# Patient Record
Sex: FEMALE | Race: BLACK OR AFRICAN AMERICAN | Employment: UNEMPLOYED | URBAN - NONMETROPOLITAN AREA
[De-identification: names, ages, dates, MRNs, and addresses within clinical notes are randomized per-mention and may not be internally consistent; named-entity substitution may affect disease eponyms.]

---

## 2018-12-11 ENCOUNTER — APPOINTMENT (OUTPATIENT)
Dept: CT IMAGING | Age: 48
End: 2018-12-11
Payer: MEDICARE

## 2018-12-11 ENCOUNTER — HOSPITAL ENCOUNTER (EMERGENCY)
Age: 48
Discharge: HOME | End: 2018-12-11
Attending: EMERGENCY MEDICINE
Payer: MEDICARE

## 2018-12-11 VITALS
OXYGEN SATURATION: 97 % | TEMPERATURE: 96.7 F | RESPIRATION RATE: 16 BRPM | DIASTOLIC BLOOD PRESSURE: 118 MMHG | HEART RATE: 92 BPM | SYSTOLIC BLOOD PRESSURE: 189 MMHG

## 2018-12-11 DIAGNOSIS — M54.31 SCIATICA OF RIGHT SIDE: Primary | ICD-10-CM

## 2018-12-11 DIAGNOSIS — I10 HYPERTENSION, UNSPECIFIED TYPE: ICD-10-CM

## 2018-12-11 LAB
-: ABNORMAL
AMORPHOUS: ABNORMAL
BACTERIA: ABNORMAL
BILIRUBIN URINE: NEGATIVE
CASTS UA: ABNORMAL /LPF
COLOR: YELLOW
COMMENT UA: ABNORMAL
CRYSTALS, UA: ABNORMAL /HPF
EPITHELIAL CELLS UA: ABNORMAL /HPF (ref 0–25)
GLUCOSE URINE: ABNORMAL
HCG(URINE) PREGNANCY TEST: NEGATIVE
KETONES, URINE: NEGATIVE
LEUKOCYTE ESTERASE, URINE: NEGATIVE
MUCUS: ABNORMAL
NITRITE, URINE: POSITIVE
OTHER OBSERVATIONS UA: ABNORMAL
PH UA: 6 (ref 5–9)
PROTEIN UA: ABNORMAL
RBC UA: ABNORMAL /HPF (ref 0–2)
RENAL EPITHELIAL, UA: ABNORMAL /HPF
SPECIFIC GRAVITY UA: 1.02 (ref 1.01–1.02)
TRICHOMONAS: ABNORMAL
TURBIDITY: ABNORMAL
URINE HGB: ABNORMAL
UROBILINOGEN, URINE: NORMAL
WBC UA: ABNORMAL /HPF (ref 0–5)
YEAST: ABNORMAL

## 2018-12-11 PROCEDURE — 81001 URINALYSIS AUTO W/SCOPE: CPT

## 2018-12-11 PROCEDURE — 84703 CHORIONIC GONADOTROPIN ASSAY: CPT

## 2018-12-11 PROCEDURE — 6360000002 HC RX W HCPCS: Performed by: PHYSICIAN ASSISTANT

## 2018-12-11 PROCEDURE — 99284 EMERGENCY DEPT VISIT MOD MDM: CPT

## 2018-12-11 PROCEDURE — 96374 THER/PROPH/DIAG INJ IV PUSH: CPT

## 2018-12-11 PROCEDURE — 87186 SC STD MICRODIL/AGAR DIL: CPT

## 2018-12-11 PROCEDURE — 87086 URINE CULTURE/COLONY COUNT: CPT

## 2018-12-11 PROCEDURE — 87077 CULTURE AEROBIC IDENTIFY: CPT

## 2018-12-11 PROCEDURE — 72131 CT LUMBAR SPINE W/O DYE: CPT

## 2018-12-11 RX ORDER — ORPHENADRINE CITRATE 100 MG/1
100 TABLET, EXTENDED RELEASE ORAL 2 TIMES DAILY
Qty: 20 TABLET | Refills: 0 | Status: SHIPPED | OUTPATIENT
Start: 2018-12-11 | End: 2018-12-19 | Stop reason: ALTCHOICE

## 2018-12-11 RX ORDER — LISINOPRIL 40 MG/1
40 TABLET ORAL DAILY
COMMUNITY
End: 2018-12-19 | Stop reason: ALTCHOICE

## 2018-12-11 RX ORDER — CIPROFLOXACIN 500 MG/1
500 TABLET, FILM COATED ORAL 2 TIMES DAILY
Qty: 14 TABLET | Refills: 0 | Status: SHIPPED | OUTPATIENT
Start: 2018-12-11 | End: 2018-12-18

## 2018-12-11 RX ORDER — ONDANSETRON 4 MG/1
4 TABLET, ORALLY DISINTEGRATING ORAL ONCE
Status: COMPLETED | OUTPATIENT
Start: 2018-12-11 | End: 2018-12-11

## 2018-12-11 RX ORDER — MORPHINE SULFATE 4 MG/ML
4 INJECTION, SOLUTION INTRAMUSCULAR; INTRAVENOUS ONCE
Status: COMPLETED | OUTPATIENT
Start: 2018-12-11 | End: 2018-12-11

## 2018-12-11 RX ADMIN — MORPHINE SULFATE 4 MG: 4 INJECTION, SOLUTION INTRAMUSCULAR; INTRAVENOUS at 12:13

## 2018-12-11 RX ADMIN — ONDANSETRON 4 MG: 4 TABLET, ORALLY DISINTEGRATING ORAL at 12:13

## 2018-12-11 ASSESSMENT — ENCOUNTER SYMPTOMS
COUGH: 0
BACK PAIN: 1
ABDOMINAL PAIN: 0
BLOOD IN STOOL: 0
VOMITING: 0
DIARRHEA: 0
CONSTIPATION: 0
SORE THROAT: 0
NAUSEA: 0
EYE DISCHARGE: 0
RHINORRHEA: 0
WHEEZING: 0
SHORTNESS OF BREATH: 0
EYE REDNESS: 0
CHEST TIGHTNESS: 0

## 2018-12-11 ASSESSMENT — PAIN DESCRIPTION - PAIN TYPE
TYPE: ACUTE PAIN
TYPE: ACUTE PAIN

## 2018-12-11 ASSESSMENT — PAIN DESCRIPTION - ORIENTATION: ORIENTATION: RIGHT

## 2018-12-11 ASSESSMENT — PAIN SCALES - GENERAL
PAINLEVEL_OUTOF10: 8
PAINLEVEL_OUTOF10: 8
PAINLEVEL_OUTOF10: 2

## 2018-12-11 ASSESSMENT — PAIN DESCRIPTION - LOCATION: LOCATION: LEG

## 2018-12-11 NOTE — ED PROVIDER NOTES
[facility] ED  eMERGENCY dEPARTMENT eNCOUnter      Pt Name: Anne Driscoll  MRN: [id number]  [place] 1970  Date of evaluation: 12/11/2018  Provider: Kitty Lemos Dr     Chief Complaint   Patient presents with    Leg Pain     states was seen at [facility] last month and diagnosed with sciatica a month ago, states still painful. right leg and hip         HISTORY OF PRESENT ILLNESS   (Location/Symptom, Timing/Onset, Context/Setting,Quality, Duration, Modifying Factors, Severity)  Note limiting factors. Anne Driscoll is a49 y.o. female who presents to the emergency department with complaints of right lower back discomfort which goes into her right lower leg which is been ongoing for the past  2 months but worsening over the past few days. Patient states she was seen at a different local ER and told she likely had sciatica and was given pain medication and sent home. She states she has not had any imaging on her back. Reports she works as a . And states that her pain has worsened. She denies any numbness or tingling. She denies any bowel or bladder incontinence. She denies fever or chills. Reports that she had been off her antihypertensive medication but did start taking her lisinopril again. She denies any headache or dizziness. She denies any unilateral weakness. She denies any abdominal pain or urinary retention. No other complaints at this time. HPI    Nursing Notes werereviewed. REVIEW OF SYSTEMS    (2-9 systems for level 4, 10 or more for level 5)     Review of Systems   Constitutional: Negative for chills, diaphoresis and fever. HENT: Negative for congestion, ear pain, rhinorrhea and sore throat. Eyes: Negative for discharge, redness and visual disturbance. Respiratory: Negative for cough, chest tightness, shortness of breath and wheezing. Cardiovascular: Negative for chest pain and palpitations.    Gastrointestinal: Negative for abdominal pain, blood in stool, constipation, diarrhea, nausea and vomiting. Endocrine: Negative for polydipsia, polyphagia and polyuria. Genitourinary: Negative for decreased urine volume, difficulty urinating, dysuria, frequency and hematuria. Musculoskeletal: Positive for back pain. Negative for arthralgias and myalgias. Skin: Negative for pallor and rash. Allergic/Immunologic: Negative for food allergies and immunocompromised state. Neurological: Negative for dizziness, syncope, weakness and light-headedness. Hematological: Negative for adenopathy. Does not bruise/bleed easily. Psychiatric/Behavioral: Negative for behavioral problems and suicidal ideas. The patient is not nervous/anxious. Except as noted above the remainder of the review of systems was reviewed and negative. PAST MEDICAL HISTORY     Past Medical History:   Diagnosis Date    Diabetes mellitus ([facility] [address]     Hypertension          SURGICALHISTORY     History reviewed. No pertinent surgical history. CURRENT MEDICATIONS       Discharge Medication List as of 12/11/2018  1:26 PM      CONTINUE these medications which have NOT CHANGED    Details   metFORMIN (GLUCOPHAGE) 1000 MG tablet Take 1,000 mg by mouth 2 times daily (with meals)Historical Med      lisinopril (PRINIVIL;ZESTRIL) 40 MG tablet Take 40 mg by mouth dailyHistorical Med             ALLERGIES     Patient has no known allergies. FAMILY HISTORY     History reviewed. No pertinent family history.        SOCIAL HISTORY       Social History     Social History    Marital status: Single     Spouse name: N/A    Number of children: N/A    Years of education: N/A     Social History Main Topics    Smoking status: Never Smoker    Smokeless tobacco: None    Alcohol use None    Drug use: Unknown    Sexual activity: Not Asked     Other Topics Concern    None     Social History Narrative    None       SCREENINGS    Doris Coma Scale  Eye Opening: Spontaneous  Best

## 2018-12-12 LAB
CULTURE: ABNORMAL
Lab: ABNORMAL
ORGANISM: ABNORMAL
SPECIMEN DESCRIPTION: ABNORMAL
STATUS: ABNORMAL

## 2018-12-18 RX ORDER — GLIPIZIDE 5 MG/1
5 TABLET ORAL
COMMUNITY
End: 2018-12-19 | Stop reason: ALTCHOICE

## 2018-12-19 ENCOUNTER — OFFICE VISIT (OUTPATIENT)
Dept: PRIMARY CARE CLINIC | Age: 48
End: 2018-12-19
Payer: MEDICARE

## 2018-12-19 VITALS
SYSTOLIC BLOOD PRESSURE: 180 MMHG | BODY MASS INDEX: 31.79 KG/M2 | HEART RATE: 98 BPM | RESPIRATION RATE: 16 BRPM | WEIGHT: 186.2 LBS | DIASTOLIC BLOOD PRESSURE: 100 MMHG | HEIGHT: 64 IN | TEMPERATURE: 97.2 F

## 2018-12-19 DIAGNOSIS — I10 UNCONTROLLED HYPERTENSION: ICD-10-CM

## 2018-12-19 DIAGNOSIS — M54.16 LUMBAR BACK PAIN WITH RADICULOPATHY AFFECTING RIGHT LOWER EXTREMITY: ICD-10-CM

## 2018-12-19 DIAGNOSIS — E11.65 UNCONTROLLED TYPE 2 DIABETES MELLITUS WITH HYPERGLYCEMIA (HCC): Primary | ICD-10-CM

## 2018-12-19 DIAGNOSIS — Z23 NEED FOR INFLUENZA VACCINATION: ICD-10-CM

## 2018-12-19 LAB — HBA1C MFR BLD: 10.4 %

## 2018-12-19 PROCEDURE — G8482 FLU IMMUNIZE ORDER/ADMIN: HCPCS | Performed by: NURSE PRACTITIONER

## 2018-12-19 PROCEDURE — 83036 HEMOGLOBIN GLYCOSYLATED A1C: CPT | Performed by: NURSE PRACTITIONER

## 2018-12-19 PROCEDURE — 1036F TOBACCO NON-USER: CPT | Performed by: NURSE PRACTITIONER

## 2018-12-19 PROCEDURE — 3046F HEMOGLOBIN A1C LEVEL >9.0%: CPT | Performed by: NURSE PRACTITIONER

## 2018-12-19 PROCEDURE — G8417 CALC BMI ABV UP PARAM F/U: HCPCS | Performed by: NURSE PRACTITIONER

## 2018-12-19 PROCEDURE — 90686 IIV4 VACC NO PRSV 0.5 ML IM: CPT | Performed by: NURSE PRACTITIONER

## 2018-12-19 PROCEDURE — 90471 IMMUNIZATION ADMIN: CPT | Performed by: NURSE PRACTITIONER

## 2018-12-19 PROCEDURE — 99203 OFFICE O/P NEW LOW 30 MIN: CPT | Performed by: NURSE PRACTITIONER

## 2018-12-19 PROCEDURE — 2022F DILAT RTA XM EVC RTNOPTHY: CPT | Performed by: NURSE PRACTITIONER

## 2018-12-19 PROCEDURE — G8427 DOCREV CUR MEDS BY ELIG CLIN: HCPCS | Performed by: NURSE PRACTITIONER

## 2018-12-19 RX ORDER — LOSARTAN POTASSIUM AND HYDROCHLOROTHIAZIDE 25; 100 MG/1; MG/1
1 TABLET ORAL DAILY
Qty: 90 TABLET | Refills: 1 | Status: SHIPPED | OUTPATIENT
Start: 2018-12-19 | End: 2019-07-23 | Stop reason: SDUPTHER

## 2018-12-19 RX ORDER — GABAPENTIN 300 MG/1
300 CAPSULE ORAL 3 TIMES DAILY
Qty: 270 CAPSULE | Refills: 0 | Status: SHIPPED | OUTPATIENT
Start: 2018-12-19 | End: 2019-02-04 | Stop reason: SDUPTHER

## 2018-12-19 ASSESSMENT — ENCOUNTER SYMPTOMS
BOWEL INCONTINENCE: 0
SHORTNESS OF BREATH: 0
ABDOMINAL PAIN: 0
SORE THROAT: 0
CONSTIPATION: 0
NAUSEA: 0
WHEEZING: 0
DIARRHEA: 0
BACK PAIN: 1
COUGH: 0
VOMITING: 0
RHINORRHEA: 0

## 2018-12-19 ASSESSMENT — PATIENT HEALTH QUESTIONNAIRE - PHQ9
2. FEELING DOWN, DEPRESSED OR HOPELESS: 0
SUM OF ALL RESPONSES TO PHQ9 QUESTIONS 1 & 2: 0
SUM OF ALL RESPONSES TO PHQ QUESTIONS 1-9: 0
SUM OF ALL RESPONSES TO PHQ QUESTIONS 1-9: 0
1. LITTLE INTEREST OR PLEASURE IN DOING THINGS: 0

## 2018-12-20 ENCOUNTER — HOSPITAL ENCOUNTER (OUTPATIENT)
Age: 48
Discharge: HOME | End: 2018-12-20
Payer: MEDICARE

## 2018-12-20 ENCOUNTER — TELEPHONE (OUTPATIENT)
Dept: PRIMARY CARE CLINIC | Age: 48
End: 2018-12-20

## 2018-12-20 DIAGNOSIS — E11.65 UNCONTROLLED TYPE 2 DIABETES MELLITUS WITH HYPERGLYCEMIA (HCC): ICD-10-CM

## 2018-12-20 DIAGNOSIS — E78.5 DYSLIPIDEMIA: Primary | ICD-10-CM

## 2018-12-20 DIAGNOSIS — D50.9 MICROCYTIC ANEMIA: Primary | ICD-10-CM

## 2018-12-20 LAB
ABSOLUTE EOS #: 0.22 K/UL (ref 0–0.44)
ABSOLUTE IMMATURE GRANULOCYTE: <0.03 K/UL (ref 0–0.3)
ABSOLUTE LYMPH #: 2.11 K/UL (ref 1.1–3.7)
ABSOLUTE MONO #: 0.74 K/UL (ref 0.1–1.2)
ALBUMIN SERPL-MCNC: 4 G/DL (ref 3.5–5.2)
ALBUMIN/GLOBULIN RATIO: 1.2 (ref 1–2.5)
ALP BLD-CCNC: 53 U/L (ref 35–104)
ALT SERPL-CCNC: 14 U/L (ref 5–33)
ANION GAP SERPL CALCULATED.3IONS-SCNC: 12 MMOL/L (ref 9–17)
AST SERPL-CCNC: 14 U/L
BASOPHILS # BLD: 1 % (ref 0–2)
BASOPHILS ABSOLUTE: 0.07 K/UL (ref 0–0.2)
BILIRUB SERPL-MCNC: 0.3 MG/DL (ref 0.3–1.2)
BUN BLDV-MCNC: 8 MG/DL (ref 6–20)
BUN/CREAT BLD: 15 (ref 9–20)
CALCIUM SERPL-MCNC: 10.2 MG/DL (ref 8.6–10.4)
CHLORIDE BLD-SCNC: 95 MMOL/L (ref 98–107)
CHOLESTEROL/HDL RATIO: 3.9
CHOLESTEROL: 212 MG/DL
CO2: 26 MMOL/L (ref 20–31)
CREAT SERPL-MCNC: 0.55 MG/DL (ref 0.5–0.9)
CREATININE URINE: 44.7 MG/DL (ref 28–217)
DIFFERENTIAL TYPE: ABNORMAL
EOSINOPHILS RELATIVE PERCENT: 3 % (ref 1–4)
GFR AFRICAN AMERICAN: >60 ML/MIN
GFR NON-AFRICAN AMERICAN: >60 ML/MIN
GFR SERPL CREATININE-BSD FRML MDRD: ABNORMAL ML/MIN/{1.73_M2}
GFR SERPL CREATININE-BSD FRML MDRD: ABNORMAL ML/MIN/{1.73_M2}
GLUCOSE BLD-MCNC: 228 MG/DL (ref 70–99)
HCT VFR BLD CALC: 32.3 % (ref 36.3–47.1)
HDLC SERPL-MCNC: 55 MG/DL
HEMOGLOBIN: 9.7 G/DL (ref 11.9–15.1)
IMMATURE GRANULOCYTES: 0 %
LDL CHOLESTEROL: 117 MG/DL (ref 0–130)
LYMPHOCYTES # BLD: 27 % (ref 24–43)
MCH RBC QN AUTO: 21.4 PG (ref 25.2–33.5)
MCHC RBC AUTO-ENTMCNC: 30 G/DL (ref 28.4–34.8)
MCV RBC AUTO: 71.3 FL (ref 82.6–102.9)
MICROALBUMIN/CREAT 24H UR: 180 MG/L
MICROALBUMIN/CREAT UR-RTO: 403 MCG/MG CREAT
MONOCYTES # BLD: 9 % (ref 3–12)
NRBC AUTOMATED: 0 PER 100 WBC
PDW BLD-RTO: 17.2 % (ref 11.8–14.4)
PLATELET # BLD: 447 K/UL (ref 138–453)
PLATELET ESTIMATE: ABNORMAL
PMV BLD AUTO: 9.1 FL (ref 8.1–13.5)
POTASSIUM SERPL-SCNC: 4.1 MMOL/L (ref 3.7–5.3)
RBC # BLD: 4.53 M/UL (ref 3.95–5.11)
RBC # BLD: ABNORMAL 10*6/UL
SEG NEUTROPHILS: 60 % (ref 36–65)
SEGMENTED NEUTROPHILS ABSOLUTE COUNT: 4.77 K/UL (ref 1.5–8.1)
SODIUM BLD-SCNC: 133 MMOL/L (ref 135–144)
TOTAL PROTEIN: 7.3 G/DL (ref 6.4–8.3)
TRIGL SERPL-MCNC: 202 MG/DL
VLDLC SERPL CALC-MCNC: ABNORMAL MG/DL (ref 1–30)
WBC # BLD: 7.9 K/UL (ref 3.5–11.3)
WBC # BLD: ABNORMAL 10*3/UL

## 2018-12-20 PROCEDURE — 85025 COMPLETE CBC W/AUTO DIFF WBC: CPT

## 2018-12-20 PROCEDURE — 82043 UR ALBUMIN QUANTITATIVE: CPT

## 2018-12-20 PROCEDURE — 80061 LIPID PANEL: CPT

## 2018-12-20 PROCEDURE — 82570 ASSAY OF URINE CREATININE: CPT

## 2018-12-20 PROCEDURE — 36415 COLL VENOUS BLD VENIPUNCTURE: CPT

## 2018-12-20 PROCEDURE — 80053 COMPREHEN METABOLIC PANEL: CPT

## 2018-12-20 RX ORDER — ATORVASTATIN CALCIUM 40 MG/1
40 TABLET, FILM COATED ORAL DAILY
Qty: 90 TABLET | Refills: 1 | Status: SHIPPED | OUTPATIENT
Start: 2018-12-20 | End: 2019-10-04 | Stop reason: SDUPTHER

## 2018-12-20 NOTE — TELEPHONE ENCOUNTER
----- Message from [address] [place] [address], APRN - CNP sent at 12/20/2018  4:46 PM EST -----  Labs stable, lipids elevated have patient start atorvastatin 40 mg daily. Rx sent. Thank you.

## 2018-12-21 ENCOUNTER — HOSPITAL ENCOUNTER (OUTPATIENT)
Age: 48
Discharge: HOME | End: 2018-12-21
Payer: MEDICARE

## 2018-12-21 DIAGNOSIS — D50.9 MICROCYTIC ANEMIA: ICD-10-CM

## 2018-12-21 DIAGNOSIS — D50.9 IRON DEFICIENCY ANEMIA, UNSPECIFIED IRON DEFICIENCY ANEMIA TYPE: Primary | ICD-10-CM

## 2018-12-21 LAB
FERRITIN: 16 UG/L (ref 13–150)
FOLATE: >20 NG/ML
IRON SATURATION: 7 % (ref 20–55)
IRON: 28 UG/DL (ref 37–145)
TOTAL IRON BINDING CAPACITY: 424 UG/DL (ref 250–450)
UNSATURATED IRON BINDING CAPACITY: 396.2 UG/DL (ref 112–347)
VITAMIN B-12: 655 PG/ML (ref 232–1245)

## 2018-12-21 PROCEDURE — 83540 ASSAY OF IRON: CPT

## 2018-12-21 PROCEDURE — 82728 ASSAY OF FERRITIN: CPT

## 2018-12-21 PROCEDURE — 82746 ASSAY OF FOLIC ACID SERUM: CPT

## 2018-12-21 PROCEDURE — 83550 IRON BINDING TEST: CPT

## 2018-12-21 PROCEDURE — 36415 COLL VENOUS BLD VENIPUNCTURE: CPT

## 2018-12-21 PROCEDURE — 82607 VITAMIN B-12: CPT

## 2018-12-24 ENCOUNTER — TELEPHONE (OUTPATIENT)
Dept: PRIMARY CARE CLINIC | Age: 48
End: 2018-12-24

## 2019-01-02 ENCOUNTER — TELEPHONE (OUTPATIENT)
Dept: PRIMARY CARE CLINIC | Age: 49
End: 2019-01-02

## 2019-01-02 DIAGNOSIS — M54.16 LUMBAR RADICULAR PAIN: Primary | ICD-10-CM

## 2019-01-10 ENCOUNTER — OFFICE VISIT (OUTPATIENT)
Dept: ONCOLOGY | Age: 49
End: 2019-01-10
Payer: MEDICARE

## 2019-01-10 ENCOUNTER — TELEPHONE (OUTPATIENT)
Dept: PRIMARY CARE CLINIC | Age: 49
End: 2019-01-10

## 2019-01-10 VITALS
HEIGHT: 64 IN | SYSTOLIC BLOOD PRESSURE: 183 MMHG | RESPIRATION RATE: 18 BRPM | BODY MASS INDEX: 31.92 KG/M2 | TEMPERATURE: 97.5 F | WEIGHT: 187 LBS | DIASTOLIC BLOOD PRESSURE: 114 MMHG | HEART RATE: 111 BPM

## 2019-01-10 DIAGNOSIS — D50.9 MICROCYTIC ANEMIA: Primary | ICD-10-CM

## 2019-01-10 DIAGNOSIS — K90.49 MALABSORPTION DUE TO INTOLERANCE, NOT ELSEWHERE CLASSIFIED: ICD-10-CM

## 2019-01-10 DIAGNOSIS — D50.9 IRON DEFICIENCY ANEMIA, UNSPECIFIED IRON DEFICIENCY ANEMIA TYPE: ICD-10-CM

## 2019-01-10 PROCEDURE — 1036F TOBACCO NON-USER: CPT | Performed by: INTERNAL MEDICINE

## 2019-01-10 PROCEDURE — 99204 OFFICE O/P NEW MOD 45 MIN: CPT | Performed by: INTERNAL MEDICINE

## 2019-01-10 PROCEDURE — G8417 CALC BMI ABV UP PARAM F/U: HCPCS | Performed by: INTERNAL MEDICINE

## 2019-01-10 PROCEDURE — G8482 FLU IMMUNIZE ORDER/ADMIN: HCPCS | Performed by: INTERNAL MEDICINE

## 2019-01-10 PROCEDURE — 3017F COLORECTAL CA SCREEN DOC REV: CPT | Performed by: INTERNAL MEDICINE

## 2019-01-10 PROCEDURE — G8427 DOCREV CUR MEDS BY ELIG CLIN: HCPCS | Performed by: INTERNAL MEDICINE

## 2019-01-10 RX ORDER — SODIUM CHLORIDE 9 MG/ML
INJECTION, SOLUTION INTRAVENOUS ONCE
Status: CANCELLED | OUTPATIENT
Start: 2019-01-14 | End: 2019-01-14

## 2019-01-10 RX ORDER — SODIUM CHLORIDE 9 MG/ML
INJECTION, SOLUTION INTRAVENOUS CONTINUOUS
Status: CANCELLED | OUTPATIENT
Start: 2019-01-14

## 2019-01-10 RX ORDER — 0.9 % SODIUM CHLORIDE 0.9 %
10 VIAL (ML) INJECTION ONCE
Status: CANCELLED | OUTPATIENT
Start: 2019-01-14 | End: 2019-01-14

## 2019-01-10 RX ORDER — DIPHENHYDRAMINE HYDROCHLORIDE 50 MG/ML
50 INJECTION INTRAMUSCULAR; INTRAVENOUS ONCE
Status: CANCELLED | OUTPATIENT
Start: 2019-01-14 | End: 2019-01-14

## 2019-01-10 RX ORDER — SODIUM CHLORIDE 0.9 % (FLUSH) 0.9 %
5 SYRINGE (ML) INJECTION PRN
Status: CANCELLED | OUTPATIENT
Start: 2019-01-14

## 2019-01-10 RX ORDER — HEPARIN SODIUM (PORCINE) LOCK FLUSH IV SOLN 100 UNIT/ML 100 UNIT/ML
500 SOLUTION INTRAVENOUS PRN
Status: CANCELLED | OUTPATIENT
Start: 2019-01-14

## 2019-01-10 RX ORDER — METHYLPREDNISOLONE SODIUM SUCCINATE 125 MG/2ML
125 INJECTION, POWDER, LYOPHILIZED, FOR SOLUTION INTRAMUSCULAR; INTRAVENOUS ONCE
Status: CANCELLED | OUTPATIENT
Start: 2019-01-14 | End: 2019-01-14

## 2019-01-10 RX ORDER — SODIUM CHLORIDE 0.9 % (FLUSH) 0.9 %
10 SYRINGE (ML) INJECTION PRN
Status: CANCELLED | OUTPATIENT
Start: 2019-01-14

## 2019-01-10 ASSESSMENT — ENCOUNTER SYMPTOMS
BLOOD IN STOOL: 0
DIARRHEA: 0
CHEST TIGHTNESS: 0
CONSTIPATION: 0
NAUSEA: 0
BACK PAIN: 0
WHEEZING: 0
ABDOMINAL PAIN: 0
EYE ITCHING: 0
COUGH: 0
VOMITING: 0
EYE REDNESS: 0
SHORTNESS OF BREATH: 0
COLOR CHANGE: 0

## 2019-01-11 ENCOUNTER — TELEPHONE (OUTPATIENT)
Dept: GASTROENTEROLOGY | Age: 49
End: 2019-01-11

## 2019-01-11 ENCOUNTER — TELEPHONE (OUTPATIENT)
Dept: ONCOLOGY | Age: 49
End: 2019-01-11

## 2019-01-11 DIAGNOSIS — D50.9 IRON DEFICIENCY ANEMIA, UNSPECIFIED IRON DEFICIENCY ANEMIA TYPE: Primary | ICD-10-CM

## 2019-01-11 RX ORDER — SODIUM, POTASSIUM,MAG SULFATES 17.5-3.13G
SOLUTION, RECONSTITUTED, ORAL ORAL
Qty: 2 BOTTLE | Refills: 0 | Status: SHIPPED | OUTPATIENT
Start: 2019-01-11 | End: 2019-02-20 | Stop reason: ALTCHOICE

## 2019-01-14 ENCOUNTER — NURSE ONLY (OUTPATIENT)
Dept: PRIMARY CARE CLINIC | Age: 49
End: 2019-01-14

## 2019-01-14 VITALS
HEART RATE: 102 BPM | HEIGHT: 64 IN | BODY MASS INDEX: 33.68 KG/M2 | RESPIRATION RATE: 16 BRPM | SYSTOLIC BLOOD PRESSURE: 162 MMHG | WEIGHT: 197.3 LBS | DIASTOLIC BLOOD PRESSURE: 97 MMHG | TEMPERATURE: 97.4 F

## 2019-01-14 DIAGNOSIS — I10 UNCONTROLLED HYPERTENSION: Primary | ICD-10-CM

## 2019-01-14 RX ORDER — NIFEDIPINE 30 MG/1
30 TABLET, EXTENDED RELEASE ORAL DAILY
Qty: 90 TABLET | Refills: 1 | Status: SHIPPED | OUTPATIENT
Start: 2019-01-14 | End: 2019-02-04 | Stop reason: DRUGHIGH

## 2019-01-15 ENCOUNTER — HOSPITAL ENCOUNTER (OUTPATIENT)
Dept: INFUSION THERAPY | Age: 49
Discharge: HOME | End: 2019-01-15
Payer: MEDICARE

## 2019-01-15 VITALS
DIASTOLIC BLOOD PRESSURE: 87 MMHG | RESPIRATION RATE: 18 BRPM | HEART RATE: 101 BPM | SYSTOLIC BLOOD PRESSURE: 163 MMHG | TEMPERATURE: 97.2 F

## 2019-01-15 DIAGNOSIS — K90.49 MALABSORPTION DUE TO INTOLERANCE, NOT ELSEWHERE CLASSIFIED: ICD-10-CM

## 2019-01-15 DIAGNOSIS — D50.9 IRON DEFICIENCY ANEMIA, UNSPECIFIED IRON DEFICIENCY ANEMIA TYPE: ICD-10-CM

## 2019-01-15 PROCEDURE — 2580000003 HC RX 258: Performed by: INTERNAL MEDICINE

## 2019-01-15 PROCEDURE — 6360000002 HC RX W HCPCS: Performed by: INTERNAL MEDICINE

## 2019-01-15 PROCEDURE — 96365 THER/PROPH/DIAG IV INF INIT: CPT

## 2019-01-15 RX ORDER — HEPARIN SODIUM (PORCINE) LOCK FLUSH IV SOLN 100 UNIT/ML 100 UNIT/ML
500 SOLUTION INTRAVENOUS PRN
Status: CANCELLED | OUTPATIENT
Start: 2019-01-15

## 2019-01-15 RX ORDER — SODIUM CHLORIDE 0.9 % (FLUSH) 0.9 %
10 SYRINGE (ML) INJECTION PRN
Status: DISCONTINUED | OUTPATIENT
Start: 2019-01-15 | End: 2019-01-16 | Stop reason: HOSPADM

## 2019-01-15 RX ORDER — METHYLPREDNISOLONE SODIUM SUCCINATE 125 MG/2ML
125 INJECTION, POWDER, LYOPHILIZED, FOR SOLUTION INTRAMUSCULAR; INTRAVENOUS ONCE
Status: CANCELLED | OUTPATIENT
Start: 2019-01-15 | End: 2019-01-15

## 2019-01-15 RX ORDER — SODIUM CHLORIDE 9 MG/ML
INJECTION, SOLUTION INTRAVENOUS ONCE
Status: COMPLETED | OUTPATIENT
Start: 2019-01-15 | End: 2019-01-15

## 2019-01-15 RX ORDER — SODIUM CHLORIDE 9 MG/ML
INJECTION, SOLUTION INTRAVENOUS ONCE
Status: CANCELLED | OUTPATIENT
Start: 2019-01-15 | End: 2019-01-15

## 2019-01-15 RX ORDER — SODIUM CHLORIDE 0.9 % (FLUSH) 0.9 %
5 SYRINGE (ML) INJECTION PRN
Status: CANCELLED | OUTPATIENT
Start: 2019-01-15

## 2019-01-15 RX ORDER — SODIUM CHLORIDE 9 MG/ML
INJECTION, SOLUTION INTRAVENOUS CONTINUOUS
Status: CANCELLED | OUTPATIENT
Start: 2019-01-15

## 2019-01-15 RX ORDER — DIPHENHYDRAMINE HYDROCHLORIDE 50 MG/ML
50 INJECTION INTRAMUSCULAR; INTRAVENOUS ONCE
Status: CANCELLED | OUTPATIENT
Start: 2019-01-15 | End: 2019-01-15

## 2019-01-15 RX ORDER — 0.9 % SODIUM CHLORIDE 0.9 %
10 VIAL (ML) INJECTION ONCE
Status: CANCELLED | OUTPATIENT
Start: 2019-01-15 | End: 2019-01-15

## 2019-01-15 RX ORDER — EPINEPHRINE 1 MG/ML
0.3 INJECTION, SOLUTION, CONCENTRATE INTRAVENOUS PRN
Status: CANCELLED | OUTPATIENT
Start: 2019-01-15

## 2019-01-15 RX ORDER — SODIUM CHLORIDE 0.9 % (FLUSH) 0.9 %
10 SYRINGE (ML) INJECTION PRN
Status: CANCELLED | OUTPATIENT
Start: 2019-01-15

## 2019-01-15 RX ADMIN — SODIUM CHLORIDE: 9 INJECTION, SOLUTION INTRAVENOUS at 13:07

## 2019-01-15 RX ADMIN — Medication 10 ML: at 13:00

## 2019-01-15 RX ADMIN — FERRIC CARBOXYMALTOSE INJECTION 750 MG: 50 INJECTION, SOLUTION INTRAVENOUS at 13:07

## 2019-01-25 ENCOUNTER — HOSPITAL ENCOUNTER (OUTPATIENT)
Dept: INFUSION THERAPY | Age: 49
Discharge: HOME | End: 2019-01-25
Payer: MEDICARE

## 2019-01-25 VITALS
HEART RATE: 96 BPM | TEMPERATURE: 98 F | RESPIRATION RATE: 18 BRPM | DIASTOLIC BLOOD PRESSURE: 98 MMHG | SYSTOLIC BLOOD PRESSURE: 176 MMHG

## 2019-01-25 DIAGNOSIS — K90.49 MALABSORPTION DUE TO INTOLERANCE, NOT ELSEWHERE CLASSIFIED: ICD-10-CM

## 2019-01-25 DIAGNOSIS — D50.9 IRON DEFICIENCY ANEMIA, UNSPECIFIED IRON DEFICIENCY ANEMIA TYPE: ICD-10-CM

## 2019-01-25 PROCEDURE — 96365 THER/PROPH/DIAG IV INF INIT: CPT

## 2019-01-25 PROCEDURE — 6360000002 HC RX W HCPCS: Performed by: INTERNAL MEDICINE

## 2019-01-25 PROCEDURE — 2580000003 HC RX 258: Performed by: INTERNAL MEDICINE

## 2019-01-25 RX ORDER — HEPARIN SODIUM (PORCINE) LOCK FLUSH IV SOLN 100 UNIT/ML 100 UNIT/ML
500 SOLUTION INTRAVENOUS PRN
Status: CANCELLED | OUTPATIENT
Start: 2019-01-25

## 2019-01-25 RX ORDER — SODIUM CHLORIDE 0.9 % (FLUSH) 0.9 %
5 SYRINGE (ML) INJECTION PRN
Status: CANCELLED | OUTPATIENT
Start: 2019-01-25

## 2019-01-25 RX ORDER — SODIUM CHLORIDE 9 MG/ML
INJECTION, SOLUTION INTRAVENOUS ONCE
Status: CANCELLED | OUTPATIENT
Start: 2019-01-25 | End: 2019-01-25

## 2019-01-25 RX ORDER — SODIUM CHLORIDE 0.9 % (FLUSH) 0.9 %
10 SYRINGE (ML) INJECTION PRN
Status: DISCONTINUED | OUTPATIENT
Start: 2019-01-25 | End: 2019-01-26 | Stop reason: HOSPADM

## 2019-01-25 RX ORDER — DIPHENHYDRAMINE HYDROCHLORIDE 50 MG/ML
50 INJECTION INTRAMUSCULAR; INTRAVENOUS ONCE
Status: CANCELLED | OUTPATIENT
Start: 2019-01-25 | End: 2019-01-25

## 2019-01-25 RX ORDER — SODIUM CHLORIDE 9 MG/ML
INJECTION, SOLUTION INTRAVENOUS ONCE
Status: COMPLETED | OUTPATIENT
Start: 2019-01-25 | End: 2019-01-25

## 2019-01-25 RX ORDER — SODIUM CHLORIDE 0.9 % (FLUSH) 0.9 %
10 SYRINGE (ML) INJECTION PRN
Status: CANCELLED | OUTPATIENT
Start: 2019-01-25

## 2019-01-25 RX ORDER — METHYLPREDNISOLONE SODIUM SUCCINATE 125 MG/2ML
125 INJECTION, POWDER, LYOPHILIZED, FOR SOLUTION INTRAMUSCULAR; INTRAVENOUS ONCE
Status: CANCELLED | OUTPATIENT
Start: 2019-01-25 | End: 2019-01-25

## 2019-01-25 RX ORDER — EPINEPHRINE 1 MG/ML
0.3 INJECTION, SOLUTION, CONCENTRATE INTRAVENOUS PRN
Status: CANCELLED | OUTPATIENT
Start: 2019-01-25

## 2019-01-25 RX ORDER — SODIUM CHLORIDE 9 MG/ML
INJECTION, SOLUTION INTRAVENOUS CONTINUOUS
Status: CANCELLED | OUTPATIENT
Start: 2019-01-25

## 2019-01-25 RX ORDER — 0.9 % SODIUM CHLORIDE 0.9 %
10 VIAL (ML) INJECTION ONCE
Status: CANCELLED | OUTPATIENT
Start: 2019-01-25 | End: 2019-01-25

## 2019-01-25 RX ADMIN — FERRIC CARBOXYMALTOSE INJECTION 750 MG: 50 INJECTION, SOLUTION INTRAVENOUS at 09:20

## 2019-01-25 RX ADMIN — SODIUM CHLORIDE: 9 INJECTION, SOLUTION INTRAVENOUS at 09:12

## 2019-01-25 RX ADMIN — Medication 10 ML: at 09:11

## 2019-01-25 NOTE — PROGRESS NOTES
Pt here for iron infusion and BP noted to still be elevated. Pt states she saw Leila Butcher after her last infusion where her BP's were also elevated and he added an additional medication. Advised pt to call office of Leila Butcher today and inform them that her BP remains elevated even with the addition of the medication last week. Pt states she took all her medication this am at 0800. Pt denies any headache or problems at this time. Pt states she is scheduled to see Leila Butcher next Wednesday.

## 2019-02-04 ENCOUNTER — OFFICE VISIT (OUTPATIENT)
Dept: PRIMARY CARE CLINIC | Age: 49
End: 2019-02-04
Payer: MEDICARE

## 2019-02-04 VITALS
HEART RATE: 116 BPM | WEIGHT: 190.2 LBS | DIASTOLIC BLOOD PRESSURE: 74 MMHG | BODY MASS INDEX: 32.65 KG/M2 | SYSTOLIC BLOOD PRESSURE: 152 MMHG | TEMPERATURE: 98.1 F | RESPIRATION RATE: 14 BRPM

## 2019-02-04 DIAGNOSIS — E11.65 UNCONTROLLED TYPE 2 DIABETES MELLITUS WITH HYPERGLYCEMIA (HCC): Primary | ICD-10-CM

## 2019-02-04 DIAGNOSIS — Z12.31 ENCOUNTER FOR SCREENING MAMMOGRAM FOR BREAST CANCER: ICD-10-CM

## 2019-02-04 DIAGNOSIS — I10 UNCONTROLLED HYPERTENSION: ICD-10-CM

## 2019-02-04 DIAGNOSIS — M54.16 LUMBAR BACK PAIN WITH RADICULOPATHY AFFECTING RIGHT LOWER EXTREMITY: ICD-10-CM

## 2019-02-04 LAB — HBA1C MFR BLD: 10.5 %

## 2019-02-04 PROCEDURE — 3046F HEMOGLOBIN A1C LEVEL >9.0%: CPT | Performed by: NURSE PRACTITIONER

## 2019-02-04 PROCEDURE — G8417 CALC BMI ABV UP PARAM F/U: HCPCS | Performed by: NURSE PRACTITIONER

## 2019-02-04 PROCEDURE — 1036F TOBACCO NON-USER: CPT | Performed by: NURSE PRACTITIONER

## 2019-02-04 PROCEDURE — 3017F COLORECTAL CA SCREEN DOC REV: CPT | Performed by: NURSE PRACTITIONER

## 2019-02-04 PROCEDURE — G8427 DOCREV CUR MEDS BY ELIG CLIN: HCPCS | Performed by: NURSE PRACTITIONER

## 2019-02-04 PROCEDURE — 99214 OFFICE O/P EST MOD 30 MIN: CPT | Performed by: NURSE PRACTITIONER

## 2019-02-04 PROCEDURE — G8482 FLU IMMUNIZE ORDER/ADMIN: HCPCS | Performed by: NURSE PRACTITIONER

## 2019-02-04 PROCEDURE — 83036 HEMOGLOBIN GLYCOSYLATED A1C: CPT | Performed by: NURSE PRACTITIONER

## 2019-02-04 PROCEDURE — 2022F DILAT RTA XM EVC RTNOPTHY: CPT | Performed by: NURSE PRACTITIONER

## 2019-02-04 RX ORDER — NIFEDIPINE 30 MG/1
60 TABLET, EXTENDED RELEASE ORAL DAILY
Qty: 90 TABLET | Refills: 1 | Status: SHIPPED | OUTPATIENT
Start: 2019-02-04 | End: 2019-10-04 | Stop reason: SDUPTHER

## 2019-02-04 RX ORDER — GABAPENTIN 600 MG/1
600 TABLET ORAL 3 TIMES DAILY
Qty: 270 TABLET | Refills: 0 | Status: SHIPPED | OUTPATIENT
Start: 2019-02-04 | End: 2019-10-04

## 2019-02-04 ASSESSMENT — ENCOUNTER SYMPTOMS
ABDOMINAL PAIN: 0
RHINORRHEA: 0
SORE THROAT: 0
DIARRHEA: 0
BLURRED VISION: 0
CONSTIPATION: 0
WHEEZING: 0
BOWEL INCONTINENCE: 0
NAUSEA: 0
VISUAL CHANGE: 0
SHORTNESS OF BREATH: 0
BACK PAIN: 1
COUGH: 0
VOMITING: 0
ORTHOPNEA: 0

## 2019-02-06 ENCOUNTER — TELEPHONE (OUTPATIENT)
Dept: PRIMARY CARE CLINIC | Age: 49
End: 2019-02-06

## 2019-02-13 RX ORDER — SODIUM, POTASSIUM,MAG SULFATES 17.5-3.13G
SOLUTION, RECONSTITUTED, ORAL ORAL
Qty: 2 BOTTLE | Refills: 0 | Status: SHIPPED | OUTPATIENT
Start: 2019-02-13 | End: 2019-02-20 | Stop reason: ALTCHOICE

## 2019-02-19 ENCOUNTER — NURSE ONLY (OUTPATIENT)
Dept: PRIMARY CARE CLINIC | Age: 49
End: 2019-02-19

## 2019-02-19 VITALS
WEIGHT: 186.2 LBS | TEMPERATURE: 97.4 F | BODY MASS INDEX: 31.79 KG/M2 | RESPIRATION RATE: 14 BRPM | SYSTOLIC BLOOD PRESSURE: 142 MMHG | HEART RATE: 90 BPM | HEIGHT: 64 IN | DIASTOLIC BLOOD PRESSURE: 88 MMHG

## 2019-02-19 DIAGNOSIS — I10 ESSENTIAL HYPERTENSION: Primary | ICD-10-CM

## 2019-02-20 ENCOUNTER — ANESTHESIA EVENT (OUTPATIENT)
Dept: OPERATING ROOM | Age: 49
End: 2019-02-20
Payer: MEDICARE

## 2019-02-20 ENCOUNTER — TELEPHONE (OUTPATIENT)
Dept: GASTROENTEROLOGY | Age: 49
End: 2019-02-20

## 2019-02-20 ENCOUNTER — ANESTHESIA (OUTPATIENT)
Dept: OPERATING ROOM | Age: 49
End: 2019-02-20
Payer: MEDICARE

## 2019-02-20 ENCOUNTER — HOSPITAL ENCOUNTER (OUTPATIENT)
Age: 49
Setting detail: OUTPATIENT SURGERY
Discharge: HOME | End: 2019-02-20
Attending: INTERNAL MEDICINE | Admitting: INTERNAL MEDICINE
Payer: MEDICARE

## 2019-02-20 ENCOUNTER — ANESTHESIA (OUTPATIENT)
Dept: OPERATING ROOM | Age: 49
End: 2019-02-20

## 2019-02-20 VITALS
DIASTOLIC BLOOD PRESSURE: 67 MMHG | RESPIRATION RATE: 12 BRPM | OXYGEN SATURATION: 99 % | SYSTOLIC BLOOD PRESSURE: 99 MMHG

## 2019-02-20 VITALS
TEMPERATURE: 98.1 F | WEIGHT: 186 LBS | DIASTOLIC BLOOD PRESSURE: 96 MMHG | RESPIRATION RATE: 18 BRPM | HEIGHT: 64 IN | SYSTOLIC BLOOD PRESSURE: 179 MMHG | HEART RATE: 94 BPM | OXYGEN SATURATION: 100 % | BODY MASS INDEX: 31.76 KG/M2

## 2019-02-20 PROCEDURE — 6360000002 HC RX W HCPCS: Performed by: NURSE ANESTHETIST, CERTIFIED REGISTERED

## 2019-02-20 PROCEDURE — 2500000003 HC RX 250 WO HCPCS: Performed by: NURSE ANESTHETIST, CERTIFIED REGISTERED

## 2019-02-20 PROCEDURE — 2580000003 HC RX 258: Performed by: INTERNAL MEDICINE

## 2019-02-20 RX ORDER — SODIUM, POTASSIUM,MAG SULFATES 17.5-3.13G
SOLUTION, RECONSTITUTED, ORAL ORAL
Status: ON HOLD | COMMUNITY
End: 2019-03-06 | Stop reason: HOSPADM

## 2019-02-20 RX ORDER — SODIUM CHLORIDE, SODIUM LACTATE, POTASSIUM CHLORIDE, CALCIUM CHLORIDE 600; 310; 30; 20 MG/100ML; MG/100ML; MG/100ML; MG/100ML
INJECTION, SOLUTION INTRAVENOUS CONTINUOUS
Status: DISCONTINUED | OUTPATIENT
Start: 2019-02-20 | End: 2019-02-20 | Stop reason: HOSPADM

## 2019-02-20 RX ORDER — LIDOCAINE HYDROCHLORIDE 20 MG/ML
INJECTION, SOLUTION INFILTRATION; PERINEURAL PRN
Status: DISCONTINUED | OUTPATIENT
Start: 2019-02-20 | End: 2019-02-20 | Stop reason: SDUPTHER

## 2019-02-20 RX ORDER — PROPOFOL 10 MG/ML
INJECTION, EMULSION INTRAVENOUS CONTINUOUS PRN
Status: DISCONTINUED | OUTPATIENT
Start: 2019-02-20 | End: 2019-02-20 | Stop reason: SDUPTHER

## 2019-02-20 RX ADMIN — SODIUM CHLORIDE, POTASSIUM CHLORIDE, SODIUM LACTATE AND CALCIUM CHLORIDE: 600; 310; 30; 20 INJECTION, SOLUTION INTRAVENOUS at 12:48

## 2019-02-20 RX ADMIN — LIDOCAINE HYDROCHLORIDE 100 MG: 20 INJECTION, SOLUTION INFILTRATION; PERINEURAL at 14:40

## 2019-02-20 RX ADMIN — PROPOFOL 140 MCG/KG/MIN: 10 INJECTION, EMULSION INTRAVENOUS at 14:40

## 2019-02-20 ASSESSMENT — PAIN - FUNCTIONAL ASSESSMENT: PAIN_FUNCTIONAL_ASSESSMENT: 0-10

## 2019-02-21 ENCOUNTER — TELEPHONE (OUTPATIENT)
Dept: PRIMARY CARE CLINIC | Age: 49
End: 2019-02-21

## 2019-03-06 ENCOUNTER — ANESTHESIA EVENT (OUTPATIENT)
Dept: OPERATING ROOM | Age: 49
End: 2019-03-06
Payer: MEDICARE

## 2019-03-06 ENCOUNTER — HOSPITAL ENCOUNTER (OUTPATIENT)
Age: 49
Setting detail: OUTPATIENT SURGERY
Discharge: HOME | End: 2019-03-06
Attending: INTERNAL MEDICINE | Admitting: INTERNAL MEDICINE
Payer: MEDICARE

## 2019-03-06 ENCOUNTER — ANESTHESIA (OUTPATIENT)
Dept: OPERATING ROOM | Age: 49
End: 2019-03-06
Payer: MEDICARE

## 2019-03-06 VITALS
RESPIRATION RATE: 18 BRPM | DIASTOLIC BLOOD PRESSURE: 90 MMHG | HEART RATE: 94 BPM | TEMPERATURE: 98.4 F | WEIGHT: 186 LBS | OXYGEN SATURATION: 100 % | SYSTOLIC BLOOD PRESSURE: 144 MMHG | BODY MASS INDEX: 31.76 KG/M2 | HEIGHT: 64 IN

## 2019-03-06 VITALS
SYSTOLIC BLOOD PRESSURE: 138 MMHG | DIASTOLIC BLOOD PRESSURE: 82 MMHG | RESPIRATION RATE: 17 BRPM | OXYGEN SATURATION: 99 %

## 2019-03-06 LAB
DIRECT EXAM: POSITIVE
Lab: ABNORMAL
SPECIMEN DESCRIPTION: ABNORMAL

## 2019-03-06 PROCEDURE — 2580000003 HC RX 258: Performed by: INTERNAL MEDICINE

## 2019-03-06 PROCEDURE — 43239 EGD BIOPSY SINGLE/MULTIPLE: CPT | Performed by: INTERNAL MEDICINE

## 2019-03-06 PROCEDURE — 45378 DIAGNOSTIC COLONOSCOPY: CPT | Performed by: INTERNAL MEDICINE

## 2019-03-06 PROCEDURE — 88305 TISSUE EXAM BY PATHOLOGIST: CPT

## 2019-03-06 PROCEDURE — 7100000011 HC PHASE II RECOVERY - ADDTL 15 MIN: Performed by: INTERNAL MEDICINE

## 2019-03-06 PROCEDURE — 3700000000 HC ANESTHESIA ATTENDED CARE: Performed by: INTERNAL MEDICINE

## 2019-03-06 PROCEDURE — 87077 CULTURE AEROBIC IDENTIFY: CPT

## 2019-03-06 PROCEDURE — 3609009500 HC COLONOSCOPY DIAGNOSTIC OR SCREENING: Performed by: INTERNAL MEDICINE

## 2019-03-06 PROCEDURE — 6360000002 HC RX W HCPCS: Performed by: NURSE ANESTHETIST, CERTIFIED REGISTERED

## 2019-03-06 PROCEDURE — 3609012400 HC EGD TRANSORAL BIOPSY SINGLE/MULTIPLE: Performed by: INTERNAL MEDICINE

## 2019-03-06 PROCEDURE — 3700000001 HC ADD 15 MINUTES (ANESTHESIA): Performed by: INTERNAL MEDICINE

## 2019-03-06 PROCEDURE — 7100000010 HC PHASE II RECOVERY - FIRST 15 MIN: Performed by: INTERNAL MEDICINE

## 2019-03-06 PROCEDURE — 2500000003 HC RX 250 WO HCPCS: Performed by: NURSE ANESTHETIST, CERTIFIED REGISTERED

## 2019-03-06 PROCEDURE — 2709999900 HC NON-CHARGEABLE SUPPLY: Performed by: INTERNAL MEDICINE

## 2019-03-06 RX ORDER — PROPOFOL 10 MG/ML
INJECTION, EMULSION INTRAVENOUS CONTINUOUS PRN
Status: DISCONTINUED | OUTPATIENT
Start: 2019-03-06 | End: 2019-03-06 | Stop reason: SDUPTHER

## 2019-03-06 RX ORDER — SODIUM CHLORIDE, SODIUM LACTATE, POTASSIUM CHLORIDE, CALCIUM CHLORIDE 600; 310; 30; 20 MG/100ML; MG/100ML; MG/100ML; MG/100ML
INJECTION, SOLUTION INTRAVENOUS CONTINUOUS
Status: DISCONTINUED | OUTPATIENT
Start: 2019-03-06 | End: 2019-03-06 | Stop reason: HOSPADM

## 2019-03-06 RX ORDER — PROPOFOL 10 MG/ML
INJECTION, EMULSION INTRAVENOUS PRN
Status: DISCONTINUED | OUTPATIENT
Start: 2019-03-06 | End: 2019-03-06 | Stop reason: SDUPTHER

## 2019-03-06 RX ORDER — LIDOCAINE HYDROCHLORIDE 20 MG/ML
INJECTION, SOLUTION EPIDURAL; INFILTRATION; INTRACAUDAL; PERINEURAL PRN
Status: DISCONTINUED | OUTPATIENT
Start: 2019-03-06 | End: 2019-03-06 | Stop reason: SDUPTHER

## 2019-03-06 RX ADMIN — PROPOFOL 30 MG: 10 INJECTION, EMULSION INTRAVENOUS at 12:49

## 2019-03-06 RX ADMIN — PROPOFOL 100 MG: 10 INJECTION, EMULSION INTRAVENOUS at 13:15

## 2019-03-06 RX ADMIN — PROPOFOL 200 MCG/KG/MIN: 10 INJECTION, EMULSION INTRAVENOUS at 12:47

## 2019-03-06 RX ADMIN — PROPOFOL 30 MG: 10 INJECTION, EMULSION INTRAVENOUS at 12:54

## 2019-03-06 RX ADMIN — SODIUM CHLORIDE, POTASSIUM CHLORIDE, SODIUM LACTATE AND CALCIUM CHLORIDE: 600; 310; 30; 20 INJECTION, SOLUTION INTRAVENOUS at 11:23

## 2019-03-06 RX ADMIN — PROPOFOL 30 MG: 10 INJECTION, EMULSION INTRAVENOUS at 12:58

## 2019-03-06 RX ADMIN — PROPOFOL 80 MG: 10 INJECTION, EMULSION INTRAVENOUS at 12:47

## 2019-03-06 RX ADMIN — LIDOCAINE HYDROCHLORIDE 100 MG: 20 INJECTION, SOLUTION EPIDURAL; INFILTRATION; INTRACAUDAL; PERINEURAL at 12:47

## 2019-03-06 RX ADMIN — LIDOCAINE HYDROCHLORIDE 100 MG: 20 INJECTION, SOLUTION EPIDURAL; INFILTRATION; INTRACAUDAL; PERINEURAL at 13:15

## 2019-03-06 RX ADMIN — PROPOFOL 30 MG: 10 INJECTION, EMULSION INTRAVENOUS at 12:51

## 2019-03-06 ASSESSMENT — PAIN SCALES - GENERAL
PAINLEVEL_OUTOF10: 0

## 2019-03-06 ASSESSMENT — PAIN - FUNCTIONAL ASSESSMENT: PAIN_FUNCTIONAL_ASSESSMENT: 0-10

## 2019-03-08 LAB — SURGICAL PATHOLOGY REPORT: NORMAL

## 2019-03-11 ENCOUNTER — TELEPHONE (OUTPATIENT)
Dept: GASTROENTEROLOGY | Age: 49
End: 2019-03-11

## 2019-03-11 DIAGNOSIS — A04.8 H. PYLORI INFECTION: Primary | ICD-10-CM

## 2019-03-12 ENCOUNTER — ANESTHESIA EVENT (OUTPATIENT)
Dept: OPERATING ROOM | Age: 49
End: 2019-03-12

## 2019-03-15 ENCOUNTER — HOSPITAL ENCOUNTER (OUTPATIENT)
Age: 49
Discharge: HOME | End: 2019-03-15
Payer: MEDICARE

## 2019-03-15 DIAGNOSIS — D50.9 IRON DEFICIENCY ANEMIA, UNSPECIFIED IRON DEFICIENCY ANEMIA TYPE: ICD-10-CM

## 2019-03-15 LAB
ABSOLUTE EOS #: 0.23 K/UL (ref 0–0.44)
ABSOLUTE IMMATURE GRANULOCYTE: 0.03 K/UL (ref 0–0.3)
ABSOLUTE LYMPH #: 2.63 K/UL (ref 1.1–3.7)
ABSOLUTE MONO #: 0.61 K/UL (ref 0.1–1.2)
ALBUMIN SERPL-MCNC: 4 G/DL (ref 3.5–5.2)
ALBUMIN/GLOBULIN RATIO: 1.1 (ref 1–2.5)
ALP BLD-CCNC: 61 U/L (ref 35–104)
ALT SERPL-CCNC: 23 U/L (ref 5–33)
ANION GAP SERPL CALCULATED.3IONS-SCNC: 16 MMOL/L (ref 9–17)
AST SERPL-CCNC: 15 U/L
BASOPHILS # BLD: 1 % (ref 0–2)
BASOPHILS ABSOLUTE: 0.05 K/UL (ref 0–0.2)
BILIRUB SERPL-MCNC: 0.55 MG/DL (ref 0.3–1.2)
BUN BLDV-MCNC: 15 MG/DL (ref 6–20)
BUN/CREAT BLD: 21 (ref 9–20)
CALCIUM SERPL-MCNC: 9.1 MG/DL (ref 8.6–10.4)
CHLORIDE BLD-SCNC: 97 MMOL/L (ref 98–107)
CO2: 22 MMOL/L (ref 20–31)
CREAT SERPL-MCNC: 0.73 MG/DL (ref 0.5–0.9)
DIFFERENTIAL TYPE: ABNORMAL
EOSINOPHILS RELATIVE PERCENT: 3 % (ref 1–4)
FERRITIN: 829 UG/L (ref 13–150)
FOLATE: >20 NG/ML
GFR AFRICAN AMERICAN: >60 ML/MIN
GFR NON-AFRICAN AMERICAN: >60 ML/MIN
GFR SERPL CREATININE-BSD FRML MDRD: ABNORMAL ML/MIN/{1.73_M2}
GFR SERPL CREATININE-BSD FRML MDRD: ABNORMAL ML/MIN/{1.73_M2}
GLUCOSE BLD-MCNC: 260 MG/DL (ref 70–99)
HCT VFR BLD CALC: 34.4 % (ref 36.3–47.1)
HEMOGLOBIN: 10.5 G/DL (ref 11.9–15.1)
IMMATURE GRANULOCYTES: 0 %
IRON SATURATION: 36 % (ref 20–55)
IRON: 116 UG/DL (ref 37–145)
LYMPHOCYTES # BLD: 30 % (ref 24–43)
MCH RBC QN AUTO: 22.5 PG (ref 25.2–33.5)
MCHC RBC AUTO-ENTMCNC: 30.5 G/DL (ref 28.4–34.8)
MCV RBC AUTO: 73.7 FL (ref 82.6–102.9)
MONOCYTES # BLD: 7 % (ref 3–12)
NRBC AUTOMATED: 0 PER 100 WBC
PDW BLD-RTO: 19.5 % (ref 11.8–14.4)
PLATELET # BLD: 338 K/UL (ref 138–453)
PLATELET ESTIMATE: ABNORMAL
PMV BLD AUTO: 9.4 FL (ref 8.1–13.5)
POTASSIUM SERPL-SCNC: 4 MMOL/L (ref 3.7–5.3)
RBC # BLD: 4.67 M/UL (ref 3.95–5.11)
RBC # BLD: ABNORMAL 10*6/UL
SEG NEUTROPHILS: 59 % (ref 36–65)
SEGMENTED NEUTROPHILS ABSOLUTE COUNT: 5.38 K/UL (ref 1.5–8.1)
SODIUM BLD-SCNC: 135 MMOL/L (ref 135–144)
TOTAL IRON BINDING CAPACITY: 327 UG/DL (ref 250–450)
TOTAL PROTEIN: 7.5 G/DL (ref 6.4–8.3)
UNSATURATED IRON BINDING CAPACITY: 210.5 UG/DL (ref 112–347)
VITAMIN B-12: 418 PG/ML (ref 232–1245)
WBC # BLD: 8.9 K/UL (ref 3.5–11.3)
WBC # BLD: ABNORMAL 10*3/UL

## 2019-03-15 PROCEDURE — 82746 ASSAY OF FOLIC ACID SERUM: CPT

## 2019-03-15 PROCEDURE — 84238 ASSAY NONENDOCRINE RECEPTOR: CPT

## 2019-03-15 PROCEDURE — 85025 COMPLETE CBC W/AUTO DIFF WBC: CPT

## 2019-03-15 PROCEDURE — 83550 IRON BINDING TEST: CPT

## 2019-03-15 PROCEDURE — 83540 ASSAY OF IRON: CPT

## 2019-03-15 PROCEDURE — 80053 COMPREHEN METABOLIC PANEL: CPT

## 2019-03-15 PROCEDURE — 36415 COLL VENOUS BLD VENIPUNCTURE: CPT

## 2019-03-15 PROCEDURE — 82728 ASSAY OF FERRITIN: CPT

## 2019-03-15 PROCEDURE — 82607 VITAMIN B-12: CPT

## 2019-03-17 LAB — SOLUBLE TRANSFERRIN RECEPT: 3.1 MG/L (ref 1.9–4.4)

## 2019-03-28 ENCOUNTER — OFFICE VISIT (OUTPATIENT)
Dept: ONCOLOGY | Age: 49
End: 2019-03-28
Payer: MEDICARE

## 2019-03-28 VITALS
HEART RATE: 108 BPM | HEIGHT: 64 IN | SYSTOLIC BLOOD PRESSURE: 148 MMHG | BODY MASS INDEX: 32.23 KG/M2 | WEIGHT: 188.8 LBS | TEMPERATURE: 97.2 F | DIASTOLIC BLOOD PRESSURE: 103 MMHG

## 2019-03-28 DIAGNOSIS — K64.8 INTERNAL HEMORRHOIDS: ICD-10-CM

## 2019-03-28 DIAGNOSIS — D50.9 IRON DEFICIENCY ANEMIA, UNSPECIFIED IRON DEFICIENCY ANEMIA TYPE: Primary | ICD-10-CM

## 2019-03-28 DIAGNOSIS — K90.49 MALABSORPTION DUE TO INTOLERANCE, NOT ELSEWHERE CLASSIFIED: ICD-10-CM

## 2019-03-28 PROCEDURE — G8417 CALC BMI ABV UP PARAM F/U: HCPCS | Performed by: INTERNAL MEDICINE

## 2019-03-28 PROCEDURE — 3017F COLORECTAL CA SCREEN DOC REV: CPT | Performed by: INTERNAL MEDICINE

## 2019-03-28 PROCEDURE — 1036F TOBACCO NON-USER: CPT | Performed by: INTERNAL MEDICINE

## 2019-03-28 PROCEDURE — 99214 OFFICE O/P EST MOD 30 MIN: CPT | Performed by: INTERNAL MEDICINE

## 2019-03-28 PROCEDURE — G8427 DOCREV CUR MEDS BY ELIG CLIN: HCPCS | Performed by: INTERNAL MEDICINE

## 2019-03-28 PROCEDURE — G8482 FLU IMMUNIZE ORDER/ADMIN: HCPCS | Performed by: INTERNAL MEDICINE

## 2019-03-28 RX ORDER — SODIUM CHLORIDE 0.9 % (FLUSH) 0.9 %
10 SYRINGE (ML) INJECTION PRN
Status: CANCELLED | OUTPATIENT
Start: 2019-04-02

## 2019-03-28 RX ORDER — METHYLPREDNISOLONE SODIUM SUCCINATE 125 MG/2ML
125 INJECTION, POWDER, LYOPHILIZED, FOR SOLUTION INTRAMUSCULAR; INTRAVENOUS ONCE
Status: CANCELLED | OUTPATIENT
Start: 2019-04-02 | End: 2019-04-02

## 2019-03-28 RX ORDER — DIPHENHYDRAMINE HYDROCHLORIDE 50 MG/ML
50 INJECTION INTRAMUSCULAR; INTRAVENOUS ONCE
Status: CANCELLED | OUTPATIENT
Start: 2019-04-02 | End: 2019-04-02

## 2019-03-28 RX ORDER — ALBUTEROL SULFATE 90 UG/1
1 AEROSOL, METERED RESPIRATORY (INHALATION)
COMMUNITY
Start: 2017-03-30 | End: 2019-10-04

## 2019-03-28 RX ORDER — SODIUM CHLORIDE 9 MG/ML
INJECTION, SOLUTION INTRAVENOUS CONTINUOUS
Status: CANCELLED | OUTPATIENT
Start: 2019-04-02

## 2019-03-28 RX ORDER — SODIUM CHLORIDE 0.9 % (FLUSH) 0.9 %
5 SYRINGE (ML) INJECTION PRN
Status: CANCELLED | OUTPATIENT
Start: 2019-04-02

## 2019-03-28 RX ORDER — HEPARIN SODIUM (PORCINE) LOCK FLUSH IV SOLN 100 UNIT/ML 100 UNIT/ML
500 SOLUTION INTRAVENOUS PRN
Status: CANCELLED | OUTPATIENT
Start: 2019-04-02

## 2019-03-28 RX ORDER — 0.9 % SODIUM CHLORIDE 0.9 %
10 VIAL (ML) INJECTION ONCE
Status: CANCELLED | OUTPATIENT
Start: 2019-04-02 | End: 2019-04-02

## 2019-03-28 ASSESSMENT — ENCOUNTER SYMPTOMS
EYE REDNESS: 0
DIARRHEA: 0
WHEEZING: 0
VOMITING: 0
SHORTNESS OF BREATH: 0
COUGH: 0
BLOOD IN STOOL: 0
CONSTIPATION: 0
NAUSEA: 0
BACK PAIN: 0
ABDOMINAL PAIN: 0
COLOR CHANGE: 0
CHEST TIGHTNESS: 0
EYE ITCHING: 0

## 2019-04-01 ENCOUNTER — HOSPITAL ENCOUNTER (OUTPATIENT)
Dept: INFUSION THERAPY | Age: 49
Discharge: HOME | End: 2019-04-01
Payer: MEDICARE

## 2019-04-01 VITALS
TEMPERATURE: 97.4 F | SYSTOLIC BLOOD PRESSURE: 185 MMHG | HEART RATE: 94 BPM | RESPIRATION RATE: 18 BRPM | DIASTOLIC BLOOD PRESSURE: 96 MMHG

## 2019-04-01 DIAGNOSIS — K90.49 MALABSORPTION DUE TO INTOLERANCE, NOT ELSEWHERE CLASSIFIED: ICD-10-CM

## 2019-04-01 DIAGNOSIS — D50.9 IRON DEFICIENCY ANEMIA, UNSPECIFIED IRON DEFICIENCY ANEMIA TYPE: Primary | ICD-10-CM

## 2019-04-01 PROCEDURE — 2580000003 HC RX 258: Performed by: INTERNAL MEDICINE

## 2019-04-01 PROCEDURE — 6360000002 HC RX W HCPCS: Performed by: INTERNAL MEDICINE

## 2019-04-01 PROCEDURE — 96365 THER/PROPH/DIAG IV INF INIT: CPT

## 2019-04-01 RX ORDER — SODIUM CHLORIDE 0.9 % (FLUSH) 0.9 %
10 SYRINGE (ML) INJECTION PRN
Status: CANCELLED | OUTPATIENT
Start: 2019-04-08

## 2019-04-01 RX ORDER — METHYLPREDNISOLONE SODIUM SUCCINATE 125 MG/2ML
125 INJECTION, POWDER, LYOPHILIZED, FOR SOLUTION INTRAMUSCULAR; INTRAVENOUS ONCE
Status: CANCELLED | OUTPATIENT
Start: 2019-04-08

## 2019-04-01 RX ORDER — DIPHENHYDRAMINE HYDROCHLORIDE 50 MG/ML
50 INJECTION INTRAMUSCULAR; INTRAVENOUS ONCE
Status: CANCELLED | OUTPATIENT
Start: 2019-04-08

## 2019-04-01 RX ORDER — 0.9 % SODIUM CHLORIDE 0.9 %
10 VIAL (ML) INJECTION ONCE
Status: CANCELLED | OUTPATIENT
Start: 2019-04-08

## 2019-04-01 RX ORDER — SODIUM CHLORIDE 0.9 % (FLUSH) 0.9 %
5 SYRINGE (ML) INJECTION PRN
Status: CANCELLED | OUTPATIENT
Start: 2019-04-08

## 2019-04-01 RX ORDER — HEPARIN SODIUM (PORCINE) LOCK FLUSH IV SOLN 100 UNIT/ML 100 UNIT/ML
500 SOLUTION INTRAVENOUS PRN
Status: CANCELLED | OUTPATIENT
Start: 2019-04-08

## 2019-04-01 RX ORDER — SODIUM CHLORIDE 9 MG/ML
INJECTION, SOLUTION INTRAVENOUS CONTINUOUS
Status: ACTIVE | OUTPATIENT
Start: 2019-04-01 | End: 2019-04-01

## 2019-04-01 RX ORDER — EPINEPHRINE 1 MG/ML
0.3 INJECTION, SOLUTION, CONCENTRATE INTRAVENOUS PRN
Status: CANCELLED | OUTPATIENT
Start: 2019-04-08

## 2019-04-01 RX ORDER — SODIUM CHLORIDE 9 MG/ML
INJECTION, SOLUTION INTRAVENOUS CONTINUOUS
Status: CANCELLED | OUTPATIENT
Start: 2019-04-08

## 2019-04-01 RX ORDER — SODIUM CHLORIDE 0.9 % (FLUSH) 0.9 %
10 SYRINGE (ML) INJECTION PRN
Status: DISCONTINUED | OUTPATIENT
Start: 2019-04-01 | End: 2019-04-02 | Stop reason: HOSPADM

## 2019-04-01 RX ADMIN — Medication 10 ML: at 10:10

## 2019-04-01 RX ADMIN — FERRIC CARBOXYMALTOSE INJECTION 750 MG: 50 INJECTION, SOLUTION INTRAVENOUS at 10:11

## 2019-04-01 RX ADMIN — SODIUM CHLORIDE: 9 INJECTION, SOLUTION INTRAVENOUS at 10:11

## 2019-04-01 ASSESSMENT — PAIN SCALES - GENERAL: PAINLEVEL_OUTOF10: 7

## 2019-04-08 ENCOUNTER — APPOINTMENT (OUTPATIENT)
Dept: INFUSION THERAPY | Age: 49
End: 2019-04-08
Payer: MEDICARE

## 2019-04-10 ENCOUNTER — APPOINTMENT (OUTPATIENT)
Dept: INFUSION THERAPY | Age: 49
End: 2019-04-10
Payer: MEDICARE

## 2019-04-15 ENCOUNTER — HOSPITAL ENCOUNTER (OUTPATIENT)
Dept: INFUSION THERAPY | Age: 49
Discharge: HOME | End: 2019-04-15
Payer: MEDICARE

## 2019-04-15 VITALS
DIASTOLIC BLOOD PRESSURE: 87 MMHG | TEMPERATURE: 96.8 F | HEART RATE: 101 BPM | RESPIRATION RATE: 18 BRPM | SYSTOLIC BLOOD PRESSURE: 148 MMHG

## 2019-04-15 DIAGNOSIS — K90.49 MALABSORPTION DUE TO INTOLERANCE, NOT ELSEWHERE CLASSIFIED: ICD-10-CM

## 2019-04-15 DIAGNOSIS — D50.9 IRON DEFICIENCY ANEMIA, UNSPECIFIED IRON DEFICIENCY ANEMIA TYPE: Primary | ICD-10-CM

## 2019-04-15 PROCEDURE — 2580000003 HC RX 258: Performed by: INTERNAL MEDICINE

## 2019-04-15 PROCEDURE — 6360000002 HC RX W HCPCS: Performed by: INTERNAL MEDICINE

## 2019-04-15 PROCEDURE — 96365 THER/PROPH/DIAG IV INF INIT: CPT

## 2019-04-15 RX ORDER — HEPARIN SODIUM (PORCINE) LOCK FLUSH IV SOLN 100 UNIT/ML 100 UNIT/ML
500 SOLUTION INTRAVENOUS PRN
Status: DISCONTINUED | OUTPATIENT
Start: 2019-04-15 | End: 2019-04-16 | Stop reason: HOSPADM

## 2019-04-15 RX ORDER — EPINEPHRINE 1 MG/ML
0.3 INJECTION, SOLUTION, CONCENTRATE INTRAVENOUS PRN
Status: CANCELLED | OUTPATIENT
Start: 2019-04-15

## 2019-04-15 RX ORDER — DIPHENHYDRAMINE HYDROCHLORIDE 50 MG/ML
50 INJECTION INTRAMUSCULAR; INTRAVENOUS ONCE
Status: CANCELLED | OUTPATIENT
Start: 2019-04-15

## 2019-04-15 RX ORDER — SODIUM CHLORIDE 0.9 % (FLUSH) 0.9 %
10 SYRINGE (ML) INJECTION PRN
Status: DISCONTINUED | OUTPATIENT
Start: 2019-04-15 | End: 2019-04-16 | Stop reason: HOSPADM

## 2019-04-15 RX ORDER — SODIUM CHLORIDE 9 MG/ML
INJECTION, SOLUTION INTRAVENOUS CONTINUOUS
Status: CANCELLED | OUTPATIENT
Start: 2019-04-15

## 2019-04-15 RX ORDER — METHYLPREDNISOLONE SODIUM SUCCINATE 125 MG/2ML
125 INJECTION, POWDER, LYOPHILIZED, FOR SOLUTION INTRAMUSCULAR; INTRAVENOUS ONCE
Status: CANCELLED | OUTPATIENT
Start: 2019-04-15

## 2019-04-15 RX ORDER — 0.9 % SODIUM CHLORIDE 0.9 %
10 VIAL (ML) INJECTION ONCE
Status: CANCELLED | OUTPATIENT
Start: 2019-04-15

## 2019-04-15 RX ORDER — SODIUM CHLORIDE 0.9 % (FLUSH) 0.9 %
10 SYRINGE (ML) INJECTION PRN
Status: CANCELLED | OUTPATIENT
Start: 2019-04-15

## 2019-04-15 RX ORDER — HEPARIN SODIUM (PORCINE) LOCK FLUSH IV SOLN 100 UNIT/ML 100 UNIT/ML
500 SOLUTION INTRAVENOUS PRN
Status: CANCELLED | OUTPATIENT
Start: 2019-04-15

## 2019-04-15 RX ORDER — SODIUM CHLORIDE 9 MG/ML
INJECTION, SOLUTION INTRAVENOUS CONTINUOUS
Status: ACTIVE | OUTPATIENT
Start: 2019-04-15 | End: 2019-04-15

## 2019-04-15 RX ORDER — SODIUM CHLORIDE 0.9 % (FLUSH) 0.9 %
5 SYRINGE (ML) INJECTION PRN
Status: CANCELLED | OUTPATIENT
Start: 2019-04-15

## 2019-04-15 RX ADMIN — Medication 10 ML: at 09:42

## 2019-04-15 RX ADMIN — FERRIC CARBOXYMALTOSE INJECTION 750 MG: 50 INJECTION, SOLUTION INTRAVENOUS at 09:30

## 2019-04-15 RX ADMIN — SODIUM CHLORIDE: 9 INJECTION, SOLUTION INTRAVENOUS at 09:25

## 2019-04-15 ASSESSMENT — PAIN DESCRIPTION - ORIENTATION: ORIENTATION: RIGHT

## 2019-04-15 ASSESSMENT — PAIN DESCRIPTION - DESCRIPTORS: DESCRIPTORS: SHARP

## 2019-04-15 ASSESSMENT — PAIN DESCRIPTION - PROGRESSION: CLINICAL_PROGRESSION: NOT CHANGED

## 2019-04-15 ASSESSMENT — PAIN SCALES - GENERAL: PAINLEVEL_OUTOF10: 6

## 2019-04-15 ASSESSMENT — PAIN DESCRIPTION - ONSET: ONSET: ON-GOING

## 2019-04-15 ASSESSMENT — PAIN DESCRIPTION - FREQUENCY: FREQUENCY: CONTINUOUS

## 2019-04-15 ASSESSMENT — PAIN DESCRIPTION - LOCATION: LOCATION: LEG

## 2019-04-15 ASSESSMENT — PAIN DESCRIPTION - PAIN TYPE: TYPE: ACUTE PAIN

## 2019-05-13 ENCOUNTER — TELEPHONE (OUTPATIENT)
Dept: PRIMARY CARE CLINIC | Age: 49
End: 2019-05-13

## 2019-05-13 NOTE — TELEPHONE ENCOUNTER
Attempted  to call patient regarding missed appt and need to get lab work done.  Instructed to call this office to R/S.

## 2019-06-21 ENCOUNTER — TELEPHONE (OUTPATIENT)
Dept: PRIMARY CARE CLINIC | Age: 49
End: 2019-06-21

## 2019-07-23 DIAGNOSIS — I10 UNCONTROLLED HYPERTENSION: ICD-10-CM

## 2019-07-23 RX ORDER — LOSARTAN POTASSIUM AND HYDROCHLOROTHIAZIDE 25; 100 MG/1; MG/1
1 TABLET ORAL DAILY
Qty: 14 TABLET | Refills: 0 | Status: SHIPPED | OUTPATIENT
Start: 2019-07-23 | End: 2019-08-13 | Stop reason: SDUPTHER

## 2019-07-24 ENCOUNTER — TELEPHONE (OUTPATIENT)
Dept: PRIMARY CARE CLINIC | Age: 49
End: 2019-07-24

## 2019-08-07 ENCOUNTER — TELEPHONE (OUTPATIENT)
Dept: PRIMARY CARE CLINIC | Age: 49
End: 2019-08-07

## 2019-08-09 ENCOUNTER — TELEPHONE (OUTPATIENT)
Dept: PRIMARY CARE CLINIC | Age: 49
End: 2019-08-09

## 2019-08-13 ENCOUNTER — HOSPITAL ENCOUNTER (OUTPATIENT)
Age: 49
Discharge: HOME | End: 2019-08-13
Payer: MEDICARE

## 2019-08-13 ENCOUNTER — TELEPHONE (OUTPATIENT)
Dept: PRIMARY CARE CLINIC | Age: 49
End: 2019-08-13

## 2019-08-13 DIAGNOSIS — K90.49 MALABSORPTION DUE TO INTOLERANCE, NOT ELSEWHERE CLASSIFIED: ICD-10-CM

## 2019-08-13 DIAGNOSIS — E11.65 UNCONTROLLED TYPE 2 DIABETES MELLITUS WITH HYPERGLYCEMIA (HCC): ICD-10-CM

## 2019-08-13 DIAGNOSIS — D50.9 IRON DEFICIENCY ANEMIA, UNSPECIFIED IRON DEFICIENCY ANEMIA TYPE: ICD-10-CM

## 2019-08-13 DIAGNOSIS — I10 UNCONTROLLED HYPERTENSION: ICD-10-CM

## 2019-08-13 LAB
ABSOLUTE EOS #: 0.26 K/UL (ref 0–0.44)
ABSOLUTE IMMATURE GRANULOCYTE: 0.04 K/UL (ref 0–0.3)
ABSOLUTE LYMPH #: 2.15 K/UL (ref 1.1–3.7)
ABSOLUTE MONO #: 0.67 K/UL (ref 0.1–1.2)
ALBUMIN SERPL-MCNC: 4.2 G/DL (ref 3.5–5.2)
ALBUMIN/GLOBULIN RATIO: 1.2 (ref 1–2.5)
ALP BLD-CCNC: 49 U/L (ref 35–104)
ALT SERPL-CCNC: 20 U/L (ref 5–33)
ANION GAP SERPL CALCULATED.3IONS-SCNC: 15 MMOL/L (ref 9–17)
AST SERPL-CCNC: 13 U/L
BASOPHILS # BLD: 1 % (ref 0–2)
BASOPHILS ABSOLUTE: 0.05 K/UL (ref 0–0.2)
BILIRUB SERPL-MCNC: 0.32 MG/DL (ref 0.3–1.2)
BUN BLDV-MCNC: 19 MG/DL (ref 6–20)
BUN/CREAT BLD: 23 (ref 9–20)
CALCIUM SERPL-MCNC: 9.5 MG/DL (ref 8.6–10.4)
CHLORIDE BLD-SCNC: 97 MMOL/L (ref 98–107)
CO2: 22 MMOL/L (ref 20–31)
CREAT SERPL-MCNC: 0.83 MG/DL (ref 0.5–0.9)
DIFFERENTIAL TYPE: ABNORMAL
EOSINOPHILS RELATIVE PERCENT: 3 % (ref 1–4)
ESTIMATED AVERAGE GLUCOSE: 249 MG/DL
FERRITIN: 1075 UG/L (ref 13–150)
FOLATE: >20 NG/ML
GFR AFRICAN AMERICAN: >60 ML/MIN
GFR NON-AFRICAN AMERICAN: >60 ML/MIN
GFR SERPL CREATININE-BSD FRML MDRD: ABNORMAL ML/MIN/{1.73_M2}
GFR SERPL CREATININE-BSD FRML MDRD: ABNORMAL ML/MIN/{1.73_M2}
GLUCOSE BLD-MCNC: 288 MG/DL (ref 70–99)
HBA1C MFR BLD: 10.3 % (ref 4.8–5.9)
HCT VFR BLD CALC: 31.9 % (ref 36.3–47.1)
HEMOGLOBIN: 9.6 G/DL (ref 11.9–15.1)
IMMATURE GRANULOCYTES: 1 %
IRON SATURATION: 19 % (ref 20–55)
IRON: 65 UG/DL (ref 37–145)
LYMPHOCYTES # BLD: 27 % (ref 24–43)
MCH RBC QN AUTO: 23.8 PG (ref 25.2–33.5)
MCHC RBC AUTO-ENTMCNC: 30.1 G/DL (ref 28.4–34.8)
MCV RBC AUTO: 79 FL (ref 82.6–102.9)
MONOCYTES # BLD: 8 % (ref 3–12)
NRBC AUTOMATED: 0 PER 100 WBC
PDW BLD-RTO: 13.6 % (ref 11.8–14.4)
PLATELET # BLD: 342 K/UL (ref 138–453)
PLATELET ESTIMATE: ABNORMAL
PMV BLD AUTO: 9.7 FL (ref 8.1–13.5)
POTASSIUM SERPL-SCNC: 4.3 MMOL/L (ref 3.7–5.3)
RBC # BLD: 4.04 M/UL (ref 3.95–5.11)
RBC # BLD: ABNORMAL 10*6/UL
SEG NEUTROPHILS: 60 % (ref 36–65)
SEGMENTED NEUTROPHILS ABSOLUTE COUNT: 4.88 K/UL (ref 1.5–8.1)
SODIUM BLD-SCNC: 134 MMOL/L (ref 135–144)
TOTAL IRON BINDING CAPACITY: 340 UG/DL (ref 250–450)
TOTAL PROTEIN: 7.7 G/DL (ref 6.4–8.3)
UNSATURATED IRON BINDING CAPACITY: 275 UG/DL (ref 112–347)
VITAMIN B-12: 538 PG/ML (ref 232–1245)
WBC # BLD: 8.1 K/UL (ref 3.5–11.3)
WBC # BLD: ABNORMAL 10*3/UL

## 2019-08-13 PROCEDURE — 82607 VITAMIN B-12: CPT

## 2019-08-13 PROCEDURE — 82728 ASSAY OF FERRITIN: CPT

## 2019-08-13 PROCEDURE — 83540 ASSAY OF IRON: CPT

## 2019-08-13 PROCEDURE — 84238 ASSAY NONENDOCRINE RECEPTOR: CPT

## 2019-08-13 PROCEDURE — 82746 ASSAY OF FOLIC ACID SERUM: CPT

## 2019-08-13 PROCEDURE — 85025 COMPLETE CBC W/AUTO DIFF WBC: CPT

## 2019-08-13 PROCEDURE — 80053 COMPREHEN METABOLIC PANEL: CPT

## 2019-08-13 PROCEDURE — 83550 IRON BINDING TEST: CPT

## 2019-08-13 PROCEDURE — 83036 HEMOGLOBIN GLYCOSYLATED A1C: CPT

## 2019-08-13 PROCEDURE — 36415 COLL VENOUS BLD VENIPUNCTURE: CPT

## 2019-08-13 RX ORDER — LOSARTAN POTASSIUM AND HYDROCHLOROTHIAZIDE 25; 100 MG/1; MG/1
1 TABLET ORAL DAILY
Qty: 30 TABLET | Refills: 0 | Status: SHIPPED | OUTPATIENT
Start: 2019-08-13 | End: 2019-10-04 | Stop reason: SDUPTHER

## 2019-08-13 NOTE — TELEPHONE ENCOUNTER
----- Message from [address] [facility], APRN - CNP sent at 8/13/2019  4:04 PM EDT -----  A1c remains too high, needs to come to her appointments.

## 2019-08-15 LAB — SOLUBLE TRANSFERRIN RECEPT: 3.2 MG/L (ref 1.9–4.4)

## 2019-10-04 ENCOUNTER — OFFICE VISIT (OUTPATIENT)
Dept: PRIMARY CARE CLINIC | Age: 49
End: 2019-10-04
Payer: MEDICARE

## 2019-10-04 ENCOUNTER — HOSPITAL ENCOUNTER (OUTPATIENT)
Age: 49
Setting detail: SPECIMEN
Discharge: HOME | End: 2019-10-04
Payer: MEDICARE

## 2019-10-04 VITALS
DIASTOLIC BLOOD PRESSURE: 98 MMHG | TEMPERATURE: 97.8 F | HEART RATE: 97 BPM | HEIGHT: 64 IN | BODY MASS INDEX: 33.07 KG/M2 | SYSTOLIC BLOOD PRESSURE: 185 MMHG | RESPIRATION RATE: 16 BRPM | WEIGHT: 193.7 LBS

## 2019-10-04 DIAGNOSIS — M25.561 CHRONIC PAIN OF RIGHT KNEE: ICD-10-CM

## 2019-10-04 DIAGNOSIS — G89.29 CHRONIC PAIN OF RIGHT KNEE: ICD-10-CM

## 2019-10-04 DIAGNOSIS — I10 UNCONTROLLED HYPERTENSION: ICD-10-CM

## 2019-10-04 DIAGNOSIS — E78.5 DYSLIPIDEMIA: ICD-10-CM

## 2019-10-04 DIAGNOSIS — E11.65 UNCONTROLLED TYPE 2 DIABETES MELLITUS WITH HYPERGLYCEMIA (HCC): Primary | ICD-10-CM

## 2019-10-04 DIAGNOSIS — R30.0 DYSURIA: ICD-10-CM

## 2019-10-04 DIAGNOSIS — Z23 NEED FOR INFLUENZA VACCINATION: ICD-10-CM

## 2019-10-04 LAB
BILIRUBIN, POC: NEGATIVE
BLOOD URINE, POC: NEGATIVE
CLARITY, POC: CLEAR
COLOR, POC: YELLOW
GLUCOSE URINE, POC: 500
HBA1C MFR BLD: 9.8 %
KETONES, POC: NEGATIVE
LEUKOCYTE EST, POC: NEGATIVE
NITRITE, POC: NEGATIVE
PH, POC: 5.5
PROTEIN, POC: 100
SPECIFIC GRAVITY, POC: 1.01
UROBILINOGEN, POC: 0.2

## 2019-10-04 PROCEDURE — 87088 URINE BACTERIA CULTURE: CPT

## 2019-10-04 PROCEDURE — 3046F HEMOGLOBIN A1C LEVEL >9.0%: CPT | Performed by: NURSE PRACTITIONER

## 2019-10-04 PROCEDURE — 1036F TOBACCO NON-USER: CPT | Performed by: NURSE PRACTITIONER

## 2019-10-04 PROCEDURE — G8482 FLU IMMUNIZE ORDER/ADMIN: HCPCS | Performed by: NURSE PRACTITIONER

## 2019-10-04 PROCEDURE — 87186 SC STD MICRODIL/AGAR DIL: CPT

## 2019-10-04 PROCEDURE — G8427 DOCREV CUR MEDS BY ELIG CLIN: HCPCS | Performed by: NURSE PRACTITIONER

## 2019-10-04 PROCEDURE — G8417 CALC BMI ABV UP PARAM F/U: HCPCS | Performed by: NURSE PRACTITIONER

## 2019-10-04 PROCEDURE — 3017F COLORECTAL CA SCREEN DOC REV: CPT | Performed by: NURSE PRACTITIONER

## 2019-10-04 PROCEDURE — 90471 IMMUNIZATION ADMIN: CPT | Performed by: NURSE PRACTITIONER

## 2019-10-04 PROCEDURE — 83036 HEMOGLOBIN GLYCOSYLATED A1C: CPT | Performed by: NURSE PRACTITIONER

## 2019-10-04 PROCEDURE — 2022F DILAT RTA XM EVC RTNOPTHY: CPT | Performed by: NURSE PRACTITIONER

## 2019-10-04 PROCEDURE — 99214 OFFICE O/P EST MOD 30 MIN: CPT | Performed by: NURSE PRACTITIONER

## 2019-10-04 PROCEDURE — 87086 URINE CULTURE/COLONY COUNT: CPT

## 2019-10-04 PROCEDURE — 90686 IIV4 VACC NO PRSV 0.5 ML IM: CPT | Performed by: NURSE PRACTITIONER

## 2019-10-04 RX ORDER — DICLOFENAC SODIUM 75 MG/1
75 TABLET, DELAYED RELEASE ORAL 2 TIMES DAILY
Qty: 60 TABLET | Refills: 3 | Status: SHIPPED | OUTPATIENT
Start: 2019-10-04 | End: 2019-12-23 | Stop reason: ALTCHOICE

## 2019-10-04 RX ORDER — ATORVASTATIN CALCIUM 40 MG/1
40 TABLET, FILM COATED ORAL DAILY
Qty: 90 TABLET | Refills: 3 | Status: SHIPPED | OUTPATIENT
Start: 2019-10-04 | End: 2020-10-07 | Stop reason: SDUPTHER

## 2019-10-04 RX ORDER — NIFEDIPINE 60 MG/1
60 TABLET, FILM COATED, EXTENDED RELEASE ORAL DAILY
Qty: 90 TABLET | Refills: 3 | Status: SHIPPED | OUTPATIENT
Start: 2019-10-04 | End: 2020-10-07 | Stop reason: SDUPTHER

## 2019-10-04 RX ORDER — LOSARTAN POTASSIUM AND HYDROCHLOROTHIAZIDE 25; 100 MG/1; MG/1
1 TABLET ORAL DAILY
Qty: 90 TABLET | Refills: 3 | Status: SHIPPED | OUTPATIENT
Start: 2019-10-04 | End: 2020-10-07 | Stop reason: SDUPTHER

## 2019-10-04 ASSESSMENT — ENCOUNTER SYMPTOMS
WHEEZING: 0
VISUAL CHANGE: 0
CONSTIPATION: 0
SHORTNESS OF BREATH: 0
COUGH: 0
NAUSEA: 0
VOMITING: 0
DIARRHEA: 0
BLURRED VISION: 0
RHINORRHEA: 0
SORE THROAT: 0

## 2019-10-04 ASSESSMENT — PATIENT HEALTH QUESTIONNAIRE - PHQ9
SUM OF ALL RESPONSES TO PHQ9 QUESTIONS 1 & 2: 0
2. FEELING DOWN, DEPRESSED OR HOPELESS: 0
1. LITTLE INTEREST OR PLEASURE IN DOING THINGS: 0
SUM OF ALL RESPONSES TO PHQ QUESTIONS 1-9: 0
SUM OF ALL RESPONSES TO PHQ QUESTIONS 1-9: 0

## 2019-10-06 LAB
CULTURE: ABNORMAL
Lab: ABNORMAL
SPECIMEN DESCRIPTION: ABNORMAL

## 2019-10-07 ENCOUNTER — TELEPHONE (OUTPATIENT)
Dept: PRIMARY CARE CLINIC | Age: 49
End: 2019-10-07

## 2019-10-07 DIAGNOSIS — G89.29 CHRONIC PAIN OF RIGHT KNEE: ICD-10-CM

## 2019-10-07 DIAGNOSIS — N39.0 ACUTE UTI: Primary | ICD-10-CM

## 2019-10-07 DIAGNOSIS — M54.16 LUMBAR BACK PAIN WITH RADICULOPATHY AFFECTING RIGHT LOWER EXTREMITY: Primary | ICD-10-CM

## 2019-10-07 DIAGNOSIS — M25.561 CHRONIC PAIN OF RIGHT KNEE: ICD-10-CM

## 2019-10-07 RX ORDER — SULFAMETHOXAZOLE AND TRIMETHOPRIM 800; 160 MG/1; MG/1
1 TABLET ORAL 2 TIMES DAILY
Qty: 10 TABLET | Refills: 0 | Status: SHIPPED | OUTPATIENT
Start: 2019-10-07 | End: 2019-10-12

## 2019-10-18 ENCOUNTER — NURSE ONLY (OUTPATIENT)
Dept: PRIMARY CARE CLINIC | Age: 49
End: 2019-10-18

## 2019-10-18 VITALS
BODY MASS INDEX: 32.04 KG/M2 | TEMPERATURE: 97 F | RESPIRATION RATE: 16 BRPM | HEIGHT: 64 IN | DIASTOLIC BLOOD PRESSURE: 100 MMHG | WEIGHT: 187.7 LBS | HEART RATE: 103 BPM | SYSTOLIC BLOOD PRESSURE: 168 MMHG

## 2019-10-18 DIAGNOSIS — E11.65 UNCONTROLLED TYPE 2 DIABETES MELLITUS WITH HYPERGLYCEMIA (HCC): ICD-10-CM

## 2019-10-18 DIAGNOSIS — I10 UNCONTROLLED HYPERTENSION: Primary | ICD-10-CM

## 2019-10-18 RX ORDER — ATENOLOL 50 MG/1
50 TABLET ORAL DAILY
Qty: 90 TABLET | Refills: 0 | Status: SHIPPED | OUTPATIENT
Start: 2019-10-18 | End: 2020-03-27

## 2019-10-18 RX ORDER — BACLOFEN 10 MG/1
TABLET ORAL
COMMUNITY
Start: 2019-10-09 | End: 2019-12-23 | Stop reason: ALTCHOICE

## 2019-10-18 RX ORDER — METHYLPREDNISOLONE 4 MG/1
TABLET ORAL
COMMUNITY
Start: 2019-10-09 | End: 2019-11-05 | Stop reason: ALTCHOICE

## 2019-11-05 ENCOUNTER — NURSE ONLY (OUTPATIENT)
Dept: PRIMARY CARE CLINIC | Age: 49
End: 2019-11-05

## 2019-11-05 VITALS
WEIGHT: 191.3 LBS | HEART RATE: 87 BPM | BODY MASS INDEX: 32.66 KG/M2 | TEMPERATURE: 98.1 F | SYSTOLIC BLOOD PRESSURE: 170 MMHG | HEIGHT: 64 IN | DIASTOLIC BLOOD PRESSURE: 98 MMHG | RESPIRATION RATE: 14 BRPM

## 2019-11-05 DIAGNOSIS — I10 UNCONTROLLED HYPERTENSION: Primary | ICD-10-CM

## 2019-11-05 RX ORDER — CLONIDINE HYDROCHLORIDE 0.1 MG/1
0.1 TABLET ORAL 2 TIMES DAILY
Qty: 60 TABLET | Refills: 0 | Status: SHIPPED | OUTPATIENT
Start: 2019-11-05 | End: 2020-05-11

## 2019-11-20 ENCOUNTER — TELEPHONE (OUTPATIENT)
Dept: PRIMARY CARE CLINIC | Age: 49
End: 2019-11-20

## 2019-11-20 DIAGNOSIS — E11.65 UNCONTROLLED TYPE 2 DIABETES MELLITUS WITH HYPERGLYCEMIA (HCC): Primary | ICD-10-CM

## 2019-11-20 RX ORDER — BLOOD-GLUCOSE METER
1 KIT MISCELLANEOUS DAILY
Qty: 1 KIT | Refills: 0 | Status: SHIPPED | OUTPATIENT
Start: 2019-11-20

## 2019-11-20 RX ORDER — BLOOD-GLUCOSE METER
1 KIT MISCELLANEOUS DAILY
Qty: 1 KIT | Refills: 0 | Status: CANCELLED | OUTPATIENT
Start: 2019-11-20

## 2019-11-20 RX ORDER — LANCETS 28 GAUGE
1 EACH MISCELLANEOUS DAILY
Qty: 100 EACH | Refills: 3 | Status: SHIPPED | OUTPATIENT
Start: 2019-11-20 | End: 2021-01-29 | Stop reason: SDUPTHER

## 2019-11-20 RX ORDER — HYDROCODONE BITARTRATE AND ACETAMINOPHEN 5; 325 MG/1; MG/1
TABLET ORAL
COMMUNITY
Start: 2019-11-11 | End: 2021-06-30

## 2019-11-26 ENCOUNTER — CARE COORDINATION (OUTPATIENT)
Dept: CARE COORDINATION | Age: 49
End: 2019-11-26

## 2019-12-05 ENCOUNTER — CARE COORDINATION (OUTPATIENT)
Dept: CARE COORDINATION | Age: 49
End: 2019-12-05

## 2019-12-06 ENCOUNTER — CARE COORDINATION (OUTPATIENT)
Dept: CARE COORDINATION | Age: 49
End: 2019-12-06

## 2019-12-10 ENCOUNTER — CARE COORDINATION (OUTPATIENT)
Dept: CARE COORDINATION | Age: 49
End: 2019-12-10

## 2019-12-13 ENCOUNTER — CARE COORDINATION (OUTPATIENT)
Dept: CARE COORDINATION | Age: 49
End: 2019-12-13

## 2019-12-13 DIAGNOSIS — E11.65 UNCONTROLLED TYPE 2 DIABETES MELLITUS WITH HYPERGLYCEMIA (HCC): ICD-10-CM

## 2019-12-19 ENCOUNTER — CARE COORDINATION (OUTPATIENT)
Dept: CARE COORDINATION | Age: 49
End: 2019-12-19

## 2019-12-23 ENCOUNTER — CARE COORDINATION (OUTPATIENT)
Dept: CARE COORDINATION | Age: 49
End: 2019-12-23

## 2019-12-23 ENCOUNTER — OFFICE VISIT (OUTPATIENT)
Dept: PRIMARY CARE CLINIC | Age: 49
End: 2019-12-23
Payer: MEDICARE

## 2019-12-23 VITALS
HEART RATE: 94 BPM | HEIGHT: 64 IN | TEMPERATURE: 99.2 F | WEIGHT: 190.2 LBS | SYSTOLIC BLOOD PRESSURE: 139 MMHG | RESPIRATION RATE: 16 BRPM | BODY MASS INDEX: 32.47 KG/M2 | DIASTOLIC BLOOD PRESSURE: 85 MMHG

## 2019-12-23 DIAGNOSIS — E11.65 UNCONTROLLED TYPE 2 DIABETES MELLITUS WITH HYPERGLYCEMIA (HCC): Primary | ICD-10-CM

## 2019-12-23 DIAGNOSIS — M54.16 LUMBAR RADICULAR PAIN: ICD-10-CM

## 2019-12-23 PROCEDURE — 3017F COLORECTAL CA SCREEN DOC REV: CPT | Performed by: NURSE PRACTITIONER

## 2019-12-23 PROCEDURE — 1036F TOBACCO NON-USER: CPT | Performed by: NURSE PRACTITIONER

## 2019-12-23 PROCEDURE — G8427 DOCREV CUR MEDS BY ELIG CLIN: HCPCS | Performed by: NURSE PRACTITIONER

## 2019-12-23 PROCEDURE — 99214 OFFICE O/P EST MOD 30 MIN: CPT | Performed by: NURSE PRACTITIONER

## 2019-12-23 PROCEDURE — 3046F HEMOGLOBIN A1C LEVEL >9.0%: CPT | Performed by: NURSE PRACTITIONER

## 2019-12-23 PROCEDURE — 2022F DILAT RTA XM EVC RTNOPTHY: CPT | Performed by: NURSE PRACTITIONER

## 2019-12-23 PROCEDURE — G8482 FLU IMMUNIZE ORDER/ADMIN: HCPCS | Performed by: NURSE PRACTITIONER

## 2019-12-23 PROCEDURE — G8417 CALC BMI ABV UP PARAM F/U: HCPCS | Performed by: NURSE PRACTITIONER

## 2019-12-23 RX ORDER — BLOOD SUGAR DIAGNOSTIC
1 STRIP MISCELLANEOUS DAILY
Qty: 100 EACH | Refills: 3 | Status: SHIPPED | OUTPATIENT
Start: 2019-12-23 | End: 2020-08-06 | Stop reason: SDUPTHER

## 2019-12-23 RX ORDER — GABAPENTIN 300 MG/1
300 CAPSULE ORAL 2 TIMES DAILY
Qty: 180 CAPSULE | Refills: 1 | Status: SHIPPED | OUTPATIENT
Start: 2019-12-23 | End: 2020-10-13

## 2019-12-23 ASSESSMENT — ENCOUNTER SYMPTOMS
NAUSEA: 0
ABDOMINAL PAIN: 0
BLURRED VISION: 0
WHEEZING: 0
VISUAL CHANGE: 0
SORE THROAT: 0
BOWEL INCONTINENCE: 0
SHORTNESS OF BREATH: 0
DIARRHEA: 0
BACK PAIN: 1
CONSTIPATION: 0
RHINORRHEA: 0
VOMITING: 0
COUGH: 0

## 2019-12-26 ENCOUNTER — CARE COORDINATION (OUTPATIENT)
Dept: CARE COORDINATION | Age: 49
End: 2019-12-26

## 2020-01-03 ENCOUNTER — CARE COORDINATION (OUTPATIENT)
Dept: CARE COORDINATION | Age: 50
End: 2020-01-03

## 2020-01-03 NOTE — CARE COORDINATION
Ambulatory Care Coordination Note  1/3/2020  CM Risk Score: 2  Charlson 10 Year Mortality Risk Score: 10%     ACC: Rimma Taylor RN    Summary Note:   Date Care Coordination Episode Started:   11/26/2019    Reason For Call Today:   Follow up blood sugar log- Basaglar effective yet? Review diet     -Spoke with Brad Sommer today   -Brad Sommer states she is fine    Topics discussed today:   1.) Veronica Wellingtonrodri able to tell ACM who much insulin she is taking; Basaglar 20 units nightly   -Denies missing any does of Moshe Cruz   -Has all diabetic supplies at home     2.) Blood sugar readings  -Brad Sommer has been compliant with monitoring blood sugar   -Not always compliant with keeping log; encouraged to keep log again today   -Reports readings as follows:  12/29- 232 and 258  12/30- 198 and 250  1/3- 200  -States her blood sugars have been around these readings  -No higher than 258   -Reports feeling tired a lot       3.) Diet  -States she drinks a lot of water, not much else   -Reports she avoids concentrated sweets and sugars   -Declining referral to CC LD   -Not eating 3 meals per day due to stomach feeling full majority of the day       Reason Patient is in Care coordination:   Diabetes, Elevated A1C, medication compliance       Assessment completed:  Diabetes Assessment    Medic Alert ID:  No  Meal Planning:  Avoidance of concentrated sweets   How often do you test your blood sugar?:  Daily, Other (Comment: 2 hours PP)   Do you have barriers with adherence to non-pharmacologic self-management interventions?  (Nutrition/Exercise/Self-Monitoring):  Yes   Have you ever had to go to the ED for symptoms of low blood sugar?:  No       No patient-reported symptoms   Do you have hyperglycemia symptoms?:  No   Do you have hypoglycemia symptoms?:  No   Last Blood Sugar Value:  200   Blood Sugar Monitoring Regimen:  2 Hours Post Meal, Morning Fasting   Blood Sugar Trends:  Fluctuating       and   General Assessment    Do you have keeping blood sugar log again. Testing morning and bedtime   · Patient to reach out to care coordinator at [phone] or MD office with questions. · Reminded Diana Glynn of walk in care clinic availability/hours; and when to utilize the facility if MD office not available. Care Coordinator Plan of Care: This nurse CC will plan to reach out to Diana Glynn in 1-2 weeks per protocol. Obtain blood sugar readings. Is she keeping log? Still so tired? Has she discussed with PCP- thinking thyroid? Care Coordination Interventions    Program Enrollment:  Rising Risk  Referral from Primary Care Provider:  No  Suggested Interventions and Community Resources  Diabetes Education:  Declined  Registered Dietician:  [facility]  Specialty Services Referral:  Completed (Comment: Pain Management Aileen Elliott))  Transportation Support:  Completed  Zone Management Tools:  Completed (Comment: Diabetes zone sheet mailed 11/26/2019)         Prior to Admission medications    Medication Sig Start Date End Date Taking? Authorizing Provider   insulin glargine (BASAGLAR KWIKPEN) 100 UNIT/ML injection pen Inject 20 Units into the skin nightly 12/23/19   BUZZ Perez CNP   gabapentin (NEURONTIN) 300 MG capsule Take 1 capsule by mouth 2 times daily for 180 days.  Intended supply: 90 days 12/23/19 6/20/20  BUZZ Perez CNP   Insulin Pen Needle (PEN NEEDLES) 32G X 5 MM MISC 1 each by Does not apply route daily 12/23/19   BUZZ Perez CNP   blood glucose test strips (ASCENSIA AUTODISC VI;ONE TOUCH ULTRA TEST VI) strip 1 each by In Vitro route 3 times daily Dispense brand compatible with insurance 12/13/19   BUZZ Perez CNP   HYDROcodone-acetaminophen (Honora Rattler) 5-325 MG per tablet  11/11/19   Historical Provider, MD   FREESTYLE LANCETS MISC 1 each by Does not apply route daily Dispense brand compatible with insurance 11/20/19   BUZZ Perez CNP   glucose monitoring kit (FREESTYLE) monitoring kit 1 kit by Does not apply route daily Dispense brand compatible with insurance 11/20/19   Juani Polanco Might, BUZZ - DONYA   cloNIDine (CATAPRES) 0.1 MG tablet Take 1 tablet by mouth 2 times daily 11/5/19   Juani Polanco Might, APRN - CNP   SITagliptin-metFORMIN (JANUMET XR)  MG TB24 per extended release tablet Take 2 tablets by mouth daily 10/18/19   Juani [facility] Might, APRN - CNP   atenolol (TENORMIN) 50 MG tablet Take 1 tablet by mouth daily 10/18/19   Juani [facility] Might, APRN - CNP   losartan-hydrochlorothiazide (HYZAAR) 100-25 MG per tablet Take 1 tablet by mouth daily 10/4/19   Juani [facility] Might, APRN - CNP   NIFEdipine (ADALAT CC) 60 MG extended release tablet Take 1 tablet by mouth daily 10/4/19   Juani [facility] Might, APRN - CNP   Ertugliflozin L-PyroglutamicAc (STEGLATRO) 5 MG TABS Take 1 tablet by mouth daily 10/4/19   Juani [facility] Might, APRN - CNP   atorvastatin (LIPITOR) 40 MG tablet Take 1 tablet by mouth daily 10/4/19   Juani [facility] Might, APRN - CNP

## 2020-01-15 ENCOUNTER — TELEPHONE (OUTPATIENT)
Dept: PRIMARY CARE CLINIC | Age: 50
End: 2020-01-15

## 2020-01-15 ENCOUNTER — CARE COORDINATION (OUTPATIENT)
Dept: CARE COORDINATION | Age: 50
End: 2020-01-15

## 2020-01-15 NOTE — TELEPHONE ENCOUNTER
Would like a referral to GYN for a pap test      Health Maintenance   Topic Date Due    Breast cancer screen  12/27/2018    Diabetic microalbuminuria test  12/20/2019    A1C test (Diabetic or Prediabetic)  01/04/2020    Lipid screen  12/20/2019    Hepatitis B vaccine (1 of 3 - Risk 3-dose series) 10/04/2020 (Originally 12/27/1987)    Shingles Vaccine (1 of 2) 10/04/2020 (Originally 12/27/2018)    DTaP/Tdap/Td vaccine (1 - Tdap) 12/23/2020 (Originally 12/27/1979)    Cervical cancer screen  12/23/2020 (Originally 12/27/1989)    HIV screen  12/23/2020 (Originally 12/27/1983)    Diabetic retinal exam  04/03/2020    Potassium monitoring  08/13/2020    Creatinine monitoring  08/13/2020    Diabetic foot exam  10/04/2020    Colon cancer screen colonoscopy  03/06/2029    Flu vaccine  Completed    Pneumococcal 0-64 years Vaccine  Completed             (applicable per patient's age: Cancer Screenings, Depression Screening, Fall Risk Screening, Immunizations)    Hemoglobin A1C (%)   Date Value   10/04/2019 9.8   08/13/2019 10.3 (H)   02/04/2019 10.5     Microalb/Crt.  Ratio (mcg/mg creat)   Date Value   12/20/2018 403 (H)     LDL Cholesterol (mg/dL)   Date Value   12/20/2018 117     AST (U/L)   Date Value   08/13/2019 13     ALT (U/L)   Date Value   08/13/2019 20     BUN (mg/dL)   Date Value   08/13/2019 19      (goal A1C is < 7)   (goal LDL is <100) need 30-50% reduction from baseline     BP Readings from Last 3 Encounters:   12/23/19 139/85   11/05/19 (!) 170/98   10/18/19 (!) 168/100    (goal /80)      All Future Testing planned in [facility]:  Lab Frequency Next Occurrence   EVA DIGITAL SCREEN W CAD BILATERAL Once 04/05/2020   H. pylori antigen Once 56/91/7552   Basic Metabolic Panel Once 12/85/1927   Lipid Panel Once 03/23/2020   Hemoglobin A1C Once 03/23/2020   Microalbumin, Ur Once 03/23/2020       Next Visit Date:  Future Appointments   Date Time Provider Murphy Velarde   3/23/2020 10:40 AM Nilesh Freitas Hadley, APRN - CNP [facility]            Patient Active Problem List:     Uncontrolled type 2 diabetes mellitus with hyperglycemia (HCC)     Uncontrolled hypertension     Lumbar back pain with radiculopathy affecting right lower extremity     Microcytic anemia     Dyslipidemia     Iron deficiency anemia     Malabsorption due to intolerance, not elsewhere classified     Internal hemorrhoids     Diverticulosis of colon     Chronic pain of right knee

## 2020-01-15 NOTE — CARE COORDINATION
Ambulatory Care Coordination Note  1/15/2020  CM Risk Score: 2  Charlson 10 Year Mortality Risk Score: 10%     ACC: Connor Oneal, RN    Summary Note:   Date Care Coordination Episode Started:   11/26/2019    Reason For Call Today:   Follow up on blood sugar log- any improvement with readings since starting Basaglar? Assess increased/ongoing tiredness     -Spoke with Raiza Fried today   -Raiza Hickeyemma states she \"ok, just tired\"     Topics discussed today:   1.) Tiredness   -Raiza Hickeyemma states that her \"tiredness\" is still ongoing   -No changes; just feels tired all the time   -[facility] encouraged Adriane to contact Dr. Cyndi Alcantar who she used to see for severe anemia- maybe Dr. Cyndi Alcantar can assist her with this concern   -Noted Raiza Fried cancelled last scheduled appointment with Dr. Cyndi Alcantar (5/30/19) and has never followed up   -Phone number for Dr. Cyndi Alcantar given to Raiza Corky: [phone]      2.) Pap Smear   -Raiza Fried voices that she needs a referral to \"someone to get a pap smear\"  -AC informed Raiza Fried that she can contact an OBGYN office and ask to be seen as a new patient; no referral needed   -AC informed Adriane of [facility]   -[facility] provided Adriane with [facility] phone number: [phone]       3.) Blood sugars  -Voices her blood sugar readings are still in the 200's  -Denies any readings 300 or higher  -Voices she does not always eat 3 meals- sleeps a lot   -States she only drinks water or Gatorade/powerade   - no pop   Lab Results   Component Value Date    LABA1C 9.8 10/04/2019     Lab Results   Component Value Date     08/13/2019       Reason Patient is in Care coordination:   Diabetes, Elevated A1C, medication noncompliance       Assessment completed:  Diabetes Assessment    Medic Alert ID:  No  Meal Planning:  Avoidance of concentrated sweets   How often do you test your blood sugar?:  Daily, Other (Comment: 2 hours PP)   Do you have barriers with adherence to non-pharmacologic self-management interventions? (Nutrition/Exercise/Self-Monitoring):  Yes   Have you ever had to go to the ED for symptoms of low blood sugar?:  No       No patient-reported symptoms   Do you have hyperglycemia symptoms?:  No   Do you have hypoglycemia symptoms?:  No   Last Blood Sugar Value:  219   Blood Sugar Monitoring Regimen:  2 Hours Post Meal, Morning Fasting   Blood Sugar Trends:  No Change       and   General Assessment    Do you have any symptoms that are causing concern?:  Yes  Progression since Onset:  Unchanged  Reported Symptoms:  Other (Comment: Tiredness )         Any ED visits or Hospitalizations since last Call? · No    Medications Reviewed with Megan Waller today:  · Adriane verbalizes that she has all medications. · Mohinieliasannika Waller denies any questions. · Any cost issues with medications No    Zone Management tool reviewed today is applicable:   Yes, Diabetes     Reviewed social needs/Home Needs if any:   · Does patient have enough food? Yes  · Does patient have transportation to appointment/store/pharmacy, etc?Yes  · Does patient need any help in the home? Denies   · If yes, what type of help? n/a       Reviewed Upcoming follow up appointments with Megan Waller  Future Appointments   Date Time Provider Murphy Velarde   2/3/2020 10:20 AM Florentin Sánchez MD [facility]   2/11/2020  1:40 PM [facility]   3/23/2020 10:40 AM BUZZ Ingram CNP [facility]       Goals reviewed. Patient to continue to work to improve:   Goals      Self Monitoring      Self-Monitored Blood Glucose - I will check my blood sugar blood sugars ac & HS  I will notify my provider of any trends of increasing or decreasing blood sugars over a 1 month period. I will notify my provider if I have any blood sugar readings less than 70 more than 2 times a month.       Barriers: lack of motivation, lack of support, overwhelmed by complexity of regimen and lack of education  Plan for overcoming my barriers: working with 5 MM MISC 1 each by Does not apply route daily 12/23/19   Ellerslie Must Might, BUZZ - CNP   blood glucose test strips (ASCENSIA AUTODISC VI;ONE TOUCH ULTRA TEST VI) strip 1 each by In Vitro route 3 times daily Dispense brand compatible with insurance 12/13/19   Garcia Must Might, APRN - CNP   HYDROcodone-acetaminophen ([address]) 5-325 MG per tablet  11/11/19   Historical Provider, MD   FREESTYLE LANCETS MISC 1 each by Does not apply route daily Dispense brand compatible with insurance 11/20/19   Garcia Must Might, BUZZ - DONYA   glucose monitoring kit (FREESTYLE) monitoring kit 1 kit by Does not apply route daily Dispense brand compatible with insurance 11/20/19   Ellerslie Must Might, APRN - CNP   cloNIDine (CATAPRES) 0.1 MG tablet Take 1 tablet by mouth 2 times daily 11/5/19   Ellerslie Must Might, APRN - CNP   SITagliptin-metFORMIN (JANUMET XR)  MG TB24 per extended release tablet Take 2 tablets by mouth daily 10/18/19   Garcia Must Might, APRN - CNP   atenolol (TENORMIN) 50 MG tablet Take 1 tablet by mouth daily 10/18/19   Ellerslie Must Might, APRN - CNP   losartan-hydrochlorothiazide (HYZAAR) 100-25 MG per tablet Take 1 tablet by mouth daily 10/4/19   Ellerslie Must Might, APRN - CNP   NIFEdipine (ADALAT CC) 60 MG extended release tablet Take 1 tablet by mouth daily 10/4/19   Garcia Must Might, APRN - CNP   Ertugliflozin L-PyroglutamicAc (STEGLATRO) 5 MG TABS Take 1 tablet by mouth daily 10/4/19   Ellerslie Must Might, APRN - CNP   atorvastatin (LIPITOR) 40 MG tablet Take 1 tablet by mouth daily 10/4/19   Ellerslie Must Might, APRN - CNP

## 2020-01-15 NOTE — TELEPHONE ENCOUNTER
Can you please contact Diana Jupiter and let her know that she does not need a referral to an OB/GYN. If she does not care who she sees you can provide her with OB providers with [facility] or she can look on her insurance website to choose a provider.   Thank you

## 2020-01-29 ENCOUNTER — CARE COORDINATION (OUTPATIENT)
Dept: CARE COORDINATION | Age: 50
End: 2020-01-29

## 2020-02-06 ENCOUNTER — CARE COORDINATION (OUTPATIENT)
Dept: CARE COORDINATION | Age: 50
End: 2020-02-06

## 2020-02-12 ENCOUNTER — CARE COORDINATION (OUTPATIENT)
Dept: CARE COORDINATION | Age: 50
End: 2020-02-12

## 2020-02-12 NOTE — LETTER
2/12/2020    [facility]  [address] [place] [address] [facility]       I have enjoyed working with you to improve your health. I would like to continue to provide you support. However, I have been unable to reach you at, [phone] (home) . If you would like continued access to your Nurse Care Coordinator, Pinky Pina RN, you can reach me at [phone]. I will wait to hear from you and will no longer reach out to you. BUZZ Martinez CNP and his/her team will continue to provide care and be available for questions.       In good health,             Pinky Pina RN Ambulatory Care Manager

## 2020-02-12 NOTE — CARE COORDINATION
Reason For Call Today:  -Attempted to reach [place] on the home phone number listed   -Unable to reach [place]   -Phone rings then states [employer]. This line has calling restrictions\"     -Attempted to reach [place] on cell phone number listed   -Unable to reach [address] to leave a message       -3rd unsuccessful reach out attempt   -Will mail CC reach out letter       Future Appointments   Date Time Provider Murphy Velarde   2/26/2020  9:00 AM BUZZ Guan - [facility]   3/5/2020  9:45 AM Rukhsana Neri MD [facility]   3/23/2020 10:40 AM BUZZ Ingram - CNP [facility]       Care Coordination Plan of Care: This nurse Care Coordinator will attempt to reach [place] next week. Will mail reach out letter.

## 2020-02-20 ENCOUNTER — OFFICE VISIT (OUTPATIENT)
Dept: PRIMARY CARE CLINIC | Age: 50
End: 2020-02-20
Payer: MEDICARE

## 2020-02-20 VITALS
WEIGHT: 193.2 LBS | BODY MASS INDEX: 32.98 KG/M2 | TEMPERATURE: 97.6 F | HEART RATE: 86 BPM | SYSTOLIC BLOOD PRESSURE: 176 MMHG | DIASTOLIC BLOOD PRESSURE: 98 MMHG | HEIGHT: 64 IN

## 2020-02-20 LAB
INFLUENZA A ANTIBODY: NEGATIVE
INFLUENZA B ANTIBODY: NEGATIVE

## 2020-02-20 PROCEDURE — 1036F TOBACCO NON-USER: CPT | Performed by: NURSE PRACTITIONER

## 2020-02-20 PROCEDURE — 99214 OFFICE O/P EST MOD 30 MIN: CPT | Performed by: NURSE PRACTITIONER

## 2020-02-20 PROCEDURE — G8482 FLU IMMUNIZE ORDER/ADMIN: HCPCS | Performed by: NURSE PRACTITIONER

## 2020-02-20 PROCEDURE — G8427 DOCREV CUR MEDS BY ELIG CLIN: HCPCS | Performed by: NURSE PRACTITIONER

## 2020-02-20 PROCEDURE — 3017F COLORECTAL CA SCREEN DOC REV: CPT | Performed by: NURSE PRACTITIONER

## 2020-02-20 PROCEDURE — 87804 INFLUENZA ASSAY W/OPTIC: CPT | Performed by: NURSE PRACTITIONER

## 2020-02-20 PROCEDURE — G8417 CALC BMI ABV UP PARAM F/U: HCPCS | Performed by: NURSE PRACTITIONER

## 2020-02-20 RX ORDER — AMOXICILLIN AND CLAVULANATE POTASSIUM 875; 125 MG/1; MG/1
1 TABLET, FILM COATED ORAL 2 TIMES DAILY
Qty: 20 TABLET | Refills: 0 | Status: SHIPPED | OUTPATIENT
Start: 2020-02-20 | End: 2020-03-01

## 2020-02-20 RX ORDER — PREDNISONE 20 MG/1
40 TABLET ORAL DAILY
Qty: 10 TABLET | Refills: 0 | Status: SHIPPED | OUTPATIENT
Start: 2020-02-20 | End: 2020-02-25

## 2020-02-20 RX ORDER — GUAIFENESIN AND CODEINE PHOSPHATE 100; 10 MG/5ML; MG/5ML
10 SOLUTION ORAL 4 TIMES DAILY PRN
Qty: 120 ML | Refills: 0 | Status: SHIPPED | OUTPATIENT
Start: 2020-02-20 | End: 2020-02-24 | Stop reason: SDUPTHER

## 2020-02-20 ASSESSMENT — ENCOUNTER SYMPTOMS
RHINORRHEA: 1
NAUSEA: 0
SHORTNESS OF BREATH: 0
SORE THROAT: 1
VOMITING: 0
TROUBLE SWALLOWING: 0
HEMOPTYSIS: 0
SWOLLEN GLANDS: 0
HEARTBURN: 0
SINUS PAIN: 1
COUGH: 1
ABDOMINAL PAIN: 0
DIARRHEA: 0
SINUS PRESSURE: 1
WHEEZING: 0
CONSTIPATION: 0

## 2020-02-20 NOTE — PATIENT INSTRUCTIONS
Patient Education        Cough: Care Instructions  Your Care Instructions    A cough is your body's response to something that bothers your throat or airways. Many things can cause a cough. You might cough because of a cold or the flu, bronchitis, or asthma. Smoking, postnasal drip, allergies, and stomach acid that backs up into your throat also can cause coughs. A cough is a symptom, not a disease. Most coughs stop when the cause, such as a cold, goes away. You can take a few steps at home to cough less and feel better. Follow-up care is a key part of your treatment and safety. Be sure to make and go to all appointments, and call your doctor if you are having problems. It's also a good idea to know your test results and keep a list of the medicines you take. How can you care for yourself at home? · Drink lots of water and other fluids. This helps thin the mucus and soothes a dry or sore throat. Honey or lemon juice in hot water or tea may ease a dry cough. · Take cough medicine as directed by your doctor. · Prop up your head on pillows to help you breathe and ease a dry cough. · Try cough drops to soothe a dry or sore throat. Cough drops don't stop a cough. Medicine-flavored cough drops are no better than candy-flavored drops or hard candy. · Do not smoke. Avoid secondhand smoke. If you need help quitting, talk to your doctor about stop-smoking programs and medicines. These can increase your chances of quitting for good. When should you call for help? Call 911 anytime you think you may need emergency care.  For example, call if:    · You have severe trouble breathing.    Call your doctor now or seek immediate medical care if:    · You cough up blood.     · You have new or worse trouble breathing.     · You have a new or higher fever.     · You have a new rash.    Watch closely for changes in your health, and be sure to contact your doctor if:    · You cough more deeply or more often, especially if you notice more mucus or a change in the color of your mucus.     · You have new symptoms, such as a sore throat, an earache, or sinus pain.     · You do not get better as expected. Where can you learn more? Go to [email or web address].[email or web address] org and sign in to your [employer] account. Enter D279 in the TRAKLOK box to learn more about \"Cough: Care Instructions. \"     If you do not have an account, please click on the \"Sign Up Now\" link. Current as of: June 9, 2019  Content Version: 12.3  © 1726-6990 [employer]. Care instructions adapted under license by [facility] ([facility]). If you have questions about a medical condition or this instruction, always ask your healthcare professional. [address] any warranty or liability for your use of this information.

## 2020-02-20 NOTE — PROGRESS NOTES
Allergies    Subjective:      Review of Systems   Constitutional: Positive for chills and fever. Negative for fatigue and weight loss. HENT: Positive for congestion, postnasal drip, rhinorrhea, sinus pressure, sinus pain, sneezing and sore throat. Negative for ear pain and trouble swallowing. Respiratory: Positive for cough. Negative for hemoptysis, shortness of breath and wheezing. Cardiovascular: Negative for chest pain. Gastrointestinal: Negative for abdominal pain, constipation, diarrhea, heartburn, nausea and vomiting. Genitourinary: Negative for decreased urine volume, difficulty urinating and dysuria. Musculoskeletal: Positive for myalgias. Negative for joint pain, neck pain and neck stiffness. Skin: Negative for rash. Allergic/Immunologic: Positive for environmental allergies. Neurological: Positive for headaches. Negative for dizziness, syncope, light-headedness and numbness. Objective:     Physical Exam  Vitals signs and nursing note reviewed. Constitutional:       General: She is not in acute distress. Appearance: She is well-developed. She is ill-appearing. HENT:      Right Ear: Tympanic membrane and ear canal normal. No middle ear effusion. Left Ear: Tympanic membrane and ear canal normal.  No middle ear effusion. Nose: Mucosal edema and rhinorrhea present. Right Sinus: Maxillary sinus tenderness and frontal sinus tenderness present. Left Sinus: Maxillary sinus tenderness and frontal sinus tenderness present. Mouth/Throat:      Mouth: Mucous membranes are not dry. Pharynx: Posterior oropharyngeal erythema present. Eyes:      Conjunctiva/sclera: Conjunctivae normal.   Neck:      Musculoskeletal: Normal range of motion and neck supple. Cardiovascular:      Rate and Rhythm: Normal rate and regular rhythm. Heart sounds: Normal heart sounds. Pulmonary:      Effort: Pulmonary effort is normal.      Breath sounds: Normal breath sounds.

## 2020-02-21 ENCOUNTER — CARE COORDINATION (OUTPATIENT)
Dept: CARE COORDINATION | Age: 50
End: 2020-02-21

## 2020-02-24 RX ORDER — GUAIFENESIN AND CODEINE PHOSPHATE 100; 10 MG/5ML; MG/5ML
10 SOLUTION ORAL 4 TIMES DAILY PRN
Qty: 120 ML | Refills: 0 | Status: SHIPPED | OUTPATIENT
Start: 2020-02-24 | End: 2020-02-27

## 2020-02-24 NOTE — TELEPHONE ENCOUNTER
Phone call from patient requesting refill of cough syrup, states only received a small bottle. Health Maintenance   Topic Date Due    Breast cancer screen  12/27/2018    Diabetic microalbuminuria test  12/20/2019    Lipid screen  12/20/2019    A1C test (Diabetic or Prediabetic)  01/04/2020    Hepatitis B vaccine (1 of 3 - Risk 3-dose series) 10/04/2020 (Originally 12/27/1987)    Shingles Vaccine (1 of 2) 10/04/2020 (Originally 12/27/2018)    DTaP/Tdap/Td vaccine (1 - Tdap) 12/23/2020 (Originally 12/27/1979)    Cervical cancer screen  12/23/2020 (Originally 12/27/1989)    HIV screen  12/23/2020 (Originally 12/27/1983)    Diabetic retinal exam  04/03/2020    Potassium monitoring  08/13/2020    Creatinine monitoring  08/13/2020    Diabetic foot exam  10/04/2020    Colon cancer screen colonoscopy  03/06/2029    Flu vaccine  Completed    Pneumococcal 0-64 years Vaccine  Completed    Hepatitis A vaccine  Aged Out    Hib vaccine  Aged Out    Meningococcal (ACWY) vaccine  Aged Out             (applicable per patient's age: Cancer Screenings, Depression Screening, Fall Risk Screening, Immunizations)    Hemoglobin A1C (%)   Date Value   10/04/2019 9.8   08/13/2019 10.3 (H)   02/04/2019 10.5     Microalb/Crt.  Ratio (mcg/mg creat)   Date Value   12/20/2018 403 (H)     LDL Cholesterol (mg/dL)   Date Value   12/20/2018 117     AST (U/L)   Date Value   08/13/2019 13     ALT (U/L)   Date Value   08/13/2019 20     BUN (mg/dL)   Date Value   08/13/2019 19      (goal A1C is < 7)   (goal LDL is <100) need 30-50% reduction from baseline     BP Readings from Last 3 Encounters:   02/20/20 (!) 176/98   12/23/19 139/85   11/05/19 (!) 170/98    (goal /80)      All Future Testing planned in [facility]:  Lab Frequency Next Occurrence   EVA DIGITAL SCREEN W CAD BILATERAL Once 04/05/2020   H. pylori antigen Once 49/47/6994   Basic Metabolic Panel Once 89/10/1241   Lipid Panel Once 03/23/2020   Hemoglobin A1C Once 03/23/2020   Microalbumin, Ur Once 03/23/2020       Next Visit Date:  Future Appointments   Date Time Provider Murphy Velarde   2/26/2020  9:00 AM BUZZ Lamar - [facility] OB/GYN TPP   3/5/2020  9:45 AM Aurdey Woodward MD tiff onc spe TPP   3/23/2020 10:40 AM BUZZ Concepcion - CNP Tiff Prim Ca TPP            Patient Active Problem List:     Uncontrolled type 2 diabetes mellitus with hyperglycemia ([facility] [address]     Uncontrolled hypertension     Lumbar back pain with radiculopathy affecting right lower extremity     Microcytic anemia     Dyslipidemia     Iron deficiency anemia     Malabsorption due to intolerance, not elsewhere classified     Internal hemorrhoids     Diverticulosis of colon     Chronic pain of right knee

## 2020-02-26 ENCOUNTER — CARE COORDINATION (OUTPATIENT)
Dept: CARE COORDINATION | Age: 50
End: 2020-02-26

## 2020-02-26 ENCOUNTER — OFFICE VISIT (OUTPATIENT)
Dept: OBGYN | Age: 50
End: 2020-02-26
Payer: MEDICARE

## 2020-02-26 ENCOUNTER — HOSPITAL ENCOUNTER (OUTPATIENT)
Age: 50
Setting detail: SPECIMEN
Discharge: HOME | End: 2020-02-26
Payer: MEDICARE

## 2020-02-26 VITALS
DIASTOLIC BLOOD PRESSURE: 100 MMHG | HEIGHT: 64 IN | SYSTOLIC BLOOD PRESSURE: 160 MMHG | WEIGHT: 195 LBS | BODY MASS INDEX: 33.29 KG/M2

## 2020-02-26 PROCEDURE — 87491 CHLMYD TRACH DNA AMP PROBE: CPT

## 2020-02-26 PROCEDURE — G8482 FLU IMMUNIZE ORDER/ADMIN: HCPCS | Performed by: ADVANCED PRACTICE MIDWIFE

## 2020-02-26 PROCEDURE — 87624 HPV HI-RISK TYP POOLED RSLT: CPT

## 2020-02-26 PROCEDURE — 87591 N.GONORRHOEAE DNA AMP PROB: CPT

## 2020-02-26 PROCEDURE — 99386 PREV VISIT NEW AGE 40-64: CPT | Performed by: ADVANCED PRACTICE MIDWIFE

## 2020-02-26 PROCEDURE — G0145 SCR C/V CYTO,THINLAYER,RESCR: HCPCS

## 2020-02-26 ASSESSMENT — PATIENT HEALTH QUESTIONNAIRE - PHQ9
SUM OF ALL RESPONSES TO PHQ9 QUESTIONS 1 & 2: 2
SUM OF ALL RESPONSES TO PHQ QUESTIONS 1-9: 2
SUM OF ALL RESPONSES TO PHQ QUESTIONS 1-9: 2
2. FEELING DOWN, DEPRESSED OR HOPELESS: 1
1. LITTLE INTEREST OR PLEASURE IN DOING THINGS: 1

## 2020-02-26 ASSESSMENT — ENCOUNTER SYMPTOMS: ABDOMINAL PAIN: 1

## 2020-02-26 NOTE — PROGRESS NOTES
YEARLY PHYSICAL    Date of service: 2020    Trevor Anglin  Is a 44 y.o.  co-habitating female    PT's PCP is: BUZZ Baird CNP     : 1970                                             Subjective:       Patient's last menstrual period was 2020 (exact date). Are your menses regular: no    OB History    Para Term  AB Living   1 1 1         SAB TAB Ectopic Molar Multiple Live Births                    # Outcome Date GA Lbr Donald/2nd Weight Sex Delivery Anes PTL Lv   1 Term 84    M CS-Unspec EPI N         Social History     Tobacco Use   Smoking Status Never Smoker   Smokeless Tobacco Never Used        Social History     Substance and Sexual Activity   Alcohol Use No       Family History   Problem Relation Age of Onset    High Blood Pressure Father     Diabetes Father     Asthma Mother        Any family history of breast or ovarian cancer: No    Any family history of blood clots: No      Allergies: Patient has no known allergies. Current Outpatient Medications:     guaiFENesin-codeine (CHERATUSSIN AC) 100-10 MG/5ML syrup, Take 10 mLs by mouth 4 times daily as needed for Cough for up to 3 days. , Disp: 120 mL, Rfl: 0    amoxicillin-clavulanate (AUGMENTIN) 875-125 MG per tablet, Take 1 tablet by mouth 2 times daily for 10 days, Disp: 20 tablet, Rfl: 0    insulin glargine (BASAGLAR KWIKPEN) 100 UNIT/ML injection pen, Inject 20 Units into the skin nightly, Disp: 5 pen, Rfl: 0    gabapentin (NEURONTIN) 300 MG capsule, Take 1 capsule by mouth 2 times daily for 180 days.  Intended supply: 90 days, Disp: 180 capsule, Rfl: 1    Insulin Pen Needle (PEN NEEDLES) 32G X 5 MM MISC, 1 each by Does not apply route daily, Disp: 100 each, Rfl: 3    blood glucose test strips (ASCENSIA AUTODISC VI;ONE TOUCH ULTRA TEST VI) strip, 1 each by In Vitro route 3 times daily Dispense brand compatible with insurance, GASTROINTESTINAL ENDOSCOPY  03/06/2019    Dr Ellis-bx(+H-Pylori,normal duodenal mucosa)erosive duodenitis    UPPER GASTROINTESTINAL ENDOSCOPY N/A 3/6/2019    EGD BIOPSY performed by Pol[facility] Ag [facility] OR       Family History   Problem Relation Age of Onset    High Blood Pressure Father     Diabetes Father     Asthma Mother        Chief Complaint   Patient presents with    Gynecologic Exam     New Patient. Yearly-PAP. pt states no issues or concerns. pt unsure of her last pap states it was completed in [facility]. pt denies ever having an abnormal pap smear. PE:  Vital Signs  Blood pressure (!) 160/100, height 5' 4\" (1.626 m), weight 195 lb (88.5 kg), last menstrual period 02/06/2020, unknown if currently breastfeeding. Labs:    No results found for this visit on 02/26/20. NURSE: NATHANAEL    HPI:  Patient here today for routine well woman exam as a new patient. Patient states it has been a while since she has a Pap test.  Patient states her last one was when she was in [place]. Patient states that she does have low back pain that is being managed by pain management patient states she also has a lot of stomach pain due to her colon. Patient also states that her period comes whenever it wants usually one time during the month but it is not regular. Patient states when she does get it it lasts 5 days however sometimes it is very light and other times it is heavy with a lot of clots    Review of Systems   Constitutional: Positive for fatigue. Gastrointestinal: Positive for abdominal pain. Genitourinary: Positive for menstrual problem. Objective  Lymphatic:   no lymphadenopathy  Heent:   negative   Cor: regular rate and rhythm, no murmurs              Pul:clear to auscultation bilaterally- no wheezes, rales or rhonchi, normal air movement, no respiratory distress      GI: Abdomen soft, non-tender.  BS normal. No masses,  No organomegaly           Extremities: normal strength,

## 2020-02-26 NOTE — CARE COORDINATION
Reason For Call Today:  -Attempted to reach [place] today   -Left a voicemail message requesting a return phone call back to this [facility] when patient is able to [phone], office hours given. -CC Reach out letter mailed on 2/12/2020- no return response   -5th unsuccessful reach out attempt via phone     Future Appointments   Date Time Provider Murphy Velarde   3/5/2020  9:45 AM Soren Choi MD tiff onc spe [facility]   3/23/2020 10:40 AM BUZZ Unger - CNP Tiff Prim Ca [facility]   3/25/2020 10:30 AM [facility] TIFF OB/GYN ULTRASOUND [facility]FF OB/GYN [facility]   3/25/2020 11:00 AM BUZZ Don - CNM TIFF OB/GYN [facility]   4/9/2020 11:30 AM [facility] MAMMOGRAPHY ROOM AT [address] Rad         Care Coordination Plan of Care: This nurse Care Coordinator will await call back from patient, and if no return call; will discontinue episode due to unable to contact/patient disengaged. Will jana temporary exclusion and notify CC Manager.

## 2020-02-28 LAB
CHLAMYDIA BY THIN PREP: NEGATIVE
HPV SAMPLE: NORMAL
HPV, GENOTYPE 16: NOT DETECTED
HPV, GENOTYPE 18: NOT DETECTED
HPV, HIGH RISK OTHER: NOT DETECTED
HPV, INTERPRETATION: NORMAL
N. GONORRHOEAE DNA, THIN PREP: NEGATIVE
SPECIMEN DESCRIPTION: NORMAL
SPECIMEN DESCRIPTION: NORMAL

## 2020-03-05 LAB — CYTOLOGY REPORT: NORMAL

## 2020-03-09 RX ORDER — METRONIDAZOLE 500 MG/1
2000 TABLET ORAL ONCE
Qty: 4 TABLET | Refills: 0 | Status: SHIPPED | OUTPATIENT
Start: 2020-03-09 | End: 2020-03-09

## 2020-03-10 ENCOUNTER — TELEPHONE (OUTPATIENT)
Dept: PRIMARY CARE CLINIC | Age: 50
End: 2020-03-10

## 2020-03-10 RX ORDER — BENZONATATE 200 MG/1
200 CAPSULE ORAL 3 TIMES DAILY PRN
Qty: 20 CAPSULE | Refills: 0 | Status: SHIPPED | OUTPATIENT
Start: 2020-03-10 | End: 2020-03-17

## 2020-03-10 NOTE — TELEPHONE ENCOUNTER
Called patient and informed her that medication was sent to the pharmacy for her cough. Verbalizes understanding.

## 2020-03-23 ENCOUNTER — TELEPHONE (OUTPATIENT)
Dept: OBGYN | Age: 50
End: 2020-03-23

## 2020-03-23 NOTE — TELEPHONE ENCOUNTER
After review of this chart this is a 90 essential appointment.   Please reschedule with ultrasound in  4 months

## 2020-03-26 ENCOUNTER — HOSPITAL ENCOUNTER (OUTPATIENT)
Age: 50
Discharge: HOME | End: 2020-03-26
Payer: MEDICARE

## 2020-03-26 LAB
ANION GAP SERPL CALCULATED.3IONS-SCNC: 14 MMOL/L (ref 9–17)
BUN BLDV-MCNC: 14 MG/DL (ref 6–20)
BUN/CREAT BLD: 24 (ref 9–20)
CALCIUM SERPL-MCNC: 9.9 MG/DL (ref 8.6–10.4)
CHLORIDE BLD-SCNC: 94 MMOL/L (ref 98–107)
CHOLESTEROL/HDL RATIO: 3.1
CHOLESTEROL: 182 MG/DL
CO2: 25 MMOL/L (ref 20–31)
CREAT SERPL-MCNC: 0.59 MG/DL (ref 0.5–0.9)
CREATININE URINE: 65.8 MG/DL (ref 28–217)
ESTIMATED AVERAGE GLUCOSE: 249 MG/DL
GFR AFRICAN AMERICAN: >60 ML/MIN
GFR NON-AFRICAN AMERICAN: >60 ML/MIN
GFR SERPL CREATININE-BSD FRML MDRD: ABNORMAL ML/MIN/{1.73_M2}
GFR SERPL CREATININE-BSD FRML MDRD: ABNORMAL ML/MIN/{1.73_M2}
GLUCOSE BLD-MCNC: 231 MG/DL (ref 70–99)
HBA1C MFR BLD: 10.3 % (ref 4.8–5.9)
HDLC SERPL-MCNC: 59 MG/DL
LDL CHOLESTEROL: 69 MG/DL (ref 0–130)
MICROALBUMIN/CREAT 24H UR: 366 MG/L
MICROALBUMIN/CREAT UR-RTO: 556 MCG/MG CREAT
POTASSIUM SERPL-SCNC: 4 MMOL/L (ref 3.7–5.3)
SODIUM BLD-SCNC: 133 MMOL/L (ref 135–144)
TRIGL SERPL-MCNC: 271 MG/DL
VLDLC SERPL CALC-MCNC: ABNORMAL MG/DL (ref 1–30)

## 2020-03-26 PROCEDURE — 36415 COLL VENOUS BLD VENIPUNCTURE: CPT

## 2020-03-26 PROCEDURE — 83036 HEMOGLOBIN GLYCOSYLATED A1C: CPT

## 2020-03-26 PROCEDURE — 82043 UR ALBUMIN QUANTITATIVE: CPT

## 2020-03-26 PROCEDURE — 82570 ASSAY OF URINE CREATININE: CPT

## 2020-03-26 PROCEDURE — 80048 BASIC METABOLIC PNL TOTAL CA: CPT

## 2020-03-26 PROCEDURE — 80061 LIPID PANEL: CPT

## 2020-03-27 ENCOUNTER — OFFICE VISIT (OUTPATIENT)
Dept: PRIMARY CARE CLINIC | Age: 50
End: 2020-03-27
Payer: MEDICARE

## 2020-03-27 VITALS
SYSTOLIC BLOOD PRESSURE: 113 MMHG | BODY MASS INDEX: 34.02 KG/M2 | HEIGHT: 64 IN | HEART RATE: 88 BPM | WEIGHT: 199.3 LBS | TEMPERATURE: 97.5 F | RESPIRATION RATE: 16 BRPM | DIASTOLIC BLOOD PRESSURE: 67 MMHG

## 2020-03-27 PROCEDURE — G8482 FLU IMMUNIZE ORDER/ADMIN: HCPCS | Performed by: NURSE PRACTITIONER

## 2020-03-27 PROCEDURE — 3017F COLORECTAL CA SCREEN DOC REV: CPT | Performed by: NURSE PRACTITIONER

## 2020-03-27 PROCEDURE — 2022F DILAT RTA XM EVC RTNOPTHY: CPT | Performed by: NURSE PRACTITIONER

## 2020-03-27 PROCEDURE — G8417 CALC BMI ABV UP PARAM F/U: HCPCS | Performed by: NURSE PRACTITIONER

## 2020-03-27 PROCEDURE — G8427 DOCREV CUR MEDS BY ELIG CLIN: HCPCS | Performed by: NURSE PRACTITIONER

## 2020-03-27 PROCEDURE — 99214 OFFICE O/P EST MOD 30 MIN: CPT | Performed by: NURSE PRACTITIONER

## 2020-03-27 PROCEDURE — 1036F TOBACCO NON-USER: CPT | Performed by: NURSE PRACTITIONER

## 2020-03-27 PROCEDURE — 3046F HEMOGLOBIN A1C LEVEL >9.0%: CPT | Performed by: NURSE PRACTITIONER

## 2020-03-27 RX ORDER — INSULIN GLARGINE 100 [IU]/ML
30 INJECTION, SOLUTION SUBCUTANEOUS NIGHTLY
Qty: 5 PEN | Refills: 5 | Status: SHIPPED | OUTPATIENT
Start: 2020-03-27 | End: 2020-07-07 | Stop reason: SDUPTHER

## 2020-03-27 RX ORDER — ATENOLOL 50 MG/1
TABLET ORAL
Qty: 90 TABLET | Refills: 3 | Status: SHIPPED | OUTPATIENT
Start: 2020-03-27 | End: 2021-10-13 | Stop reason: SDUPTHER

## 2020-03-27 RX ORDER — FUROSEMIDE 20 MG/1
20 TABLET ORAL DAILY PRN
Qty: 90 TABLET | Refills: 1 | Status: SHIPPED | OUTPATIENT
Start: 2020-03-27 | End: 2021-04-26 | Stop reason: SDUPTHER

## 2020-03-27 ASSESSMENT — ENCOUNTER SYMPTOMS
WHEEZING: 0
NAUSEA: 0
SORE THROAT: 0
VOMITING: 0
COUGH: 0
SHORTNESS OF BREATH: 0
DIARRHEA: 0
ORTHOPNEA: 0
CONSTIPATION: 0
BACK PAIN: 1
VISUAL CHANGE: 0
RHINORRHEA: 0
BLURRED VISION: 0
ABDOMINAL PAIN: 0

## 2020-03-27 NOTE — TELEPHONE ENCOUNTER
2 diabetes mellitus with hyperglycemia (HCC)     Essential hypertension     Lumbar back pain with radiculopathy affecting right lower extremity     Microcytic anemia     Dyslipidemia     Iron deficiency anemia     Malabsorption due to intolerance, not elsewhere classified     Internal hemorrhoids     Diverticulosis of colon     Chronic pain of right knee

## 2020-03-27 NOTE — PROGRESS NOTES
visit with a dietitian. She rarely participates in exercise. There is no change in her home blood glucose trend. Her breakfast blood glucose range is generally >200 mg/dl. An ACE inhibitor/angiotensin II receptor blocker is being taken. She does not see a podiatrist.Eye exam is current. Hypertension   This is a chronic problem. The current episode started more than 1 year ago. The problem has been rapidly improving since onset. The problem is controlled. Associated symptoms include peripheral edema. Pertinent negatives include no anxiety, blurred vision, chest pain, headaches, malaise/fatigue, neck pain, orthopnea, palpitations, PND, shortness of breath or sweats. There are no associated agents to hypertension. Risk factors for coronary artery disease include diabetes mellitus, dyslipidemia, family history, post-menopausal state, obesity, sedentary lifestyle and stress. Past treatments include calcium channel blockers, diuretics, angiotensin blockers and alpha 1 blockers. The current treatment provides moderate improvement. Compliance problems include exercise and diet. There is no history of kidney disease, CAD/MI, CVA or heart failure. There is no history of chronic renal disease.          Past Medical History:     Past Medical History:   Diagnosis Date    Diabetes mellitus ([address]     Hyperlipidemia     Hypertension     Irregular heart rhythm     Sciatica       Reviewed all health maintenance requirements and ordered appropriate tests  Health Maintenance Due   Topic Date Due    Breast cancer screen  2018    Diabetic retinal exam  2020       Past Surgical History:     Past Surgical History:   Procedure Laterality Date     SECTION      COLONOSCOPY  2019    Dr Lisa Almanzar- diverticulosis,hemorrhoids    COLONOSCOPY N/A 3/6/2019    COLONOSCOPY DIAGNOSTIC performed by Deion Rodriguez MD at [address]  2019    Dr Ellis-bx(+H-Pylori,normal duodenal mucosa)erosive duodenitis    UPPER GASTROINTESTINAL ENDOSCOPY N/A 3/6/2019    EGD BIOPSY performed by Ignacia Rodriguez MD at [address]        Medications:       Prior to Admission medications    Medication Sig Start Date End Date Taking? Authorizing Provider   insulin glargine [facility]) 100 UNIT/ML injection pen Inject 30 Units into the skin nightly 3/27/20  Yes BUZZ Concepcion - CNP   furosemide (LASIX) 20 MG tablet Take 1 tablet by mouth daily as needed (swellling) 3/27/20  Yes Crissy Butcher APRN - CNP   gabapentin (NEURONTIN) 300 MG capsule Take 1 capsule by mouth 2 times daily for 180 days.  Intended supply: 90 days 12/23/19 6/20/20 Yes BUZZ Concepcion - CNP   Insulin Pen Needle (PEN NEEDLES) 32G X 5 MM MISC 1 each by Does not apply route daily 12/23/19  Yes BUZZ Concepcion CNP   blood glucose test strips (ASCENSIA AUTODISC VI;ONE TOUCH ULTRA TEST VI) strip 1 each by In Vitro route 3 times daily Dispense brand compatible with insurance 12/13/19  Yes BUZZ Ortiz - DONYA   HYDROcodone-acetaminophen ([address]) 5-325 MG per tablet  11/11/19  Yes Historical Provider, MD JOHNSONYLE LANCETS MISC 1 each by Does not apply route daily Dispense brand compatible with insurance 11/20/19  Yes BUZZ Concepcion CNP   glucose monitoring kit (FREESTYLE) monitoring kit 1 kit by Does not apply route daily Dispense brand compatible with insurance 11/20/19  Yes BUZZ Concepcion - DONYA   cloNIDine (CATAPRES) 0.1 MG tablet Take 1 tablet by mouth 2 times daily 11/5/19  Yes Crissy Butcher APRN - DONYA   SITagliptin-metFORMIN (JANUMET XR)  MG TB24 per extended release tablet Take 2 tablets by mouth daily 10/18/19  Yes BUZZ Concepcion - CNP   atenolol (TENORMIN) 50 MG tablet Take 1 tablet by mouth daily 10/18/19  Yes BUZZ Concepcion - CNP   losartan-hydrochlorothiazide (HYZAAR) 100-25 MG per tablet Take 1 tablet by mouth daily 10/4/19  Yes Aundria Sit Might, APRN - CNP   NIFEdipine (ADALAT CC) 60 MG extended release tablet Take 1 tablet by mouth daily 10/4/19  Yes BUZZ Monae - CNP   Ertugliflozin L-PyroglutamicAc (STEGLATRO) 5 MG TABS Take 1 tablet by mouth daily 10/4/19  Yes BUZZ Monae - CNP   atorvastatin (LIPITOR) 40 MG tablet Take 1 tablet by mouth daily 10/4/19  Yes BUZZ Monae CNP        Allergies:       Patient has no known allergies. Social History:     Tobacco:    reports that she has never smoked. She has never used smokeless tobacco.  Alcohol:      reports no history of alcohol use. Drug Use:  reports no history of drug use. Family History:     Family History   Problem Relation Age of Onset    High Blood Pressure Father     Diabetes Father     Asthma Mother        Review of Systems:     Positive and Negative as described in HPI    Review of Systems   Constitutional: Negative for chills, fatigue, fever and malaise/fatigue. HENT: Negative for congestion, rhinorrhea and sore throat. Eyes: Negative for blurred vision and visual disturbance. Respiratory: Negative for cough, shortness of breath and wheezing. Cardiovascular: Positive for leg swelling. Negative for chest pain, palpitations, orthopnea and PND. Gastrointestinal: Negative for abdominal pain, constipation, diarrhea, nausea and vomiting. Endocrine: Negative for polydipsia, polyphagia and polyuria. Genitourinary: Negative for difficulty urinating, dysuria and vaginal discharge. Musculoskeletal: Positive for back pain (Chronic). Negative for gait problem and neck pain. Skin: Negative for rash. Neurological: Negative for dizziness, syncope, light-headedness and headaches.        Physical Exam:   Vitals:  /67 (Site: Left Upper Arm, Position: Sitting, Cuff Size: Large Adult)   Pulse 88   Temp 97.5 °F (36.4 °C) (Temporal)   Resp 16   Ht 5' 4\" (1.626 m)   Wt 199 lb 4.8 oz (90.4 kg)   LMP 03/06/2020 (Approximate)   BMI 34.21 kg/m²     Physical Exam  Vitals signs and nursing note reviewed. Constitutional:       General: She is not in acute distress. Appearance: She is well-developed. HENT:      Mouth/Throat:      Mouth: Mucous membranes are moist.   Eyes:      General: No scleral icterus. Conjunctiva/sclera: Conjunctivae normal.   Neck:      Musculoskeletal: Normal range of motion and neck supple. Cardiovascular:      Rate and Rhythm: Normal rate and regular rhythm. Heart sounds: No murmur. Pulmonary:      Effort: Pulmonary effort is normal.      Breath sounds: Normal breath sounds. No wheezing. Abdominal:      General: Bowel sounds are normal. There is no distension. Palpations: Abdomen is soft. Tenderness: There is no abdominal tenderness. Musculoskeletal:      Right lower leg: Edema (trace to 1+) present. Left lower leg: Edema (trace to 1+) present. Lymphadenopathy:      Cervical: No cervical adenopathy. Skin:     General: Skin is warm and dry. Findings: No rash. Neurological:      Mental Status: She is alert and oriented to person, place, and time. Psychiatric:         Mood and Affect: Mood normal.         Behavior: Behavior normal.         Data:     Lab Results   Component Value Date     03/26/2020    K 4.0 03/26/2020    CL 94 03/26/2020    CO2 25 03/26/2020    BUN 14 03/26/2020    CREATININE 0.59 03/26/2020    GLUCOSE 231 03/26/2020    PROT 7.7 08/13/2019    LABALBU 4.2 08/13/2019    BILITOT 0.32 08/13/2019    ALKPHOS 49 08/13/2019    AST 13 08/13/2019    ALT 20 08/13/2019     Lab Results   Component Value Date    WBC 8.1 08/13/2019    RBC 4.04 08/13/2019    HGB 9.6 08/13/2019    HCT 31.9 08/13/2019    MCV 79.0 08/13/2019    MCH 23.8 08/13/2019    MCHC 30.1 08/13/2019    RDW 13.6 08/13/2019     08/13/2019    MPV 9.7 08/13/2019     No results found for: TSH  Lab Results   Component Value Date    CHOL 182 03/26/2020    HDL 59 03/26/2020    LABA1C 10.3 03/26/2020       Assessment/Plan:      Diagnosis Orders   1. Uncontrolled type 2 diabetes mellitus with hyperglycemia (HCC)  insulin glargine (BASAGLAR KWIKPEN) 100 UNIT/ML injection pen   2. Essential hypertension     3. Lower leg edema  furosemide (LASIX) 20 MG tablet   4. Encounter for screening mammogram for breast cancer  EVA DIGITAL SCREEN W CAD BILATERAL     We will increase her long-acting insulin gradually to 30 units per night. She will call with fasting blood sugars next week. She is to continue all other medications. Lasix 20 mg daily as needed for lower leg edema. She will call with progress. 1.  Adriane received counseling on the following healthy behaviors: nutrition, exercise and medication adherence  2. Patient given educational materials - see patient instructions  3. Was a self-tracking handout given in paper form or via [facility]? No  If yes, see orders or list here. 4.  Discussed use, benefit, and side effects of prescribed medications. Barriers to medication compliance addressed. All patient questions answered. Pt voiced understanding. 5.  Reviewed prior labs and health maintenance  6. Continue current medications, diet and exercise. Completed Refills   Requested Prescriptions     Signed Prescriptions Disp Refills    insulin glargine (BASAGLAR KWIKPEN) 100 UNIT/ML injection pen 5 pen 5     Sig: Inject 30 Units into the skin nightly    furosemide (LASIX) 20 MG tablet 90 tablet 1     Sig: Take 1 tablet by mouth daily as needed (swellling)         Return in about 3 months (around 6/27/2020) for check up- A1c in office.

## 2020-05-11 RX ORDER — CLONIDINE HYDROCHLORIDE 0.1 MG/1
TABLET ORAL
Qty: 60 TABLET | Refills: 0 | Status: SHIPPED | OUTPATIENT
Start: 2020-05-11 | End: 2020-07-31 | Stop reason: SDUPTHER

## 2020-05-11 NOTE — TELEPHONE ENCOUNTER
Health Maintenance   Topic Date Due    Breast cancer screen  12/27/2018    Diabetic retinal exam  04/03/2020    Hepatitis B vaccine (1 of 3 - Risk 3-dose series) 10/04/2020 (Originally 12/27/1987)    Shingles Vaccine (1 of 2) 10/04/2020 (Originally 12/27/2018)    DTaP/Tdap/Td vaccine (1 - Tdap) 12/23/2020 (Originally 12/27/1987)    HIV screen  12/23/2020 (Originally 12/27/1983)    A1C test (Diabetic or Prediabetic)  06/26/2020    Diabetic foot exam  10/04/2020    Diabetic microalbuminuria test  03/26/2021    Lipid screen  03/26/2021    Potassium monitoring  03/26/2021    Creatinine monitoring  03/26/2021    Cervical cancer screen  02/26/2025    Colon cancer screen colonoscopy  03/06/2029    Flu vaccine  Completed    Pneumococcal 0-64 years Vaccine  Completed    Hepatitis A vaccine  Aged Out    Hib vaccine  Aged Out    Meningococcal (ACWY) vaccine  Aged Out             (applicable per patient's age: Cancer Screenings, Depression Screening, Fall Risk Screening, Immunizations)    Hemoglobin A1C (%)   Date Value   03/26/2020 10.3 (H)   10/04/2019 9.8   08/13/2019 10.3 (H)     Microalb/Crt.  Ratio (mcg/mg creat)   Date Value   03/26/2020 556 (H)     LDL Cholesterol (mg/dL)   Date Value   03/26/2020 69     AST (U/L)   Date Value   08/13/2019 13     ALT (U/L)   Date Value   08/13/2019 20     BUN (mg/dL)   Date Value   03/26/2020 14      (goal A1C is < 7)   (goal LDL is <100) need 30-50% reduction from baseline     BP Readings from Last 3 Encounters:   03/27/20 113/67   02/26/20 (!) 160/100   02/20/20 (!) 176/98    (goal /80)      All Future Testing planned in [facility]:  Lab Frequency Next Occurrence   US Non OB Transvaginal Once 03/11/2020   EVA DIGITAL SCREEN W CAD BILATERAL Once 07/06/2020       Next Visit Date:  Future Appointments   Date Time Provider Murphy Velarde   6/29/2020 10:40 AM Chelly Jacquelyn Might, APRN - CNP Tiff Prim Ca [facility]   7/6/2020 11:00 AM [facility] MAMMOGRAPHY ROOM AT [address]

## 2020-05-21 ENCOUNTER — TELEPHONE (OUTPATIENT)
Dept: PRIMARY CARE CLINIC | Age: 50
End: 2020-05-21

## 2020-07-07 ENCOUNTER — OFFICE VISIT (OUTPATIENT)
Dept: PRIMARY CARE CLINIC | Age: 50
End: 2020-07-07
Payer: MEDICARE

## 2020-07-07 VITALS
WEIGHT: 199.4 LBS | RESPIRATION RATE: 18 BRPM | TEMPERATURE: 97.8 F | SYSTOLIC BLOOD PRESSURE: 113 MMHG | DIASTOLIC BLOOD PRESSURE: 73 MMHG | HEART RATE: 94 BPM | BODY MASS INDEX: 34.23 KG/M2

## 2020-07-07 LAB — HBA1C MFR BLD: 10.9 %

## 2020-07-07 PROCEDURE — 1036F TOBACCO NON-USER: CPT | Performed by: NURSE PRACTITIONER

## 2020-07-07 PROCEDURE — 3017F COLORECTAL CA SCREEN DOC REV: CPT | Performed by: NURSE PRACTITIONER

## 2020-07-07 PROCEDURE — 2022F DILAT RTA XM EVC RTNOPTHY: CPT | Performed by: NURSE PRACTITIONER

## 2020-07-07 PROCEDURE — 3046F HEMOGLOBIN A1C LEVEL >9.0%: CPT | Performed by: NURSE PRACTITIONER

## 2020-07-07 PROCEDURE — 83036 HEMOGLOBIN GLYCOSYLATED A1C: CPT | Performed by: NURSE PRACTITIONER

## 2020-07-07 PROCEDURE — 99214 OFFICE O/P EST MOD 30 MIN: CPT | Performed by: NURSE PRACTITIONER

## 2020-07-07 PROCEDURE — G8427 DOCREV CUR MEDS BY ELIG CLIN: HCPCS | Performed by: NURSE PRACTITIONER

## 2020-07-07 PROCEDURE — G8417 CALC BMI ABV UP PARAM F/U: HCPCS | Performed by: NURSE PRACTITIONER

## 2020-07-07 RX ORDER — INSULIN GLARGINE 100 [IU]/ML
32 INJECTION, SOLUTION SUBCUTANEOUS NIGHTLY
Qty: 5 PEN | Refills: 5 | Status: SHIPPED
Start: 2020-07-07 | End: 2020-07-09 | Stop reason: CLARIF

## 2020-07-07 RX ORDER — ERTUGLIFLOZIN 5 MG/1
1 TABLET, FILM COATED ORAL DAILY
Qty: 90 TABLET | Refills: 1 | Status: SHIPPED
Start: 2020-07-07 | End: 2020-07-09 | Stop reason: CLARIF

## 2020-07-07 ASSESSMENT — ENCOUNTER SYMPTOMS
DIARRHEA: 0
CONSTIPATION: 0
VISUAL CHANGE: 0
VOMITING: 0
SORE THROAT: 0
WHEEZING: 0
ORTHOPNEA: 0
NAUSEA: 0
RHINORRHEA: 0
ABDOMINAL PAIN: 0
COUGH: 0
SHORTNESS OF BREATH: 0

## 2020-07-07 NOTE — PROGRESS NOTES
Name: Duke Lynn  : 1970         Chief Complaint:     Chief Complaint   Patient presents with    Diabetes     routine chreck    Hypertension       History of Present Illness:      Duke Lynn is a 44 y.o.  female who presents with Diabetes (routine chreck) and Hypertension      Cristal Lord is here today for a routine follow-up visit. Unfortunately her A1c has not improved. She did start Basaglar 30 units, however according to her fill records she has not been totally compliant. See below for further detail. Diabetes   She presents for her follow-up diabetic visit. She has type 2 diabetes mellitus. Her disease course has been worsening. There are no hypoglycemic associated symptoms. Pertinent negatives for hypoglycemia include no confusion, dizziness, headaches, mood changes, nervousness/anxiousness, pallor, speech difficulty or sweats. Pertinent negatives for diabetes include no chest pain, no fatigue, no foot paresthesias, no polydipsia, no polyphagia, no polyuria, no visual change and no weakness. There are no hypoglycemic complications. Pertinent negatives for hypoglycemia complications include no blackouts, no hospitalization, no nocturnal hypoglycemia, no required assistance and no required glucagon injection. Symptoms are stable. There are no diabetic complications. Pertinent negatives for diabetic complications include no CVA, heart disease, nephropathy or peripheral neuropathy. Risk factors for coronary artery disease include diabetes mellitus, dyslipidemia, family history, obesity, hypertension, post-menopausal, sedentary lifestyle and stress. Current diabetic treatment includes insulin injections and oral agent (triple therapy). She is compliant with treatment all of the time. Her weight is stable. She is following a generally unhealthy diet. Meal planning includes avoidance of concentrated sweets. She has not had a previous visit with a dietitian.  She rarely participates in exercise. There is no change in her home blood glucose trend. Her breakfast blood glucose range is generally >200 mg/dl. An ACE inhibitor/angiotensin II receptor blocker is being taken. She does not see a podiatrist.Eye exam is current. Hypertension   This is a chronic problem. The current episode started more than 1 year ago. The problem is unchanged. The problem is controlled. Associated symptoms include peripheral edema (intermittent). Pertinent negatives include no chest pain, headaches, neck pain, orthopnea, palpitations, shortness of breath or sweats. There are no associated agents to hypertension. Risk factors for coronary artery disease include diabetes mellitus, dyslipidemia, family history, obesity, post-menopausal state and stress. Past treatments include alpha 1 blockers, beta blockers, diuretics and angiotensin blockers. The current treatment provides moderate improvement. Compliance problems include exercise and diet. There is no history of kidney disease, CAD/MI, CVA or heart failure. There is no history of chronic renal disease.          Past Medical History:     Past Medical History:   Diagnosis Date    Diabetes mellitus ([facility] [address]     Hyperlipidemia     Hypertension     Irregular heart rhythm     Sciatica       Reviewed all health maintenance requirements and ordered appropriate tests  Health Maintenance Due   Topic Date Due    Breast cancer screen  2018    A1C test (Diabetic or Prediabetic)  2020       Past Surgical History:     Past Surgical History:   Procedure Laterality Date     SECTION      COLONOSCOPY  2019    Dr Zahida Heredia- diverticulosis,hemorrhoids    COLONOSCOPY N/A 3/6/2019    COLONOSCOPY DIAGNOSTIC performed by Kalyn Kraft MD at [address] [place] [address]  2019    Dr Ellis-bx(+H-Pylori,normal duodenal mucosa)erosive duodenitis    UPPER GASTROINTESTINAL ENDOSCOPY N/A 3/6/2019    EGD BIOPSY performed by Kalyn Kraft MD at [facility] Tre Butcher, APRN - CNP   gabapentin (NEURONTIN) 300 MG capsule Take 1 capsule by mouth 2 times daily for 180 days. Intended supply: 90 days 12/23/19 6/20/20  Tre Butcher APRN - CNP        Allergies:       Patient has no known allergies. Social History:     Tobacco:    reports that she has never smoked. She has never used smokeless tobacco.  Alcohol:      reports no history of alcohol use. Drug Use:  reports no history of drug use. Family History:     Family History   Problem Relation Age of Onset    High Blood Pressure Father     Diabetes Father     Asthma Mother        Review of Systems:     Positive and Negative as described in HPI    Review of Systems   Constitutional: Negative for chills, fatigue and fever. HENT: Negative for congestion, rhinorrhea and sore throat. Eyes: Negative for visual disturbance. Respiratory: Negative for cough, shortness of breath and wheezing. Cardiovascular: Negative for chest pain, palpitations and orthopnea. Gastrointestinal: Negative for abdominal pain, constipation, diarrhea, nausea and vomiting. Endocrine: Negative for polydipsia, polyphagia and polyuria. Genitourinary: Negative for difficulty urinating and dysuria. Musculoskeletal: Negative for gait problem, neck pain and neck stiffness. Skin: Negative for pallor and rash. Neurological: Negative for dizziness, syncope, speech difficulty, weakness, light-headedness and headaches. Psychiatric/Behavioral: Negative for confusion. The patient is not nervous/anxious. Physical Exam:   Vitals:  /73 (Site: Left Upper Arm, Position: Sitting, Cuff Size: Medium Adult)   Pulse 94   Temp 97.8 °F (36.6 °C) (Temporal)   Resp 18   Wt 199 lb 6.4 oz (90.4 kg)   BMI 34.23 kg/m²     Physical Exam  Vitals signs and nursing note reviewed. Constitutional:       General: She is not in acute distress. Appearance: She is well-developed.    HENT:      Mouth/Throat:      Mouth: Mucous membranes are moist.   Eyes:      General: No scleral icterus. Conjunctiva/sclera: Conjunctivae normal.   Neck:      Musculoskeletal: Normal range of motion and neck supple. Cardiovascular:      Rate and Rhythm: Normal rate and regular rhythm. Heart sounds: No murmur. Pulmonary:      Effort: Pulmonary effort is normal.      Breath sounds: Normal breath sounds. No wheezing. Abdominal:      General: Bowel sounds are normal. There is no distension. Palpations: Abdomen is soft. Tenderness: There is no abdominal tenderness. Musculoskeletal:      Right lower leg: No edema. Left lower leg: No edema. Lymphadenopathy:      Cervical: No cervical adenopathy. Skin:     General: Skin is warm and dry. Findings: No rash. Neurological:      Mental Status: She is alert and oriented to person, place, and time. Psychiatric:         Mood and Affect: Mood normal.         Behavior: Behavior normal.         Data:     Lab Results   Component Value Date     03/26/2020    K 4.0 03/26/2020    CL 94 03/26/2020    CO2 25 03/26/2020    BUN 14 03/26/2020    CREATININE 0.59 03/26/2020    GLUCOSE 231 03/26/2020    PROT 7.7 08/13/2019    LABALBU 4.2 08/13/2019    BILITOT 0.32 08/13/2019    ALKPHOS 49 08/13/2019    AST 13 08/13/2019    ALT 20 08/13/2019     Lab Results   Component Value Date    WBC 8.1 08/13/2019    RBC 4.04 08/13/2019    HGB 9.6 08/13/2019    HCT 31.9 08/13/2019    MCV 79.0 08/13/2019    MCH 23.8 08/13/2019    MCHC 30.1 08/13/2019    RDW 13.6 08/13/2019     08/13/2019    MPV 9.7 08/13/2019     No results found for: TSH  Lab Results   Component Value Date    CHOL 182 03/26/2020    HDL 59 03/26/2020    LABA1C 10.9 07/07/2020       Assessment/Plan:      Diagnosis Orders   1.  Uncontrolled type 2 diabetes mellitus with hyperglycemia (HCC)  POCT glycosylated hemoglobin (Hb A1C)    insulin glargine (BASAGLAR KWIKPEN) 100 UNIT/ML injection pen    Ertugliflozin L-PyroglutamicAc (STEGLATRO) 5 MG TABS    Hemoglobin E0Q    Basic Metabolic Panel   2. Essential hypertension       I would like her to start recording fasting morning blood sugars. We will increase Basaglar to 32 units today. We will refer her to care coordination for monitoring. I would like her to call weekly fasting blood sugars and we will adjust her Basaglar as indicated. We will hold off on mealtime insulin at this time. 1.  Adriane received counseling on the following healthy behaviors: nutrition, exercise and medication adherence  2. Patient given educational materials - see patient instructions  3. Was a self-tracking handout given in paper form or via [employer]? No  If yes, see orders or list here. 4.  Discussed use, benefit, and side effects of prescribed medications. Barriers to medication compliance addressed. All patient questions answered. Pt voiced understanding. 5.  Reviewed prior labs and health maintenance  6. Continue current medications, diet and exercise. Completed Refills   Requested Prescriptions     Signed Prescriptions Disp Refills    insulin glargine (BASAGLAR KWIKPEN) 100 UNIT/ML injection pen 5 pen 5     Sig: Inject 32 Units into the skin nightly    Ertugliflozin L-PyroglutamicAc (STEGLATRO) 5 MG TABS 90 tablet 1     Sig: Take 1 tablet by mouth daily         Return in about 3 months (around 10/7/2020) for check up with labs.

## 2020-07-07 NOTE — Clinical Note
We will refer her to care coordination for monitoring. I would like her to call weekly fasting blood sugars and we will adjust her Basaglar as indicated. We will hold off on mealtime insulin at this time.

## 2020-07-07 NOTE — PATIENT INSTRUCTIONS
SURVEY:    You may be receiving a survey from [employer] regarding your visit today. Please complete the survey to enable us to provide the highest quality of care to you and your family. If you cannot score us a very good on any question, please call the office to discuss how we could have made your experience a very good one. Thank you. Patient Education        Counting Carbohydrates: Care Instructions  Your Care Instructions     You don't have to eat special foods when you have diabetes. You just have to be careful to eat healthy foods. Carbohydrates (carbs) raise blood sugar higher and quicker than any other nutrient. Carbs are found in desserts, breads and cereals, and fruit. They're also in starchy vegetables. These include potatoes, corn, and grains such as rice and pasta. Carbs are also in milk and yogurt. The more carbs you eat at one time, the higher your blood sugar will rise. Spreading carbs all through the day helps keep your blood sugar levels within your target range. Counting carbs is one of the best ways to keep your blood sugar under control. If you use insulin, counting carbs helps you match the right amount of insulin to the number of grams of carbs in a meal. Then you can change your diet and insulin dose as needed. Testing your blood sugar several times a day can help you learn how carbs affect your blood sugar. A registered dietitian or certified diabetes educator can help you plan meals and snacks. Follow-up care is a key part of your treatment and safety. Be sure to make and go to all appointments, and call your doctor if you are having problems. It's also a good idea to know your test results and keep a list of the medicines you take. How can you care for yourself at home?   Know your daily amount of carbohydrates  Your daily amount depends on several things, such as your weight, how active you are, which diabetes medicines you take, and what your goals are for your blood sugar levels. A registered dietitian or certified diabetes educator can help you plan how many carbs to include in each meal and snack. For most adults, a guideline for the daily amount of carbs is:  · 45 to 60 grams at each meal. That's about the same as 3 to 4 carbohydrate servings. · 15 to 20 grams at each snack. That's about the same as 1 carbohydrate serving. Count carbs  Counting carbs lets you know how much rapid-acting insulin to take before you eat. If you use an insulin pump, you get a constant rate of insulin during the day. So the pump must be programmed at meals. This gives you extra insulin to cover the rise in blood sugar after meals. If you take insulin:  · Learn your own insulin-to-carb ratio. You and your diabetes health professional will figure out the ratio. You can do this by testing your blood sugar after meals. For example, you may need a certain amount of insulin for every 15 grams of carbs. · Add up the carb grams in a meal. Then you can figure out how many units of insulin to take based on your insulin-to-carb ratio. · Exercise lowers blood sugar. You can use less insulin than you would if you were not doing exercise. Keep in mind that timing matters. If you exercise within 1 hour after a meal, your body may need less insulin for that meal than it would if you exercised 3 hours after the meal. Test your blood sugar to find out how exercise affects your need for insulin. If you do or don't take insulin:  · Look at labels on packaged foods. This can tell you how many carbs are in a serving. You can also use guides from the American Diabetes Association. · Be aware of portions, or serving sizes. If a package has two servings and you eat the whole package, you need to double the number of grams of carbohydrate listed for one serving. · Protein, fat, and fiber do not raise blood sugar as much as carbs do.  If you eat a lot of these nutrients in a meal, your blood sugar will rise more slowly than it would otherwise. Eat from all food groups  · Eat at least three meals a day. · Plan meals to include food from all the food groups. The food groups include grains, fruits, dairy, proteins, and vegetables. · Talk to your dietitian or diabetes educator about ways to add limited amounts of sweets into your meal plan. · If you drink alcohol, talk to your doctor. It may not be recommended when you are taking certain diabetes medicines. Where can you learn more? Go to https://[employer] GamespeAirec.[employer]. org and sign in to your [employer] account. Enter F595 in the Kylesabc[employer]per[employer] box to learn more about \"Counting Carbohydrates: Care Instructions. \"     If you do not have an account, please click on the \"Sign Up Now\" link. Current as of: December 20, 2019               Content Version: 12.5  © 3103-0130 "[email or web address]. Care instructions adapted under license by [facility] ([facility]). If you have questions about a medical condition or this instruction, always ask your healthcare professional. [place] [address] any warranty or liability for your use of this information. Patient Education        High Blood Pressure: Care Instructions  Overview     It's normal for blood pressure to go up and down throughout the day. But if it stays up, you have high blood pressure. Another name for high blood pressure is hypertension. Despite what a lot of people think, high blood pressure usually doesn't cause headaches or make you feel dizzy or lightheaded. It usually has no symptoms. But it does increase your risk of stroke, heart attack, and other problems. You and your doctor will talk about your risks of these problems based on your blood pressure. Your doctor will give you a goal for your blood pressure. Your goal will be based on your health and your age. Lifestyle changes, such as eating healthy and being active, are always important to help lower blood pressure.  You might also take medicine to reach your blood pressure goal.  Follow-up care is a key part of your treatment and safety. Be sure to make and go to all appointments, and call your doctor if you are having problems. It's also a good idea to know your test results and keep a list of the medicines you take. How can you care for yourself at home? Medical treatment  · If you stop taking your medicine, your blood pressure will go back up. You may take one or more types of medicine to lower your blood pressure. Be safe with medicines. Take your medicine exactly as prescribed. Call your doctor if you think you are having a problem with your medicine. · Talk to your doctor before you start taking aspirin every day. Aspirin can help certain people lower their risk of a heart attack or stroke. But taking aspirin isn't right for everyone, because it can cause serious bleeding. · See your doctor regularly. You may need to see the doctor more often at first or until your blood pressure comes down. · If you are taking blood pressure medicine, talk to your doctor before you take decongestants or anti-inflammatory medicine, such as ibuprofen. Some of these medicines can raise blood pressure. · Learn how to check your blood pressure at home. Lifestyle changes  · Stay at a healthy weight. This is especially important if you put on weight around the waist. Losing even 10 pounds can help you lower your blood pressure. · If your doctor recommends it, get more exercise. Walking is a good choice. Bit by bit, increase the amount you walk every day. Try for at least 30 minutes on most days of the week. You also may want to swim, bike, or do other activities. · Avoid or limit alcohol. Talk to your doctor about whether you can drink any alcohol. · Try to limit how much sodium you eat to less than 2,300 milligrams (mg) a day. Your doctor may ask you to try to eat less than 1,500 mg a day.   · Eat plenty of fruits (such as bananas and oranges), vegetables, legumes, whole grains, and low-fat dairy products. · Lower the amount of saturated fat in your diet. Saturated fat is found in animal products such as milk, cheese, and meat. Limiting these foods may help you lose weight and also lower your risk for heart disease. · Do not smoke. Smoking increases your risk for heart attack and stroke. If you need help quitting, talk to your doctor about stop-smoking programs and medicines. These can increase your chances of quitting for good. When should you call for help? Call  911 anytime you think you may need emergency care. This may mean having symptoms that suggest that your blood pressure is causing a serious heart or blood vessel problem. Your blood pressure may be over 180/120. For example, call 911 if:  · You have symptoms of a heart attack. These may include:  ? Chest pain or pressure, or a strange feeling in the chest.  ? Sweating. ? Shortness of breath. ? Nausea or vomiting. ? Pain, pressure, or a strange feeling in the back, neck, jaw, or upper belly or in one or both shoulders or arms. ? Lightheadedness or sudden weakness. ? A fast or irregular heartbeat. · You have symptoms of a stroke. These may include:  ? Sudden numbness, tingling, weakness, or loss of movement in your face, arm, or leg, especially on only one side of your body. ? Sudden vision changes. ? Sudden trouble speaking. ? Sudden confusion or trouble understanding simple statements. ? Sudden problems with walking or balance. ? A sudden, severe headache that is different from past headaches. · You have severe back or belly pain. Do not wait until your blood pressure comes down on its own. Get help right away. Call your doctor now or seek immediate care if:  · Your blood pressure is much higher than normal (such as 180/120 or higher), but you don't have symptoms.   · You think high blood pressure is causing symptoms, such as:  ? Severe headache.  ? Blurry vision. Watch closely for changes in your health, and be sure to contact your doctor if:  · Your blood pressure measures higher than your doctor recommends at least 2 times. That means the top number is higher or the bottom number is higher, or both. · You think you may be having side effects from your blood pressure medicine. Where can you learn more? Go to https://Tango Publishingpemikhailewjordi.[employer]. org and sign in to your [employer] Plus account. Enter I624 in the [employer] box to learn more about \"High Blood Pressure: Care Instructions. \"     If you do not have an account, please click on the \"Sign Up Now\" link. Current as of: December 16, 2019               Content Version: 12.5  © 0044-5441 Healthwise, Incorporated. Care instructions adapted under license by [facility] ([facility]). If you have questions about a medical condition or this instruction, always ask your healthcare professional. [address] any warranty or liability for your use of this information.

## 2020-07-09 ENCOUNTER — TELEPHONE (OUTPATIENT)
Dept: PRIMARY CARE CLINIC | Age: 50
End: 2020-07-09

## 2020-07-09 RX ORDER — INSULIN DETEMIR 100 [IU]/ML
32 INJECTION, SOLUTION SUBCUTANEOUS NIGHTLY
Qty: 5 PEN | Refills: 5 | Status: SHIPPED | OUTPATIENT
Start: 2020-07-09 | End: 2020-10-07 | Stop reason: SDUPTHER

## 2020-07-09 NOTE — TELEPHONE ENCOUNTER
Called patient and left message informing her that her insurance did not approve her [address] said it was ok to stop medication. To call office with any questions.

## 2020-07-09 NOTE — TELEPHONE ENCOUNTER
Received fax that Meng Rodriguez was refused by [employer] and preferred medicatons are Lantus and Levemir vial and pen. Health Maintenance   Topic Date Due    Breast cancer screen  12/27/2018    Hepatitis B vaccine (1 of 3 - Risk 3-dose series) 10/04/2020 (Originally 12/27/1987)    Shingles Vaccine (1 of 2) 10/04/2020 (Originally 12/27/2018)    DTaP/Tdap/Td vaccine (1 - Tdap) 12/23/2020 (Originally 12/27/1987)    HIV screen  12/23/2020 (Originally 12/27/1983)    Diabetic retinal exam  07/17/2021 (Originally 4/3/2020)    Flu vaccine (1) 09/01/2020    Diabetic foot exam  10/04/2020    A1C test (Diabetic or Prediabetic)  10/07/2020    Diabetic microalbuminuria test  03/26/2021    Lipid screen  03/26/2021    Potassium monitoring  03/26/2021    Creatinine monitoring  03/26/2021    Cervical cancer screen  02/26/2025    Colon cancer screen colonoscopy  03/06/2029    Pneumococcal 0-64 years Vaccine  Completed    Hepatitis A vaccine  Aged Out    Hib vaccine  Aged Out    Meningococcal (ACWY) vaccine  Aged Out             (applicable per patient's age: Cancer Screenings, Depression Screening, Fall Risk Screening, Immunizations)    Hemoglobin A1C (%)   Date Value   07/07/2020 10.9   03/26/2020 10.3 (H)   10/04/2019 9.8     Microalb/Crt.  Ratio (mcg/mg creat)   Date Value   03/26/2020 556 (H)     LDL Cholesterol (mg/dL)   Date Value   03/26/2020 69     AST (U/L)   Date Value   08/13/2019 13     ALT (U/L)   Date Value   08/13/2019 20     BUN (mg/dL)   Date Value   03/26/2020 14      (goal A1C is < 7)   (goal LDL is <100) need 30-50% reduction from baseline     BP Readings from Last 3 Encounters:   07/07/20 113/73   03/27/20 113/67   02/26/20 (!) 160/100    (goal /80)      All Future Testing planned in [facility]:  Lab Frequency Next Occurrence   US Non OB Transvaginal Once 03/11/2020   EVA DIGITAL SCREEN W CAD BILATERAL Once 07/06/2020   Hemoglobin A1C Once 10/81/3182   Basic Metabolic Panel Once 10/07/2020       Next Visit Date:  Future Appointments   Date Time Provider Murphy Velarde   10/7/2020 10:00 AM Floyce Rubinstein Might, APRN - CNP [facility]            Patient Active Problem List:     Uncontrolled type 2 diabetes mellitus with hyperglycemia ([facility] [address]     Essential hypertension     Lumbar back pain with radiculopathy affecting right lower extremity     Microcytic anemia     Dyslipidemia     Iron deficiency anemia     Malabsorption due to intolerance, not elsewhere classified     Internal hemorrhoids     Diverticulosis of colon     Chronic pain of right knee

## 2020-07-09 NOTE — TELEPHONE ENCOUNTER
Ertugliflozin L-PyroglutamicAc (STEGLATRO) 5 MG TABS- Prior auth done and DENIED by insurance-Did not try and fail formulary alternative. No formulary alternative given. Please advise. Health Maintenance   Topic Date Due    Breast cancer screen  12/27/2018    Hepatitis B vaccine (1 of 3 - Risk 3-dose series) 10/04/2020 (Originally 12/27/1987)    Shingles Vaccine (1 of 2) 10/04/2020 (Originally 12/27/2018)    DTaP/Tdap/Td vaccine (1 - Tdap) 12/23/2020 (Originally 12/27/1987)    HIV screen  12/23/2020 (Originally 12/27/1983)    Diabetic retinal exam  07/17/2021 (Originally 4/3/2020)    Flu vaccine (1) 09/01/2020    Diabetic foot exam  10/04/2020    A1C test (Diabetic or Prediabetic)  10/07/2020    Diabetic microalbuminuria test  03/26/2021    Lipid screen  03/26/2021    Potassium monitoring  03/26/2021    Creatinine monitoring  03/26/2021    Cervical cancer screen  02/26/2025    Colon cancer screen colonoscopy  03/06/2029    Pneumococcal 0-64 years Vaccine  Completed    Hepatitis A vaccine  Aged Out    Hib vaccine  Aged Out    Meningococcal (ACWY) vaccine  Aged Out             (applicable per patient's age: Cancer Screenings, Depression Screening, Fall Risk Screening, Immunizations)    Hemoglobin A1C (%)   Date Value   07/07/2020 10.9   03/26/2020 10.3 (H)   10/04/2019 9.8     Microalb/Crt.  Ratio (mcg/mg creat)   Date Value   03/26/2020 556 (H)     LDL Cholesterol (mg/dL)   Date Value   03/26/2020 69     AST (U/L)   Date Value   08/13/2019 13     ALT (U/L)   Date Value   08/13/2019 20     BUN (mg/dL)   Date Value   03/26/2020 14      (goal A1C is < 7)   (goal LDL is <100) need 30-50% reduction from baseline     BP Readings from Last 3 Encounters:   07/07/20 113/73   03/27/20 113/67   02/26/20 (!) 160/100    (goal /80)      All Future Testing planned in [facility]:  Lab Frequency Next Occurrence   US Non OB Transvaginal Once 03/11/2020   EVA DIGITAL SCREEN W CAD BILATERAL Once 07/06/2020 Hemoglobin A1C Once 43/24/2178   Basic Metabolic Panel Once 74/13/8563       Next Visit Date:  Future Appointments   Date Time Provider Murphy Velarde   10/7/2020 10:00 AM BUZZ Marley - CNP [facility]            Patient Active Problem List:     Uncontrolled type 2 diabetes mellitus with hyperglycemia ([facility] [address]     Essential hypertension     Lumbar back pain with radiculopathy affecting right lower extremity     Microcytic anemia     Dyslipidemia     Iron deficiency anemia     Malabsorption due to intolerance, not elsewhere classified     Internal hemorrhoids     Diverticulosis of colon     Chronic pain of right knee

## 2020-07-09 NOTE — TELEPHONE ENCOUNTER
Called patient and left message that her insurance has denied her Basaglar insulin and they want her to use Levemir or Lantus insulin and Ovi sent a script to the pharmacy for Levemir insulin and she will take 32 units nightly. To call office with any questions.

## 2020-07-10 ENCOUNTER — CARE COORDINATION (OUTPATIENT)
Dept: CARE COORDINATION | Age: 50
End: 2020-07-10

## 2020-07-10 NOTE — CARE COORDINATION
Reason For Call Today:  -Attempted to reach [place] today to enroll into care coordination per PCP request following office appointment from 7/7/20.   -Enrolling to help educate on how to monitor blood sugar (morning fasting) and regulating insulin.   -Noted patient is to start Levemir 32 units nightly (insurance denied [employer])   -Unable to reach [place] today   -Left a voicemail message requesting a return phone call back to this [facility] when patient is able to [phone], office hours given. Also reminded that PCP called prescription for Levemir into pharmacy. Patient Active Problem List   Diagnosis    Uncontrolled type 2 diabetes mellitus with hyperglycemia ([facility] [address]    Essential hypertension    Lumbar back pain with radiculopathy affecting right lower extremity    Microcytic anemia    Dyslipidemia    Iron deficiency anemia    Malabsorption due to intolerance, not elsewhere classified    Internal hemorrhoids    Diverticulosis of colon    Chronic pain of right knee         Lab Results   Component Value Date    LABA1C 10.9 07/07/2020     Lab Results   Component Value Date     03/26/2020       Future Appointments   Date Time Provider Murphy Velarde   10/7/2020 10:00 AM Dinh Butcher, APRN - CNP [facility]       Care Coordination Plan of Care: This nurse Care Coordinator will send [address] Introduction letter to My Chart. Will await call back from patient, and if no return call; will attempt to reach patient back again next week to enroll into care coordination.

## 2020-07-14 NOTE — TELEPHONE ENCOUNTER
Patient would like refill. Health Maintenance   Topic Date Due    Breast cancer screen  12/27/2018    Hepatitis B vaccine (1 of 3 - Risk 3-dose series) 10/04/2020 (Originally 12/27/1987)    Shingles Vaccine (1 of 2) 10/04/2020 (Originally 12/27/2018)    DTaP/Tdap/Td vaccine (1 - Tdap) 12/23/2020 (Originally 12/27/1987)    HIV screen  12/23/2020 (Originally 12/27/1983)    Flu vaccine (1) 09/01/2020    Diabetic foot exam  10/04/2020    A1C test (Diabetic or Prediabetic)  10/07/2020    Diabetic microalbuminuria test  03/26/2021    Lipid screen  03/26/2021    Potassium monitoring  03/26/2021    Creatinine monitoring  03/26/2021    Diabetic retinal exam  07/08/2021    Cervical cancer screen  02/26/2025    Colon cancer screen colonoscopy  03/06/2029    Pneumococcal 0-64 years Vaccine  Completed    Hepatitis A vaccine  Aged Out    Hib vaccine  Aged Out    Meningococcal (ACWY) vaccine  Aged Out             (applicable per patient's age: Cancer Screenings, Depression Screening, Fall Risk Screening, Immunizations)    Hemoglobin A1C (%)   Date Value   07/07/2020 10.9   03/26/2020 10.3 (H)   10/04/2019 9.8     Microalb/Crt.  Ratio (mcg/mg creat)   Date Value   03/26/2020 556 (H)     LDL Cholesterol (mg/dL)   Date Value   03/26/2020 69     AST (U/L)   Date Value   08/13/2019 13     ALT (U/L)   Date Value   08/13/2019 20     BUN (mg/dL)   Date Value   03/26/2020 14      (goal A1C is < 7)   (goal LDL is <100) need 30-50% reduction from baseline     BP Readings from Last 3 Encounters:   07/07/20 113/73   03/27/20 113/67   02/26/20 (!) 160/100    (goal /80)      All Future Testing planned in [facility]:  Lab Frequency Next Occurrence   US Non OB Transvaginal Once 03/11/2020   EVA DIGITAL SCREEN W CAD BILATERAL Once 07/06/2020   Hemoglobin A1C Once 57/01/3523   Basic Metabolic Panel Once 04/76/6671       Next Visit Date:  Future Appointments   Date Time Provider Murphy Velarde   10/7/2020 10:00 AM Marianna Irizarry Hadley, APRN - CNP [facility]            Patient Active Problem List:     Uncontrolled type 2 diabetes mellitus with hyperglycemia (HCC)     Essential hypertension     Lumbar back pain with radiculopathy affecting right lower extremity     Microcytic anemia     Dyslipidemia     Iron deficiency anemia     Malabsorption due to intolerance, not elsewhere classified     Internal hemorrhoids     Diverticulosis of colon     Chronic pain of right knee

## 2020-07-15 ENCOUNTER — CARE COORDINATION (OUTPATIENT)
Dept: CARE COORDINATION | Age: 50
End: 2020-07-15

## 2020-07-15 SDOH — ECONOMIC STABILITY: FOOD INSECURITY: WITHIN THE PAST 12 MONTHS, THE FOOD YOU BOUGHT JUST DIDN'T LAST AND YOU DIDN'T HAVE MONEY TO GET MORE.: NEVER TRUE

## 2020-07-15 SDOH — HEALTH STABILITY: MENTAL HEALTH: HOW OFTEN DO YOU HAVE A DRINK CONTAINING ALCOHOL?: NEVER

## 2020-07-15 SDOH — ECONOMIC STABILITY: TRANSPORTATION INSECURITY
IN THE PAST 12 MONTHS, HAS THE LACK OF TRANSPORTATION KEPT YOU FROM MEDICAL APPOINTMENTS OR FROM GETTING MEDICATIONS?: NO

## 2020-07-15 SDOH — ECONOMIC STABILITY: FOOD INSECURITY: WITHIN THE PAST 12 MONTHS, YOU WORRIED THAT YOUR FOOD WOULD RUN OUT BEFORE YOU GOT MONEY TO BUY MORE.: NEVER TRUE

## 2020-07-15 SDOH — ECONOMIC STABILITY: TRANSPORTATION INSECURITY
IN THE PAST 12 MONTHS, HAS LACK OF TRANSPORTATION KEPT YOU FROM MEETINGS, WORK, OR FROM GETTING THINGS NEEDED FOR DAILY LIVING?: NO

## 2020-07-15 SDOH — ECONOMIC STABILITY: INCOME INSECURITY: HOW HARD IS IT FOR YOU TO PAY FOR THE VERY BASICS LIKE FOOD, HOUSING, MEDICAL CARE, AND HEATING?: NOT VERY HARD

## 2020-07-15 NOTE — CARE COORDINATION
Ambulatory Care Coordination Note  7/15/2020  CM Risk Score: 2  Charlson 10 Year Mortality Risk Score: 10%     ACC: Alana Magana RN    Summary Note:   Date Care Coordination Episode Started: Today, 7/15/2020    Reason For Call Today:   Assess and enroll into care coordination per PCP referral     -Spoke with Analy Sierra today   -Very flat affect   -Did not seem interested in care coordination but informed her that PCP had referred to care coordination for her best interest to help better control her diabetes     Topics Reviewed Today:  1.) Medications  -Does the patient have all of their prescribed medications filled? No- called and requested refill for Janumet yesterday.    -Is there any barriers to medication adherence? Yes  -Is there any financial issues with affording any medications? No     2.) Transportation  -Does the patient drive? Yes  -How is patient transported to appointments? Herself    -Any additional phone numbers for transportation in the area needed? No     3.) Living/Housing  -Does the patient live alone? No   -What type of housing does the patient live in? Apartment   -Does the patient feel safe in their home? Yes  -Who does the cooking, cleaning, housework? Adriane     4.) DME  -What DME does patient currently have? Glucometer    -Any additional DME orders needed? No       Chronic Conditions:   1.)Diabetes  -Not monitoring blood sugar at this time   -ACM educated that PCP recommends monitoring morning fasting and keeping log   -ACM educated on how to keep blood sugar log   -Also educated to test first thing in the morning before any food is consumed   -Tries to avoid concentrated sweets in diet  -Does not exercise  -States she is out of Janumet- noted she did call and request refill to PCP office yesterday. Informed that medication was approved and sent to [facility] in [place].    -Reports she is taking Levemir 32 units nightly   -ACM educated on importance of eating a bedtime diabetic friendly snack to prevent low throughout the night   Lab Results   Component Value Date    LABA1C 10.9 07/07/2020     Lab Results   Component Value Date     03/26/2020   Assessment Completed:  Diabetes Assessment    Medic Alert ID:  No  Meal Planning:  Avoidance of concentrated sweets   How often do you test your blood sugar?:  No Testing   Do you have barriers with adherence to non-pharmacologic self-management interventions? (Nutrition/Exercise/Self-Monitoring):  Yes   Have you ever had to go to the ED for symptoms of low blood sugar?:  No       No patient-reported symptoms   Do you have hyperglycemia symptoms?:  No   Do you have hypoglycemia symptoms?:  No   Blood Sugar Monitoring Regimen:  Not Testing          Zone Management tool reviewed today is applicable:   Yes, Diabetes       Reviewed social needs/Home Needs if any:   · Does patient have enough food? Yes  · Does patient have transportation to appointment/store/pharmacy, etc?Yes  · Does patient need any help in the home? Denies   · If yes, what type of help? n/a         Reviewed Upcoming follow up appointments with Isaias Rodriguez  Future Appointments   Date Time Provider Murphy Velarde   10/7/2020 10:00 AM Isaías Butcher APRN - CNP [facility]       Goals reviewed. Patient to continue to work to improve:   Goals      Self Monitoring      Self-Monitored Blood Glucose - I will check my blood sugar Fasting blood sugar  I will notify my provider of any trends of increasing or decreasing blood sugars over a 1 month period. I will notify my provider if I have any blood sugar readings less than 70 more than 2 times a month.       Barriers: lack of motivation, lack of support, overwhelmed by complexity of regimen and lack of education  Plan for overcoming my barriers: working with this ambulatory care manager   -Check blood sugar morning fasting and as needed  -Notify this ACM or PCP if you are unable to afford your medications or testing supplies   -Keep blood sugar log   -Take all medications as prescribed  -Eat diabetic friendly snack to prevent low reading throughout the night   -Follow a carb controlled diet, avoid concentrated sweets, utilize plate method and avoid pop   -Exercise 20 minutes 3 times per week   -Inspect feet daily   -Get yearly eye exams   Confidence: 7/10  Anticipated Goal Completion Date: 10/15/2020              Education Reviewed with patient today: See Education Module. Interventions completed today:  · Patient to reach out to care coordinator at [phone] or MD office with questions. · Reminded Hubert Barnett of walk in care clinic availability/hours; and when to utilize the facility if MD office not available. Care Coordinator Plan of Care: This nurse CC will plan to send to PCP for plan of care approval. Will plan to reach out to Hubertteodoro Barnett in 1 week to follow up on blood sugar readings- is she monitoring? Is she keeping a log? Was she able to get Janumet from pharmacy? Continue CC Protocol. Ambulatory Care Coordination Assessment    Care Coordination Protocol  Program Enrollment:  Rising Risk  Referral from Primary Care Provider:  Yes  Week 1 - Initial Assessment     Do you have all of your prescriptions and are they filled?:  No (Comment: requested refills yesterday)  Barriers to medication adherence:  Does not feel there is a need/No perceived effect  Are you able to afford your medications?:  Yes  How often do you have trouble taking your medications the way you have been told to take them?:  Sometimes I take them as prescribed. Do you have Home O2 Therapy?:  No      Ability to seek help/take action for Emergent Urgent situations i.e. fire, crime, inclement weather or health crisis. :  Independent  Ability to ambulate to restroom:  Independent  Ability handle personal hygeine needs (bathing/dressing/grooming): Independent  Ability to manage Medications:   Independent  Ability to prepare Food Preparation: Independent  Ability to maintain home (clean home, laundry): Independent  Ability to drive and/or has transportation:  Independent  Ability to do shopping:  Independent  Ability to manage finances: Independent  Is patient able to live independently?:  Yes     Current Housing:  Private Residence        Per the Fall Risk Screening, did the patient have 2 or more falls or 1 fall with injury in the past year?:  No     Frequent urination at night?:  No  Do you use rails/bars?:  No  Do you have a non-slip tub mat?:  No     Are you experiencing loss of meaning?:  No  Are you experiencing loss of hope and peace?:  No     Thinking about your patient's physical health needs, are there any symptoms or problems (risk indicators) you are unsure about that require further investigation?:  No identified areas of uncertainly or problems already being investigated   Are the patients physical health problems impacting on their mental well-being?:  Mild impact on mental well-being e.g. \"\"feeling fed-up\"\", \"\"reduced enjoyment\"\"   Are there any problems with your patients lifestyle behaviors (alcohol, drugs, diet, exercise) that are impacting on physical or mental well-being?:  No identified areas of concern   Do you have any other concerns about your patients mental well-being?  How would you rate their severity and impact on the patient?:  No identified areas of concern   How would you rate their home environment in terms of safety and stability (including domestic violence, insecure housing, neighbor harassment)?:  Consistently safe, supportive, stable, no identified problems   How do daily activities impact on the patient's well-being? (include current or anticipated unemployment, work, caregiving, access to transportation or other):  No identified problems or perceived positive benefits   How would you rate their social network (family, work, friends)?:  Good participation with social networks   How would you rate their APRN - CNP   gabapentin (NEURONTIN) 300 MG capsule Take 1 capsule by mouth 2 times daily for 180 days.  Intended supply: 90 days 12/23/19 6/20/20  BUZZ Beckwith CNP   Insulin Pen Needle (PEN NEEDLES) 32G X 5 MM MISC 1 each by Does not apply route daily 12/23/19   BUZZ Beckwith CNP   blood glucose test strips (ASCENSIA AUTODISC VI;ONE TOUCH ULTRA TEST VI) strip 1 each by In Vitro route 3 times daily Dispense brand compatible with insurance 12/13/19   BUZZ Beckwith - DONYA   HYDROcodone-acetaminophen (Annabel Ripper) 5-325 MG per tablet  11/11/19   Historical Provider, MD   FREESTYLE LANCETS MISC 1 each by Does not apply route daily Dispense brand compatible with insurance 11/20/19   BUZZ Beckwith CNP   glucose monitoring kit (FREESTYLE) monitoring kit 1 kit by Does not apply route daily Dispense brand compatible with insurance 11/20/19   Tara Butcher, BUZZ - CNP   losartan-hydrochlorothiazide (HYZAAR) 100-25 MG per tablet Take 1 tablet by mouth daily 10/4/19   Tara Butcher, BUZZ - CNP   NIFEdipine (ADALAT CC) 60 MG extended release tablet Take 1 tablet by mouth daily 10/4/19   Tara Butcher, APRN - CNP   atorvastatin (LIPITOR) 40 MG tablet Take 1 tablet by mouth daily 10/4/19   Tara Butcher, APRN - CNP

## 2020-07-16 ENCOUNTER — TELEPHONE (OUTPATIENT)
Dept: PRIMARY CARE CLINIC | Age: 50
End: 2020-07-16

## 2020-07-16 RX ORDER — METFORMIN HYDROCHLORIDE 500 MG/1
2000 TABLET, EXTENDED RELEASE ORAL
Qty: 360 TABLET | Refills: 3 | Status: SHIPPED
Start: 2020-07-16 | End: 2020-08-26 | Stop reason: ALTCHOICE

## 2020-07-16 NOTE — TELEPHONE ENCOUNTER
Joanne Sandoval is being denied through [place], Preferred medications include:    Metformin (glocophage)  Metformin ER (Glucophage XR)  Glipizide/Metformin (Metaglip)  Pioglitazone/Metformin (ActoPlus Met)      Health Maintenance   Topic Date Due    Breast cancer screen  12/27/2018    Hepatitis B vaccine (1 of 3 - Risk 3-dose series) 10/04/2020 (Originally 12/27/1987)    Shingles Vaccine (1 of 2) 10/04/2020 (Originally 12/27/2018)    DTaP/Tdap/Td vaccine (1 - Tdap) 12/23/2020 (Originally 12/27/1987)    HIV screen  12/23/2020 (Originally 12/27/1983)    Flu vaccine (1) 09/01/2020    Diabetic foot exam  10/04/2020    A1C test (Diabetic or Prediabetic)  10/07/2020    Diabetic microalbuminuria test  03/26/2021    Lipid screen  03/26/2021    Potassium monitoring  03/26/2021    Creatinine monitoring  03/26/2021    Diabetic retinal exam  07/08/2021    Cervical cancer screen  02/26/2025    Colon cancer screen colonoscopy  03/06/2029    Pneumococcal 0-64 years Vaccine  Completed    Hepatitis A vaccine  Aged Out    Hib vaccine  Aged Out    Meningococcal (ACWY) vaccine  Aged Out             (applicable per patient's age: Cancer Screenings, Depression Screening, Fall Risk Screening, Immunizations)    Hemoglobin A1C (%)   Date Value   07/07/2020 10.9   03/26/2020 10.3 (H)   10/04/2019 9.8     Microalb/Crt.  Ratio (mcg/mg creat)   Date Value   03/26/2020 556 (H)     LDL Cholesterol (mg/dL)   Date Value   03/26/2020 69     AST (U/L)   Date Value   08/13/2019 13     ALT (U/L)   Date Value   08/13/2019 20     BUN (mg/dL)   Date Value   03/26/2020 14      (goal A1C is < 7)   (goal LDL is <100) need 30-50% reduction from baseline     BP Readings from Last 3 Encounters:   07/07/20 113/73   03/27/20 113/67   02/26/20 (!) 160/100    (goal /80)      All Future Testing planned in [facility]:  Lab Frequency Next Occurrence   US Non OB Transvaginal Once 03/11/2020   EVA DIGITAL SCREEN W CAD BILATERAL Once 07/06/2020 Hemoglobin A1C Once 19/78/8745   Basic Metabolic Panel Once 09/55/9772       Next Visit Date:  Future Appointments   Date Time Provider Murphy Velarde   10/7/2020 10:00 AM Jose Butcher, APRN - CNP [facility]            Patient Active Problem List:     Uncontrolled type 2 diabetes mellitus with hyperglycemia ([facility] [address]     Essential hypertension     Lumbar back pain with radiculopathy affecting right lower extremity     Microcytic anemia     Dyslipidemia     Iron deficiency anemia     Malabsorption due to intolerance, not elsewhere classified     Internal hemorrhoids     Diverticulosis of colon     Chronic pain of right knee

## 2020-07-21 ENCOUNTER — TELEPHONE (OUTPATIENT)
Dept: PRIMARY CARE CLINIC | Age: 50
End: 2020-07-21

## 2020-07-23 ENCOUNTER — CARE COORDINATION (OUTPATIENT)
Dept: CARE COORDINATION | Age: 50
End: 2020-07-23

## 2020-07-23 NOTE — CARE COORDINATION
Ambulatory Care Coordination Note  7/23/2020  CM Risk Score: 2  Charlson 10 Year Mortality Risk Score: 10%     ACC: Mria Hussein, RN    Summary Note: Call placed to [facility] for 1 week follow up after enrolling into care coordination per PCP request. ACM introduced self and then asked for blood sugar readings from past week to update PCP. Adriane hung up on ACM. Call To:  -Another call placed to [employer] to reach   -Left a voicemail message with reason for call and requested a call or text back to [phone]. Instructed if she prefers not to be contacted by care coordination to let this ACM know. Noted:   -Patient was in care coordination earlier this year and was marked as temporarily excluded due to unable to contact/disengaged at that time. Future Appointments   Date Time Provider Murphy Velarde   7/28/2020  1:40 PM [facility]   10/7/2020 10:00 AM [facility]         Care Coordination Plan of Care: This nurse Care Coordinator will update PCP. Will await call back from patient, and if no return call; will attempt to reach patient back again following PCP appointment on 7/28. Care Coordination Interventions    Program Enrollment:  Rising Risk  Referral from Primary Care Provider:  Yes  Suggested Interventions and Community Resources  Registered Dietician:  Declined  Transportation Support:  Completed  Zone Management Tools:  Completed         Prior to Admission medications    Medication Sig Start Date End Date Taking?  Authorizing Provider   metFORMIN (GLUCOPHAGE-XR) 500 MG extended release tablet Take 4 tablets by mouth daily (with breakfast) 7/16/20   BUZZ Iglesias CNP   insulin detemir (LEVEMIR FLEXTOUCH) 100 UNIT/ML injection pen Inject 32 Units into the skin nightly 7/9/20   BUZZ Iglesias CNP   cloNIDine (CATAPRES) 0.1 MG tablet TAKE ONE TABLET BY MOUTH TWICE A DAY 5/11/20   Marla Butcher APRN - CNP   furosemide (LASIX) 20 MG tablet Take 1 tablet by mouth daily as needed (swellling) 3/27/20   Bettyjane Medal Might, APRN - CNP   atenolol (TENORMIN) 50 MG tablet TAKE ONE TABLET BY MOUTH DAILY 3/27/20   Bettyjane Medal Might, APRN - CNP   gabapentin (NEURONTIN) 300 MG capsule Take 1 capsule by mouth 2 times daily for 180 days.  Intended supply: 90 days 12/23/19 6/20/20  Bettyjane Medal Might, APRN - CNP   Insulin Pen Needle (PEN NEEDLES) 32G X 5 MM MISC 1 each by Does not apply route daily 12/23/19   Betjane Medal Might, APRN - CNP   blood glucose test strips (ASCENSIA AUTODISC VI;ONE TOUCH ULTRA TEST VI) strip 1 each by In Vitro route 3 times daily Dispense brand compatible with insurance 12/13/19   [facility] Might, APRN - CNP   HYDROcodone-acetaminophen (Love Torres) 5-325 MG per tablet  11/11/19   Historical Provider, MD   FREESTYLE LANCETS MISC 1 each by Does not apply route daily Dispense brand compatible with insurance 11/20/19   [facility] Might, APRN - CNP   glucose monitoring kit (FREESTYLE) monitoring kit 1 kit by Does not apply route daily Dispense brand compatible with insurance 11/20/19   Bettyjane Medal Might, APRN - CNP   losartan-hydrochlorothiazide (HYZAAR) 100-25 MG per tablet Take 1 tablet by mouth daily 10/4/19   Bettyjane Medal Might, APRN - CNP   NIFEdipine (ADALAT CC) 60 MG extended release tablet Take 1 tablet by mouth daily 10/4/19   Bettyjane Medal Might, APRN - CNP   atorvastatin (LIPITOR) 40 MG tablet Take 1 tablet by mouth daily 10/4/19   Bettyjane Medal Might, APRN - CNP

## 2020-07-29 ENCOUNTER — HOSPITAL ENCOUNTER (OUTPATIENT)
Age: 50
Setting detail: SPECIMEN
Discharge: HOME | End: 2020-07-29
Payer: MEDICARE

## 2020-07-29 ENCOUNTER — OFFICE VISIT (OUTPATIENT)
Dept: PRIMARY CARE CLINIC | Age: 50
End: 2020-07-29
Payer: MEDICARE

## 2020-07-29 VITALS
BODY MASS INDEX: 34.4 KG/M2 | DIASTOLIC BLOOD PRESSURE: 77 MMHG | RESPIRATION RATE: 20 BRPM | WEIGHT: 200.4 LBS | SYSTOLIC BLOOD PRESSURE: 136 MMHG | TEMPERATURE: 98 F | HEART RATE: 94 BPM

## 2020-07-29 LAB
BILIRUBIN, POC: ABNORMAL
BLOOD URINE, POC: ABNORMAL
CLARITY, POC: CLEAR
COLOR, POC: YELLOW
GLUCOSE URINE, POC: 500
KETONES, POC: ABNORMAL
LEUKOCYTE EST, POC: NEGATIVE
NITRITE, POC: POSITIVE
PH, POC: 5.5
PROTEIN, POC: 30
SPECIFIC GRAVITY, POC: 1.01
UROBILINOGEN, POC: 0.2

## 2020-07-29 PROCEDURE — 1036F TOBACCO NON-USER: CPT | Performed by: NURSE PRACTITIONER

## 2020-07-29 PROCEDURE — 87086 URINE CULTURE/COLONY COUNT: CPT

## 2020-07-29 PROCEDURE — 3017F COLORECTAL CA SCREEN DOC REV: CPT | Performed by: NURSE PRACTITIONER

## 2020-07-29 PROCEDURE — 81003 URINALYSIS AUTO W/O SCOPE: CPT | Performed by: NURSE PRACTITIONER

## 2020-07-29 PROCEDURE — G8427 DOCREV CUR MEDS BY ELIG CLIN: HCPCS | Performed by: NURSE PRACTITIONER

## 2020-07-29 PROCEDURE — 87186 SC STD MICRODIL/AGAR DIL: CPT

## 2020-07-29 PROCEDURE — 87077 CULTURE AEROBIC IDENTIFY: CPT

## 2020-07-29 PROCEDURE — G8417 CALC BMI ABV UP PARAM F/U: HCPCS | Performed by: NURSE PRACTITIONER

## 2020-07-29 PROCEDURE — 99213 OFFICE O/P EST LOW 20 MIN: CPT | Performed by: NURSE PRACTITIONER

## 2020-07-29 RX ORDER — NITROFURANTOIN 25; 75 MG/1; MG/1
100 CAPSULE ORAL 2 TIMES DAILY
Qty: 20 CAPSULE | Refills: 0 | Status: SHIPPED | OUTPATIENT
Start: 2020-07-29 | End: 2020-08-08

## 2020-07-29 ASSESSMENT — ENCOUNTER SYMPTOMS: NAUSEA: 0

## 2020-07-29 NOTE — PROGRESS NOTES
[facility] WALK-IN CARE  [address] [facility] [address] [facility]  Dept: [phone]  Dept Fax: [phone]    Ras Carlson is a 44 y.o. female who presentsto the [facility] in Care today for her medical conditions/complaints as noted below. Ras Carlson is c/o of Dysuria (X 1 week)      HPI:     Boom Low is here today for a walk in visit. She reports over the last week she has noticed a foul-smelling odor with urination and it just does not feel right to urinate. She denies any hematuria, flank pain, chills, nausea or fever. She denies any vaginal discharge or itching. She has not tried taking anything for this problem. Past Medical History:   Diagnosis Date    Diabetes mellitus ([address]     Hyperlipidemia     Hypertension     Irregular heart rhythm     Sciatica         Current Outpatient Medications   Medication Sig Dispense Refill    nitrofurantoin, macrocrystal-monohydrate, (MACROBID) 100 MG capsule Take 1 capsule by mouth 2 times daily for 10 days 20 capsule 0    metFORMIN (GLUCOPHAGE-XR) 500 MG extended release tablet Take 4 tablets by mouth daily (with breakfast) 360 tablet 3    insulin detemir (LEVEMIR FLEXTOUCH) 100 UNIT/ML injection pen Inject 32 Units into the skin nightly 5 pen 5    cloNIDine (CATAPRES) 0.1 MG tablet TAKE ONE TABLET BY MOUTH TWICE A DAY 60 tablet 0    furosemide (LASIX) 20 MG tablet Take 1 tablet by mouth daily as needed (swellling) 90 tablet 1    atenolol (TENORMIN) 50 MG tablet TAKE ONE TABLET BY MOUTH DAILY 90 tablet 3    gabapentin (NEURONTIN) 300 MG capsule Take 1 capsule by mouth 2 times daily for 180 days.  Intended supply: 90 days 180 capsule 1    Insulin Pen Needle (PEN NEEDLES) 32G X 5 MM MISC 1 each by Does not apply route daily 100 each 3    blood glucose test strips (ASCENSIA AUTODISC VI;ONE TOUCH ULTRA TEST VI) strip 1 each by In Vitro route 3 times daily Dispense brand compatible with insurance 100 each 3  FREESTYLE LANCETS MISC 1 each by Does not apply route daily Dispense brand compatible with insurance 100 each 3    glucose monitoring kit (FREESTYLE) monitoring kit 1 kit by Does not apply route daily Dispense brand compatible with insurance 1 kit 0    losartan-hydrochlorothiazide (HYZAAR) 100-25 MG per tablet Take 1 tablet by mouth daily 90 tablet 3    NIFEdipine (ADALAT CC) 60 MG extended release tablet Take 1 tablet by mouth daily 90 tablet 3    atorvastatin (LIPITOR) 40 MG tablet Take 1 tablet by mouth daily 90 tablet 3    HYDROcodone-acetaminophen (NORCO) 5-325 MG per tablet        No current facility-administered medications for this visit. No Known Allergies    Subjective:      Review of Systems   Constitutional: Negative for activity change, chills, fatigue and fever. Gastrointestinal: Negative for nausea. Genitourinary: Positive for difficulty urinating (doesnt feel normal to urinate. ). Negative for dysuria, flank pain, frequency, hematuria and urgency. Strong odor with urinating. Objective:     Physical Exam  Vitals signs and nursing note reviewed. Constitutional:       General: She is not in acute distress. Appearance: Normal appearance. She is not ill-appearing, toxic-appearing or diaphoretic. HENT:      Head: Normocephalic and atraumatic. Neck:      Musculoskeletal: Normal range of motion and neck supple. Cardiovascular:      Rate and Rhythm: Normal rate and regular rhythm. Pulmonary:      Effort: Pulmonary effort is normal.      Breath sounds: Normal breath sounds. No wheezing, rhonchi or rales. Abdominal:      Tenderness: There is no right CVA tenderness or left CVA tenderness. Neurological:      General: No focal deficit present. Mental Status: She is alert and oriented to person, place, and time.    Psychiatric:         Mood and Affect: Mood normal.         Behavior: Behavior normal.       /77 (Site: Right Upper Arm, Position: Sitting, Cuff Size: Large Adult)   Pulse 94   Temp 98 °F (36.7 °C) (Temporal)   Resp 20   Wt 200 lb 6.4 oz (90.9 kg)   BMI 34.40 kg/m²     Assessment:      Diagnosis Orders   1. Acute cystitis without hematuria  Culture, Urine    nitrofurantoin, macrocrystal-monohydrate, (MACROBID) 100 MG capsule   2. Dysuria  POCT Urinalysis No Micro (Auto)     UA consistent with acute cystitis. She has no fever, flank pain and has not had any recurrent UTIs. We will treat her for acute simple cystitis with Macrobid. Plan:     · Encouraged to increase fluids and to eat nutritiously. · Avoid full bladder, bubble baths, bath oils and hot tubs. · Wipe front to back and void after sexual intercourse. · Continue antibiotic as prescribed until all doses are completed  · Probiotic OTC or greek yogurt daily while on antibiotic  · Will call with urine culture results once obtained. · Patient instructions given for urinary tract infection. · To ER or call 911 if any difficulty breathing, shortness of breath, inability to swallow, hives, rash, facial/tongue swelling or temp greater than 103 degrees. · Follow up with PCP or Walk in Care if symptoms worsen or do not improve. Return if symptoms worsen or fail to improve. Orders Placed This Encounter   Medications    nitrofurantoin, macrocrystal-monohydrate, (MACROBID) 100 MG capsule     Sig: Take 1 capsule by mouth 2 times daily for 10 days     Dispense:  20 capsule     Refill:  0          All patient questions answered. Pt voiced understanding.       Electronically signed by BUZZ Byrnes CNP on 7/29/2020 at 4:42 PM

## 2020-07-29 NOTE — PATIENT INSTRUCTIONS
SURVEY:    You may be receiving a survey from [employer] regarding your visit today. Please complete the survey to enable us to provide the highest quality of care to you and your family. If you cannot score us a very good on any question, please call the office to discuss how we could have made your experience a very good one. Thank you. Patient Education        Urinary Tract Infection in Women: Care Instructions  Your Care Instructions     A urinary tract infection, or UTI, is a general term for an infection anywhere between the kidneys and the urethra (where urine comes out). Most UTIs are bladder infections. They often cause pain or burning when you urinate. UTIs are caused by bacteria and can be cured with antibiotics. Be sure to complete your treatment so that the infection goes away. Follow-up care is a key part of your treatment and safety. Be sure to make and go to all appointments, and call your doctor if you are having problems. It's also a good idea to know your test results and keep a list of the medicines you take. How can you care for yourself at home? · Take your antibiotics as directed. Do not stop taking them just because you feel better. You need to take the full course of antibiotics. · Drink extra water and other fluids for the next day or two. This may help wash out the bacteria that are causing the infection. (If you have kidney, heart, or liver disease and have to limit fluids, talk with your doctor before you increase your fluid intake.)  · Avoid drinks that are carbonated or have caffeine. They can irritate the bladder. · Urinate often. Try to empty your bladder each time. · To relieve pain, take a hot bath or lay a heating pad set on low over your lower belly or genital area. Never go to sleep with a heating pad in place. To prevent UTIs  · Drink plenty of water each day. This helps you urinate often, which clears bacteria from your system.  (If you have kidney, heart,

## 2020-07-31 ENCOUNTER — TELEPHONE (OUTPATIENT)
Dept: PRIMARY CARE CLINIC | Age: 50
End: 2020-07-31

## 2020-07-31 ENCOUNTER — CARE COORDINATION (OUTPATIENT)
Dept: CARE COORDINATION | Age: 50
End: 2020-07-31

## 2020-07-31 LAB
CULTURE: ABNORMAL
Lab: ABNORMAL
SPECIMEN DESCRIPTION: ABNORMAL

## 2020-07-31 RX ORDER — CLONIDINE HYDROCHLORIDE 0.1 MG/1
TABLET ORAL
Qty: 60 TABLET | Refills: 1 | Status: SHIPPED | OUTPATIENT
Start: 2020-07-31 | End: 2020-10-07 | Stop reason: SDUPTHER

## 2020-07-31 NOTE — TELEPHONE ENCOUNTER
Called patient and left message informing her that Nir reviewed her urine culture result and she is on the correct antibiotic for her UTI and that she only needs to take the antibiotic for 5 days for a total of 10 doses and she doesn't need to take this for 10 days.

## 2020-07-31 NOTE — TELEPHONE ENCOUNTER
----- Message from Germain Saint, APRN - CNP sent at 7/31/2020 12:27 PM EDT -----  Please let Ricardo Finch know that she is on the correct antibiotic for urinary tract infection. Let her know she only needs to take the antibiotic for 5 days for a total of 10 doses she does not need to take this for 10 days.  Thank you

## 2020-07-31 NOTE — TELEPHONE ENCOUNTER
Phone call from patient requesting refill of Clonidine to be sent to [address] [facility] in [place]. Health Maintenance   Topic Date Due    Breast cancer screen  12/27/2018    Hepatitis B vaccine (1 of 3 - Risk 3-dose series) 10/04/2020 (Originally 12/27/1987)    Shingles Vaccine (1 of 2) 10/04/2020 (Originally 12/27/2018)    DTaP/Tdap/Td vaccine (1 - Tdap) 12/23/2020 (Originally 12/27/1987)    HIV screen  12/23/2020 (Originally 12/27/1983)    Flu vaccine (1) 09/01/2020    Diabetic foot exam  10/04/2020    A1C test (Diabetic or Prediabetic)  10/07/2020    Diabetic microalbuminuria test  03/26/2021    Lipid screen  03/26/2021    Potassium monitoring  03/26/2021    Creatinine monitoring  03/26/2021    Diabetic retinal exam  07/08/2021    Cervical cancer screen  02/26/2025    Colon cancer screen colonoscopy  03/06/2029    Pneumococcal 0-64 years Vaccine  Completed    Hepatitis A vaccine  Aged Out    Hib vaccine  Aged Out    Meningococcal (ACWY) vaccine  Aged Out             (applicable per patient's age: Cancer Screenings, Depression Screening, Fall Risk Screening, Immunizations)    Hemoglobin A1C (%)   Date Value   07/07/2020 10.9   03/26/2020 10.3 (H)   10/04/2019 9.8     Microalb/Crt.  Ratio (mcg/mg creat)   Date Value   03/26/2020 556 (H)     LDL Cholesterol (mg/dL)   Date Value   03/26/2020 69     AST (U/L)   Date Value   08/13/2019 13     ALT (U/L)   Date Value   08/13/2019 20     BUN (mg/dL)   Date Value   03/26/2020 14      (goal A1C is < 7)   (goal LDL is <100) need 30-50% reduction from baseline     BP Readings from Last 3 Encounters:   07/29/20 136/77   07/07/20 113/73   03/27/20 113/67    (goal /80)      All Future Testing planned in [facility]:  Lab Frequency Next Occurrence   US Non OB Transvaginal Once 03/11/2020   EVA DIGITAL SCREEN W CAD BILATERAL Once 08/07/2020   Hemoglobin A1C Once 45/19/7837   Basic Metabolic Panel Once 60/14/3043       Next Visit Date:  Future Appointments Date Time Provider Murphy Prachi   10/7/2020 10:00 AM Malick Butcher, APRN - CNP [facility]            Patient Active Problem List:     Uncontrolled type 2 diabetes mellitus with hyperglycemia ([facility] [address]     Essential hypertension     Lumbar back pain with radiculopathy affecting right lower extremity     Microcytic anemia     Dyslipidemia     Iron deficiency anemia     Malabsorption due to intolerance, not elsewhere classified     Internal hemorrhoids     Diverticulosis of colon     Chronic pain of right knee

## 2020-07-31 NOTE — CARE COORDINATION
Reason For Call Today:  -Attempted to reach Remi Bowen today to follow up on monitoring blood sugar readings (morng fasting). Review medications.   -Unable to reach Remi Bowen today   -Left a voicemail message requesting a return phone call back to this ACM if patient would like education and assistance with diabetes to [phone], office hours given. Future Appointments   Date Time Provider Murphy Velarde   10/7/2020 10:00 AM Marcia Butcher, APRN - CNP [facility]       Care Coordination Plan of Care: This nurse Care Coordinator will plan to resolve current episode of care coordination due to unable to reach patient/disengagement. If patient willing to work with [facility] in the future, I will be willing to re-enroll.

## 2020-08-06 RX ORDER — BLOOD SUGAR DIAGNOSTIC
1 STRIP MISCELLANEOUS DAILY
Qty: 100 EACH | Refills: 3 | Status: SHIPPED | OUTPATIENT
Start: 2020-08-06 | End: 2021-10-21

## 2020-08-24 ENCOUNTER — TELEPHONE (OUTPATIENT)
Dept: PRIMARY CARE CLINIC | Age: 50
End: 2020-08-24

## 2020-08-26 ENCOUNTER — TELEPHONE (OUTPATIENT)
Dept: PRIMARY CARE CLINIC | Age: 50
End: 2020-08-26

## 2020-08-26 NOTE — TELEPHONE ENCOUNTER
Phone call from patient stating that she would like to go back on Janumet because taking 4 Metformin in the morning is causing her stomach to be upset. States she called insurance and they will need a prior auth but should be OK if enough information.

## 2020-08-26 NOTE — TELEPHONE ENCOUNTER
Phone call from patient stating that she would like to go back on Janumet because taking 4 Metformin in the morning is causing her stomach to be upset. States she called insurance and they will need a prior auth but should be OK if enough information is provided. Will need new order. Health Maintenance   Topic Date Due    Breast cancer screen  12/27/2018    Hepatitis B vaccine (1 of 3 - Risk 3-dose series) 10/04/2020 (Originally 12/27/1987)    Shingles Vaccine (1 of 2) 10/04/2020 (Originally 12/27/2018)    DTaP/Tdap/Td vaccine (1 - Tdap) 12/23/2020 (Originally 12/27/1987)    HIV screen  12/23/2020 (Originally 12/27/1983)    Flu vaccine (1) 09/01/2020    Diabetic foot exam  10/04/2020    A1C test (Diabetic or Prediabetic)  10/07/2020    Diabetic microalbuminuria test  03/26/2021    Lipid screen  03/26/2021    Potassium monitoring  03/26/2021    Creatinine monitoring  03/26/2021    Diabetic retinal exam  07/08/2021    Cervical cancer screen  02/26/2025    Colon cancer screen colonoscopy  03/06/2029    Pneumococcal 0-64 years Vaccine  Completed    Hepatitis A vaccine  Aged Out    Hib vaccine  Aged Out    Meningococcal (ACWY) vaccine  Aged Out             (applicable per patient's age: Cancer Screenings, Depression Screening, Fall Risk Screening, Immunizations)    Hemoglobin A1C (%)   Date Value   07/07/2020 10.9   03/26/2020 10.3 (H)   10/04/2019 9.8     Microalb/Crt.  Ratio (mcg/mg creat)   Date Value   03/26/2020 556 (H)     LDL Cholesterol (mg/dL)   Date Value   03/26/2020 69     AST (U/L)   Date Value   08/13/2019 13     ALT (U/L)   Date Value   08/13/2019 20     BUN (mg/dL)   Date Value   03/26/2020 14      (goal A1C is < 7)   (goal LDL is <100) need 30-50% reduction from baseline     BP Readings from Last 3 Encounters:   07/29/20 136/77   07/07/20 113/73   03/27/20 113/67    (goal /80)      All Future Testing planned in [facility]:  Lab Frequency Next Occurrence   US Non OB Transvaginal Once 03/11/2020   EVA DIGITAL SCREEN W CAD BILATERAL Once 09/07/2020   Hemoglobin A1C Once 16/34/7728   Basic Metabolic Panel Once 47/84/5357       Next Visit Date:  Future Appointments   Date Time Provider Murphy Velarde   10/7/2020 10:00 AM Renetta Butcher, APRN - CNP [facility]            Patient Active Problem List:     Uncontrolled type 2 diabetes mellitus with hyperglycemia ([facility] [address]     Essential hypertension     Lumbar back pain with radiculopathy affecting right lower extremity     Microcytic anemia     Dyslipidemia     Iron deficiency anemia     Malabsorption due to intolerance, not elsewhere classified     Internal hemorrhoids     Diverticulosis of colon     Chronic pain of right knee

## 2020-08-26 NOTE — TELEPHONE ENCOUNTER
Called patient and left message informing her that Janumet was ordered and prior auth done for her insurance.

## 2020-09-03 ENCOUNTER — TELEPHONE (OUTPATIENT)
Dept: PRIMARY CARE CLINIC | Age: 50
End: 2020-09-03

## 2020-09-03 NOTE — TELEPHONE ENCOUNTER
Message left that pt needs functional capacity exam to have the disability paperwork filled out.   Advised in message for pt to  papers prior to the exam.

## 2020-09-22 ENCOUNTER — TELEPHONE (OUTPATIENT)
Dept: PRIMARY CARE CLINIC | Age: 50
End: 2020-09-22

## 2020-09-22 NOTE — TELEPHONE ENCOUNTER
Attempted to call patient and message left regarding need to get mammogram rescheduled. Instructed to call this office with any questions.

## 2020-09-28 ENCOUNTER — TELEPHONE (OUTPATIENT)
Dept: PRIMARY CARE CLINIC | Age: 50
End: 2020-09-28

## 2020-09-28 NOTE — TELEPHONE ENCOUNTER
Called patient and left message that for her disability form she will need a functional capacity evaluation and that she should call [facility] PT to set that appt. To call office with any questions.

## 2020-09-30 ENCOUNTER — HOSPITAL ENCOUNTER (OUTPATIENT)
Dept: PHYSICAL THERAPY | Age: 50
Setting detail: THERAPIES SERIES
Discharge: HOME | End: 2020-09-30
Payer: MEDICARE

## 2020-09-30 ENCOUNTER — HOSPITAL ENCOUNTER (OUTPATIENT)
Age: 50
Discharge: HOME | End: 2020-09-30
Payer: MEDICARE

## 2020-09-30 LAB
ANION GAP SERPL CALCULATED.3IONS-SCNC: 13 MMOL/L (ref 9–17)
BUN BLDV-MCNC: 16 MG/DL (ref 6–20)
BUN/CREAT BLD: 16 (ref 9–20)
CALCIUM SERPL-MCNC: 9.8 MG/DL (ref 8.6–10.4)
CHLORIDE BLD-SCNC: 95 MMOL/L (ref 98–107)
CO2: 23 MMOL/L (ref 20–31)
CREAT SERPL-MCNC: 0.97 MG/DL (ref 0.5–0.9)
GFR AFRICAN AMERICAN: >60 ML/MIN
GFR NON-AFRICAN AMERICAN: >60 ML/MIN
GFR SERPL CREATININE-BSD FRML MDRD: ABNORMAL ML/MIN/{1.73_M2}
GFR SERPL CREATININE-BSD FRML MDRD: ABNORMAL ML/MIN/{1.73_M2}
GLUCOSE BLD-MCNC: 501 MG/DL (ref 70–99)
POTASSIUM SERPL-SCNC: 4.4 MMOL/L (ref 3.7–5.3)
SODIUM BLD-SCNC: 131 MMOL/L (ref 135–144)

## 2020-09-30 PROCEDURE — 36415 COLL VENOUS BLD VENIPUNCTURE: CPT

## 2020-09-30 PROCEDURE — 97161 PT EVAL LOW COMPLEX 20 MIN: CPT

## 2020-09-30 PROCEDURE — 80048 BASIC METABOLIC PNL TOTAL CA: CPT

## 2020-09-30 PROCEDURE — 83036 HEMOGLOBIN GLYCOSYLATED A1C: CPT

## 2020-09-30 ASSESSMENT — PAIN SCALES - QUEBEC BACK PAIN DISABILITY SCALE
WALK SEVERAL KILOMETERS  OR MILES: 5
PUT ON SOCKS OR PANYHOSE: 3
CARRY TWO BAGS OF GROCERIES: 4
TURN OVER IN BED: 3
THROW A BALL: 5
SLEEP THROUGH THE NIGHT: 5
TAKE FOOD OUT OF THE REFRIGERATOR: 3
MAKE YOUR BED: 4
TOTAL SCORE: 78
WALK A FEW BLOCKS OR 300 TO 400M: 4
STAND UP FOR 20 TO 30 MINUTES: 5
QUEBEC CMS MODIFIER: CL
MOVE A CHAIR: 3
SIT IN A CHAIR FOR SEVERAL HOURS: 3
LIFT AND CARRY A HEAVY SUITCASE: 5
REACH UP TO HIGH SHELVES: 4
CLIMB ONE FLIGHT OF STAIRS: 4
RUN ONE BLOCK OR 100M: 5
PULL OR PUSH HEAVY DOORS: 4
QUEBEC DISABILITY INDEX: 60-79%
GET OUT OF BED: 3
RIDE IN A CAR: 2
BEND OVER TO CLEAN THE BATHTUB: 4

## 2020-09-30 NOTE — PROGRESS NOTES
Phone: [address] [facility]          Fax: [phone]                      Outpatient Physical Therapy                                                                      Evaluation  Date: 2020  Patient: Arsalan Lazar  : 1970  CSN #: [id number]  Referring Practitioner: EDELMIRA Hall    Referral Date : 20     Medical Diagnosis: Lumbar pain with radiculopathy of R LE, M54.16; Chronic R knee pain, M25.561    Treatment Diagnosis: Low back pain with radiculopathy  Onset Date: 19  PT Insurance Information: [employer]  Total # of Visits Approved: 9   Total # of Visits to Date: 1  No Show: 0  Canceled Appointment: 0     Subjective  Subjective: Patient reports pain 6/10 on average to 10/10 at worst and she can't even move. Pain medications and injections from pain management are not helping. Pt reports pain down R LE and L LE pain just started and is worsening. No position is comfortable; occasionally R LE gives out on her. Hx of bowel and bladder problems. Pt to have MRI of lumbar spine tomorrow morning and follow up with doctor 10/7/2020, and pain management 10/28/2020. Additional Pertinent Hx: DM, OA; HTN, sciatica; bronchitis    Objective     Observation/Palpation  Posture: Fair  Palpation: TTP R PSIS  Observation: Pt stands with decreased lumbar and thoracic curves and ambulates with moderate antalgic gait.          Spine  Lumbar: flexion: 3in from toes with pain; B SB to knees with pain the to R           Strength RLE  Comment: 4-/5 grossly  Strength LLE  Comment: 4-/5 grossly  Strength Other  Other: 3+/5 core strength    Additional Measures  Special Tests: (+) B Slump and SLR's, R worse than L; unable to test pelvic alignment or HS length due to pain; increased pain with compression and with manual lumbar distraction    Functional Outcome Measures  Get out of bed: Fairly difficult  Sleep through the night: Unable to do  Turn over in bed: Fairly difficult  Ride in a car: Somewhat difficult  Stand up for 20-30 minutes: Unable to do  Sit in a chair for several hours: Fairly difficult  Climb one flight of stairs: Very difficult  Walk a few blocks (300-400 m): Very difficult  Walk several kilometers - miles: Unable to do  Reach up to high shelves: Very difficult  Throw a ball: Unable to do  Run one block (about 100 m): Unable to do  Take food out of the refrigerator: Fairly difficult  Make your bed: Very difficult  Put on socks (pantyhose): Fairly difficult  Bend over to clean the bathtub: Very difficult  Move a chair: Fairly difficult  Pull or push heavy doors: Very difficult  Carry two bags of groceries: Very difficult  Lift and carry a heavy suitcase: Unable to do  Michael Total Score: 78    Assessment  Assessment: Patient is 44year old female with dx of chronic low back pain with R LE radiculopathy who presents with pain 6-10/10. Pt stands with decreased lumbar and thoracic curves and ambulates with moderate antalgic gait. . TTP R PSIS. Patient has decreased lumbar AROM:flexion: 3in from toes with pain; B SB to knees with pain the to R. Patient has decreased B LE strength: 4-/5 grossly and decreased core strength 3+/5 grossly. (+) B Slump and SLR's, R worse than L; unable to test pelvic alignment or HS length due to pain; increased pain with compression and with manual lumbar distraction. Educated patient to contact  for filing for disability in order to request a specific order for an FCE; patient demo's fair understanding. Patient to benefit from aquatic physical therapy to decrease pain and improve functional strength and ROM to improve QOL. Prognosis: Good        Decision Making: Low Complexity    Patient Education  PT eval, POC, HEP    Pt verbalized/demonstrated good understanding:     [X] Yes         [] No, pt required further clarification.       Goals  Short term goals  Time Frame for Short term goals: 2 weeks  Short term goal 1: Patient to initiate aquatic therapy to decrease pain and improve strength and mobility. Short term goal 2: Patient to be instructed in HEP as tolerated to improve mobility and decrease pain. Long term goals  Time Frame for Long term goals : 4 weeks  Long term goal 1: Patient to be independent and compliant with HEP and aquatic exercise. Long term goal 2: Patient to have improved core and B LE strength >/=4/5 grossly for improved lumbar stability. Long term goal 3: Patient to have improved lumbar AROM flexion to toes with no increase in pain for improved mobility. Long term goal 4: Patient to report ability to stand/walk >/=10 minutes with no increase in pain or radicular symptoms to improve QOL. Patient goals :  \"Help\"        Minutes Tracking:  Time In: 8762  Time Out: 2275  [facility]  Minutes: 31  Timed Code Treatment Minutes: [address], PT, DPT    9/30/2020

## 2020-09-30 NOTE — PLAN OF CARE
[facility]           Phone: [phone]             Outpatient Physical Therapy  Fax: [phone]                                           Date: 2020  Patient: Deloris Chen : 1970 [facility] #: [id number]   Referring Practitioner:  EDELMIRA Altman Referral Date:  20       [x] Plan of Care   [] Updated Plan of Care    Dates of Service to Include: 2020 to 20    Diagnosis:  Lumbar pain with radiculopathy of R LE, M54.16; Chronic R knee pain, M25.561    Rehab (Treatment) Diagnosis:  Low back pain with radiculopathy             Onset Date:  19    Attendance  Total # of Visits to Date: 1 No Show: 0 Canceled Appointment: 0    Assessment  Assessment: Patient is 44year old female with dx of chronic low back pain with R LE radiculopathy who presents with pain 6-10/10. Pt stands with decreased lumbar and thoracic curves and ambulates with moderate antalgic gait. . TTP R PSIS. Patient has decreased lumbar AROM:flexion: 3in from toes with pain; B SB to knees with pain the to R. Patient has decreased B LE strength: 4-/5 grossly and decreased core strength 3+/5 grossly. (+) B Slump and SLR's, R worse than L; unable to test pelvic alignment or HS length due to pain; increased pain with compression and with manual lumbar distraction. Educated patient to contact  for filing for disability in order to request a specific order for an FCE; patient demo's fair understanding. Patient to benefit from aquatic physical therapy to decrease pain and improve functional strength and ROM to improve QOL. Goals  Short term goals  Time Frame for Short term goals: 2 weeks  Short term goal 1: Patient to initiate aquatic therapy to decrease pain and improve strength and mobility. Short term goal 2: Patient to be instructed in HEP as tolerated to improve mobility and decrease pain.   Long term goals  Time Frame for Long term goals : 4 weeks  Long term goal 1: Patient to be independent and compliant with HEP and aquatic exercise. Long term goal 2: Patient to have improved core and B LE strength >/=4/5 grossly for improved lumbar stability. Long term goal 3: Patient to have improved lumbar AROM flexion to toes with no increase in pain for improved mobility. Long term goal 4: Patient to report ability to stand/walk >/=10 minutes with no increase in pain or radicular symptoms to improve QOL.      Prognosis  Prognosis: Good    Treatment Plan   Times per week: 2  Plan weeks: 4  [x] HP/CP      [] Electrical Stim   [x] Therapeutic Exercise      [x] Gait Training  [x] Aquatics   [] Ultrasound         [x] Patient Education/HEP   [] Manual Therapy  [] Traction    [x] Neuro-tacho        [] Soft Tissue Mobs            [] Home TENS  [] Iontophoresis    [] Orthotic casting/fitting      [] Dry Needling             Electronically signed by: Joshua Bhatia, PT, DPT    Date: 9/30/2020      ______________________________________ Date: 9/30/2020   Physician Signature

## 2020-10-01 LAB
ESTIMATED AVERAGE GLUCOSE: 286 MG/DL
HBA1C MFR BLD: 11.6 % (ref 4–6)

## 2020-10-01 NOTE — RESULT ENCOUNTER NOTE
Please check with John Beltran and make sure she has all of her medications. We will also enroll into care coordination for medication adherence and diabetes management.   Thank you

## 2020-10-05 ENCOUNTER — CARE COORDINATION (OUTPATIENT)
Dept: CARE COORDINATION | Age: 50
End: 2020-10-05

## 2020-10-05 NOTE — LETTER
10/5/2020    [facility]  [address] [facility] [address]      Dear [facility],    My name is Tyrell Grimes and I am a registered nurse who partners with BUZZ Balderrama CNP to improve patients' health. BUZZ Balderrama CNP believes you would benefit from working with me. As a member of your health care team, I would work with other providers involved in your care, offer education for your specific health conditions, and connect you with additional resources as needed. I will collaborate with BUZZ Balderrama CNP to support you in following your treatment plan. The additional support I provide is no additional cost to you. My primary focus is to help you achieve specific goals and improve your health. We are committed to walk with you on this journey and look forward to working with you. Please call me to further discuss your healthcare needs. I am available by phone or for appointments at the office. You can reach me at [phone].     In good health,     Tyrell Grimes RN

## 2020-10-07 ENCOUNTER — OFFICE VISIT (OUTPATIENT)
Dept: PRIMARY CARE CLINIC | Age: 50
End: 2020-10-07
Payer: MEDICARE

## 2020-10-07 ENCOUNTER — HOSPITAL ENCOUNTER (OUTPATIENT)
Dept: ULTRASOUND IMAGING | Age: 50
Discharge: HOME | End: 2020-10-09
Payer: MEDICARE

## 2020-10-07 VITALS
BODY MASS INDEX: 34.5 KG/M2 | HEART RATE: 82 BPM | TEMPERATURE: 97 F | RESPIRATION RATE: 16 BRPM | SYSTOLIC BLOOD PRESSURE: 132 MMHG | DIASTOLIC BLOOD PRESSURE: 77 MMHG | HEIGHT: 64 IN | WEIGHT: 202.1 LBS

## 2020-10-07 PROCEDURE — G8484 FLU IMMUNIZE NO ADMIN: HCPCS | Performed by: NURSE PRACTITIONER

## 2020-10-07 PROCEDURE — 1036F TOBACCO NON-USER: CPT | Performed by: NURSE PRACTITIONER

## 2020-10-07 PROCEDURE — G8427 DOCREV CUR MEDS BY ELIG CLIN: HCPCS | Performed by: NURSE PRACTITIONER

## 2020-10-07 PROCEDURE — 3017F COLORECTAL CA SCREEN DOC REV: CPT | Performed by: NURSE PRACTITIONER

## 2020-10-07 PROCEDURE — 2022F DILAT RTA XM EVC RTNOPTHY: CPT | Performed by: NURSE PRACTITIONER

## 2020-10-07 PROCEDURE — 76830 TRANSVAGINAL US NON-OB: CPT

## 2020-10-07 PROCEDURE — 3046F HEMOGLOBIN A1C LEVEL >9.0%: CPT | Performed by: NURSE PRACTITIONER

## 2020-10-07 PROCEDURE — G8417 CALC BMI ABV UP PARAM F/U: HCPCS | Performed by: NURSE PRACTITIONER

## 2020-10-07 PROCEDURE — 99214 OFFICE O/P EST MOD 30 MIN: CPT | Performed by: NURSE PRACTITIONER

## 2020-10-07 RX ORDER — DICLOFENAC SODIUM 75 MG/1
75 TABLET, DELAYED RELEASE ORAL 2 TIMES DAILY
Qty: 60 TABLET | Refills: 0 | Status: SHIPPED
Start: 2020-10-07 | End: 2021-02-03

## 2020-10-07 RX ORDER — CLONIDINE HYDROCHLORIDE 0.1 MG/1
TABLET ORAL
Qty: 180 TABLET | Refills: 3 | Status: SHIPPED | OUTPATIENT
Start: 2020-10-07 | End: 2021-10-13 | Stop reason: SDUPTHER

## 2020-10-07 RX ORDER — GLIPIZIDE 2.5 MG/1
2.5 TABLET, EXTENDED RELEASE ORAL DAILY
Qty: 90 TABLET | Refills: 3 | Status: SHIPPED | OUTPATIENT
Start: 2020-10-07 | End: 2021-10-13 | Stop reason: SDUPTHER

## 2020-10-07 RX ORDER — LOSARTAN POTASSIUM AND HYDROCHLOROTHIAZIDE 25; 100 MG/1; MG/1
1 TABLET ORAL DAILY
Qty: 90 TABLET | Refills: 3 | Status: SHIPPED | OUTPATIENT
Start: 2020-10-07 | End: 2021-10-13 | Stop reason: SDUPTHER

## 2020-10-07 RX ORDER — INSULIN DETEMIR 100 [IU]/ML
40 INJECTION, SOLUTION SUBCUTANEOUS NIGHTLY
Qty: 5 PEN | Refills: 5 | Status: SHIPPED | OUTPATIENT
Start: 2020-10-07 | End: 2021-02-03 | Stop reason: SDUPTHER

## 2020-10-07 RX ORDER — ATORVASTATIN CALCIUM 40 MG/1
40 TABLET, FILM COATED ORAL DAILY
Qty: 90 TABLET | Refills: 3 | Status: SHIPPED | OUTPATIENT
Start: 2020-10-07 | End: 2021-10-13 | Stop reason: SDUPTHER

## 2020-10-07 RX ORDER — NIFEDIPINE 60 MG/1
60 TABLET, FILM COATED, EXTENDED RELEASE ORAL DAILY
Qty: 90 TABLET | Refills: 3 | Status: SHIPPED | OUTPATIENT
Start: 2020-10-07 | End: 2021-10-13 | Stop reason: SDUPTHER

## 2020-10-07 ASSESSMENT — ENCOUNTER SYMPTOMS
RHINORRHEA: 0
VISUAL CHANGE: 0
WHEEZING: 0
CONSTIPATION: 0
VOMITING: 0
NAUSEA: 0
DIARRHEA: 0
SORE THROAT: 0
ABDOMINAL PAIN: 0
SHORTNESS OF BREATH: 0
COUGH: 0
ORTHOPNEA: 0

## 2020-10-07 NOTE — PROGRESS NOTES
Name: Kamille Petersen  : 1970         Chief Complaint:     Chief Complaint   Patient presents with    Diabetes     Routine office visit. \"My blood sugar has been running around 200. \"     Hypertension    Arm Pain     Left elbow area x 4 months. Denies injury. History of Present Illness:      Kamille Petersen is a 44 y.o.  female who presents with Diabetes (Routine office visit. \"My blood sugar has been running around 200. \" ); Hypertension; and Arm Pain (Left elbow area x 4 months. Denies injury. )      Kenny Pina is here today for a routine follow-up visit. DM-remains uncontrolled, patient found not to be taking all medications. See below for further comment    Left elbow pain-patient complains of intermittent pain in the left elbow that is sharp in nature. Patient states that this happens when trying to  objects or with certain movements. Patient states that she thinks this is come from overusing her arms at work. Diabetes   She presents for her follow-up diabetic visit. She has type 2 diabetes mellitus. Her disease course has been worsening. There are no hypoglycemic associated symptoms. Pertinent negatives for hypoglycemia include no confusion, dizziness, headaches, mood changes, nervousness/anxiousness, pallor, speech difficulty or sweats. Pertinent negatives for diabetes include no chest pain, no fatigue, no foot paresthesias, no polydipsia, no polyphagia, no polyuria, no visual change and no weakness. There are no hypoglycemic complications. Pertinent negatives for hypoglycemia complications include no blackouts, no hospitalization, no nocturnal hypoglycemia, no required assistance and no required glucagon injection. Symptoms are stable. There are no diabetic complications. Pertinent negatives for diabetic complications include no CVA, heart disease, nephropathy or peripheral neuropathy.  Risk factors for coronary artery disease include diabetes mellitus, dyslipidemia, family history, obesity, hypertension, post-menopausal, sedentary lifestyle and stress. Current diabetic treatment includes insulin injections and oral agent (triple therapy). She is compliant with treatment all of the time. Her weight is stable. She is following a generally unhealthy diet. Meal planning includes avoidance of concentrated sweets. She has not had a previous visit with a dietitian. She rarely participates in exercise. There is no change in her home blood glucose trend. Her breakfast blood glucose range is generally >200 mg/dl. An ACE inhibitor/angiotensin II receptor blocker is being taken. She does not see a podiatrist.Eye exam is current. Hypertension   This is a chronic problem. The current episode started more than 1 year ago. The problem is unchanged. The problem is controlled. Associated symptoms include peripheral edema (intermittent). Pertinent negatives include no chest pain, headaches, neck pain, orthopnea, palpitations, shortness of breath or sweats. There are no associated agents to hypertension. Risk factors for coronary artery disease include diabetes mellitus, dyslipidemia, family history, obesity, post-menopausal state and stress. Past treatments include alpha 1 blockers, beta blockers, diuretics and angiotensin blockers. The current treatment provides moderate improvement. Compliance problems include exercise and diet. There is no history of kidney disease, CAD/MI, CVA or heart failure. There is no history of chronic renal disease. Hyperlipidemia   This is a chronic problem. The current episode started more than 1 year ago. The problem is controlled. Recent lipid tests were reviewed and are normal. Exacerbating diseases include diabetes and obesity. She has no history of chronic renal disease, hypothyroidism or liver disease. Factors aggravating her hyperlipidemia include fatty foods. Associated symptoms include myalgias. Pertinent negatives include no chest pain or shortness of breath. Current antihyperlipidemic treatment includes statins. The current treatment provides moderate improvement of lipids. Compliance problems include adherence to exercise and adherence to diet. Risk factors for coronary artery disease include diabetes mellitus, family history, dyslipidemia, hypertension, obesity, stress, a sedentary lifestyle and post-menopausal.         Past Medical History:     Past Medical History:   Diagnosis Date    Diabetes mellitus ([facility] [address]     Hyperlipidemia     Hypertension     Irregular heart rhythm     Sciatica       Reviewed all health maintenance requirements and ordered appropriate tests  Health Maintenance Due   Topic Date Due    Breast cancer screen  2018    Diabetic foot exam  10/04/2020       Past Surgical History:     Past Surgical History:   Procedure Laterality Date     SECTION      COLONOSCOPY  2019    Dr Ivis Hernandez- diverticulosis,hemorrhoids    COLONOSCOPY N/A 3/6/2019    COLONOSCOPY DIAGNOSTIC performed by Shawn Merritt MD at [facility].  2019    Dr Ellis-bx(+H-Pylori,normal duodenal mucosa)erosive duodenitis    UPPER GASTROINTESTINAL ENDOSCOPY N/A 3/6/2019    EGD BIOPSY performed by Shawn Merritt MD at [facility] [address]        Medications:       Prior to Admission medications    Medication Sig Start Date End Date Taking?  Authorizing Provider   cloNIDine (CATAPRES) 0.1 MG tablet TAKE ONE TABLET BY MOUTH TWICE A DAY 10/7/20  Yes Jeanne Ely Might, APRN - CNP   losartan-hydroCHLOROthiazide (HYZAAR) 100-25 MG per tablet Take 1 tablet by mouth daily 10/7/20  Yes Jeanne Rival Might, APRN - CNP   NIFEdipine (ADALAT CC) 60 MG extended release tablet Take 1 tablet by mouth daily 10/7/20  Yes Eledwar Rival Might, APRN - CNP   atorvastatin (LIPITOR) 40 MG tablet Take 1 tablet by mouth daily 10/7/20  Yes Eledwar Rival Might, APRN - CNP   insulin detemir (LEVEMIR FLEXTOUCH) 100 UNIT/ML injection pen Inject 40 Units into the skin nightly 10/7/20  Yes BUZZ Rojas CNP   glipiZIDE (GLUCOTROL XL) 2.5 MG extended release tablet Take 1 tablet by mouth daily 10/7/20  Yes BUZZ Rojas CNP   diclofenac (VOLTAREN) 75 MG EC tablet Take 1 tablet by mouth 2 times daily 10/7/20  Yes BUZZ Rojas CNP   SITagliptin-metFORMIN (JANUMET XR)  MG TB24 per extended release tablet Take 2 tablets by mouth daily 8/26/20  Yes BUZZ Rojas CNP   Insulin Pen Needle (PEN NEEDLES) 32G X 5 MM MISC 1 each by Does not apply route daily 8/6/20  Yes BUZZ Rojas CNP   furosemide (LASIX) 20 MG tablet Take 1 tablet by mouth daily as needed (swellling) 3/27/20  Yes BUZZ Rojas CNP   atenolol (TENORMIN) 50 MG tablet TAKE ONE TABLET BY MOUTH DAILY 3/27/20  Yes BUZZ Rojas CNP   blood glucose test strips (ASCENSIA AUTODISC VI;ONE TOUCH ULTRA TEST VI) strip 1 each by In Vitro route 3 times daily Dispense brand compatible with insurance 12/13/19  Yes BUZZ Amin CNP   HYDROcodone-acetaminophen (Willie Ashwini) 5-325 MG per tablet  11/11/19  Yes Historical Provider, MD   FREESTYLE LANCETS MISC 1 each by Does not apply route daily Dispense brand compatible with insurance 11/20/19  Yes BUZZ Rojas CNP   glucose monitoring kit (FREESTYLE) monitoring kit 1 kit by Does not apply route daily Dispense brand compatible with insurance 11/20/19  Yes BUZZ Rojas CNP   gabapentin (NEURONTIN) 300 MG capsule Take 1 capsule by mouth 2 times daily for 180 days. Intended supply: 90 days 12/23/19 7/29/20  BUZZ Rojas CNP        Allergies:       Patient has no known allergies. Social History:     Tobacco:    reports that she has never smoked. She has never used smokeless tobacco.  Alcohol:      reports no history of alcohol use. Drug Use:  reports no history of drug use.     Family History:     Family History   Problem Relation Age of Onset    High Blood Pressure Father     Diabetes Father     Asthma Mother Review of Systems:     Positive and Negative as described in HPI    Review of Systems   Constitutional: Negative for chills, fatigue and fever. HENT: Negative for congestion, rhinorrhea and sore throat. Eyes: Negative for visual disturbance. Respiratory: Negative for cough, shortness of breath and wheezing. Cardiovascular: Negative for chest pain, palpitations and orthopnea. Gastrointestinal: Negative for abdominal pain, constipation, diarrhea, nausea and vomiting. Endocrine: Negative for polydipsia, polyphagia and polyuria. Genitourinary: Negative for difficulty urinating and dysuria. Musculoskeletal: Positive for arthralgias and myalgias. Negative for gait problem, neck pain and neck stiffness. Skin: Negative for pallor and rash. Neurological: Negative for dizziness, syncope, speech difficulty, weakness, light-headedness and headaches. Psychiatric/Behavioral: Negative for confusion. The patient is not nervous/anxious. Physical Exam:   Vitals:  /77 (Site: Right Upper Arm, Position: Sitting, Cuff Size: Large Adult)   Pulse 82   Temp 97 °F (36.1 °C) (Temporal)   Resp 16   Ht 5' 4\" (1.626 m)   Wt 202 lb 1.6 oz (91.7 kg)   LMP 09/22/2020 (Approximate)   BMI 34.69 kg/m²     Physical Exam  Vitals signs and nursing note reviewed. Constitutional:       General: She is not in acute distress. Appearance: She is well-developed. HENT:      Mouth/Throat:      Mouth: Mucous membranes are moist.   Eyes:      General: No scleral icterus. Conjunctiva/sclera: Conjunctivae normal.   Neck:      Musculoskeletal: Normal range of motion and neck supple. Cardiovascular:      Rate and Rhythm: Normal rate and regular rhythm. Heart sounds: No murmur. Pulmonary:      Effort: Pulmonary effort is normal.      Breath sounds: Normal breath sounds. No wheezing. Abdominal:      General: Bowel sounds are normal. There is no distension. Palpations: Abdomen is soft. Tenderness: There is no abdominal tenderness. Musculoskeletal:         General: Tenderness present. Left elbow: Tenderness found. Lateral epicondyle tenderness noted. Arms:       Right lower leg: No edema. Left lower leg: No edema. Lymphadenopathy:      Cervical: No cervical adenopathy. Skin:     General: Skin is warm and dry. Findings: No rash. Neurological:      Mental Status: She is alert and oriented to person, place, and time. Psychiatric:         Mood and Affect: Mood normal.         Behavior: Behavior normal.         Data:     Lab Results   Component Value Date     09/30/2020    K 4.4 09/30/2020    CL 95 09/30/2020    CO2 23 09/30/2020    BUN 16 09/30/2020    CREATININE 0.97 09/30/2020    GLUCOSE 501 09/30/2020    PROT 7.7 08/13/2019    LABALBU 4.2 08/13/2019    BILITOT 0.32 08/13/2019    ALKPHOS 49 08/13/2019    AST 13 08/13/2019    ALT 20 08/13/2019     Lab Results   Component Value Date    WBC 8.1 08/13/2019    RBC 4.04 08/13/2019    HGB 9.6 08/13/2019    HCT 31.9 08/13/2019    MCV 79.0 08/13/2019    MCH 23.8 08/13/2019    MCHC 30.1 08/13/2019    RDW 13.6 08/13/2019     08/13/2019    MPV 9.7 08/13/2019     No results found for: TSH  Lab Results   Component Value Date    CHOL 182 03/26/2020    HDL 59 03/26/2020    LABA1C 11.6 09/30/2020       Assessment/Plan:      Diagnosis Orders   1. Uncontrolled type 2 diabetes mellitus with hyperglycemia (HCC)   DIABETES FOOT EXAM    insulin detemir (LEVEMIR FLEXTOUCH) 100 UNIT/ML injection pen    glipiZIDE (GLUCOTROL XL) 2.5 MG extended release tablet   2. Essential hypertension  cloNIDine (CATAPRES) 0.1 MG tablet    losartan-hydroCHLOROthiazide (HYZAAR) 100-25 MG per tablet    NIFEdipine (ADALAT CC) 60 MG extended release tablet   3. Dyslipidemia  atorvastatin (LIPITOR) 40 MG tablet   4. Left lateral epicondylitis  diclofenac (VOLTAREN) 75 MG EC tablet     Increase Levemir to 40 units nightly. Restart Janumet as ordered. Glipizide 2.5 mg daily added. We will recheck A1c in 3 months. Hypertension well-controlled, continue current medications. Diclofenac short-term for left elbow lateral epicondylitis. Rest ice also indicated. Recommend PT if not improving. 1.  Adriane received counseling on the following healthy behaviors: nutrition, exercise and medication adherence  2. Patient given educational materials - see patient instructions  3. Was a self-tracking handout given in paper form or via [employer]? No  If yes, see orders or list here. 4.  Discussed use, benefit, and side effects of prescribed medications. Barriers to medication compliance addressed. All patient questions answered. Pt voiced understanding. 5.  Reviewed prior labs and health maintenance  6. Continue current medications, diet and exercise. Completed Refills   Requested Prescriptions     Signed Prescriptions Disp Refills    cloNIDine (CATAPRES) 0.1 MG tablet 180 tablet 3     Sig: TAKE ONE TABLET BY MOUTH TWICE A DAY    losartan-hydroCHLOROthiazide (HYZAAR) 100-25 MG per tablet 90 tablet 3     Sig: Take 1 tablet by mouth daily    NIFEdipine (ADALAT CC) 60 MG extended release tablet 90 tablet 3     Sig: Take 1 tablet by mouth daily    atorvastatin (LIPITOR) 40 MG tablet 90 tablet 3     Sig: Take 1 tablet by mouth daily    insulin detemir (LEVEMIR FLEXTOUCH) 100 UNIT/ML injection pen 5 pen 5     Sig: Inject 40 Units into the skin nightly    glipiZIDE (GLUCOTROL XL) 2.5 MG extended release tablet 90 tablet 3     Sig: Take 1 tablet by mouth daily    diclofenac (VOLTAREN) 75 MG EC tablet 60 tablet 0     Sig: Take 1 tablet by mouth 2 times daily         Return in about 3 months (around 1/7/2021) for Check up- A1c in office.

## 2020-10-07 NOTE — PATIENT INSTRUCTIONS
feet:  ? Wash your feet every day. Use warm (not hot) water. Check the water temperature with your wrists or other part of your body, not your feet. ? Dry your feet well. Pat them dry. Do not rub the skin on your feet too hard. Dry well between your toes. If the skin on your feet stays moist, bacteria or a fungus can grow, which can lead to infection. ? Keep your skin soft. Use moisturizing skin cream to keep the skin on your feet soft and prevent calluses and cracks. But do not put the cream between your toes, and stop using any cream that causes a rash. ? Clean underneath your toenails carefully. Do not use a sharp object to clean underneath your toenails. Use the blunt end of a nail file or other rounded tool. ? Trim and file your toenails straight across to prevent ingrown toenails. Use a nail clipper, not scissors. Use an emery board to smooth the edges. · Change socks daily. Socks without seams are best, because seams often rub the feet. You can find socks for people with diabetes from specialty catalogs. · Look inside your shoes every day for things like gravel or torn linings, which could cause blisters or sores. · Buy shoes that fit well:  ? Look for shoes that have plenty of space around the toes. This helps prevent bunions and blisters. ? Try on shoes while wearing the kind of socks you will usually wear with the shoes. ? Avoid plastic shoes. They may rub your feet and cause blisters. Good shoes should be made of materials that are flexible and breathable, such as leather or cloth. ? Break in new shoes slowly by wearing them for no more than an hour a day for several days. Take extra time to check your feet for red areas, blisters, or other problems after you wear new shoes. · Do not go barefoot. Do not wear sandals, and do not wear shoes with very thin soles. Thin soles are easy to puncture. They also do not protect your feet from hot pavement or cold weather.   · Have your doctor check your feet during each visit. If you have a foot problem, see your doctor. Do not try to treat an early foot problem at home. Home remedies or treatments that you can buy without a prescription (such as corn removers) can be harmful. · Always get early treatment for foot problems. A minor irritation can lead to a major problem if not properly cared for early. When should you call for help? Call your doctor now or seek immediate medical care if:    · You have a foot sore, an ulcer or break in the skin that is not healing after 4 days, bleeding corns or calluses, or an ingrown toenail.     · You have blue or black areas, which can mean bruising or blood flow problems.     · You have peeling skin or tiny blisters between your toes or cracking or oozing of the skin.     · You have a fever for more than 24 hours and a foot sore.     · You have new numbness or tingling in your feet that does not go away after you move your feet or change positions.     · You have unexplained or unusual swelling of the foot or ankle. Watch closely for changes in your health, and be sure to contact your doctor if:    · You cannot do proper foot care. Where can you learn more? Go to https://[employer]. org and sign in to your [employer] account. Enter A739 in the [facility] box to learn more about \"Diabetes Foot Health: Care Instructions. \"     If you do not have an account, please click on the \"Sign Up Now\" link. Current as of: December 20, 2019               Content Version: 12.6  © 4227-5648 [employer]. Care instructions adapted under license by [facility] [address] If you have questions about a medical condition or this instruction, always ask your healthcare professional. [place] [address] any warranty or liability for your use of this information.

## 2020-10-08 ENCOUNTER — CARE COORDINATION (OUTPATIENT)
Dept: CARE COORDINATION | Age: 50
End: 2020-10-08

## 2020-10-12 ENCOUNTER — CARE COORDINATION (OUTPATIENT)
Dept: CARE COORDINATION | Age: 50
End: 2020-10-12

## 2020-10-12 SDOH — SOCIAL STABILITY: SOCIAL NETWORK: HOW OFTEN DO YOU ATTENT MEETINGS OF THE CLUB OR ORGANIZATION YOU BELONG TO?: NEVER

## 2020-10-12 SDOH — SOCIAL STABILITY: SOCIAL NETWORK: HOW OFTEN DO YOU GET TOGETHER WITH FRIENDS OR RELATIVES?: TWICE A WEEK

## 2020-10-12 SDOH — SOCIAL STABILITY: SOCIAL INSECURITY
WITHIN THE LAST YEAR, HAVE TO BEEN RAPED OR FORCED TO HAVE ANY KIND OF SEXUAL ACTIVITY BY YOUR PARTNER OR EX-PARTNER?: NO

## 2020-10-12 SDOH — SOCIAL STABILITY: SOCIAL NETWORK: ARE YOU MARRIED, WIDOWED, DIVORCED, SEPARATED, NEVER MARRIED, OR LIVING WITH A PARTNER?: DIVORCED

## 2020-10-12 SDOH — SOCIAL STABILITY: SOCIAL INSECURITY
WITHIN THE LAST YEAR, HAVE YOU BEEN KICKED, HIT, SLAPPED, OR OTHERWISE PHYSICALLY HURT BY YOUR PARTNER OR EX-PARTNER?: NO

## 2020-10-12 SDOH — SOCIAL STABILITY: SOCIAL NETWORK: IN A TYPICAL WEEK, HOW MANY TIMES DO YOU TALK ON THE PHONE WITH FAMILY, FRIENDS, OR NEIGHBORS?: THREE TIMES A WEEK

## 2020-10-12 SDOH — SOCIAL STABILITY: SOCIAL NETWORK: HOW OFTEN DO YOU ATTEND CHURCH OR RELIGIOUS SERVICES?: NEVER

## 2020-10-12 SDOH — SOCIAL STABILITY: SOCIAL NETWORK
DO YOU BELONG TO ANY CLUBS OR ORGANIZATIONS SUCH AS CHURCH GROUPS UNIONS, FRATERNAL OR ATHLETIC GROUPS, OR SCHOOL GROUPS?: NO

## 2020-10-12 SDOH — SOCIAL STABILITY: SOCIAL INSECURITY: WITHIN THE LAST YEAR, HAVE YOU BEEN AFRAID OF YOUR PARTNER OR EX-PARTNER?: NO

## 2020-10-12 SDOH — HEALTH STABILITY: MENTAL HEALTH
STRESS IS WHEN SOMEONE FEELS TENSE, NERVOUS, ANXIOUS, OR CAN'T SLEEP AT NIGHT BECAUSE THEIR MIND IS TROUBLED. HOW STRESSED ARE YOU?: NOT AT ALL

## 2020-10-12 SDOH — SOCIAL STABILITY: SOCIAL INSECURITY: WITHIN THE LAST YEAR, HAVE YOU BEEN HUMILIATED OR EMOTIONALLY ABUSED IN OTHER WAYS BY YOUR PARTNER OR EX-PARTNER?: NO

## 2020-10-12 NOTE — CARE COORDINATION
Ambulatory Care Coordination Note  10/13/2020  CM Risk Score: 2  Charlson 10 Year Mortality Risk Score: 10%     ACC: Arnaud Riggins RN    Summary Note:     Date Care Coordination Episode Started: Today    Reason for Call Today:     -Admission to Care Coordination. -patient referred per PCP for Diabetes Education  -Able to speak to patient today. Reason patient is in Care Coordination:     -PCP Referred    Topics Reviewed Today:  1.) Medications  -Does the patient have all of their prescribed medications filled? Yes, accept Tenormin (patient to call pharmacy to find out why? and then update this nurse), This nurse care manager will follow up regarding tomorrow.      -Is there any barriers to medication adherence? No, denies  -Is there any financial issues with affording any medications? No     2.) Transportation  -Does the patient drive? Yes  -How is patient transported to appointments? Patient drives herself, or her aunt or uncle take her. -[employer] is an option, reviewed with patient   -Any additional phone numbers for transportation in the area needed? No -denies    3.) Living/Housing  -Does the patient live alone? yes  -What type of housing does the patient live in? Patient lives with her aunt; in [address] [facility]    -Patient, Savage Solano a son, but he does not live close by\". -Does the patient feel safe in their home? Yes  -Who does the cooking, cleaning, housework? Patient and her aunt do this   -Any steps going into the home? Yes, \"there are steps in her home, there is raling\". 4.) DME  -What DME does patient currently have? Roberta Ford uses this most of the time\"    -Any additional DME orders needed? No     5)Pcp instructions:   Assessment/Plan:       Diagnosis Orders   1. Uncontrolled type 2 diabetes mellitus with hyperglycemia (HCC)   DIABETES FOOT EXAM     insulin detemir (LEVEMIR FLEXTOUCH) 100 UNIT/ML injection pen     glipiZIDE (GLUCOTROL XL) 2.5 MG extended release tablet   2.  Essential hypertension dosing, importance of changing insulin needle after each injection, importance of rotating insulin injection sites around his abdomen staying at least 2 inches away from the belly button. Also reviewed proper needle disposal of dirty needles. Patient verbalizes with good understanding. Diet planning /carbohydrate counting:   REviewed with patient Recommended women's recommended carbohydrate amount: (Referring to Dietician)  Diet and Low carbohydrate discussion with patient. Carbohydrate counting. Carbohydrate (starch and sugar) is the main nutrient in food that raises blood sugar. Whey you plan meals based on carbohydrate counting, count only the foods that contain carbohydrates. Calculate the carbohydrate grams or choices using the bolded carbohydrate numbers at the top of each food list. If you are using packaged food with a Nutrition facts label; count the number of \"Total Carbohydrate\" grams based on the serving size listed on the label. **Check the serving size (at the top of the label) and count total carbohydrates**    How Much Carbohydrate do you need?   -The amount can depend on your age, weight, activity, and diabetes medications. You can meet with a Dietician to help you decide. General Carbohydrate counting knowledge:   1 carbohydrate choice = 15 grams carbohydrate    Women: ofen need about 45-60 grams carbohydate (3-4 choices) at each of 3 meals and 15 grams carbohydrate (1 choice) for snacks as needed. Plate Method Reviewed with: patient:   You can use the Plate Method to plan meals. It is a good, quick way to make sure that you have a balanced meal. Also helps to spread carbs throughout the day.    Examples of Appropriate way to divide up your plate include:  -1/2 Non starchy vegetables; reviewed non-starchy vegetable options (Examples: asparagus, broccoli, cabbage, carrots, cucumber, eggplant, onions, peppers (all varieties), mushrooms, radishes, spinach)  -1/4 protein (Examples: that require further investigation?:  No identified areas of uncertainly or problems already being investigated   Are the patients physical health problems impacting on their mental well-being?:  No identified areas of concern   Are there any problems with your patients lifestyle behaviors (alcohol, drugs, diet, exercise) that are impacting on physical or mental well-being?:  No identified areas of concern   Do you have any other concerns about your patients mental well-being? How would you rate their severity and impact on the patient?:  No identified areas of concern   How would you rate their home environment in terms of safety and stability (including domestic violence, insecure housing, neighbor harassment)?:  Consistently safe, supportive, stable, no identified problems   How do daily activities impact on the patient's well-being? (include current or anticipated unemployment, work, caregiving, access to transportation or other):  No identified problems or perceived positive benefits   How would you rate their social network (family, work, friends)?:  Adequate participation with social networks   How would you rate their financial resources (including ability to afford all required medical care)?:  Financially secure, some resource challenges   How wells does the patient now understand their health and well-being (symptoms, signs or risk factors) and what they need to do to manage their health?:  Reasonable to good understanding and already engages in managing health or is willing to undertake better management   How well do you think your patient can engage in healthcare discussions?  (Barriers include language, deafness, aphasia, alcohol or drug problems, learning difficulties, concentration):  Clear and open communication, no identified barriers   Do other services need to be involved to help this patient?:  Other care/services not required at this time   Are current services involved with this patient well-coordinated? (Include coordination with other services you are now recommendation): All required care/services in place and well-coordinated   Suggested Interventions and Community Resources  Fall Risk Prevention:  Declined Meals on Wheels:  Declined   Medication Assistance Program:  Declined   Medi Set or Pill Pack:  Declined   Registered Dietician:  Completed   Social Work:  Declined   Transportation Services:  Declined   Zone Management Tools:  Completed         Schedule an appointment with the patient's PCP, Set up/Review an Education Plan, Set up/Review Goals              Prior to Admission medications    Medication Sig Start Date End Date Taking?  Authorizing Provider   cloNIDine (CATAPRES) 0.1 MG tablet TAKE ONE TABLET BY MOUTH TWICE A DAY 10/7/20  Yes Marcial Blanc Might, APRN - CNP   losartan-hydroCHLOROthiazide (HYZAAR) 100-25 MG per tablet Take 1 tablet by mouth daily 10/7/20  Yes Marcial Blanc Might, APRN - CNP   NIFEdipine (ADALAT CC) 60 MG extended release tablet Take 1 tablet by mouth daily 10/7/20  Yes Marcial Jayashree Might, APRN - CNP   atorvastatin (LIPITOR) 40 MG tablet Take 1 tablet by mouth daily 10/7/20  Yes Marcial Blanc Might, APRN - CNP   insulin detemir (LEVEMIR FLEXTOUCH) 100 UNIT/ML injection pen Inject 40 Units into the skin nightly 10/7/20  Yes Marcial Jayashree Might, APRN - CNP   glipiZIDE (GLUCOTROL XL) 2.5 MG extended release tablet Take 1 tablet by mouth daily 10/7/20  Yes Marcial Blanc Might, APRN - CNP   diclofenac (VOLTAREN) 75 MG EC tablet Take 1 tablet by mouth 2 times daily 10/7/20  Yes Marcial Blanc Might, APRN - CNP   SITagliptin-metFORMIN (JANUMET XR)  MG TB24 per extended release tablet Take 2 tablets by mouth daily 8/26/20  Yes Marcial Blanc Might, APRN - CNP   HYDROcodone-acetaminophen ([address]) 5-325 MG per tablet  11/11/19  Yes Historical Provider, MD   Insulin Pen Needle (PEN NEEDLES) 32G X 5 MM MISC 1 each by Does not apply route daily 8/6/20   Marcial Blanc Might, APRN - CNP   furosemide (LASIX) 20 MG tablet Take 1 tablet by mouth daily as needed (swellling) 3/27/20   BUZZ Fuentes CNP   atenolol (TENORMIN) 50 MG tablet TAKE ONE TABLET BY MOUTH DAILY 3/27/20   BUZZ Fuentes CNP   gabapentin (NEURONTIN) 300 MG capsule Take 1 capsule by mouth 2 times daily for 180 days.  Intended supply: 90 days 12/23/19 7/29/20  BUZZ Fuentes CNP   blood glucose test strips (ASCENSIA AUTODISC VI;ONE TOUCH ULTRA TEST VI) strip 1 each by In Vitro route 3 times daily Dispense brand compatible with insurance 12/13/19   BUZZ Fuentes CNP   FREESTYLE LANCETS MISC 1 each by Does not apply route daily Dispense brand compatible with insurance 11/20/19   BUZZ Fuentes CNP   glucose monitoring kit (FREESTYLE) monitoring kit 1 kit by Does not apply route daily Dispense brand compatible with insurance 11/20/19   BUZZ Fuentes CNP       Future Appointments   Date Time Provider Murphy Cazaresisti   10/21/2020  2:00 PM Magdalene South, [address] [facility] PT [place]   10/23/2020 11:20 AM MD JENNIFER Myers OB[facility]N [id number]   1/7/2021 10:00 AM [facility]

## 2020-10-13 ENCOUNTER — TELEPHONE (OUTPATIENT)
Dept: PRIMARY CARE CLINIC | Age: 50
End: 2020-10-13

## 2020-10-13 ENCOUNTER — CARE COORDINATION (OUTPATIENT)
Dept: CARE COORDINATION | Age: 50
End: 2020-10-13

## 2020-10-13 NOTE — CARE COORDINATION
Registered Dietitian Initial Assessment for [address]  October 13, 2020    Initial Referral Reason: Diabetes    Patient Care Team:  BUZZ Reinoso CNP as PCP - General (Family Nurse Practitioner)  BUZZ Reinoso CNP as PCP - [facility]  Dilcia Moreira RN as [facility]  Charisma Araujo RD, LD as Dietitian    Patient Active Problem List   Diagnosis    Uncontrolled type 2 diabetes mellitus with hyperglycemia ([address]    Essential hypertension    Lumbar back pain with radiculopathy affecting right lower extremity    Microcytic anemia    Dyslipidemia    Iron deficiency anemia    Malabsorption due to intolerance, not elsewhere classified    Internal hemorrhoids    Diverticulosis of colon    Chronic pain of right knee       Current Outpatient Medications   Medication Sig Dispense Refill    cloNIDine (CATAPRES) 0.1 MG tablet TAKE ONE TABLET BY MOUTH TWICE A  tablet 3    losartan-hydroCHLOROthiazide (HYZAAR) 100-25 MG per tablet Take 1 tablet by mouth daily 90 tablet 3    NIFEdipine (ADALAT CC) 60 MG extended release tablet Take 1 tablet by mouth daily 90 tablet 3    atorvastatin (LIPITOR) 40 MG tablet Take 1 tablet by mouth daily 90 tablet 3    insulin detemir (LEVEMIR FLEXTOUCH) 100 UNIT/ML injection pen Inject 40 Units into the skin nightly 5 pen 5    glipiZIDE (GLUCOTROL XL) 2.5 MG extended release tablet Take 1 tablet by mouth daily 90 tablet 3    diclofenac (VOLTAREN) 75 MG EC tablet Take 1 tablet by mouth 2 times daily 60 tablet 0    SITagliptin-metFORMIN (JANUMET XR)  MG TB24 per extended release tablet Take 2 tablets by mouth daily 180 tablet 3    Insulin Pen Needle (PEN NEEDLES) 32G X 5 MM MISC 1 each by Does not apply route daily 100 each 3    furosemide (LASIX) 20 MG tablet Take 1 tablet by mouth daily as needed (swellling) 90 tablet 1    atenolol (TENORMIN) 50 MG tablet TAKE ONE TABLET BY MOUTH DAILY 90 tablet 3    gabapentin (NEURONTIN) 300 MG capsule Take 1 capsule by mouth 2 times daily for 180 days. Intended supply: 90 days 180 capsule 1    blood glucose test strips (ASCENSIA AUTODISC VI;ONE TOUCH ULTRA TEST VI) strip 1 each by In Vitro route 3 times daily Dispense brand compatible with insurance 100 each 3    HYDROcodone-acetaminophen (NORCO) 5-325 MG per tablet       FREESTYLE LANCETS MISC 1 each by Does not apply route daily Dispense brand compatible with insurance 100 each 3    glucose monitoring kit (FREESTYLE) monitoring kit 1 kit by Does not apply route daily Dispense brand compatible with insurance 1 kit 0     No current facility-administered medications for this visit.           Visit for:  Obesity/Weight loss  Diabetes: X  Hypertension:  Hyperlipidemia:  Other:     Anthropometric Measurements:  HT: 5'4\"  Weight: 202  IBW: 120 + or - 10%  BMI: 34    Biochemical Data, Medical Tests and Procedures:    Lab Results   Component Value Date    LABA1C 11.6 (H) 09/30/2020     Lab Results   Component Value Date     09/30/2020       Lab Results   Component Value Date    CHOL 182 03/26/2020    CHOL 212 (H) 12/20/2018     Lab Results   Component Value Date    TRIG 271 (H) 03/26/2020    TRIG 202 (H) 12/20/2018     Lab Results   Component Value Date    HDL 59 03/26/2020    HDL 55 12/20/2018     Lab Results   Component Value Date    LDLCHOLESTEROL 69 03/26/2020    LDLCHOLESTEROL 117 12/20/2018     Lab Results   Component Value Date    VLDL NOT REPORTED (H) 03/26/2020    VLDL NOT REPORTED 12/20/2018     Lab Results   Component Value Date    CHOLHDLRATIO 3.1 03/26/2020    CHOLHDLRATIO 3.9 12/20/2018       Lab Results   Component Value Date    WBC 8.1 08/13/2019    HGB 9.6 (L) 08/13/2019    HCT 31.9 (L) 08/13/2019    MCV 79.0 (L) 08/13/2019     08/13/2019       Lab Results   Component Value Date    CREATININE 0.97 (H) 09/30/2020    BUN 16 09/30/2020     (L) 09/30/2020    K 4.4 09/30/2020    CL 95 (L) 09/30/2020    CO2 23 09/30/2020         NUTRITION DIAGNOSIS    #1 Problem  Excessive carbohydrate intake       Etiology  related to food and nutrition knowledge deficit       Signs/Symptoms  as evidenced by HbA1c- 11.6    NUTRITION INTERVENTION  Nutrition Prescription: used adj. wt of 142#    diabetic diet     Estimated daily CHO Needs: 45-60gms/meal, 15gms/snack  Calorie needs: 1600cals/d  Protein needs: 55gms/d  Fluid needs: 2000cc/day      Patient Goals:  1. Patient will work on her meal pattern- timing, spacing and consistency of meal times discussed. Patient skips breakfast daily, quick/easy breakfast suggestions discussed, will send patient a list of suggestions.  Goal is 3 meals daily spaced every 4-5 hours. 2. Discussed what foods contain carbohydrate to limit and how to measure out portions. Goal is 45-60gms of carb/meal, 15gms/snack. Low carb snacking discussed. 3. Discussed plate method, encouraged patient to increase intake of non-starchy vegetables.  Goal is 1/2 of  plate to be non-starchy vegetables, 1/4 lean protein, 1/4 carbs. Foods from each category reviewed along with what a serving size is. 4. Patient admits to drinking sugary drinks daily- drinking regular powerade.  Encouraged water and discussed zero calorie beverages. Goal is for patient to cut out all sweetened drinks    Nutrition Intervention Need:  Initial/Brief:  Comprehensive Nutrition Education: X  Coordination of other care during nutrition care:    Monitoring and Evaluation:  Ability to plan meals/snacks:  Ability to select healthy foods/meals: X  Food and Nutrition Knowledge: X  Self Monitoring:  Physical Activity:  Energy intake:  Fluid/beverage intake:  Fat/chol intake:  Carb intake: X  Other:    Plan:  1.  Will continue to educate patient on reading food labels, measuring out portions, carb counting, low carb snacking, plate method, importance of meal pattern, quick/easy diabetic meal ideas, zero calorie beverages, and low

## 2020-10-13 NOTE — TELEPHONE ENCOUNTER
Received phone call from PT stating that patient has only showed up to initial evaluation of PT and has not been showing to her scheduled PT. States Functional Capacity Eval takes approx 3 hours and they do not want to schedule pt if she is ot going to show. Reguesting that we call patient and explain this to her. Called patient and informed her that PT called and states that she has not been going to her PT appts and that they are hesitant about scheduling her for her Functional Capacity Eval because it takes 3 hours and they don't want to schedule her if she is not going to show. Informed patient that she needs to have the FCE done prior to us filling out her disability paperwork. Instructed patient to call PT.

## 2020-10-13 NOTE — CARE COORDINATION
Phone call to [address] in [place]:   -Follow up Tenormin:     -Spoke to staff who reports, \"that patient does have an active prescription for the Tenormin, and they will get this ready for patient. Phone call back to patient to inform that Tenormin will be ready at [address] later today:   -left vm message and requested phone call back. Care Coordination Plan of Care: This nurse Care Coordinator will    -Will await call back from patient, to follow up regarding Tenormin  -forward to PcP for plan of care approval.   -will follow up with patient next week to follow up on blood sugars,  -will also address ACP Status when patient calls back.   -Did she get her Tenormin?

## 2020-10-19 ENCOUNTER — CARE COORDINATION (OUTPATIENT)
Dept: CARE COORDINATION | Age: 50
End: 2020-10-19

## 2020-10-19 NOTE — CARE COORDINATION
Ambulatory Care Coordination Note  10/19/2020  CM Risk Score: 2  Charlson 10 Year Mortality Risk Score: 10%     ACC: Chao Dumont, LUCAS    Summary Note:     Date Care Coordination Episode Started:10/12/20    Reason for Call Today:     -Follow up attempt today to patient.   -Left vm message   -Wanting to follow up regarding blood sugars, Medications, did she get the Tenormin picked up? Wanting to follow up on therapy? Did see where she no showed last week, wanting to follow up on diet (dietician working with patient). Care Coordinator plan of care:   --will await call back from patient, and if no return call; will attempt to reach back out to patient again next week. Care Coordination Interventions    Program Enrollment:  Rising Risk  Referral from Primary Care Provider:  Yes  Suggested Interventions and Community Resources  Fall Risk Prevention:  Declined  Meals on Wheels:  Declined  Medication Assistance Program:  Declined  Medi Set or Pill Pack:  Declined  Registered Dietician:  Completed (Comment: referring patient to [facility] dietician Diabetes diet education)  Social Work:  Declined  Transportation Support:  Declined  Zone Management Tools:  Completed (Comment: CCSS to mail Diabetes zone tool to patient)       Prior to Admission medications    Medication Sig Start Date End Date Taking?  Authorizing Provider   cloNIDine (CATAPRES) 0.1 MG tablet TAKE ONE TABLET BY MOUTH TWICE A DAY 10/7/20   Yancy Gupta Might, APRN - CNP   losartan-hydroCHLOROthiazide (HYZAAR) 100-25 MG per tablet Take 1 tablet by mouth daily 10/7/20   Maxcornelio Gupta Might, APRN - CNP   NIFEdipine (ADALAT CC) 60 MG extended release tablet Take 1 tablet by mouth daily 10/7/20   Maxcornelio Gupta Might, APRN - CNP   atorvastatin (LIPITOR) 40 MG tablet Take 1 tablet by mouth daily 10/7/20   Maxcornelio Ortegao Might, APRN - CNP   insulin detemir (LEVEMIR FLEXTOUCH) 100 UNIT/ML injection pen Inject 40 Units into the skin nightly 10/7/20   Yancy Gupta Might, APRN - CNP   glipiZIDE (GLUCOTROL XL) 2.5 MG extended release tablet Take 1 tablet by mouth daily 10/7/20   Ileana Butcher, APRN - CNP   diclofenac (VOLTAREN) 75 MG EC tablet Take 1 tablet by mouth 2 times daily 10/7/20   Ileana Butcher, APRN - CNP   SITagliptin-metFORMIN (JANUMET XR)  MG TB24 per extended release tablet Take 2 tablets by mouth daily 8/26/20   Ileana Butcher, APRN - CNP   Insulin Pen Needle (PEN NEEDLES) 32G X 5 MM MISC 1 each by Does not apply route daily 8/6/20   Ileana Butcher, BUZZ - CNP   furosemide (LASIX) 20 MG tablet Take 1 tablet by mouth daily as needed (swellling) 3/27/20   Ileana Butcher, BUZZ - CNP   atenolol (TENORMIN) 50 MG tablet TAKE ONE TABLET BY MOUTH DAILY 3/27/20   Ileana Butcher, BUZZ - CNP   blood glucose test strips (ASCENSIA AUTODISC VI;ONE TOUCH ULTRA TEST VI) strip 1 each by In Vitro route 3 times daily Dispense brand compatible with insurance 12/13/19   BUZZ Rose CNP   HYDROcodone-acetaminophen ([facility]) 5-325 MG per tablet  11/11/19   Historical Provider, MD   FREESTYLE LANCETS MISC 1 each by Does not apply route daily Dispense brand compatible with insurance 11/20/19   BUZZ Rose CNP   glucose monitoring kit (FREESTYLE) monitoring kit 1 kit by Does not apply route daily Dispense brand compatible with insurance 11/20/19   BUZZ Rose CNP       Future Appointments   Date Time Provider Murphy Velarde   10/21/2020  2:00 PM Khadijah CokerZ PT [place]   10/23/2020 11:20 AM Unknown MD GAYATHRI EwingF OB/GYN [facility]   1/7/2021 10:00 AM BUZZ Rose CNP Prim [facility]

## 2020-10-21 ENCOUNTER — HOSPITAL ENCOUNTER (OUTPATIENT)
Dept: PHYSICAL THERAPY | Age: 50
Setting detail: THERAPIES SERIES
Discharge: HOME | End: 2020-10-21
Payer: MEDICARE

## 2020-10-21 PROCEDURE — 97750 PHYSICAL PERFORMANCE TEST: CPT

## 2020-10-21 NOTE — PROGRESS NOTES
FCE    Patient was seen for an FCE on this date. Documentation done through outside program and will be scanned in at a later date.      Gabi Dos Santos PT, DPT

## 2020-10-23 ENCOUNTER — OFFICE VISIT (OUTPATIENT)
Dept: OBGYN | Age: 50
End: 2020-10-23
Payer: MEDICARE

## 2020-10-23 ENCOUNTER — HOSPITAL ENCOUNTER (OUTPATIENT)
Age: 50
Setting detail: SPECIMEN
Discharge: HOME | End: 2020-10-23
Payer: MEDICARE

## 2020-10-23 ENCOUNTER — HOSPITAL ENCOUNTER (OUTPATIENT)
Age: 50
Discharge: HOME | End: 2020-10-23
Payer: MEDICARE

## 2020-10-23 VITALS
WEIGHT: 200 LBS | BODY MASS INDEX: 34.15 KG/M2 | HEIGHT: 64 IN | SYSTOLIC BLOOD PRESSURE: 158 MMHG | DIASTOLIC BLOOD PRESSURE: 96 MMHG

## 2020-10-23 LAB
ESTRADIOL LEVEL: 97 PG/ML (ref 27–314)
FOLLICLE STIMULATING HORMONE: 5.3 U/L (ref 1.7–21.5)

## 2020-10-23 PROCEDURE — 82670 ASSAY OF TOTAL ESTRADIOL: CPT

## 2020-10-23 PROCEDURE — 58100 BIOPSY OF UTERUS LINING: CPT | Performed by: OBSTETRICS & GYNECOLOGY

## 2020-10-23 PROCEDURE — 36415 COLL VENOUS BLD VENIPUNCTURE: CPT

## 2020-10-23 PROCEDURE — 83001 ASSAY OF GONADOTROPIN (FSH): CPT

## 2020-10-23 PROCEDURE — 88305 TISSUE EXAM BY PATHOLOGIST: CPT

## 2020-10-23 NOTE — PROGRESS NOTES
PROBLEM VISIT     Date of service: 10/23/2020    Dennis Kyle  Is a 44 y.o. single female    PT's PCP is: Yancy Butcher, APRN - CNP     : 1970                                             Subjective:       Patient's last menstrual period was 2020. OB History    Para Term  AB Living   1 1 1         SAB TAB Ectopic Molar Multiple Live Births                    # Outcome Date GA Lbr Donald/2nd Weight Sex Delivery Anes PTL Lv   1 Term 84    M CS-Unspec EPI N         Social History     Tobacco Use   Smoking Status Never Smoker   Smokeless Tobacco Never Used        Social History     Substance and Sexual Activity   Alcohol Use No    Alcohol/week: 0.0 standard drinks    Frequency: Never    Binge frequency: Never       Allergies: Patient has no known allergies.       Current Outpatient Medications:     cloNIDine (CATAPRES) 0.1 MG tablet, TAKE ONE TABLET BY MOUTH TWICE A DAY, Disp: 180 tablet, Rfl: 3    losartan-hydroCHLOROthiazide (HYZAAR) 100-25 MG per tablet, Take 1 tablet by mouth daily, Disp: 90 tablet, Rfl: 3    NIFEdipine (ADALAT CC) 60 MG extended release tablet, Take 1 tablet by mouth daily, Disp: 90 tablet, Rfl: 3    atorvastatin (LIPITOR) 40 MG tablet, Take 1 tablet by mouth daily, Disp: 90 tablet, Rfl: 3    insulin detemir (LEVEMIR FLEXTOUCH) 100 UNIT/ML injection pen, Inject 40 Units into the skin nightly, Disp: 5 pen, Rfl: 5    glipiZIDE (GLUCOTROL XL) 2.5 MG extended release tablet, Take 1 tablet by mouth daily, Disp: 90 tablet, Rfl: 3    diclofenac (VOLTAREN) 75 MG EC tablet, Take 1 tablet by mouth 2 times daily, Disp: 60 tablet, Rfl: 0    SITagliptin-metFORMIN (JANUMET XR)  MG TB24 per extended release tablet, Take 2 tablets by mouth daily, Disp: 180 tablet, Rfl: 3    furosemide (LASIX) 20 MG tablet, Take 1 tablet by mouth daily as needed (swellling), Disp: 90 tablet, Rfl: 1    atenolol (TENORMIN) 50 MG tablet, TAKE ONE TABLET BY MOUTH DAILY, Disp: 90 tablet, Rfl: 3    HYDROcodone-acetaminophen (NORCO) 5-325 MG per tablet, , Disp: , Rfl:     Insulin Pen Needle (PEN NEEDLES) 32G X 5 MM MISC, 1 each by Does not apply route daily (Patient not taking: Reported on 10/23/2020), Disp: 100 each, Rfl: 3    blood glucose test strips (ASCENSIA AUTODISC VI;ONE TOUCH ULTRA TEST VI) strip, 1 each by In Vitro route 3 times daily Dispense brand compatible with insurance (Patient not taking: Reported on 10/23/2020), Disp: 100 each, Rfl: 3    FREESTYLE LANCETS MISC, 1 each by Does not apply route daily Dispense brand compatible with insurance (Patient not taking: Reported on 10/23/2020), Disp: 100 each, Rfl: 3    glucose monitoring kit (FREESTYLE) monitoring kit, 1 kit by Does not apply route daily Dispense brand compatible with insurance (Patient not taking: Reported on 10/23/2020), Disp: 1 kit, Rfl: 0    Social History     Substance and Sexual Activity   Sexual Activity Yes    Partners: Male    Comment: none       Last Yearly:  2/26/20    Last pap: 2/26/20    Last HPV: never    Chief Complaint   Patient presents with    Follow-up     pt of [facility] presents for uterine fibroids as seen on us          NURSE: jose      PE:  Vital Signs  Blood pressure (!) 158/96, height 5' 4\" (1.626 m), weight 200 lb (90.7 kg), last menstrual period 09/22/2020, unknown if currently breastfeeding. Labs:    No results found for this visit on 10/23/20. HPI: The patient is having very heavy and irregular perimenopausal bleeding. Does have a moderate anemia probably secondary to this issue.   Of note that she has severe suboptimally controlled diabetes    No PT denies fever, chills, nausea and vomiting       Objective   Pelvic Exam: GENITAL/URINARY:  External Genitalia:  General appearance; normal, Hair distribution; normal, Lesions absent  Vagina:  General appearance normal, Estrogen effect normal, Discharge absent, Lesions absent, Pelvic support normal  Cervix:  General

## 2020-10-26 LAB — SURGICAL PATHOLOGY REPORT: NORMAL

## 2020-10-27 ENCOUNTER — CARE COORDINATION (OUTPATIENT)
Dept: CARE COORDINATION | Age: 50
End: 2020-10-27

## 2020-10-29 ENCOUNTER — CARE COORDINATION (OUTPATIENT)
Dept: CARE COORDINATION | Age: 50
End: 2020-10-29

## 2020-10-29 NOTE — CARE COORDINATION
Nutrition Care Coordinator Follow-Up visit:    Food Recall: eating 2-3 meals/d    Activity Level:  Sedentary: X  Lightly Active: Moderately Active:  Very Active:    Adult BMI:  Underweight (below 18.5)  Normal Weight (18.5-24.9)  Overweight (25-29. 9)  Obese (30-39. 9) X  Morbidly Obese (>40)    Weight Change: no change    Plan:  Plan was established with patient:  Increase dietary fiber by consuming whole grains, fruits and vegetables: X  Limit dietary cholesterol to >200mg/day: Increase water intake:  Avoid added sugar: X  Avoid sweetened beverages: X  Choose lean meats: X      Monitoring: Will monitor weight:  Will monitor adherence to meal plan: Will monitor adherence to exercise plan: Will monitor HGA1c: X    Handouts Provided :  Low Carb snacking: X  Carb counting /individual meal plan: x  Portion Control: X  Food Labels: X  Physical Activity:  Low Fat/Cholesterol:  Hypo/Hyperglycemia:  Calorie Controlled Meal Plan:    Goals: Increase water consumption to 8oz. 6-8 times daily:  Manage blood sugars by consuming 3 meals spaced every 4-5 hours with 2-3 snacks daily: discussed  Increase fiber and decrease fat intake by consuming 1-2 fruit servings and 2-3 vegetable servings per day. Increase physical activity by: encouraged daily walking as tolerated, goal is 30mins/day  Consume less than 2,000mg of sodium/day  Avoid consumption of sweetened beverages and added sugar by reading food labels: discussed  Monitor blood sugars by using meter to check blood glucose before morning meal and 2 hours after a meal daily: BS slowly improving per patient now 250's -300 were 400's per patient  Decrease risk of coronary heart disease by consuming fish that contains omega-3 fatty acids at least twice a week, avoiding partially hydrogenated oil/trans fats and limiting saturated fat intake by reading food labels:discussed    Patient goals set:  1.  Reviewed  meal pattern- timing, spacing and consistency of meal times discussed. Patient skips breakfast daily, quick/easy breakfast suggestions reviewed.  Goal is 3 meals daily spaced every 4-5 hours. 2. Reviewed what foods contain carbohydrate to limit and how to measure out portions. Goal is 45-60gms of carb/meal, 15gms/snack. Low carb snacking reviewed. 3. Reviewed plate method, encouraged patient to increase intake of non-starchy vegetables.  Goal is 1/2 of  plate to be non-starchy vegetables, 1/4 lean protein, 1/4 carbs. Foods from each category reviewed along with what a serving size is. 4. Patient continues to drink sugary drinks daily but says she has cut down- drinking regular powerade.  Encouraged water and reviewed zero calorie beverages. Goal is for patient to cut out all sweetened drinks. Patient relays she got nutrition information and is making changes- eating more salads and greens. Encouraged patient to use plate method at meals- work on increasing lean proteins and non-starchy vegetables. She was only eating cereal at breakfast- encouraged to limit and have a protein source at breakfast- eggs, cottage chees, ect. Will follow up in 3-4 weeks to review and answer questions.     Leonie Strauss

## 2020-10-29 NOTE — CARE COORDINATION
Phone call to patient today after reach out from dietician to inform:   -patient continues with with shakiness; \"although checks her sugar, and her blood sugar is not low\". -Blood sugars, 'running 250-300's\". -dietician working with patient on diet/carbohydrate counting.     -left vm message and requested phone call back. Care Coordination Plan of Care:    This nurse Care Coordinator will   -await call back from patient; and if no return call, will attempt to reach back out to patient next week        Future Appointments   Date Time Provider Murphy Velarde   10/30/2020 11:30 AM Larisa Carbajal MD [facility]   11/2/2020  1:50 PM Larisa Carbajal MD [facility]   1/7/2021 10:00 AM [facility]

## 2020-10-30 ENCOUNTER — OFFICE VISIT (OUTPATIENT)
Dept: OBGYN | Age: 50
End: 2020-10-30
Payer: MEDICARE

## 2020-10-30 VITALS
WEIGHT: 205 LBS | BODY MASS INDEX: 35 KG/M2 | SYSTOLIC BLOOD PRESSURE: 136 MMHG | DIASTOLIC BLOOD PRESSURE: 86 MMHG | HEIGHT: 64 IN

## 2020-10-30 PROCEDURE — G8417 CALC BMI ABV UP PARAM F/U: HCPCS | Performed by: OBSTETRICS & GYNECOLOGY

## 2020-10-30 PROCEDURE — G8484 FLU IMMUNIZE NO ADMIN: HCPCS | Performed by: OBSTETRICS & GYNECOLOGY

## 2020-10-30 PROCEDURE — G8427 DOCREV CUR MEDS BY ELIG CLIN: HCPCS | Performed by: OBSTETRICS & GYNECOLOGY

## 2020-10-30 PROCEDURE — 99213 OFFICE O/P EST LOW 20 MIN: CPT | Performed by: OBSTETRICS & GYNECOLOGY

## 2020-10-30 PROCEDURE — 3017F COLORECTAL CA SCREEN DOC REV: CPT | Performed by: OBSTETRICS & GYNECOLOGY

## 2020-10-30 PROCEDURE — 1036F TOBACCO NON-USER: CPT | Performed by: OBSTETRICS & GYNECOLOGY

## 2020-10-30 RX ORDER — TRANEXAMIC ACID 650 1/1
TABLET ORAL
Qty: 30 TABLET | Refills: 5 | Status: SHIPPED | OUTPATIENT
Start: 2020-10-30

## 2020-10-30 NOTE — PROGRESS NOTES
PROBLEM VISIT     Date of service: 10/30/2020    Nancy Graf  Is a 44 y.o. single female    PT's PCP is: BUZZ Carvalho - DONYA     : 1970                                             Subjective:       No LMP recorded. (Menstrual status: Irregular periods). OB History    Para Term  AB Living   1 1 1         SAB TAB Ectopic Molar Multiple Live Births                    # Outcome Date GA Lbr Donald/2nd Weight Sex Delivery Anes PTL Lv   1 Term 84    M CS-Unspec EPI N         Social History     Tobacco Use   Smoking Status Never Smoker   Smokeless Tobacco Never Used        Social History     Substance and Sexual Activity   Alcohol Use No    Alcohol/week: 0.0 standard drinks    Frequency: Never    Binge frequency: Never       Social History     Substance and Sexual Activity   Sexual Activity Yes    Partners: Male    Comment: none       Allergies: Patient has no known allergies. Chief Complaint   Patient presents with   [facility] Results     pt presents for lab results       Last Yearly:  20    Last pap: 20    Last HPV: 20      NURSE: jose    PE:  Vital Signs  Blood pressure 136/86, height 5' 4\" (1.626 m), weight 205 lb (93 kg), unknown if currently breastfeeding. Labs:    No results found for this visit on 10/30/20. NURSE: josefina    HPI: The patient is here to discuss recent findings. Her complaints is menorrhagia    No  PT denies fever, chills, nausea and vomiting       Objective: I did review recent findings endometrial biopsy benign secretory endometrium hormonal panel consistent with premenopausal.  The ultrasound does show uterine fibroids and normal endometrial stripe                           Assessment and Plan: I did talk to patient about various treatments. After discussion she would like to try Lysteda to see if this will lighten her bleeding. It is my hope that when she becomes menopausal this problem will resolve itself.           Diagnosis Orders 1. DUB (dysfunctional uterine bleeding)  tranexamic acid (LYSTEDA) 650 MG TABS tablet             No follow-ups on file. FF: 15 minutes    There are no Patient Instructions on file for this visit. Over 75%of time spent on counseling and care coordination on: see assessment and plan,  She was also counseled on her preventative health maintenance recommendations and follow-up.       Sanna Merlin Hedges,10/30/2020 11:54 AM

## 2020-11-04 ENCOUNTER — CARE COORDINATION (OUTPATIENT)
Dept: CARE COORDINATION | Age: 50
End: 2020-11-04

## 2020-11-04 NOTE — CARE COORDINATION
Ambulatory Care Coordination Note  11/4/2020  CM Risk Score: 2  Charlson 10 Year Mortality Risk Score: 10%     ACC: Juan Gillette, LUCAS    Summary Note:     -Phone call to patient to attempt to follow up regarding uterine bleeding, request blood sugar log, follow up on \"shakiness\" report per dietician after patient informed last week she was having shakness not related to blood sugars. -follow up on diet (dietician working with noah ortiz). Review Gynecology instructions from visit on 10/30/20:   Objective: I did review recent findings endometrial biopsy benign secretory endometrium hormonal panel consistent with premenopausal.  The ultrasound does show uterine fibroids and normal endometrial stripe                                         Assessment and Plan: I did talk to patient about various treatments. After discussion she would like to try Lysteda to see if this will lighten her bleeding. It is my hope that when she becomes menopausal this problem will resolve itself.                                             Diagnosis Orders   1. DUB (dysfunctional uterine bleeding)  tranexamic acid (LYSTEDA) 650 MG TABS tablet               No follow-ups on file.     FF: 15 minutes     There are no Patient Instructions on file for this visit.     -Did patient start Lysteda? Care Coordination Plan of Care:    This nurse Care Coordinator will await call back from patient, and if no return call; will attempt to reach back out to patient in the next week week          Care Coordination Interventions    Program Enrollment:  Rising Risk  Referral from Primary Care Provider:  Yes  Suggested Interventions and Community Resources  Fall Risk Prevention:  Declined  Meals on Wheels:  Declined  Medication Assistance Program:  Declined  Medi Set or Pill Pack:  Declined  Registered Dietician:  Completed (Comment: referring patient to [facility] dietician Diabetes diet education)  Social Work:  Declined  Transportation Support: Declined  Zone Management Tools:  Completed (Comment: CCSS to mail Diabetes zone tool to patient)         Prior to Admission medications    Medication Sig Start Date End Date Taking?  Authorizing Provider   tranexamic acid (LYSTEDA) 650 MG TABS tablet Take 2 tablets up to 3 times daily for up to 5 days as needed for heavy menses 10/30/20   Federica Raines MD   cloNIDine (CATAPRES) 0.1 MG tablet TAKE ONE TABLET BY MOUTH TWICE A DAY 10/7/20   Jet Butcher, BUZZ Terry CNP   losartan-hydroCHLOROthiazide (HYZAAR) 100-25 MG per tablet Take 1 tablet by mouth daily 10/7/20   Jet Butcher, BUZZ - CNP   NIFEdipine (ADALAT CC) 60 MG extended release tablet Take 1 tablet by mouth daily 10/7/20   Jet Butcher, BUZZ - CNP   atorvastatin (LIPITOR) 40 MG tablet Take 1 tablet by mouth daily 10/7/20   Jet Butcher, BUZZ Terry CNP   insulin detemir (LEVEMIR FLEXTOUCH) 100 UNIT/ML injection pen Inject 40 Units into the skin nightly 10/7/20   Jet Butcher, BUZZ - CNP   glipiZIDE (GLUCOTROL XL) 2.5 MG extended release tablet Take 1 tablet by mouth daily 10/7/20   Jet Butcher, APRN - CNP   diclofenac (VOLTAREN) 75 MG EC tablet Take 1 tablet by mouth 2 times daily 10/7/20   BUZZ Durant - DONYA   SITagliptin-metFORMIN (JANUMET XR)  MG TB24 per extended release tablet Take 2 tablets by mouth daily 8/26/20   BUZZ Durant CNP   Insulin Pen Needle (PEN NEEDLES) 32G X 5 MM MISC 1 each by Does not apply route daily  Patient not taking: Reported on 10/23/2020 8/6/20   BUZZ Durant CNP   furosemide (LASIX) 20 MG tablet Take 1 tablet by mouth daily as needed (swellling) 3/27/20   BUZZ Durant - CNP   atenolol (TENORMIN) 50 MG tablet TAKE ONE TABLET BY MOUTH DAILY 3/27/20   BUZZ Durant CNP   blood glucose test strips (ASCENSIA AUTODISC VI;ONE TOUCH ULTRA TEST VI) strip 1 each by In Vitro route 3 times daily Dispense brand compatible with insurance  Patient not taking: Reported on 10/23/2020 12/13/19 BUZZ Dangelo CNP   HYDROcodone-acetaminophen ([address]) 5-325 MG per tablet  11/11/19   Historical Provider, MD   FREESTYLE LANCETS MISC 1 each by Does not apply route daily Dispense brand compatible with insurance  Patient not taking: Reported on 10/23/2020 11/20/19   BUZZ Dangelo CNP   glucose monitoring kit (FREESTYLE) monitoring kit 1 kit by Does not apply route daily Dispense brand compatible with insurance  Patient not taking: Reported on 10/23/2020 11/20/19   BUZZ Dangelo CNP       Future Appointments   Date Time Provider Murphy Velarde   1/7/2021 10:00 AM [facility]angelo [facility]

## 2020-11-06 ENCOUNTER — TELEPHONE (OUTPATIENT)
Dept: PRIMARY CARE CLINIC | Age: 50
End: 2020-11-06

## 2020-11-11 ENCOUNTER — CARE COORDINATION (OUTPATIENT)
Dept: CARE COORDINATION | Age: 50
End: 2020-11-11

## 2020-11-11 NOTE — CARE COORDINATION
Ambulatory Care Coordination Note  11/11/2020  CM Risk Score: 2  Charlson 10 Year Mortality Risk Score: 10%     ACC: Kesha Hdz RN    Summary Note:     -Phone call to patient to attempt to follow up regarding uterine bleeding, request blood sugar log, follow up on \"shakiness\" report per dietician   -follow up on diet (dietician working with noah ortiz). -patient admitted 10/12/20 for Care Coordination.     -Unsuccessful Follow up calls by Care Coordination made:   1)10/19/20  2)10/27/20,   3)10/29/20  4)11/04/20  5)11/11/20    -5 unsuccessful attempts in follow up to patient for the last 4 weeks. -will send to PCP today and remove patient from care coordination due to non-engagement.    -Dietician informed of discharge of patient from care coordination    Lab Results   Component Value Date    LABA1C 11.6 (H) 09/30/2020    LABA1C 10.9 07/07/2020    LABA1C 10.3 (H) 03/26/2020     Lab Results   Component Value Date     09/30/2020     03/26/2020     08/13/2019         Care Coordination Plan of Care: This nurse Care Coordinator will   -forward to PCP to notify patient will be removed from care coordination today due to non-engagement  -This ACM more than willing to work with patient again in the future, if patient will allow. Care Coordination Interventions    Program Enrollment:  Rising Risk  Referral from Primary Care Provider:  Yes  Suggested Interventions and Community Resources  Fall Risk Prevention:  Declined  Meals on Wheels:  Declined  Medication Assistance Program:  Declined  Medi Set or Pill Pack:  Declined  Registered Dietician:  Completed (Comment: referring patient to [facility] dietician Diabetes diet education)  Social Work:  Declined  Transportation Support:  Declined  Zone Management Tools:  Completed (Comment: CCSS to mail Diabetes zone tool to patient)         Prior to Admission medications    Medication Sig Start Date End Date Taking?  Authorizing Provider   tranexamic acid (LYSTEDA) 650 MG TABS tablet Take 2 tablets up to 3 times daily for up to 5 days as needed for heavy menses 10/30/20   Claire Husain MD   cloNIDine (CATAPRES) 0.1 MG tablet TAKE ONE TABLET BY MOUTH TWICE A DAY 10/7/20   Zoran Butcher, BUZZ Terry CNP   losartan-hydroCHLOROthiazide (HYZAAR) 100-25 MG per tablet Take 1 tablet by mouth daily 10/7/20   Zoran Butcher, BUZZ - CNP   NIFEdipine (ADALAT CC) 60 MG extended release tablet Take 1 tablet by mouth daily 10/7/20   Zoran Butcher, BUZZ - CNP   atorvastatin (LIPITOR) 40 MG tablet Take 1 tablet by mouth daily 10/7/20   Zoran Butcher, BUZZ Terry CNP   insulin detemir (LEVEMIR FLEXTOUCH) 100 UNIT/ML injection pen Inject 40 Units into the skin nightly 10/7/20   Zoran Butcher, BUZZ Terry CNP   glipiZIDE (GLUCOTROL XL) 2.5 MG extended release tablet Take 1 tablet by mouth daily 10/7/20   Zoran Butcher, APRN - CNP   diclofenac (VOLTAREN) 75 MG EC tablet Take 1 tablet by mouth 2 times daily 10/7/20   Zoran Butcher, BUZZ Terry CNP   SITagliptin-metFORMIN (JANUMET XR)  MG TB24 per extended release tablet Take 2 tablets by mouth daily 8/26/20   BUZZ Esteban CNP   Insulin Pen Needle (PEN NEEDLES) 32G X 5 MM MISC 1 each by Does not apply route daily  Patient not taking: Reported on 10/23/2020 8/6/20   BUZZ Esteban CNP   furosemide (LASIX) 20 MG tablet Take 1 tablet by mouth daily as needed (swellling) 3/27/20   BUZZ Esteban CNP   atenolol (TENORMIN) 50 MG tablet TAKE ONE TABLET BY MOUTH DAILY 3/27/20   BUZZ Esteban CNP   blood glucose test strips (ASCENSIA AUTODISC VI;ONE TOUCH ULTRA TEST VI) strip 1 each by In Vitro route 3 times daily Dispense brand compatible with insurance  Patient not taking: Reported on 10/23/2020 12/13/19   Radene Ashleigh Might, APRN - CNP   HYDROcodone-acetaminophen ([place]) 5-325 MG per tablet  11/11/19   Historical Provider, MD   FREESTYLE LANCETS MISC 1 each by Does not apply route daily Dispense brand compatible with insurance  Patient not taking: Reported on 10/23/2020 11/20/19   BUZZ Segovia CNP   glucose monitoring kit (FREESTYLE) monitoring kit 1 kit by Does not apply route daily Dispense brand compatible with insurance  Patient not taking: Reported on 10/23/2020 11/20/19   BUZZ Segovia CNP       Future Appointments   Date Time Provider Murphy Velarde   1/7/2021 10:00 AM [facility]

## 2020-11-19 ENCOUNTER — CARE COORDINATION (OUTPATIENT)
Dept: CARE COORDINATION | Age: 50
End: 2020-11-19

## 2020-11-30 ENCOUNTER — TELEPHONE (OUTPATIENT)
Dept: PRIMARY CARE CLINIC | Age: 50
End: 2020-11-30

## 2020-12-03 ENCOUNTER — OFFICE VISIT (OUTPATIENT)
Dept: PRIMARY CARE CLINIC | Age: 50
End: 2020-12-03
Payer: MEDICARE

## 2020-12-03 ENCOUNTER — CARE COORDINATION (OUTPATIENT)
Dept: CARE COORDINATION | Age: 50
End: 2020-12-03

## 2020-12-03 VITALS
DIASTOLIC BLOOD PRESSURE: 70 MMHG | HEART RATE: 95 BPM | WEIGHT: 204.4 LBS | TEMPERATURE: 97.3 F | OXYGEN SATURATION: 99 % | SYSTOLIC BLOOD PRESSURE: 132 MMHG | BODY MASS INDEX: 35.09 KG/M2 | RESPIRATION RATE: 20 BRPM

## 2020-12-03 PROCEDURE — G8427 DOCREV CUR MEDS BY ELIG CLIN: HCPCS | Performed by: NURSE PRACTITIONER

## 2020-12-03 PROCEDURE — 3017F COLORECTAL CA SCREEN DOC REV: CPT | Performed by: NURSE PRACTITIONER

## 2020-12-03 PROCEDURE — G8417 CALC BMI ABV UP PARAM F/U: HCPCS | Performed by: NURSE PRACTITIONER

## 2020-12-03 PROCEDURE — 99213 OFFICE O/P EST LOW 20 MIN: CPT | Performed by: NURSE PRACTITIONER

## 2020-12-03 PROCEDURE — 1036F TOBACCO NON-USER: CPT | Performed by: NURSE PRACTITIONER

## 2020-12-03 PROCEDURE — G8484 FLU IMMUNIZE NO ADMIN: HCPCS | Performed by: NURSE PRACTITIONER

## 2020-12-03 ASSESSMENT — ENCOUNTER SYMPTOMS
BACK PAIN: 1
CONSTIPATION: 0
VOMITING: 0

## 2020-12-03 NOTE — CARE COORDINATION
2nd failed attempt to reach patient. Message left asking  For return call to [phone]. Will attempt to reach   Patient one last time within 1-2 weeks.

## 2020-12-03 NOTE — PROGRESS NOTES
[facility]  [address] [facility] [address] [facility]  Dept: [phone]  Dept Fax: [phone]    Arsalan Lazar is a 44 y.o. female who [facility] today for her medical conditions/complaints as noted below. Arsalan Lazar is c/o of Urinary Tract Infection (c/o frequency and strong smelling urine)      HPI:     Jey Vieyra is here today for a walk in visit. She reports over the last 1-2 weeks she has had urinary frequency and strong smelling urine. She denies any fever or chills. She reports chronic lower back pain typically on her right side now it is on her left side. Pain in her back has been constant denies any colicky pain. Urinary Tract Infection    This is a new problem. The current episode started 1 to 4 weeks ago (x 2 weeks). The problem has been unchanged. The quality of the pain is described as burning. There has been no fever. There is no history of pyelonephritis. Associated symptoms include frequency, hesitancy and urgency. Pertinent negatives include no chills, discharge, hematuria or vomiting. Associated symptoms comments: Strong smelling urine. Her past medical history is significant for recurrent UTIs. There is no history of kidney stones.        Past Medical History:   Diagnosis Date    Diabetes mellitus ([address]     Hyperlipidemia     Hypertension     Irregular heart rhythm     Sciatica         Current Outpatient Medications   Medication Sig Dispense Refill    ciprofloxacin (CIPRO) 500 MG tablet Take 1 tablet by mouth 2 times daily for 7 days 14 tablet 0    tranexamic acid (LYSTEDA) 650 MG TABS tablet Take 2 tablets up to 3 times daily for up to 5 days as needed for heavy menses 30 tablet 5    cloNIDine (CATAPRES) 0.1 MG tablet TAKE ONE TABLET BY MOUTH TWICE A  tablet 3    losartan-hydroCHLOROthiazide (HYZAAR) 100-25 MG per tablet Take 1 tablet by mouth daily 90 tablet 3    NIFEdipine (ADALAT CC) 60 MG extended release tablet Take 1 tablet by mouth daily 90 tablet 3    atorvastatin (LIPITOR) 40 MG tablet Take 1 tablet by mouth daily 90 tablet 3    insulin detemir (LEVEMIR FLEXTOUCH) 100 UNIT/ML injection pen Inject 40 Units into the skin nightly 5 pen 5    glipiZIDE (GLUCOTROL XL) 2.5 MG extended release tablet Take 1 tablet by mouth daily 90 tablet 3    SITagliptin-metFORMIN (JANUMET XR)  MG TB24 per extended release tablet Take 2 tablets by mouth daily 180 tablet 3    Insulin Pen Needle (PEN NEEDLES) 32G X 5 MM MISC 1 each by Does not apply route daily 100 each 3    furosemide (LASIX) 20 MG tablet Take 1 tablet by mouth daily as needed (swellling) 90 tablet 1    atenolol (TENORMIN) 50 MG tablet TAKE ONE TABLET BY MOUTH DAILY 90 tablet 3    blood glucose test strips (ASCENSIA AUTODISC VI;ONE TOUCH ULTRA TEST VI) strip 1 each by In Vitro route 3 times daily Dispense brand compatible with insurance 100 each 3    HYDROcodone-acetaminophen (NORCO) 5-325 MG per tablet       FREESTYLE LANCETS MISC 1 each by Does not apply route daily Dispense brand compatible with insurance 100 each 3    glucose monitoring kit (FREESTYLE) monitoring kit 1 kit by Does not apply route daily Dispense brand compatible with insurance 1 kit 0    diclofenac (VOLTAREN) 75 MG EC tablet Take 1 tablet by mouth 2 times daily (Patient not taking: Reported on 12/3/2020) 60 tablet 0     No current facility-administered medications for this visit. No Known Allergies    Subjective:      Review of Systems   Constitutional: Negative for chills and fever. Respiratory: Negative for cough, shortness of breath and wheezing. Gastrointestinal: Negative for constipation and vomiting. Genitourinary: Positive for difficulty urinating, dysuria, frequency, hesitancy and urgency. Negative for hematuria. Strong smelling urine   Musculoskeletal: Positive for back pain (Chronic). Neurological: Positive for headaches. or call 911 if any difficulty breathing, shortness of breath, inability to swallow, hives or temp greater than 103 degrees. Questions answered. They verbalized understandingand agreement. Return if symptoms worsen or fail to improve. Orders Placed This Encounter   Medications    ciprofloxacin (CIPRO) 500 MG tablet     Sig: Take 1 tablet by mouth 2 times daily for 7 days     Dispense:  14 tablet     Refill:  0          All patient questions answered. Pt voiced understanding.       Electronically signed by BUZZ Anglin CNP on 12/4/2020 at 1:18 PM

## 2020-12-04 ENCOUNTER — TELEPHONE (OUTPATIENT)
Dept: PRIMARY CARE CLINIC | Age: 50
End: 2020-12-04

## 2020-12-04 ENCOUNTER — HOSPITAL ENCOUNTER (OUTPATIENT)
Age: 50
Discharge: HOME | End: 2020-12-04
Payer: MEDICARE

## 2020-12-04 LAB
-: ABNORMAL
AMORPHOUS: ABNORMAL
BACTERIA: ABNORMAL
BILIRUBIN URINE: NEGATIVE
CASTS UA: ABNORMAL /LPF
COLOR: YELLOW
COMMENT UA: ABNORMAL
CRYSTALS, UA: ABNORMAL /HPF
EPITHELIAL CELLS UA: ABNORMAL /HPF (ref 0–25)
GLUCOSE URINE: NEGATIVE
KETONES, URINE: NEGATIVE
LEUKOCYTE ESTERASE, URINE: ABNORMAL
MUCUS: ABNORMAL
NITRITE, URINE: POSITIVE
OTHER OBSERVATIONS UA: ABNORMAL
PH UA: 6 (ref 5–9)
PROTEIN UA: ABNORMAL
RBC UA: ABNORMAL /HPF (ref 0–2)
RENAL EPITHELIAL, UA: ABNORMAL /HPF
SPECIFIC GRAVITY UA: 1.01 (ref 1.01–1.02)
TRICHOMONAS: ABNORMAL
TURBIDITY: CLEAR
URINE HGB: NEGATIVE
UROBILINOGEN, URINE: NORMAL
WBC UA: ABNORMAL /HPF (ref 0–5)
YEAST: ABNORMAL

## 2020-12-04 PROCEDURE — 81001 URINALYSIS AUTO W/SCOPE: CPT

## 2020-12-04 PROCEDURE — 87077 CULTURE AEROBIC IDENTIFY: CPT

## 2020-12-04 PROCEDURE — 87186 SC STD MICRODIL/AGAR DIL: CPT

## 2020-12-04 PROCEDURE — 87086 URINE CULTURE/COLONY COUNT: CPT

## 2020-12-04 RX ORDER — CIPROFLOXACIN 500 MG/1
500 TABLET, FILM COATED ORAL 2 TIMES DAILY
Qty: 14 TABLET | Refills: 0 | Status: SHIPPED | OUTPATIENT
Start: 2020-12-04 | End: 2020-12-11

## 2020-12-04 ASSESSMENT — ENCOUNTER SYMPTOMS
COUGH: 0
WHEEZING: 0
SHORTNESS OF BREATH: 0

## 2020-12-04 NOTE — TELEPHONE ENCOUNTER
----- Message from BUZZ Xavier CNP sent at 12/4/2020  1:21 PM EST -----  UA is consistent with urinary tract infection. Prescription sent in to [facility] please have her pick this up ASAP.   Thank you

## 2020-12-06 LAB
CULTURE: ABNORMAL
Lab: ABNORMAL
SPECIMEN DESCRIPTION: ABNORMAL

## 2020-12-09 ENCOUNTER — CARE COORDINATION (OUTPATIENT)
Dept: CARE COORDINATION | Age: 50
End: 2020-12-09

## 2020-12-09 NOTE — CARE COORDINATION
12/9/20-3rd failed attempt to reach patient. Message left asking  For return call to [phone]. Will remove patient from panel, unable to reach.   DHEERAJ Gaytan

## 2020-12-22 ENCOUNTER — TELEPHONE (OUTPATIENT)
Dept: PRIMARY CARE CLINIC | Age: 50
End: 2020-12-22

## 2020-12-22 NOTE — TELEPHONE ENCOUNTER
Reminding open Mammogram order, attempted to reach pt., no answer, left VM to remind, order extended until appt 1/7/21

## 2020-12-31 NOTE — DISCHARGE SUMMARY
Phone: Fannie          Fax: [phone]                            Outpatient Physical Therapy                                                                    Discharge Summary    Patient: Arsalan Lazar  : 1970  CSN #: [id number]   Referring physician: No admitting provider for patient encounter. Referring Practitioner: EDELMIRA Britt      Diagnosis: Lumbar pain with radiculopathy of R LE, M54.16; Chronic R knee pain, M25.561      Date Treatment Initiated: 2020  Date of Last Treatment: 2020      PT Visit Information  Onset Date: 19  PT Insurance Information: [place] [employer]  Total # of Visits Approved: 9  Total # of Visits to Date: 1  Plan of Care/Certification Expiration Date: 20  No Show: 3  Canceled Appointment: 0  Progress Note Counter: *Pt to contact  for disability paperwork and ask them to send us a specific order for an FCE      Frequency/Duration  2 times per week  4 weeks      Treatment Received  [x] HP/CP      [] Electrical Stim   [x] Therapeutic Exercise      [x] Gait Training  [x] Aquatics   [] Ultrasound         [x] Patient Education/HEP   [] Manual Therapy  [] Traction    [x] Neuro-tacho        [] Soft Tissue Mobs            [] Home TENS  [] Iontophoresis    [] Orthotic casting/fitting      [] Dry Needling    Assessment  Assessment: Patient attended initial PT evaluation for low back pain with radiculopathy and was assessed and POC and goals were established for patient to begin aquatic therapy d/t severe pain with land based activity. Patient then no-showed three consecutive appointment and was removed from the schedule per attendance policy. Will d/c patient at this time. Goals  Short term goals  Time Frame for Short term goals: 2 weeks  Short term goal 1: Patient to initiate aquatic therapy to decrease pain and improve strength and mobility.   Short term goal 2: Patient to be instructed in HEP as tolerated to improve mobility and decrease pain. Long term goals  Time Frame for Long term goals : 4 weeks  Long term goal 1: Patient to be independent and compliant with HEP and aquatic exercise. Long term goal 2: Patient to have improved core and B LE strength >/=4/5 grossly for improved lumbar stability. Long term goal 3: Patient to have improved lumbar AROM flexion to toes with no increase in pain for improved mobility. Long term goal 4: Patient to report ability to stand/walk >/=10 minutes with no increase in pain or radicular symptoms to improve QOL.       Reason for Discharge  [] Goals Achieved                        [x]  Poor Follow Through/Attendance                  []  Optimal Function Achieved     [x]  Patient Discharged Self    []  Hospitalization                         []  Physician discharge      Thank you for this referral      Swapnil Bear PT, DPT               Date: 12/31/2020

## 2021-01-29 DIAGNOSIS — E11.65 UNCONTROLLED TYPE 2 DIABETES MELLITUS WITH HYPERGLYCEMIA (HCC): ICD-10-CM

## 2021-01-29 RX ORDER — LANCETS 28 GAUGE
1 EACH MISCELLANEOUS DAILY
Qty: 100 EACH | Refills: 3 | Status: SHIPPED | OUTPATIENT
Start: 2021-01-29

## 2021-01-29 RX ORDER — FERROUS SULFATE 325(65) MG
325 TABLET ORAL 2 TIMES DAILY
COMMUNITY

## 2021-01-29 NOTE — TELEPHONE ENCOUNTER
Health Maintenance   Topic Date Due    Hepatitis C screen  1970    HIV screen  12/27/1983    DTaP/Tdap/Td vaccine (1 - Tdap) 12/27/1987    Breast cancer screen  12/27/2018    A1C test (Diabetic or Prediabetic)  12/30/2020    Hepatitis B vaccine (1 of 3 - Risk 3-dose series) 10/07/2021 (Originally 12/27/1987)    Flu vaccine (1) 10/07/2021 (Originally 9/1/2020)    Shingles Vaccine (1 of 2) 10/07/2021 (Originally 12/27/2018)    Diabetic microalbuminuria test  03/26/2021    Lipid screen  03/26/2021    Diabetic retinal exam  07/08/2021    Potassium monitoring  09/30/2021    Creatinine monitoring  09/30/2021    Diabetic foot exam  10/07/2021    Cervical cancer screen  02/26/2025    Colon cancer screen colonoscopy  03/06/2029    Pneumococcal 0-64 years Vaccine  Completed    Hepatitis A vaccine  Aged Out    Hib vaccine  Aged Out    Meningococcal (ACWY) vaccine  Aged Out             (applicable per patient's age: Cancer Screenings, Depression Screening, Fall Risk Screening, Immunizations)    Hemoglobin A1C (%)   Date Value   09/30/2020 11.6 (H)   07/07/2020 10.9   03/26/2020 10.3 (H)     Microalb/Crt.  Ratio (mcg/mg creat)   Date Value   03/26/2020 556 (H)     LDL Cholesterol (mg/dL)   Date Value   03/26/2020 69     AST (U/L)   Date Value   08/13/2019 13     ALT (U/L)   Date Value   08/13/2019 20     BUN (mg/dL)   Date Value   09/30/2020 16      (goal A1C is < 7)   (goal LDL is <100) need 30-50% reduction from baseline     BP Readings from Last 3 Encounters:   12/03/20 132/70   10/30/20 136/86   10/23/20 (!) 158/96    (goal /80)      All Future Testing planned in [facility]:  Lab Frequency Next Occurrence   EVA DIGITAL SCREEN W CAD BILATERAL Once 01/07/2021       Next Visit Date:  Future Appointments   Date Time Provider Murphy Velarde   2/3/2021  8:20 AM Rody Butcher, APRN - CNP [facility]            Patient Active Problem List:     Uncontrolled type 2 diabetes mellitus with hyperglycemia (HCC)     Essential hypertension     Lumbar back pain with radiculopathy affecting right lower extremity     Microcytic anemia     Dyslipidemia     Iron deficiency anemia     Malabsorption due to intolerance, not elsewhere classified     Internal hemorrhoids     Diverticulosis of colon     Chronic pain of right knee

## 2021-02-03 ENCOUNTER — OFFICE VISIT (OUTPATIENT)
Dept: PRIMARY CARE CLINIC | Age: 51
End: 2021-02-03
Payer: MEDICARE

## 2021-02-03 VITALS
WEIGHT: 210.4 LBS | HEART RATE: 88 BPM | BODY MASS INDEX: 35.92 KG/M2 | TEMPERATURE: 97.4 F | HEIGHT: 64 IN | OXYGEN SATURATION: 99 % | DIASTOLIC BLOOD PRESSURE: 74 MMHG | SYSTOLIC BLOOD PRESSURE: 118 MMHG | RESPIRATION RATE: 20 BRPM

## 2021-02-03 DIAGNOSIS — K31.84 GASTROPARESIS DUE TO DM (HCC): ICD-10-CM

## 2021-02-03 DIAGNOSIS — E11.65 UNCONTROLLED TYPE 2 DIABETES MELLITUS WITH HYPERGLYCEMIA (HCC): Primary | ICD-10-CM

## 2021-02-03 DIAGNOSIS — F11.20 CONTINUOUS OPIOID DEPENDENCE (HCC): ICD-10-CM

## 2021-02-03 DIAGNOSIS — E11.43 GASTROPARESIS DUE TO DM (HCC): ICD-10-CM

## 2021-02-03 LAB — HBA1C MFR BLD: 10.8 %

## 2021-02-03 PROCEDURE — 99214 OFFICE O/P EST MOD 30 MIN: CPT | Performed by: NURSE PRACTITIONER

## 2021-02-03 PROCEDURE — 3046F HEMOGLOBIN A1C LEVEL >9.0%: CPT | Performed by: NURSE PRACTITIONER

## 2021-02-03 PROCEDURE — 2022F DILAT RTA XM EVC RTNOPTHY: CPT | Performed by: NURSE PRACTITIONER

## 2021-02-03 PROCEDURE — G8417 CALC BMI ABV UP PARAM F/U: HCPCS | Performed by: NURSE PRACTITIONER

## 2021-02-03 PROCEDURE — 1036F TOBACCO NON-USER: CPT | Performed by: NURSE PRACTITIONER

## 2021-02-03 PROCEDURE — 3017F COLORECTAL CA SCREEN DOC REV: CPT | Performed by: NURSE PRACTITIONER

## 2021-02-03 PROCEDURE — 83036 HEMOGLOBIN GLYCOSYLATED A1C: CPT | Performed by: NURSE PRACTITIONER

## 2021-02-03 PROCEDURE — G8427 DOCREV CUR MEDS BY ELIG CLIN: HCPCS | Performed by: NURSE PRACTITIONER

## 2021-02-03 PROCEDURE — G8484 FLU IMMUNIZE NO ADMIN: HCPCS | Performed by: NURSE PRACTITIONER

## 2021-02-03 RX ORDER — PSEUDOEPHEDRINE HCL 30 MG
100 TABLET ORAL 2 TIMES DAILY
COMMUNITY

## 2021-02-03 RX ORDER — METOCLOPRAMIDE 5 MG/1
5 TABLET ORAL 3 TIMES DAILY
Qty: 90 TABLET | Refills: 2 | Status: SHIPPED | OUTPATIENT
Start: 2021-02-03 | End: 2021-06-17

## 2021-02-03 RX ORDER — INSULIN LISPRO 100 [IU]/ML
4 INJECTION, SOLUTION INTRAVENOUS; SUBCUTANEOUS
Qty: 5 PEN | Refills: 5 | Status: SHIPPED | OUTPATIENT
Start: 2021-02-03 | End: 2021-05-04 | Stop reason: SDUPTHER

## 2021-02-03 RX ORDER — INSULIN DETEMIR 100 [IU]/ML
50 INJECTION, SOLUTION SUBCUTANEOUS NIGHTLY
Qty: 5 PEN | Refills: 5 | Status: SHIPPED | OUTPATIENT
Start: 2021-02-03 | End: 2021-05-04 | Stop reason: SDUPTHER

## 2021-02-03 ASSESSMENT — ENCOUNTER SYMPTOMS
SHORTNESS OF BREATH: 0
SORE THROAT: 0
RHINORRHEA: 0
WHEEZING: 0
ABDOMINAL DISTENTION: 1
VOMITING: 0
NAUSEA: 0
COUGH: 0
DIARRHEA: 0
ORTHOPNEA: 0
HEMATOCHEZIA: 0
BELCHING: 1
FLATUS: 0
ABDOMINAL PAIN: 0
CONSTIPATION: 1
VISUAL CHANGE: 0

## 2021-02-03 ASSESSMENT — PATIENT HEALTH QUESTIONNAIRE - PHQ9
2. FEELING DOWN, DEPRESSED OR HOPELESS: 0
SUM OF ALL RESPONSES TO PHQ QUESTIONS 1-9: 0

## 2021-02-03 ASSESSMENT — CROHNS DISEASE ACTIVITY INDEX (CDAI): CDAI SCORE: 0

## 2021-02-03 NOTE — PATIENT INSTRUCTIONS
SURVEY:    You may be receiving a survey from [employer] regarding your visit today. Please complete the survey to enable us to provide the highest quality of care to you and your family. If you cannot score us a very good on any question, please call the office to discuss how we could have made your experience a very good one. Thank you. Patient Education        Counting Carbohydrates: Care Instructions  Your Care Instructions     You don't have to eat special foods when you have diabetes. You just have to be careful to eat healthy foods. Carbohydrates (carbs) raise blood sugar higher and quicker than any other nutrient. Carbs are found in desserts, breads and cereals, and fruit. They're also in starchy vegetables. These include potatoes, corn, and grains such as rice and pasta. Carbs are also in milk and yogurt. The more carbs you eat at one time, the higher your blood sugar will rise. Spreading carbs all through the day helps keep your blood sugar levels within your target range. Counting carbs is one of the best ways to keep your blood sugar under control. If you use insulin, counting carbs helps you match the right amount of insulin to the number of grams of carbs in a meal. Then you can change your diet and insulin dose as needed. Testing your blood sugar several times a day can help you learn how carbs affect your blood sugar. A registered dietitian or certified diabetes educator can help you plan meals and snacks. Follow-up care is a key part of your treatment and safety. Be sure to make and go to all appointments, and call your doctor if you are having problems. It's also a good idea to know your test results and keep a list of the medicines you take. How can you care for yourself at home?   Know your daily amount of carbohydrates  Your daily amount depends on several things, such as your weight, how active you are, which diabetes medicines you take, and what your goals are for your blood sugar levels. A registered dietitian or certified diabetes educator can help you plan how many carbs to include in each meal and snack. For most adults, a guideline for the daily amount of carbs is:  · 45 to 60 grams at each meal. That's about the same as 3 to 4 carbohydrate servings. · 15 to 20 grams at each snack. That's about the same as 1 carbohydrate serving. Count carbs  Counting carbs lets you know how much rapid-acting insulin to take before you eat. If you use an insulin pump, you get a constant rate of insulin during the day. So the pump must be programmed at meals. This gives you extra insulin to cover the rise in blood sugar after meals. If you take insulin:  · Learn your own insulin-to-carb ratio. You and your diabetes health professional will figure out the ratio. You can do this by testing your blood sugar after meals. For example, you may need a certain amount of insulin for every 15 grams of carbs. · Add up the carb grams in a meal. Then you can figure out how many units of insulin to take based on your insulin-to-carb ratio. · Exercise lowers blood sugar. You can use less insulin than you would if you were not doing exercise. Keep in mind that timing matters. If you exercise within 1 hour after a meal, your body may need less insulin for that meal than it would if you exercised 3 hours after the meal. Test your blood sugar to find out how exercise affects your need for insulin. If you do or don't take insulin:  · Look at labels on packaged foods. This can tell you how many carbs are in a serving. You can also use guides from the American Diabetes Association. · Be aware of portions, or serving sizes. If a package has two servings and you eat the whole package, you need to double the number of grams of carbohydrate listed for one serving. · Protein, fat, and fiber do not raise blood sugar as much as carbs do.  If you eat a lot of these nutrients in a meal, your blood sugar will rise

## 2021-02-03 NOTE — PROGRESS NOTES
Name: Destini Dodd  : 1970         Chief Complaint:     Chief Complaint   Patient presents with    Diabetes     routine check    Bloated     X 1 month,diarrhea and constipation       History of Present Illness:      Destini Dodd is a 44 y.o.  female who presents with Diabetes (routine check) and Bloated (X 1 month,diarrhea and constipation)      Mario Alberto Skill is here today for a routine follow-up visit. Continuous opioid dependencepatient is established with pain management and is compliant with her medication regimen. She is having problems with constipation. Suspected gastroparesispatient is having abdominal bloating and constipation. She is trying to increase her fiber but still having problems. Her sugars are uncontrolled. This mixed with her opioid dependence is probably causing all of her problems. Diabetes  She presents for her follow-up diabetic visit. She has type 2 diabetes mellitus. Her disease course has been worsening. There are no hypoglycemic associated symptoms. Pertinent negatives for hypoglycemia include no confusion, dizziness, headaches, mood changes, nervousness/anxiousness, pallor, speech difficulty or sweats. Pertinent negatives for diabetes include no chest pain, no fatigue, no foot paresthesias, no polydipsia, no polyphagia, no polyuria, no visual change, no weakness and no weight loss. There are no hypoglycemic complications. Pertinent negatives for hypoglycemia complications include no blackouts, no hospitalization, no nocturnal hypoglycemia, no required assistance and no required glucagon injection. Symptoms are stable. There are no diabetic complications. Pertinent negatives for diabetic complications include no CVA, heart disease, nephropathy or peripheral neuropathy. Risk factors for coronary artery disease include diabetes mellitus, dyslipidemia, family history, obesity, hypertension, post-menopausal, sedentary lifestyle and stress.  Current diabetic treatment includes insulin injections and oral agent (triple therapy). She is compliant with treatment all of the time. Her weight is stable. She is following a generally unhealthy diet. Meal planning includes avoidance of concentrated sweets. She has not had a previous visit with a dietitian. She rarely participates in exercise. There is no change in her home blood glucose trend. Her breakfast blood glucose range is generally >200 mg/dl. An ACE inhibitor/angiotensin II receptor blocker is being taken. She does not see a podiatrist.Eye exam is current. Abdominal Pain  This is a recurrent problem. The current episode started more than 1 month ago. The onset quality is undetermined. The problem occurs intermittently. The problem has been unchanged. The pain is located in the generalized abdominal region. The pain is at a severity of 4/10. The pain is moderate. The quality of the pain is a sensation of fullness, colicky and cramping. The abdominal pain does not radiate. Associated symptoms include arthralgias (Chronic), belching, constipation and myalgias (Chronic). Pertinent negatives include no anorexia, diarrhea, dysuria, fever, flatus, frequency, headaches, hematochezia, hematuria, melena, nausea, vomiting or weight loss. The pain is aggravated by eating. The pain is relieved by nothing. Treatments tried: Laxatives. The treatment provided moderate relief. Her past medical history is significant for GERD and irritable bowel syndrome. There is no history of abdominal surgery, colon cancer, Crohn's disease, gallstones, pancreatitis, PUD or ulcerative colitis.          Past Medical History:     Past Medical History:   Diagnosis Date    Diabetes mellitus ([facility] [address]     Hyperlipidemia     Hypertension     Irregular heart rhythm     Sciatica       Reviewed all health maintenance requirements and ordered appropriate tests  Health Maintenance Due   Topic Date Due    Breast cancer screen  12/27/2018    A1C test (Diabetic or Prediabetic)  2020       Past Surgical History:     Past Surgical History:   Procedure Laterality Date     SECTION      COLONOSCOPY  2019    Dr Jr Gomes- diverticulosis,hemorrhoids    COLONOSCOPY N/A 3/6/2019    COLONOSCOPY DIAGNOSTIC performed by Mariely Jordan MD at [facility]  2019    Dr Ellis-bx(+H-Pylori,normal duodenal mucosa)erosive duodenitis    UPPER GASTROINTESTINAL ENDOSCOPY N/A 3/6/2019    EGD BIOPSY performed by Mariely Jordan MD at [address]        Medications:       Prior to Admission medications    Medication Sig Start Date End Date Taking?  Authorizing Provider   docusate (COLACE, DULCOLAX) 100 MG CAPS Take 100 mg by mouth 2 times daily   Yes Historical Provider, MD   insulin detemir (LEVEMIR FLEXTOUCH) 100 UNIT/ML injection pen Inject 50 Units into the skin nightly 2/3/21  Yes BUZZ Oseguera CNP   insulin lispro, 1 Unit Dial, (HUMALOG KWIKPEN) 100 UNIT/ML SOPN Inject 4 Units into the skin 3 times daily (before meals) 2/3/21  Yes BUZZ Oseguera CNP   metoclopramide (REGLAN) 5 MG tablet Take 1 tablet by mouth 3 times daily 2/3/21  Yes BUZZ Oseguera CNP   blood glucose test strips (ASCENSIA AUTODISC VI;ONE TOUCH ULTRA TEST VI) strip 1 each by In Vitro route 3 times daily Dispense brand compatible with insurance 21  Yes BUZZ Hall CNP   FreeStyle Lancets MISC 1 each by Does not apply route daily Dispense brand compatible with insurance 21  Yes [de-identified] M BUZZ Jessica CNP   ferrous sulfate (IRON 325) 325 (65 Fe) MG tablet Take 325 mg by mouth 2 times daily   Yes Historical Provider, MD   cloNIDine (CATAPRES) 0.1 MG tablet TAKE ONE TABLET BY MOUTH TWICE A DAY 10/7/20  Yes BUZZ Oseguera CNP   losartan-hydroCHLOROthiazide (HYZAAR) 100-25 MG per tablet Take 1 tablet by mouth daily 10/7/20  Yes BUZZ Oseguera CNP   NIFEdipine (ADALAT CC) 60 MG extended release tablet Take 1 tablet by mouth daily 10/7/20  Yes BUZZ Galicia CNP   atorvastatin (LIPITOR) 40 MG tablet Take 1 tablet by mouth daily 10/7/20  Yes BUZZ Galicia CNP   glipiZIDE (GLUCOTROL XL) 2.5 MG extended release tablet Take 1 tablet by mouth daily 10/7/20  Yes BUZZ Galicia CNP   SITagliptin-metFORMIN (JANUMET XR)  MG TB24 per extended release tablet Take 2 tablets by mouth daily 8/26/20  Yes BUZZ Galicia CNP   Insulin Pen Needle (PEN NEEDLES) 32G X 5 MM MISC 1 each by Does not apply route daily 8/6/20  Yes BUZZ Galicia CNP   furosemide (LASIX) 20 MG tablet Take 1 tablet by mouth daily as needed (swellling) 3/27/20  Yes BUZZ Galicia CNP   atenolol (TENORMIN) 50 MG tablet TAKE ONE TABLET BY MOUTH DAILY 3/27/20  Yes BUZZ Galicia CNP   HYDROcodone-acetaminophen (Maribel Cuna) 5-325 MG per tablet  11/11/19  Yes Historical Provider, MD   glucose monitoring kit (FREESTYLE) monitoring kit 1 kit by Does not apply route daily Dispense brand compatible with insurance 11/20/19  Yes BUZZ Galicia CNP   tranexamic acid (LYSTEDA) 650 MG TABS tablet Take 2 tablets up to 3 times daily for up to 5 days as needed for heavy menses  Patient not taking: Reported on 2/3/2021 10/30/20   Yudelka Rios MD        Allergies:       Patient has no known allergies. Social History:     Tobacco:    reports that she has never smoked. She has never used smokeless tobacco.  Alcohol:      reports no history of alcohol use. Drug Use:  reports no history of drug use. Family History:     Family History   Problem Relation Age of Onset    High Blood Pressure Father     Diabetes Father     Asthma Mother        Review of Systems:     Positive and Negative as described in HPI    Review of Systems   Constitutional: Negative for chills, fatigue, fever and weight loss. HENT: Negative for congestion, rhinorrhea and sore throat. Eyes: Negative for visual disturbance.    Respiratory: Negative for cough, shortness of breath and wheezing. Cardiovascular: Negative for chest pain, palpitations and orthopnea. Gastrointestinal: Positive for abdominal distention (bloating) and constipation. Negative for abdominal pain, anorexia, diarrhea, flatus, hematochezia, melena, nausea and vomiting. Endocrine: Negative for polydipsia, polyphagia and polyuria. Genitourinary: Negative for difficulty urinating, dysuria, frequency and hematuria. Musculoskeletal: Positive for arthralgias (Chronic) and myalgias (Chronic). Negative for gait problem, neck pain and neck stiffness. Skin: Negative for pallor and rash. Neurological: Negative for dizziness, syncope, speech difficulty, weakness, light-headedness and headaches. Psychiatric/Behavioral: Negative for confusion. The patient is not nervous/anxious. Physical Exam:   Vitals:  /74 (Site: Left Upper Arm)   Pulse 88   Temp 97.4 °F (36.3 °C) (Temporal)   Resp 20   Ht 5' 4\" (1.626 m)   Wt 210 lb 6.4 oz (95.4 kg)   SpO2 99%   BMI 36.12 kg/m²     Physical Exam  Vitals signs and nursing note reviewed. Constitutional:       General: She is not in acute distress. Appearance: She is well-developed. HENT:      Mouth/Throat:      Mouth: Mucous membranes are moist.   Eyes:      General: No scleral icterus. Conjunctiva/sclera: Conjunctivae normal.   Neck:      Musculoskeletal: Normal range of motion and neck supple. Cardiovascular:      Rate and Rhythm: Normal rate and regular rhythm. Heart sounds: No murmur. Pulmonary:      Effort: Pulmonary effort is normal.      Breath sounds: Normal breath sounds. No wheezing. Musculoskeletal:      Right lower leg: No edema. Left lower leg: No edema. Lymphadenopathy:      Cervical: No cervical adenopathy. Skin:     General: Skin is warm and dry. Findings: No rash. Neurological:      Mental Status: She is alert and oriented to person, place, and time.    Psychiatric:         Mood and Affect: Mood normal.         Behavior: Behavior normal.         Data:     Lab Results   Component Value Date     09/30/2020    K 4.4 09/30/2020    CL 95 09/30/2020    CO2 23 09/30/2020    BUN 16 09/30/2020    CREATININE 0.97 09/30/2020    GLUCOSE 501 09/30/2020    PROT 7.7 08/13/2019    LABALBU 4.2 08/13/2019    BILITOT 0.32 08/13/2019    ALKPHOS 49 08/13/2019    AST 13 08/13/2019    ALT 20 08/13/2019     Lab Results   Component Value Date    WBC 8.1 08/13/2019    RBC 4.04 08/13/2019    HGB 9.6 08/13/2019    HCT 31.9 08/13/2019    MCV 79.0 08/13/2019    MCH 23.8 08/13/2019    MCHC 30.1 08/13/2019    RDW 13.6 08/13/2019     08/13/2019    MPV 9.7 08/13/2019     No results found for: TSH  Lab Results   Component Value Date    CHOL 182 03/26/2020    HDL 59 03/26/2020    LABA1C 11.6 09/30/2020       Assessment/Plan:      Diagnosis Orders   1. Uncontrolled type 2 diabetes mellitus with hyperglycemia (HCC)  POCT glycosylated hemoglobin (Hb A1C)    CBC Auto Differential    ALT    AST    Basic Metabolic Panel    Hemoglobin A1C    Lipid Panel    Microalbumin, Ur    insulin detemir (LEVEMIR FLEXTOUCH) 100 UNIT/ML injection pen    insulin lispro, 1 Unit Dial, (HUMALOG KWIKPEN) 100 UNIT/ML SOPN   2. Gastroparesis due to DM (HCC)  metoclopramide (REGLAN) 5 MG tablet   3. Continuous opioid dependence ([address]       Sugars remain uncontrolled so we will have her start mealtime insulin at 4 units prior to each meal.  She will record 2-hour postprandial sugars. We will increase long-acting insulin to 50 units nightly. She will record fasting blood sugars. She will call weekly with number so we can adjust.    Start Reglan 5 mg 3 times daily with meals. This will be temporary and she is understanding. 1.  Adriane received counseling on the following healthy behaviors: nutrition, exercise and medication adherence  2. Patient given educational materials - see patient instructions  3.   Was a self-tracking handout given in paper form or via [employer]? No  If yes, see orders or list here. 4.  Discussed use, benefit, and side effects of prescribed medications. Barriers to medication compliance addressed. All patient questions answered. Pt voiced understanding. 5.  Reviewed prior labs and health maintenance  6. Continue current medications, diet and exercise. Completed Refills   Requested Prescriptions     Signed Prescriptions Disp Refills    insulin detemir (LEVEMIR FLEXTOUCH) 100 UNIT/ML injection pen 5 pen 5     Sig: Inject 50 Units into the skin nightly    insulin lispro, 1 Unit Dial, (HUMALOG KWIKPEN) 100 UNIT/ML SOPN 5 pen 5     Sig: Inject 4 Units into the skin 3 times daily (before meals)    metoclopramide (REGLAN) 5 MG tablet 90 tablet 2     Sig: Take 1 tablet by mouth 3 times daily         Return in about 3 months (around 5/3/2021) for Check up.

## 2021-03-15 ENCOUNTER — TELEPHONE (OUTPATIENT)
Dept: PRIMARY CARE CLINIC | Age: 51
End: 2021-03-15

## 2021-03-15 NOTE — TELEPHONE ENCOUNTER
Called patient and left message for her to schedule to have her mammogram done that she cancelled in December. To call office with any questions.

## 2021-03-25 ENCOUNTER — TELEPHONE (OUTPATIENT)
Dept: PRIMARY CARE CLINIC | Age: 51
End: 2021-03-25

## 2021-03-25 ENCOUNTER — IMMUNIZATION (OUTPATIENT)
Dept: PRIMARY CARE CLINIC | Age: 51
End: 2021-03-25
Payer: MEDICARE

## 2021-03-25 PROCEDURE — 0001A COVID-19, PFIZER VACCINE 30MCG/0.3ML DOSE: CPT | Performed by: INTERNAL MEDICINE

## 2021-03-25 PROCEDURE — 91300 COVID-19, PFIZER VACCINE 30MCG/0.3ML DOSE: CPT | Performed by: INTERNAL MEDICINE

## 2021-03-25 NOTE — TELEPHONE ENCOUNTER
Called and spoke with patient and reminded her to have her mammogram done, phone number provided for patient to call to reschedule. Verbalizes understanding.

## 2021-04-26 DIAGNOSIS — R60.0 LOWER LEG EDEMA: ICD-10-CM

## 2021-04-26 RX ORDER — FUROSEMIDE 20 MG/1
20 TABLET ORAL DAILY PRN
Qty: 90 TABLET | Refills: 1 | Status: SHIPPED | OUTPATIENT
Start: 2021-04-26 | End: 2021-05-04 | Stop reason: SDUPTHER

## 2021-04-26 NOTE — TELEPHONE ENCOUNTER
Health Maintenance   Topic Date Due    Breast cancer screen  Never done    Diabetic microalbuminuria test  03/26/2021    Lipid screen  03/26/2021    COVID-19 Vaccine (2 - Pfizer 2-dose series) 04/15/2021    A1C test (Diabetic or Prediabetic)  05/03/2021    Hepatitis B vaccine (1 of 3 - Risk 3-dose series) 10/07/2021 (Originally 12/27/1987)    Flu vaccine (Season Ended) 10/07/2021 (Originally 9/1/2021)    Shingles Vaccine (1 of 2) 10/07/2021 (Originally 12/27/2018)    DTaP/Tdap/Td vaccine (1 - Tdap) 02/03/2022 (Originally 12/27/1987)    Hepatitis C screen  02/03/2022 (Originally 1970)    HIV screen  02/03/2022 (Originally 12/27/1983)    Diabetic retinal exam  07/08/2021    Potassium monitoring  09/30/2021    Creatinine monitoring  09/30/2021    Diabetic foot exam  10/07/2021    Cervical cancer screen  02/26/2025    Colon cancer screen colonoscopy  03/06/2029    Pneumococcal 0-64 years Vaccine  Completed    Hepatitis A vaccine  Aged Out    Hib vaccine  Aged Out    Meningococcal (ACWY) vaccine  Aged Out             (applicable per patient's age: Cancer Screenings, Depression Screening, Fall Risk Screening, Immunizations)    Hemoglobin A1C (%)   Date Value   02/03/2021 10.8   09/30/2020 11.6 (H)   07/07/2020 10.9     Microalb/Crt.  Ratio (mcg/mg creat)   Date Value   03/26/2020 556 (H)     LDL Cholesterol (mg/dL)   Date Value   03/26/2020 69     AST (U/L)   Date Value   08/13/2019 13     ALT (U/L)   Date Value   08/13/2019 20     BUN (mg/dL)   Date Value   09/30/2020 16      (goal A1C is < 7)   (goal LDL is <100) need 30-50% reduction from baseline     BP Readings from Last 3 Encounters:   02/03/21 118/74   12/03/20 132/70   10/30/20 136/86    (goal /80)      All Future Testing planned in [facility]:  Lab Frequency Next Occurrence   CBC Auto Differential Once 05/04/2021   ALT Once 05/03/2021   AST Once 62/47/5893   Basic Metabolic Panel Once 16/46/4996   Hemoglobin A1C Once 05/04/2021 Lipid Panel Once 05/04/2021   Microalbumin, Ur Once 05/04/2021       Next Visit Date:  Future Appointments   Date Time Provider Murphy Mayeri   5/4/2021  9:20 AM Jose Butcher, BUZZ - CNP [facility]   5/10/2021  9:40 AM BUZZ Cross CNM [facility]            Patient Active Problem List:     Uncontrolled type 2 diabetes mellitus with hyperglycemia ([address]     Essential hypertension     Lumbar back pain with radiculopathy affecting right lower extremity     Microcytic anemia     Dyslipidemia     Iron deficiency anemia     Malabsorption due to intolerance, not elsewhere classified     Internal hemorrhoids     Diverticulosis of colon     Chronic pain of right knee     Continuous opioid dependence ([address]

## 2021-04-27 ENCOUNTER — TELEPHONE (OUTPATIENT)
Dept: PRIMARY CARE CLINIC | Age: 51
End: 2021-04-27

## 2021-05-03 ENCOUNTER — HOSPITAL ENCOUNTER (OUTPATIENT)
Age: 51
Discharge: HOME | End: 2021-05-03
Payer: MEDICARE

## 2021-05-03 DIAGNOSIS — E11.65 UNCONTROLLED TYPE 2 DIABETES MELLITUS WITH HYPERGLYCEMIA (HCC): ICD-10-CM

## 2021-05-03 LAB
ABSOLUTE EOS #: 0.41 K/UL (ref 0–0.44)
ABSOLUTE IMMATURE GRANULOCYTE: 0.03 K/UL (ref 0–0.3)
ABSOLUTE LYMPH #: 1.92 K/UL (ref 1.1–3.7)
ABSOLUTE MONO #: 0.73 K/UL (ref 0.1–1.2)
ALT SERPL-CCNC: 42 U/L (ref 5–33)
ANION GAP SERPL CALCULATED.3IONS-SCNC: 14 MMOL/L (ref 9–17)
AST SERPL-CCNC: 34 U/L
BASOPHILS # BLD: 1 % (ref 0–2)
BASOPHILS ABSOLUTE: 0.05 K/UL (ref 0–0.2)
BUN BLDV-MCNC: 21 MG/DL (ref 6–20)
BUN/CREAT BLD: 26 (ref 9–20)
CALCIUM SERPL-MCNC: 8.9 MG/DL (ref 8.6–10.4)
CHLORIDE BLD-SCNC: 99 MMOL/L (ref 98–107)
CHOLESTEROL/HDL RATIO: 4
CHOLESTEROL: 173 MG/DL
CO2: 24 MMOL/L (ref 20–31)
CREAT SERPL-MCNC: 0.8 MG/DL (ref 0.5–0.9)
DIFFERENTIAL TYPE: ABNORMAL
EOSINOPHILS RELATIVE PERCENT: 5 % (ref 1–4)
GFR AFRICAN AMERICAN: >60 ML/MIN
GFR NON-AFRICAN AMERICAN: >60 ML/MIN
GFR SERPL CREATININE-BSD FRML MDRD: ABNORMAL ML/MIN/{1.73_M2}
GFR SERPL CREATININE-BSD FRML MDRD: ABNORMAL ML/MIN/{1.73_M2}
GLUCOSE BLD-MCNC: 336 MG/DL (ref 70–99)
HCT VFR BLD CALC: 29.5 % (ref 36.3–47.1)
HDLC SERPL-MCNC: 43 MG/DL
HEMOGLOBIN: 8.8 G/DL (ref 11.9–15.1)
IMMATURE GRANULOCYTES: 0 %
LDL CHOLESTEROL: 55 MG/DL (ref 0–130)
LYMPHOCYTES # BLD: 23 % (ref 24–43)
MCH RBC QN AUTO: 22.1 PG (ref 25.2–33.5)
MCHC RBC AUTO-ENTMCNC: 29.8 G/DL (ref 28.4–34.8)
MCV RBC AUTO: 73.9 FL (ref 82.6–102.9)
MONOCYTES # BLD: 9 % (ref 3–12)
NRBC AUTOMATED: 0 PER 100 WBC
PDW BLD-RTO: 15.4 % (ref 11.8–14.4)
PLATELET # BLD: 281 K/UL (ref 138–453)
PLATELET ESTIMATE: ABNORMAL
PMV BLD AUTO: 9.4 FL (ref 8.1–13.5)
POTASSIUM SERPL-SCNC: 4.3 MMOL/L (ref 3.7–5.3)
RBC # BLD: 3.99 M/UL (ref 3.95–5.11)
RBC # BLD: ABNORMAL 10*6/UL
SEG NEUTROPHILS: 62 % (ref 36–65)
SEGMENTED NEUTROPHILS ABSOLUTE COUNT: 5.2 K/UL (ref 1.5–8.1)
SODIUM BLD-SCNC: 137 MMOL/L (ref 135–144)
TRIGL SERPL-MCNC: 376 MG/DL
VLDLC SERPL CALC-MCNC: ABNORMAL MG/DL (ref 1–30)
WBC # BLD: 8.3 K/UL (ref 3.5–11.3)
WBC # BLD: ABNORMAL 10*3/UL

## 2021-05-03 PROCEDURE — 85025 COMPLETE CBC W/AUTO DIFF WBC: CPT

## 2021-05-03 PROCEDURE — 80048 BASIC METABOLIC PNL TOTAL CA: CPT

## 2021-05-03 PROCEDURE — 36415 COLL VENOUS BLD VENIPUNCTURE: CPT

## 2021-05-03 PROCEDURE — 82570 ASSAY OF URINE CREATININE: CPT

## 2021-05-03 PROCEDURE — 84450 TRANSFERASE (AST) (SGOT): CPT

## 2021-05-03 PROCEDURE — 82043 UR ALBUMIN QUANTITATIVE: CPT

## 2021-05-03 PROCEDURE — 80061 LIPID PANEL: CPT

## 2021-05-03 PROCEDURE — 83036 HEMOGLOBIN GLYCOSYLATED A1C: CPT

## 2021-05-03 PROCEDURE — 84460 ALANINE AMINO (ALT) (SGPT): CPT

## 2021-05-04 ENCOUNTER — IMMUNIZATION (OUTPATIENT)
Dept: PRIMARY CARE CLINIC | Age: 51
End: 2021-05-04
Payer: MEDICARE

## 2021-05-04 ENCOUNTER — OFFICE VISIT (OUTPATIENT)
Dept: PRIMARY CARE CLINIC | Age: 51
End: 2021-05-04
Payer: MEDICARE

## 2021-05-04 ENCOUNTER — TELEPHONE (OUTPATIENT)
Dept: PRIMARY CARE CLINIC | Age: 51
End: 2021-05-04

## 2021-05-04 VITALS
DIASTOLIC BLOOD PRESSURE: 64 MMHG | TEMPERATURE: 97.8 F | BODY MASS INDEX: 37.9 KG/M2 | OXYGEN SATURATION: 99 % | WEIGHT: 220.8 LBS | SYSTOLIC BLOOD PRESSURE: 124 MMHG | RESPIRATION RATE: 22 BRPM | HEART RATE: 92 BPM

## 2021-05-04 DIAGNOSIS — I10 ESSENTIAL HYPERTENSION: ICD-10-CM

## 2021-05-04 DIAGNOSIS — R60.0 LOWER LEG EDEMA: ICD-10-CM

## 2021-05-04 DIAGNOSIS — I73.9 INTERMITTENT CLAUDICATION (HCC): ICD-10-CM

## 2021-05-04 DIAGNOSIS — E11.65 UNCONTROLLED TYPE 2 DIABETES MELLITUS WITH HYPERGLYCEMIA (HCC): Primary | ICD-10-CM

## 2021-05-04 DIAGNOSIS — F34.1 DYSTHYMIA: ICD-10-CM

## 2021-05-04 DIAGNOSIS — Z12.31 SCREENING MAMMOGRAM FOR HIGH-RISK PATIENT: ICD-10-CM

## 2021-05-04 LAB
CREATININE URINE: 36.5 MG/DL (ref 28–217)
ESTIMATED AVERAGE GLUCOSE: 252 MG/DL
HBA1C MFR BLD: 10.4 % (ref 4–6)
MICROALBUMIN/CREAT 24H UR: 188 MG/L
MICROALBUMIN/CREAT UR-RTO: 515 MCG/MG CREAT

## 2021-05-04 PROCEDURE — 91300 COVID-19, PFIZER VACCINE 30MCG/0.3ML DOSE: CPT | Performed by: INTERNAL MEDICINE

## 2021-05-04 PROCEDURE — 99214 OFFICE O/P EST MOD 30 MIN: CPT | Performed by: NURSE PRACTITIONER

## 2021-05-04 PROCEDURE — 2022F DILAT RTA XM EVC RTNOPTHY: CPT | Performed by: NURSE PRACTITIONER

## 2021-05-04 PROCEDURE — 3017F COLORECTAL CA SCREEN DOC REV: CPT | Performed by: NURSE PRACTITIONER

## 2021-05-04 PROCEDURE — 3046F HEMOGLOBIN A1C LEVEL >9.0%: CPT | Performed by: NURSE PRACTITIONER

## 2021-05-04 PROCEDURE — G8417 CALC BMI ABV UP PARAM F/U: HCPCS | Performed by: NURSE PRACTITIONER

## 2021-05-04 PROCEDURE — 1036F TOBACCO NON-USER: CPT | Performed by: NURSE PRACTITIONER

## 2021-05-04 PROCEDURE — 0002A COVID-19, PFIZER VACCINE 30MCG/0.3ML DOSE: CPT | Performed by: INTERNAL MEDICINE

## 2021-05-04 PROCEDURE — G8427 DOCREV CUR MEDS BY ELIG CLIN: HCPCS | Performed by: NURSE PRACTITIONER

## 2021-05-04 RX ORDER — INSULIN LISPRO 100 [IU]/ML
6 INJECTION, SOLUTION INTRAVENOUS; SUBCUTANEOUS
Qty: 5 PEN | Refills: 5 | Status: SHIPPED | OUTPATIENT
Start: 2021-05-04 | End: 2021-10-13 | Stop reason: SDUPTHER

## 2021-05-04 RX ORDER — FUROSEMIDE 40 MG/1
40 TABLET ORAL DAILY
Qty: 90 TABLET | Refills: 1 | Status: SHIPPED | OUTPATIENT
Start: 2021-05-04 | End: 2021-10-13 | Stop reason: SDUPTHER

## 2021-05-04 RX ORDER — INSULIN DETEMIR 100 [IU]/ML
56 INJECTION, SOLUTION SUBCUTANEOUS NIGHTLY
Qty: 5 PEN | Refills: 5 | Status: SHIPPED | OUTPATIENT
Start: 2021-05-04 | End: 2021-10-13 | Stop reason: SDUPTHER

## 2021-05-04 RX ORDER — POTASSIUM CHLORIDE 750 MG/1
10 TABLET, EXTENDED RELEASE ORAL 2 TIMES DAILY
Qty: 180 TABLET | Refills: 1 | Status: SHIPPED | OUTPATIENT
Start: 2021-05-04 | End: 2021-10-13 | Stop reason: SDUPTHER

## 2021-05-04 RX ORDER — CITALOPRAM 10 MG/1
10 TABLET ORAL DAILY
Qty: 90 TABLET | Refills: 0 | Status: SHIPPED
Start: 2021-05-04 | End: 2021-10-13

## 2021-05-04 ASSESSMENT — ENCOUNTER SYMPTOMS
ABDOMINAL PAIN: 0
CONSTIPATION: 0
VOMITING: 0
COUGH: 0
SHORTNESS OF BREATH: 0
ORTHOPNEA: 0
DIARRHEA: 0
RHINORRHEA: 0
SORE THROAT: 0
VISUAL CHANGE: 0
WHEEZING: 0
NAUSEA: 0

## 2021-05-04 NOTE — PATIENT INSTRUCTIONS
blood sugar levels. A registered dietitian or certified diabetes educator can help you plan how many carbs to include in each meal and snack. For most adults, a guideline for the daily amount of carbs is:  · 45 to 60 grams at each meal. That's about the same as 3 to 4 carbohydrate servings. · 15 to 20 grams at each snack. That's about the same as 1 carbohydrate serving. Count carbs  Counting carbs lets you know how much rapid-acting insulin to take before you eat. If you use an insulin pump, you get a constant rate of insulin during the day. So the pump must be programmed at meals. This gives you extra insulin to cover the rise in blood sugar after meals. If you take insulin:  · Learn your own insulin-to-carb ratio. You and your diabetes health professional will figure out the ratio. You can do this by testing your blood sugar after meals. For example, you may need a certain amount of insulin for every 15 grams of carbs. · Add up the carb grams in a meal. Then you can figure out how many units of insulin to take based on your insulin-to-carb ratio. · Exercise lowers blood sugar. You can use less insulin than you would if you were not doing exercise. Keep in mind that timing matters. If you exercise within 1 hour after a meal, your body may need less insulin for that meal than it would if you exercised 3 hours after the meal. Test your blood sugar to find out how exercise affects your need for insulin. If you do or don't take insulin:  · Look at labels on packaged foods. This can tell you how many carbs are in a serving. You can also use guides from the American Diabetes Association. · Be aware of portions, or serving sizes. If a package has two servings and you eat the whole package, you need to double the number of grams of carbohydrate listed for one serving. · Protein, fat, and fiber do not raise blood sugar as much as carbs do.  If you eat a lot of these nutrients in a meal, your blood sugar will rise more slowly than it would otherwise. Eat from all food groups  · Eat at least three meals a day. · Plan meals to include food from all the food groups. The food groups include grains, fruits, dairy, proteins, and vegetables. · Talk to your dietitian or diabetes educator about ways to add limited amounts of sweets into your meal plan. · If you drink alcohol, talk to your doctor. It may not be recommended when you are taking certain diabetes medicines. Where can you learn more? Go to [email or web address] org and sign in to your [employer] account. Enter B047 in the Pennant box to learn more about \"Counting Carbohydrates: Care Instructions. \"     If you do not have an account, please click on the \"Sign Up Now\" link. Current as of: August 31, 2020               Content Version: 12.8  © 0099-7848 Healthwise, Incorporated. Care instructions adapted under license by [facility] ([facility]). If you have questions about a medical condition or this instruction, always ask your healthcare professional. [address] any warranty or liability for your use of this information.

## 2021-05-04 NOTE — TELEPHONE ENCOUNTER
----- Message from [address] [place] [address], APRN - CNP sent at 5/4/2021 10:56 AM EDT -----  Her A1c is still not close to goal.  She understands that we have increased her Levemir to 56 units and her mealtime to 6 units which each meal.  She needs to repeat a BMP next week due to the increase in Lasix.

## 2021-05-04 NOTE — PROGRESS NOTES
disease, nephropathy or peripheral neuropathy. Risk factors for coronary artery disease include diabetes mellitus, dyslipidemia, family history, obesity, hypertension, post-menopausal, sedentary lifestyle and stress. Current diabetic treatment includes insulin injections and oral agent (triple therapy). She is compliant with treatment all of the time. Her weight is stable. She is following a generally unhealthy diet. Meal planning includes avoidance of concentrated sweets. She has not had a previous visit with a dietitian. She rarely participates in exercise. There is no change in her home blood glucose trend. Her breakfast blood glucose range is generally >200 mg/dl. An ACE inhibitor/angiotensin II receptor blocker is being taken. She does not see a podiatrist.Eye exam is current. Hypertension  This is a chronic problem. The current episode started more than 1 year ago. The problem is unchanged. The problem is controlled. Associated symptoms include peripheral edema (intermittent). Pertinent negatives include no chest pain, headaches, neck pain, orthopnea, palpitations, shortness of breath or sweats. There are no associated agents to hypertension. Risk factors for coronary artery disease include diabetes mellitus, dyslipidemia, family history, obesity, post-menopausal state and stress. Past treatments include alpha 1 blockers, beta blockers, diuretics and angiotensin blockers. The current treatment provides moderate improvement. Compliance problems include exercise and diet. There is no history of kidney disease, CAD/MI, CVA or heart failure. There is no history of chronic renal disease. Leg Pain   The incident occurred more than 1 week ago. Incident location: NO INCIDENT. There was no injury mechanism. The pain is present in the left leg. The quality of the pain is described as aching. The pain is at a severity of 4/10. The pain is moderate. The pain has been intermittent since onset.  Pertinent negatives SECTION      COLONOSCOPY  03/06/2019    Dr Jessica Aguiar- diverticulosis,hemorrhoids    COLONOSCOPY N/A 3/6/2019    COLONOSCOPY DIAGNOSTIC performed by Sanjuana Gomez MD at [facility]  03/06/2019    Dr Ellis-bx(+H-Pylori,normal duodenal mucosa)erosive duodenitis    UPPER GASTROINTESTINAL ENDOSCOPY N/A 3/6/2019    EGD BIOPSY performed by Sanjuana Gomez MD at [address]        Medications:       Prior to Admission medications    Medication Sig Start Date End Date Taking?  Authorizing Provider   furosemide (LASIX) 40 MG tablet Take 1 tablet by mouth daily 5/4/21  Yes BUZZ Jasmine - CNP   potassium chloride (KLOR-CON M) 10 MEQ extended release tablet Take 1 tablet by mouth 2 times daily 5/4/21  Yes BUZZ Jasmine - CNP   insulin detemir (LEVEMIR FLEXTOUCH) 100 UNIT/ML injection pen Inject 56 Units into the skin nightly 5/4/21  Yes BUZZ Jasmine - CNP   insulin lispro, 1 Unit Dial, (HUMALOG KWIKPEN) 100 UNIT/ML SOPN Inject 6 Units into the skin 3 times daily (before meals) 5/4/21  Yes BUZZ Jasmine - CNP   citalopram (CELEXA) 10 MG tablet Take 1 tablet by mouth daily 5/4/21  Yes BUZZ Jasmine - CNP   docusate (COLACE, DULCOLAX) 100 MG CAPS Take 100 mg by mouth 2 times daily   Yes Historical Provider, MD   metoclopramide (REGLAN) 5 MG tablet Take 1 tablet by mouth 3 times daily 2/3/21  Yes BUZZ Jasmine CNP   blood glucose test strips (ASCENSIA AUTODISC VI;ONE TOUCH ULTRA TEST VI) strip 1 each by In Vitro route 3 times daily Dispense brand compatible with insurance 1/29/21  Yes BUZZ Hall CNP   FreeStyle Lancets MISC 1 each by Does not apply route daily Dispense brand compatible with insurance 1/29/21  Yes [de-identified] M BUZZ Jessica CNP   ferrous sulfate (IRON 325) 325 (65 Fe) MG tablet Take 325 mg by mouth 2 times daily   Yes Historical Provider, MD   tranexamic acid (LYSTEDA) 650 MG TABS tablet Take 2 tablets up to 3 times daily for up to 5 days throat. Eyes: Negative for visual disturbance. Respiratory: Negative for cough, shortness of breath and wheezing. Cardiovascular: Positive for leg swelling. Negative for chest pain, palpitations and orthopnea. Gastrointestinal: Negative for abdominal pain, constipation, diarrhea, nausea and vomiting. Endocrine: Negative for polydipsia, polyphagia and polyuria. Genitourinary: Negative for difficulty urinating, dysuria, frequency and hematuria. Musculoskeletal: Positive for arthralgias (Chronic) and myalgias (Chronic). Negative for gait problem, neck pain and neck stiffness. Skin: Negative for pallor and rash. Neurological: Negative for dizziness, tingling, seizures, syncope, speech difficulty, weakness, light-headedness, numbness and headaches. Psychiatric/Behavioral: Positive for agitation, decreased concentration, dysphoric mood and sleep disturbance. Negative for behavioral problems, confusion, hallucinations, self-injury and suicidal ideas. The patient has insomnia. The patient is not nervous/anxious and is not hyperactive. Physical Exam:   Vitals:  /64 (Position: Sitting)   Pulse 92   Temp 97.8 °F (36.6 °C) (Temporal)   Resp 22   Wt 220 lb 12.8 oz (100.2 kg)   SpO2 99%   BMI 37.90 kg/m²     Physical Exam  Vitals signs and nursing note reviewed. Constitutional:       General: She is not in acute distress. Appearance: Normal appearance. She is well-developed. She is obese. She is not ill-appearing. HENT:      Mouth/Throat:      Mouth: Mucous membranes are moist.   Eyes:      General: No scleral icterus. Conjunctiva/sclera: Conjunctivae normal.   Neck:      Musculoskeletal: Normal range of motion and neck supple. Cardiovascular:      Rate and Rhythm: Normal rate and regular rhythm. Heart sounds: No murmur. Pulmonary:      Effort: Pulmonary effort is normal.      Breath sounds: Normal breath sounds. No wheezing.    Abdominal:      General: Bowel sounds are normal. There is no distension. Palpations: Abdomen is soft. Tenderness: There is no abdominal tenderness. Musculoskeletal:      Right lower leg: Edema (trace) present. Left lower leg: Edema (1+ pitting) present. Lymphadenopathy:      Cervical: No cervical adenopathy. Skin:     General: Skin is warm and dry. Findings: No rash. Neurological:      Mental Status: She is alert and oriented to person, place, and time. Psychiatric:         Attention and Perception: Attention normal.         Mood and Affect: Mood is depressed. Mood is not anxious. Speech: Speech normal.         Behavior: Behavior normal. Behavior is cooperative. Thought Content: Thought content normal. Thought content does not include homicidal or suicidal ideation. Thought content does not include homicidal or suicidal plan. Cognition and Memory: Cognition normal.         Judgment: Judgment normal.         Data:     Lab Results   Component Value Date     05/03/2021    K 4.3 05/03/2021    CL 99 05/03/2021    CO2 24 05/03/2021    BUN 21 05/03/2021    CREATININE 0.80 05/03/2021    GLUCOSE 336 05/03/2021    PROT 7.7 08/13/2019    LABALBU 4.2 08/13/2019    BILITOT 0.32 08/13/2019    ALKPHOS 49 08/13/2019    AST 34 05/03/2021    ALT 42 05/03/2021     Lab Results   Component Value Date    WBC 8.3 05/03/2021    RBC 3.99 05/03/2021    HGB 8.8 05/03/2021    HCT 29.5 05/03/2021    MCV 73.9 05/03/2021    MCH 22.1 05/03/2021    MCHC 29.8 05/03/2021    RDW 15.4 05/03/2021     05/03/2021    MPV 9.4 05/03/2021     No results found for: TSH  Lab Results   Component Value Date    CHOL 173 05/03/2021    HDL 43 05/03/2021    LABA1C 10.4 05/03/2021       Assessment/Plan:      Diagnosis Orders   1. Uncontrolled type 2 diabetes mellitus with hyperglycemia (HCC)  insulin detemir (LEVEMIR FLEXTOUCH) 100 UNIT/ML injection pen    insulin lispro, 1 Unit Dial, (HUMALOG KWIKPEN) 100 UNIT/ML SOPN   2.  Essential hypertension  Basic Metabolic Panel   3. Lower leg edema  furosemide (LASIX) 40 MG tablet    VL LOWER EXTREMITY ARTERIAL SEGMENTAL PRESSURES W PPG   4. Intermittent claudication (HCC)  VL LOWER EXTREMITY ARTERIAL SEGMENTAL PRESSURES W PPG   5. Dysthymia  citalopram (CELEXA) 10 MG tablet   6. Screening mammogram for high-risk patient  EVA PRISCILA DIGITAL SCREEN BILATERAL     We will continue current medications until we obtain A1c result. Increase Lasix to 40 mg daily. We will need to take potassium 10 mEq 2 times daily. BMP next week. We will obtain studies for claudication. We will start citalopram and milligrams nightly for dysthymia. I recommend counseling and she may keep her dog with her. 1.  Adriane received counseling on the following healthy behaviors: nutrition, exercise and medication adherence  2. Patient given educational materials - see patient instructions  3. Was a self-tracking handout given in paper form or via [employer]? No  If yes, see orders or list here. 4.  Discussed use, benefit, and side effects of prescribed medications. Barriers to medication compliance addressed. All patient questions answered. Pt voiced understanding. 5.  Reviewed prior labs and health maintenance  6. Continue current medications, diet and exercise.     Completed Refills   Requested Prescriptions     Signed Prescriptions Disp Refills    furosemide (LASIX) 40 MG tablet 90 tablet 1     Sig: Take 1 tablet by mouth daily    potassium chloride (KLOR-CON M) 10 MEQ extended release tablet 180 tablet 1     Sig: Take 1 tablet by mouth 2 times daily    insulin detemir (LEVEMIR FLEXTOUCH) 100 UNIT/ML injection pen 5 pen 5     Sig: Inject 56 Units into the skin nightly    insulin lispro, 1 Unit Dial, (HUMALOG KWIKPEN) 100 UNIT/ML SOPN 5 pen 5     Sig: Inject 6 Units into the skin 3 times daily (before meals)    citalopram (CELEXA) 10 MG tablet 90 tablet 0     Sig: Take 1 tablet by mouth daily         Return in about 3 months (around 8/4/2021) for Check up- A1c in office.

## 2021-05-04 NOTE — TELEPHONE ENCOUNTER
Called patient and informed her that her A1C is still not close to goal and that Rosa Phillips wants her to increase her Levemir to 56 units and her mealtime insulin to 6 units with each meal and that Rosa Phillips needs her to repeat BMP lab next week due to the increase in Lasix. Verbalizes understanding.

## 2021-05-12 ENCOUNTER — TELEPHONE (OUTPATIENT)
Dept: PRIMARY CARE CLINIC | Age: 51
End: 2021-05-12

## 2021-05-12 ENCOUNTER — HOSPITAL ENCOUNTER (OUTPATIENT)
Dept: VASCULAR LAB | Age: 51
Discharge: HOME | End: 2021-05-14
Payer: MEDICARE

## 2021-05-12 DIAGNOSIS — I73.9 INTERMITTENT CLAUDICATION (HCC): ICD-10-CM

## 2021-05-12 DIAGNOSIS — I73.9 INTERMITTENT CLAUDICATION (HCC): Primary | ICD-10-CM

## 2021-05-12 DIAGNOSIS — R60.0 LOWER LEG EDEMA: ICD-10-CM

## 2021-05-12 PROCEDURE — 93923 UPR/LXTR ART STDY 3+ LVLS: CPT

## 2021-05-12 NOTE — TELEPHONE ENCOUNTER
Called patient and left message informing her that her vascular study showed some changes in the arteries in her legs and that Liz Dickson was referring her to Dr Mark Carrion, vascular surgeon and appt scheduled for May 26, 2021 @ 12:30pm. To call office with any questions or to call outpatient department @ [facility] if needs to reschedule, phone: [phone].

## 2021-05-12 NOTE — TELEPHONE ENCOUNTER
----- Message from BUZZ Stevens CNP sent at 5/12/2021  2:50 PM EDT -----  She does have some changes in the arteries in her legs. I am referring her to vascular surgery for consult.   Thank you

## 2021-05-19 ENCOUNTER — TELEPHONE (OUTPATIENT)
Dept: PRIMARY CARE CLINIC | Age: 51
End: 2021-05-19

## 2021-05-25 ENCOUNTER — TELEPHONE (OUTPATIENT)
Dept: PRIMARY CARE CLINIC | Age: 51
End: 2021-05-25

## 2021-05-25 ENCOUNTER — HOSPITAL ENCOUNTER (OUTPATIENT)
Age: 51
Discharge: HOME | End: 2021-05-25
Payer: MEDICARE

## 2021-05-25 DIAGNOSIS — I10 ESSENTIAL HYPERTENSION: ICD-10-CM

## 2021-05-25 LAB
ANION GAP SERPL CALCULATED.3IONS-SCNC: 14 MMOL/L (ref 9–17)
BUN BLDV-MCNC: 17 MG/DL (ref 6–20)
BUN/CREAT BLD: 18 (ref 9–20)
CALCIUM SERPL-MCNC: 10.1 MG/DL (ref 8.6–10.4)
CHLORIDE BLD-SCNC: 89 MMOL/L (ref 98–107)
CO2: 24 MMOL/L (ref 20–31)
CREAT SERPL-MCNC: 0.93 MG/DL (ref 0.5–0.9)
GFR AFRICAN AMERICAN: >60 ML/MIN
GFR NON-AFRICAN AMERICAN: >60 ML/MIN
GFR SERPL CREATININE-BSD FRML MDRD: ABNORMAL ML/MIN/{1.73_M2}
GFR SERPL CREATININE-BSD FRML MDRD: ABNORMAL ML/MIN/{1.73_M2}
GLUCOSE BLD-MCNC: 483 MG/DL (ref 70–99)
POTASSIUM SERPL-SCNC: 4.6 MMOL/L (ref 3.7–5.3)
SODIUM BLD-SCNC: 127 MMOL/L (ref 135–144)

## 2021-05-25 PROCEDURE — 80048 BASIC METABOLIC PNL TOTAL CA: CPT

## 2021-05-25 PROCEDURE — 36415 COLL VENOUS BLD VENIPUNCTURE: CPT

## 2021-05-25 NOTE — TELEPHONE ENCOUNTER
Left message in regards to lab results. Informed patient to call the office back in regards to if she is taking medication.

## 2021-05-25 NOTE — TELEPHONE ENCOUNTER
----- Message from [address] [place] [address], BUZZ - CNP sent at 5/25/2021  3:48 PM EDT -----  Her potassium is fine however her glucose is once again out of control. Is she taking her medication or does she have any trouble getting her medication?

## 2021-06-30 ENCOUNTER — OFFICE VISIT (OUTPATIENT)
Dept: VASCULAR SURGERY | Age: 51
End: 2021-06-30
Payer: MEDICARE

## 2021-06-30 VITALS
HEIGHT: 64 IN | RESPIRATION RATE: 18 BRPM | WEIGHT: 215 LBS | DIASTOLIC BLOOD PRESSURE: 77 MMHG | BODY MASS INDEX: 36.7 KG/M2 | SYSTOLIC BLOOD PRESSURE: 139 MMHG | TEMPERATURE: 97.5 F | HEART RATE: 106 BPM

## 2021-06-30 DIAGNOSIS — I73.9 PAD (PERIPHERAL ARTERY DISEASE) (HCC): Primary | ICD-10-CM

## 2021-06-30 PROCEDURE — 3017F COLORECTAL CA SCREEN DOC REV: CPT | Performed by: INTERNAL MEDICINE

## 2021-06-30 PROCEDURE — 99203 OFFICE O/P NEW LOW 30 MIN: CPT | Performed by: INTERNAL MEDICINE

## 2021-06-30 PROCEDURE — G8427 DOCREV CUR MEDS BY ELIG CLIN: HCPCS | Performed by: INTERNAL MEDICINE

## 2021-06-30 PROCEDURE — G8417 CALC BMI ABV UP PARAM F/U: HCPCS | Performed by: INTERNAL MEDICINE

## 2021-06-30 PROCEDURE — 1036F TOBACCO NON-USER: CPT | Performed by: INTERNAL MEDICINE

## 2021-06-30 NOTE — PROGRESS NOTES
Katheryn Salinas was seen on 6/30/2021 for   Chief Complaint   Patient presents with   [facility] New Patient     test results of left leg   Pain in both legs   . 6/30/21 1st [facility] V[facility] visit. Referred by Hedy Butcher CNP for leg pain. Main concern is actually swelling, left> right. Almost a year. Takes lasix, although she feels benefit is limited. Legs feel heavy with walking. Legs are bad whether walking, standing, resting. Legs are restless. No vv. No fh of vv. No hx of DVT. No fh of blood clot. Exercise pretty much limited to stairs. With exercise pain is about the same. In past took narcotic for the pain. When asked about pain, generally refers to swelling. Feet swell. Has used heating pad and ice, but no compression. BROOK 5/12/21 r 1.49  l 1.42  Hasn't been able to work d/t leg pain and back pain. Former . No accidents. Only belly surgery 2 c/s. Never smoked. DM for many years. No heart dz or cerebrovascular dz.   Cr 0.93  No hx of liver dz. Rare etoh use.     [place]        Social History     Socioeconomic History    Marital status: Single     Spouse name: Not on file    Number of children: Not on file    Years of education: Not on file    Highest education level: Not on file   Occupational History     Employer: NONE   Tobacco Use    Smoking status: Never Smoker    Smokeless tobacco: Never Used   Vaping Use    Vaping Use: Never used   Substance and Sexual Activity    Alcohol use: No     Alcohol/week: 0.0 standard drinks    Drug use: No    Sexual activity: Yes     Partners: Male     Comment: none   Other Topics Concern    Not on file   Social History Narrative    Not on file     Social Determinants of Health     Financial Resource Strain: Low Risk     Difficulty of Paying Living Expenses: Not very hard   Food Insecurity: No Food Insecurity    Worried About Running Out of Food in the Last Year: Never true    Ayden of Food in the Last Year: Never true Transportation Needs: No Transportation Needs    Lack of Transportation (Medical): No    Lack of Transportation (Non-Medical): No   Physical Activity:     Days of Exercise per Week:     Minutes of Exercise per Session:    Stress: No Stress Concern Present    Feeling of Stress : Not at all   Social Connections: Socially Isolated    Frequency of Communication with Friends and Family:  Three times a week    Frequency of Social Gatherings with Friends and Family: Twice a week    Attends Scientologist Services: Never    Active Member of Clubs or Organizations: No    Attends Club or Organization Meetings: Never    Marital Status:    Intimate Partner Violence: Not At Risk    Fear of Current or Ex-Partner: No    Emotionally Abused: No    Physically Abused: No    Sexually Abused: No     Family History   Problem Relation Age of Onset    High Blood Pressure Father     Diabetes Father     Asthma Mother      Past Medical History:   Diagnosis Date    Diabetes mellitus ([facility] [address]     Hyperlipidemia     Hypertension     Irregular heart rhythm     Sciatica      Current Outpatient Medications   Medication Sig Dispense Refill    metoclopramide (REGLAN) 5 MG tablet TAKE ONE TABLET BY MOUTH THREE TIMES A DAY 90 tablet 0    furosemide (LASIX) 40 MG tablet Take 1 tablet by mouth daily 90 tablet 1    potassium chloride (KLOR-CON M) 10 MEQ extended release tablet Take 1 tablet by mouth 2 times daily 180 tablet 1    insulin detemir (LEVEMIR FLEXTOUCH) 100 UNIT/ML injection pen Inject 56 Units into the skin nightly 5 pen 5    insulin lispro, 1 Unit Dial, (HUMALOG KWIKPEN) 100 UNIT/ML SOPN Inject 6 Units into the skin 3 times daily (before meals) 5 pen 5    citalopram (CELEXA) 10 MG tablet Take 1 tablet by mouth daily 90 tablet 0    docusate (COLACE, DULCOLAX) 100 MG CAPS Take 100 mg by mouth 2 times daily      blood glucose test strips (ASCENSIA AUTODISC VI;ONE TOUCH ULTRA TEST VI) strip 1 each by In Vitro route 3 times daily Dispense brand compatible with insurance 100 each 3    FreeStyle Lancets MISC 1 each by Does not apply route daily Dispense brand compatible with insurance 100 each 3    ferrous sulfate (IRON 325) 325 (65 Fe) MG tablet Take 325 mg by mouth 2 times daily      tranexamic acid (LYSTEDA) 650 MG TABS tablet Take 2 tablets up to 3 times daily for up to 5 days as needed for heavy menses 30 tablet 5    cloNIDine (CATAPRES) 0.1 MG tablet TAKE ONE TABLET BY MOUTH TWICE A  tablet 3    losartan-hydroCHLOROthiazide (HYZAAR) 100-25 MG per tablet Take 1 tablet by mouth daily 90 tablet 3    NIFEdipine (ADALAT CC) 60 MG extended release tablet Take 1 tablet by mouth daily 90 tablet 3    atorvastatin (LIPITOR) 40 MG tablet Take 1 tablet by mouth daily 90 tablet 3    glipiZIDE (GLUCOTROL XL) 2.5 MG extended release tablet Take 1 tablet by mouth daily 90 tablet 3    SITagliptin-metFORMIN (JANUMET XR)  MG TB24 per extended release tablet Take 2 tablets by mouth daily 180 tablet 3    Insulin Pen Needle (PEN NEEDLES) 32G X 5 MM MISC 1 each by Does not apply route daily 100 each 3    atenolol (TENORMIN) 50 MG tablet TAKE ONE TABLET BY MOUTH DAILY 90 tablet 3    glucose monitoring kit (FREESTYLE) monitoring kit 1 kit by Does not apply route daily Dispense brand compatible with insurance 1 kit 0     No current facility-administered medications for this visit. Physical findings:  General- no acute distress, oriented  HEENT - no xanthelasma, external ears normal  Neck- Supple, no thyromegaly  Carotids - no carotid bruits  Lungs - Clear to auscultation.   CV- Regular rate and rythym, no murmurs rubs or gallops  Abdomen - Non tender, non distended, no bruits  Skin- warm, dry, no skin breakdown or gangrene  Extremities - trace edema    Pulses Right -  Femoral 2+  Popliteal: absent  Posterior tibial:    absent  Dorsalis pedis:  2+  Radial 2+    Pulses Left - Femoral 2+  Popliteal: absent  Posterior tibial:    absent  Dorsalis pedis:  2+  Radial 2+        Assessment:  Encounter Diagnosis   Name Primary?  PAD (peripheral artery disease) (HCC) Yes   rigo indicates noncompressible vessels, but vascular supply to legs is intact  No indication of venous insufficiency  Swelling today is only trace  No further w/u or treatment needed  30 min chart review and encounter  RTC prn      Plan of care: Follow up and evaluate.        Electronically signed by Bar Fernandez MD on 6/30/21 at 1:05 PM EDT

## 2021-06-30 NOTE — PROGRESS NOTES
Bia Pryor was seen on 6/30/2021 for   Chief Complaint   Patient presents with   Renee Benson New Patient     test results of left leg   Pain in both legs   .                             REVIEW OF SYSTEMS    Constitutional Weight: absent Fever: absent   HEENT Ears: normal,Visual disturbance: absent Sore throat: absent,    Respiratory Shortness of breath: present, Cough: absent;, Snoring: absent   Cardivascular Chest pain: absent,  Leg pain: present,Leg swelling:present, Non-healing wound:absent    GI Diarrhea: absent, Abdominal Pain: absent    Urinary frequency: absent, Urinary incontinence: absent   Musculoskeletal Neck pain: absent, Back pain: absent, Restless Leg:present     Dermatological Hair loss: absent, Skin changes: absent   Neurological  Sudden Loss of Vision in one eye:absent, Slurred Speech:absent, Weakness on one side of body: absent,Headache: absent   Psychiatric Anxiety: absent, Depression: absent   Hematologic Abnormal bleeding: absent,

## 2021-08-26 ENCOUNTER — TELEPHONE (OUTPATIENT)
Dept: PRIMARY CARE CLINIC | Age: 51
End: 2021-08-26

## 2021-08-26 ENCOUNTER — HOSPITAL ENCOUNTER (INPATIENT)
Age: 51
LOS: 8 days | Discharge: HOME | DRG: 720 | End: 2021-09-03
Attending: EMERGENCY MEDICINE | Admitting: FAMILY MEDICINE
Payer: MEDICARE

## 2021-08-26 ENCOUNTER — HOSPITAL ENCOUNTER (OUTPATIENT)
Dept: CT IMAGING | Age: 51
Discharge: HOME | DRG: 720 | End: 2021-08-28
Payer: MEDICARE

## 2021-08-26 ENCOUNTER — APPOINTMENT (OUTPATIENT)
Dept: CT IMAGING | Age: 51
DRG: 720 | End: 2021-08-26
Payer: MEDICARE

## 2021-08-26 ENCOUNTER — HOSPITAL ENCOUNTER (OUTPATIENT)
Age: 51
Discharge: HOME | DRG: 720 | End: 2021-08-26
Payer: MEDICARE

## 2021-08-26 ENCOUNTER — OFFICE VISIT (OUTPATIENT)
Dept: PRIMARY CARE CLINIC | Age: 51
End: 2021-08-26
Payer: MEDICARE

## 2021-08-26 ENCOUNTER — HOSPITAL ENCOUNTER (OUTPATIENT)
Dept: GENERAL RADIOLOGY | Age: 51
Discharge: HOME | DRG: 720 | End: 2021-08-28
Payer: MEDICARE

## 2021-08-26 ENCOUNTER — HOSPITAL ENCOUNTER (OUTPATIENT)
Age: 51
Discharge: HOME | DRG: 720 | End: 2021-08-28
Payer: MEDICARE

## 2021-08-26 VITALS
RESPIRATION RATE: 22 BRPM | WEIGHT: 204 LBS | BODY MASS INDEX: 35.02 KG/M2 | DIASTOLIC BLOOD PRESSURE: 94 MMHG | SYSTOLIC BLOOD PRESSURE: 170 MMHG | OXYGEN SATURATION: 96 % | HEART RATE: 132 BPM | TEMPERATURE: 97.5 F

## 2021-08-26 DIAGNOSIS — R11.2 NON-INTRACTABLE VOMITING WITH NAUSEA, UNSPECIFIED VOMITING TYPE: ICD-10-CM

## 2021-08-26 DIAGNOSIS — K92.1 BLACK STOOLS: ICD-10-CM

## 2021-08-26 DIAGNOSIS — N10 ACUTE PYELONEPHRITIS: ICD-10-CM

## 2021-08-26 DIAGNOSIS — E11.65 POORLY CONTROLLED DIABETES MELLITUS (HCC): Primary | ICD-10-CM

## 2021-08-26 DIAGNOSIS — R10.30 LOWER ABDOMINAL PAIN: ICD-10-CM

## 2021-08-26 DIAGNOSIS — R05.9 COUGH: ICD-10-CM

## 2021-08-26 DIAGNOSIS — A41.51 SEPSIS DUE TO ESCHERICHIA COLI, UNSPECIFIED WHETHER ACUTE ORGAN DYSFUNCTION PRESENT (HCC): ICD-10-CM

## 2021-08-26 DIAGNOSIS — E11.65 UNCONTROLLED TYPE 2 DIABETES MELLITUS WITH HYPERGLYCEMIA (HCC): Primary | ICD-10-CM

## 2021-08-26 DIAGNOSIS — A41.9 SEPTICEMIA (HCC): ICD-10-CM

## 2021-08-26 PROBLEM — Z79.4 TYPE 2 DIABETES MELLITUS WITH DIABETIC NEPHROPATHY, WITH LONG-TERM CURRENT USE OF INSULIN (HCC): Status: ACTIVE | Noted: 2021-08-26

## 2021-08-26 PROBLEM — E11.21 TYPE 2 DIABETES MELLITUS WITH DIABETIC NEPHROPATHY, WITH LONG-TERM CURRENT USE OF INSULIN (HCC): Status: ACTIVE | Noted: 2021-08-26

## 2021-08-26 PROBLEM — K57.32 DIVERTICULITIS OF COLON WITHOUT HEMORRHAGE: Status: ACTIVE | Noted: 2021-08-26

## 2021-08-26 LAB
-: ABNORMAL
-: ABNORMAL
ABSOLUTE EOS #: 0 K/UL (ref 0–0.44)
ABSOLUTE IMMATURE GRANULOCYTE: 0 K/UL (ref 0–0.3)
ABSOLUTE LYMPH #: 2.48 K/UL (ref 1.1–3.7)
ABSOLUTE MONO #: 2.1 K/UL (ref 0.1–1.2)
ALBUMIN SERPL-MCNC: 3.7 G/DL (ref 3.5–5.2)
ALBUMIN/GLOBULIN RATIO: 0.9 (ref 1–2.5)
ALLEN TEST: ABNORMAL
ALP BLD-CCNC: 116 U/L (ref 35–104)
ALT SERPL-CCNC: 20 U/L (ref 5–33)
AMORPHOUS: ABNORMAL
AMORPHOUS: ABNORMAL
AMYLASE: 49 U/L (ref 28–100)
ANION GAP SERPL CALCULATED.3IONS-SCNC: 25 MMOL/L (ref 9–17)
AST SERPL-CCNC: 23 U/L
BACTERIA: ABNORMAL
BACTERIA: ABNORMAL
BASOPHILS # BLD: 0 % (ref 0–2)
BASOPHILS ABSOLUTE: 0 K/UL (ref 0–0.2)
BETA-HYDROXYBUTYRATE: 3.71 MMOL/L (ref 0.02–0.27)
BETA-HYDROXYBUTYRATE: 3.73 MMOL/L (ref 0.02–0.27)
BILIRUB SERPL-MCNC: 0.36 MG/DL (ref 0.3–1.2)
BILIRUBIN URINE: ABNORMAL
BILIRUBIN URINE: ABNORMAL
BUN BLDV-MCNC: 21 MG/DL (ref 6–20)
BUN/CREAT BLD: 14 (ref 9–20)
CALCIUM SERPL-MCNC: 9.3 MG/DL (ref 8.6–10.4)
CARBOXYHEMOGLOBIN: ABNORMAL % (ref 0–5)
CASTS UA: ABNORMAL /LPF
CASTS UA: ABNORMAL /LPF
CHLORIDE BLD-SCNC: 84 MMOL/L (ref 98–107)
CHP ED QC CHECK: NORMAL
CO2: 16 MMOL/L (ref 20–31)
COLOR: YELLOW
COLOR: YELLOW
COMMENT UA: ABNORMAL
COMMENT UA: ABNORMAL
CREAT SERPL-MCNC: 1.46 MG/DL (ref 0.5–0.9)
CRYSTALS, UA: ABNORMAL /HPF
CRYSTALS, UA: ABNORMAL /HPF
DIFFERENTIAL TYPE: ABNORMAL
EOSINOPHILS RELATIVE PERCENT: 0 % (ref 1–4)
EPITHELIAL CELLS UA: ABNORMAL /HPF (ref 0–25)
EPITHELIAL CELLS UA: ABNORMAL /HPF (ref 0–25)
FIO2: ABNORMAL
GFR AFRICAN AMERICAN: 46 ML/MIN
GFR NON-AFRICAN AMERICAN: 38 ML/MIN
GFR SERPL CREATININE-BSD FRML MDRD: ABNORMAL ML/MIN/{1.73_M2}
GFR SERPL CREATININE-BSD FRML MDRD: ABNORMAL ML/MIN/{1.73_M2}
GLUCOSE BLD-MCNC: 234 MG/DL (ref 74–100)
GLUCOSE BLD-MCNC: 416 MG/DL
GLUCOSE BLD-MCNC: 416 MG/DL (ref 74–100)
GLUCOSE BLD-MCNC: 447 MG/DL (ref 74–100)
GLUCOSE BLD-MCNC: 538 MG/DL (ref 70–99)
GLUCOSE URINE: ABNORMAL
GLUCOSE URINE: ABNORMAL
HBA1C MFR BLD: 12.6 %
HCO3 ARTERIAL: 17.1 MMOL/L (ref 22–26)
HCT VFR BLD CALC: 29.6 % (ref 36.3–47.1)
HEMOGLOBIN: 8.9 G/DL (ref 11.9–15.1)
IMMATURE GRANULOCYTES: 0 %
KETONES, URINE: ABNORMAL
KETONES, URINE: ABNORMAL
LACTIC ACID, SEPSIS WHOLE BLOOD: NORMAL MMOL/L (ref 0.5–1.9)
LACTIC ACID, SEPSIS: 1.6 MMOL/L (ref 0.5–1.9)
LEUKOCYTE ESTERASE, URINE: NEGATIVE
LEUKOCYTE ESTERASE, URINE: NEGATIVE
LIPASE: 33 U/L (ref 13–60)
LYMPHOCYTES # BLD: 13 % (ref 24–43)
MCH RBC QN AUTO: 21.3 PG (ref 25.2–33.5)
MCHC RBC AUTO-ENTMCNC: 30.1 G/DL (ref 28.4–34.8)
MCV RBC AUTO: 71 FL (ref 82.6–102.9)
METHEMOGLOBIN: ABNORMAL % (ref 0–1.9)
MODE: ABNORMAL
MONOCYTES # BLD: 11 % (ref 3–12)
MORPHOLOGY: NORMAL
MUCUS: ABNORMAL
MUCUS: ABNORMAL
NEGATIVE BASE EXCESS, ART: 5.4 MMOL/L (ref 0–2)
NITRITE, URINE: NEGATIVE
NITRITE, URINE: NEGATIVE
NOTIFICATION TIME: ABNORMAL
NOTIFICATION: ABNORMAL
NRBC AUTOMATED: 0 PER 100 WBC
O2 DEVICE/FLOW/%: ABNORMAL
O2 SAT, ARTERIAL: 97.2 % (ref 95–98)
OTHER OBSERVATIONS UA: ABNORMAL
OTHER OBSERVATIONS UA: ABNORMAL
OXYHEMOGLOBIN: ABNORMAL % (ref 95–98)
PATIENT TEMP: 37
PCO2 ARTERIAL: 26.4 MMHG (ref 35–45)
PCO2, ART, TEMP ADJ: ABNORMAL (ref 35–45)
PDW BLD-RTO: 14.8 % (ref 11.8–14.4)
PEEP/CPAP: ABNORMAL
PH ARTERIAL: 7.43 (ref 7.35–7.45)
PH UA: 6 (ref 5–9)
PH UA: 6 (ref 5–9)
PH, ART, TEMP ADJ: ABNORMAL (ref 7.35–7.45)
PLATELET # BLD: 345 K/UL (ref 138–453)
PLATELET ESTIMATE: ABNORMAL
PMV BLD AUTO: 9.5 FL (ref 8.1–13.5)
PO2 ARTERIAL: 89.9 MMHG (ref 80–100)
PO2, ART, TEMP ADJ: ABNORMAL MMHG (ref 80–100)
POSITIVE BASE EXCESS, ART: ABNORMAL MMOL/L (ref 0–2)
POTASSIUM SERPL-SCNC: 4.5 MMOL/L (ref 3.7–5.3)
PROTEIN UA: ABNORMAL
PROTEIN UA: ABNORMAL
PSV: ABNORMAL
PT. POSITION: ABNORMAL
RBC # BLD: 4.17 M/UL (ref 3.95–5.11)
RBC # BLD: ABNORMAL 10*6/UL
RBC UA: ABNORMAL /HPF (ref 0–2)
RBC UA: ABNORMAL /HPF (ref 0–2)
RENAL EPITHELIAL, UA: ABNORMAL /HPF
RENAL EPITHELIAL, UA: ABNORMAL /HPF
RESPIRATORY RATE: ABNORMAL
SAMPLE SITE: ABNORMAL
SARS-COV-2, RAPID: NOT DETECTED
SEG NEUTROPHILS: 76 % (ref 36–65)
SEGMENTED NEUTROPHILS ABSOLUTE COUNT: 14.52 K/UL (ref 1.5–8.1)
SET RATE: ABNORMAL
SODIUM BLD-SCNC: 125 MMOL/L (ref 135–144)
SPECIFIC GRAVITY UA: 1.01 (ref 1.01–1.02)
SPECIFIC GRAVITY UA: 1.01 (ref 1.01–1.02)
SPECIMEN DESCRIPTION: NORMAL
TEXT FOR RESPIRATORY: ABNORMAL
TOTAL HB: ABNORMAL G/DL (ref 12–16)
TOTAL PROTEIN: 7.9 G/DL (ref 6.4–8.3)
TOTAL RATE: ABNORMAL
TRICHOMONAS: ABNORMAL
TRICHOMONAS: ABNORMAL
TURBIDITY: ABNORMAL
TURBIDITY: CLEAR
URINE HGB: ABNORMAL
URINE HGB: ABNORMAL
UROBILINOGEN, URINE: NORMAL
UROBILINOGEN, URINE: NORMAL
VT: ABNORMAL
WBC # BLD: 19.1 K/UL (ref 3.5–11.3)
WBC # BLD: ABNORMAL 10*3/UL
WBC UA: ABNORMAL /HPF (ref 0–5)
WBC UA: ABNORMAL /HPF (ref 0–5)
YEAST: ABNORMAL
YEAST: ABNORMAL

## 2021-08-26 PROCEDURE — 80053 COMPREHEN METABOLIC PANEL: CPT

## 2021-08-26 PROCEDURE — 74177 CT ABD & PELVIS W/CONTRAST: CPT

## 2021-08-26 PROCEDURE — 36600 WITHDRAWAL OF ARTERIAL BLOOD: CPT

## 2021-08-26 PROCEDURE — 36415 COLL VENOUS BLD VENIPUNCTURE: CPT

## 2021-08-26 PROCEDURE — 87205 SMEAR GRAM STAIN: CPT

## 2021-08-26 PROCEDURE — 6370000000 HC RX 637 (ALT 250 FOR IP): Performed by: EMERGENCY MEDICINE

## 2021-08-26 PROCEDURE — 87186 SC STD MICRODIL/AGAR DIL: CPT

## 2021-08-26 PROCEDURE — 1036F TOBACCO NON-USER: CPT | Performed by: NURSE PRACTITIONER

## 2021-08-26 PROCEDURE — 83036 HEMOGLOBIN GLYCOSYLATED A1C: CPT | Performed by: NURSE PRACTITIONER

## 2021-08-26 PROCEDURE — 6360000002 HC RX W HCPCS: Performed by: FAMILY MEDICINE

## 2021-08-26 PROCEDURE — 2022F DILAT RTA XM EVC RTNOPTHY: CPT | Performed by: NURSE PRACTITIONER

## 2021-08-26 PROCEDURE — 6370000000 HC RX 637 (ALT 250 FOR IP): Performed by: FAMILY MEDICINE

## 2021-08-26 PROCEDURE — 87088 URINE BACTERIA CULTURE: CPT

## 2021-08-26 PROCEDURE — G8427 DOCREV CUR MEDS BY ELIG CLIN: HCPCS | Performed by: NURSE PRACTITIONER

## 2021-08-26 PROCEDURE — 99214 OFFICE O/P EST MOD 30 MIN: CPT | Performed by: NURSE PRACTITIONER

## 2021-08-26 PROCEDURE — 3046F HEMOGLOBIN A1C LEVEL >9.0%: CPT | Performed by: NURSE PRACTITIONER

## 2021-08-26 PROCEDURE — 6360000004 HC RX CONTRAST MEDICATION: Performed by: NURSE PRACTITIONER

## 2021-08-26 PROCEDURE — 81001 URINALYSIS AUTO W/SCOPE: CPT

## 2021-08-26 PROCEDURE — 83690 ASSAY OF LIPASE: CPT

## 2021-08-26 PROCEDURE — 83605 ASSAY OF LACTIC ACID: CPT

## 2021-08-26 PROCEDURE — 3017F COLORECTAL CA SCREEN DOC REV: CPT | Performed by: NURSE PRACTITIONER

## 2021-08-26 PROCEDURE — 87077 CULTURE AEROBIC IDENTIFY: CPT

## 2021-08-26 PROCEDURE — 85025 COMPLETE CBC W/AUTO DIFF WBC: CPT

## 2021-08-26 PROCEDURE — 82150 ASSAY OF AMYLASE: CPT

## 2021-08-26 PROCEDURE — 99284 EMERGENCY DEPT VISIT MOD MDM: CPT

## 2021-08-26 PROCEDURE — 82805 BLOOD GASES W/O2 SATURATION: CPT

## 2021-08-26 PROCEDURE — 96367 TX/PROPH/DG ADDL SEQ IV INF: CPT

## 2021-08-26 PROCEDURE — 6360000002 HC RX W HCPCS: Performed by: EMERGENCY MEDICINE

## 2021-08-26 PROCEDURE — 87040 BLOOD CULTURE FOR BACTERIA: CPT

## 2021-08-26 PROCEDURE — 2580000003 HC RX 258: Performed by: FAMILY MEDICINE

## 2021-08-26 PROCEDURE — 82947 ASSAY GLUCOSE BLOOD QUANT: CPT

## 2021-08-26 PROCEDURE — 2580000003 HC RX 258: Performed by: EMERGENCY MEDICINE

## 2021-08-26 PROCEDURE — 87086 URINE CULTURE/COLONY COUNT: CPT

## 2021-08-26 PROCEDURE — 82010 KETONE BODYS QUAN: CPT

## 2021-08-26 PROCEDURE — 2500000003 HC RX 250 WO HCPCS: Performed by: EMERGENCY MEDICINE

## 2021-08-26 PROCEDURE — 96366 THER/PROPH/DIAG IV INF ADDON: CPT

## 2021-08-26 PROCEDURE — 87635 SARS-COV-2 COVID-19 AMP PRB: CPT

## 2021-08-26 PROCEDURE — 96375 TX/PRO/DX INJ NEW DRUG ADDON: CPT

## 2021-08-26 PROCEDURE — 71046 X-RAY EXAM CHEST 2 VIEWS: CPT

## 2021-08-26 PROCEDURE — 96374 THER/PROPH/DIAG INJ IV PUSH: CPT

## 2021-08-26 PROCEDURE — 2000000000 HC ICU R&B

## 2021-08-26 PROCEDURE — 87150 DNA/RNA AMPLIFIED PROBE: CPT

## 2021-08-26 PROCEDURE — C9803 HOPD COVID-19 SPEC COLLECT: HCPCS

## 2021-08-26 PROCEDURE — 96365 THER/PROPH/DIAG IV INF INIT: CPT

## 2021-08-26 PROCEDURE — G8417 CALC BMI ABV UP PARAM F/U: HCPCS | Performed by: NURSE PRACTITIONER

## 2021-08-26 RX ORDER — SODIUM CHLORIDE 9 MG/ML
INJECTION, SOLUTION INTRAVENOUS
Status: DISPENSED
Start: 2021-08-26 | End: 2021-08-27

## 2021-08-26 RX ORDER — SODIUM CHLORIDE 0.9 % (FLUSH) 0.9 %
5-40 SYRINGE (ML) INJECTION EVERY 12 HOURS SCHEDULED
Status: DISCONTINUED | OUTPATIENT
Start: 2021-08-26 | End: 2021-09-03 | Stop reason: HOSPADM

## 2021-08-26 RX ORDER — SODIUM CHLORIDE 0.9 % (FLUSH) 0.9 %
5-40 SYRINGE (ML) INJECTION PRN
Status: DISCONTINUED | OUTPATIENT
Start: 2021-08-26 | End: 2021-09-03 | Stop reason: HOSPADM

## 2021-08-26 RX ORDER — DEXTROSE MONOHYDRATE 25 G/50ML
12.5 INJECTION, SOLUTION INTRAVENOUS PRN
Status: DISCONTINUED | OUTPATIENT
Start: 2021-08-26 | End: 2021-09-03 | Stop reason: HOSPADM

## 2021-08-26 RX ORDER — ACETAMINOPHEN 500 MG
1000 TABLET ORAL ONCE
Status: COMPLETED | OUTPATIENT
Start: 2021-08-26 | End: 2021-08-26

## 2021-08-26 RX ORDER — GLIPIZIDE 5 MG/1
2.5 TABLET ORAL
Status: DISCONTINUED | OUTPATIENT
Start: 2021-08-27 | End: 2021-09-03 | Stop reason: HOSPADM

## 2021-08-26 RX ORDER — ATORVASTATIN CALCIUM 40 MG/1
40 TABLET, FILM COATED ORAL DAILY
Status: DISCONTINUED | OUTPATIENT
Start: 2021-08-27 | End: 2021-09-03 | Stop reason: HOSPADM

## 2021-08-26 RX ORDER — ONDANSETRON 2 MG/ML
4 INJECTION INTRAMUSCULAR; INTRAVENOUS ONCE
Status: COMPLETED | OUTPATIENT
Start: 2021-08-26 | End: 2021-08-26

## 2021-08-26 RX ORDER — ACETAMINOPHEN 650 MG/1
650 SUPPOSITORY RECTAL EVERY 6 HOURS PRN
Status: DISCONTINUED | OUTPATIENT
Start: 2021-08-26 | End: 2021-08-27

## 2021-08-26 RX ORDER — METFORMIN HYDROCHLORIDE 500 MG/1
1000 TABLET, EXTENDED RELEASE ORAL
Status: DISCONTINUED | OUTPATIENT
Start: 2021-08-27 | End: 2021-09-03 | Stop reason: HOSPADM

## 2021-08-26 RX ORDER — CITALOPRAM 10 MG/1
10 TABLET ORAL DAILY
Status: DISCONTINUED | OUTPATIENT
Start: 2021-08-27 | End: 2021-09-03 | Stop reason: HOSPADM

## 2021-08-26 RX ORDER — SODIUM CHLORIDE 9 MG/ML
25 INJECTION, SOLUTION INTRAVENOUS PRN
Status: DISCONTINUED | OUTPATIENT
Start: 2021-08-26 | End: 2021-09-03 | Stop reason: HOSPADM

## 2021-08-26 RX ORDER — SODIUM CHLORIDE 9 MG/ML
250 INJECTION, SOLUTION INTRAVENOUS CONTINUOUS
Status: DISCONTINUED | OUTPATIENT
Start: 2021-08-26 | End: 2021-08-26 | Stop reason: ALTCHOICE

## 2021-08-26 RX ORDER — METOCLOPRAMIDE 5 MG/1
5 TABLET ORAL
Status: DISCONTINUED | OUTPATIENT
Start: 2021-08-26 | End: 2021-09-03 | Stop reason: HOSPADM

## 2021-08-26 RX ORDER — ONDANSETRON 4 MG/1
4 TABLET, ORALLY DISINTEGRATING ORAL EVERY 8 HOURS PRN
Status: DISCONTINUED | OUTPATIENT
Start: 2021-08-26 | End: 2021-09-03 | Stop reason: HOSPADM

## 2021-08-26 RX ORDER — HYDROCHLOROTHIAZIDE 25 MG/1
25 TABLET ORAL DAILY
Status: DISCONTINUED | OUTPATIENT
Start: 2021-08-27 | End: 2021-09-03 | Stop reason: HOSPADM

## 2021-08-26 RX ORDER — DEXTROSE MONOHYDRATE 50 MG/ML
100 INJECTION, SOLUTION INTRAVENOUS PRN
Status: DISCONTINUED | OUTPATIENT
Start: 2021-08-26 | End: 2021-09-03 | Stop reason: HOSPADM

## 2021-08-26 RX ORDER — DOCUSATE SODIUM 100 MG/1
100 CAPSULE, LIQUID FILLED ORAL 2 TIMES DAILY
Status: DISCONTINUED | OUTPATIENT
Start: 2021-08-26 | End: 2021-09-03 | Stop reason: HOSPADM

## 2021-08-26 RX ORDER — ATENOLOL 50 MG/1
50 TABLET ORAL DAILY
Status: DISCONTINUED | OUTPATIENT
Start: 2021-08-27 | End: 2021-09-03 | Stop reason: HOSPADM

## 2021-08-26 RX ORDER — ONDANSETRON 2 MG/ML
4 INJECTION INTRAMUSCULAR; INTRAVENOUS EVERY 6 HOURS PRN
Status: DISCONTINUED | OUTPATIENT
Start: 2021-08-26 | End: 2021-09-03 | Stop reason: HOSPADM

## 2021-08-26 RX ORDER — LOSARTAN POTASSIUM AND HYDROCHLOROTHIAZIDE 25; 100 MG/1; MG/1
1 TABLET ORAL DAILY
Status: DISCONTINUED | OUTPATIENT
Start: 2021-08-27 | End: 2021-08-26 | Stop reason: CLARIF

## 2021-08-26 RX ORDER — FERROUS SULFATE 325(65) MG
325 TABLET ORAL 2 TIMES DAILY
Status: DISCONTINUED | OUTPATIENT
Start: 2021-08-26 | End: 2021-09-03 | Stop reason: HOSPADM

## 2021-08-26 RX ORDER — LOSARTAN POTASSIUM 50 MG/1
100 TABLET ORAL DAILY
Status: DISCONTINUED | OUTPATIENT
Start: 2021-08-27 | End: 2021-09-03 | Stop reason: HOSPADM

## 2021-08-26 RX ORDER — NICOTINE POLACRILEX 4 MG
15 LOZENGE BUCCAL PRN
Status: DISCONTINUED | OUTPATIENT
Start: 2021-08-26 | End: 2021-09-03 | Stop reason: HOSPADM

## 2021-08-26 RX ORDER — ACETAMINOPHEN 325 MG/1
650 TABLET ORAL EVERY 6 HOURS PRN
Status: DISCONTINUED | OUTPATIENT
Start: 2021-08-26 | End: 2021-08-27

## 2021-08-26 RX ORDER — CLONIDINE HYDROCHLORIDE 0.1 MG/1
0.1 TABLET ORAL 2 TIMES DAILY
Status: DISCONTINUED | OUTPATIENT
Start: 2021-08-26 | End: 2021-09-03 | Stop reason: HOSPADM

## 2021-08-26 RX ORDER — POLYETHYLENE GLYCOL 3350 17 G/17G
17 POWDER, FOR SOLUTION ORAL DAILY PRN
Status: DISCONTINUED | OUTPATIENT
Start: 2021-08-26 | End: 2021-09-03 | Stop reason: HOSPADM

## 2021-08-26 RX ORDER — INSULIN GLARGINE 100 [IU]/ML
56 INJECTION, SOLUTION SUBCUTANEOUS NIGHTLY
Status: DISCONTINUED | OUTPATIENT
Start: 2021-08-26 | End: 2021-08-30

## 2021-08-26 RX ORDER — MORPHINE SULFATE 4 MG/ML
4 INJECTION, SOLUTION INTRAMUSCULAR; INTRAVENOUS ONCE
Status: COMPLETED | OUTPATIENT
Start: 2021-08-26 | End: 2021-08-26

## 2021-08-26 RX ORDER — NIFEDIPINE 30 MG/1
60 TABLET, FILM COATED, EXTENDED RELEASE ORAL DAILY
Status: DISCONTINUED | OUTPATIENT
Start: 2021-08-27 | End: 2021-09-03 | Stop reason: HOSPADM

## 2021-08-26 RX ORDER — SODIUM CHLORIDE 9 MG/ML
INJECTION, SOLUTION INTRAVENOUS CONTINUOUS
Status: DISCONTINUED | OUTPATIENT
Start: 2021-08-26 | End: 2021-08-30

## 2021-08-26 RX ORDER — POTASSIUM CHLORIDE 750 MG/1
10 TABLET, EXTENDED RELEASE ORAL 2 TIMES DAILY
Status: DISCONTINUED | OUTPATIENT
Start: 2021-08-26 | End: 2021-09-03 | Stop reason: HOSPADM

## 2021-08-26 RX ORDER — ALOGLIPTIN 25 MG/1
25 TABLET, FILM COATED ORAL DAILY
Status: DISCONTINUED | OUTPATIENT
Start: 2021-08-27 | End: 2021-09-03 | Stop reason: HOSPADM

## 2021-08-26 RX ADMIN — SODIUM CHLORIDE 1000 ML: 9 INJECTION, SOLUTION INTRAVENOUS at 17:47

## 2021-08-26 RX ADMIN — METRONIDAZOLE 500 MG: 500 INJECTION, SOLUTION INTRAVENOUS at 20:32

## 2021-08-26 RX ADMIN — SODIUM CHLORIDE, PRESERVATIVE FREE 10 ML: 5 INJECTION INTRAVENOUS at 22:44

## 2021-08-26 RX ADMIN — SODIUM CHLORIDE 10 UNITS/HR: 9 INJECTION, SOLUTION INTRAVENOUS at 18:06

## 2021-08-26 RX ADMIN — SODIUM CHLORIDE 1000 ML: 9 INJECTION, SOLUTION INTRAVENOUS at 19:45

## 2021-08-26 RX ADMIN — ACETAMINOPHEN 1000 MG: 500 TABLET, FILM COATED ORAL at 19:47

## 2021-08-26 RX ADMIN — CLONIDINE HYDROCHLORIDE 0.1 MG: 0.1 TABLET ORAL at 22:29

## 2021-08-26 RX ADMIN — INSULIN HUMAN 10 UNITS: 100 INJECTION, SOLUTION PARENTERAL at 18:05

## 2021-08-26 RX ADMIN — SODIUM CHLORIDE 250 ML/HR: 9 INJECTION, SOLUTION INTRAVENOUS at 20:33

## 2021-08-26 RX ADMIN — ONDANSETRON 4 MG: 2 INJECTION INTRAMUSCULAR; INTRAVENOUS at 19:43

## 2021-08-26 RX ADMIN — PIPERACILLIN AND TAZOBACTAM 3375 MG: 3; .375 INJECTION, POWDER, FOR SOLUTION INTRAVENOUS at 22:30

## 2021-08-26 RX ADMIN — POTASSIUM CHLORIDE 10 MEQ: 750 TABLET, EXTENDED RELEASE ORAL at 22:30

## 2021-08-26 RX ADMIN — IOPAMIDOL 75 ML: 755 INJECTION, SOLUTION INTRAVENOUS at 14:17

## 2021-08-26 RX ADMIN — METOCLOPRAMIDE 5 MG: 5 TABLET ORAL at 22:30

## 2021-08-26 RX ADMIN — INSULIN LISPRO 3 UNITS: 100 INJECTION, SOLUTION INTRAVENOUS; SUBCUTANEOUS at 22:29

## 2021-08-26 RX ADMIN — INSULIN GLARGINE 56 UNITS: 100 INJECTION, SOLUTION SUBCUTANEOUS at 22:28

## 2021-08-26 RX ADMIN — DOCUSATE SODIUM 100 MG: 100 CAPSULE ORAL at 22:30

## 2021-08-26 RX ADMIN — IOPAMIDOL 18 ML: 755 INJECTION, SOLUTION INTRAVENOUS at 13:10

## 2021-08-26 RX ADMIN — SODIUM CHLORIDE: 9 INJECTION, SOLUTION INTRAVENOUS at 21:54

## 2021-08-26 RX ADMIN — MORPHINE SULFATE 4 MG: 4 INJECTION, SOLUTION INTRAMUSCULAR; INTRAVENOUS at 19:43

## 2021-08-26 RX ADMIN — CEFTRIAXONE 1000 MG: 1 INJECTION, POWDER, FOR SOLUTION INTRAMUSCULAR; INTRAVENOUS at 19:41

## 2021-08-26 RX ADMIN — FERROUS SULFATE TAB 325 MG (65 MG ELEMENTAL FE) 325 MG: 325 (65 FE) TAB at 22:30

## 2021-08-26 SDOH — ECONOMIC STABILITY: FOOD INSECURITY: WITHIN THE PAST 12 MONTHS, THE FOOD YOU BOUGHT JUST DIDN'T LAST AND YOU DIDN'T HAVE MONEY TO GET MORE.: PATIENT DECLINED

## 2021-08-26 SDOH — ECONOMIC STABILITY: FOOD INSECURITY: WITHIN THE PAST 12 MONTHS, YOU WORRIED THAT YOUR FOOD WOULD RUN OUT BEFORE YOU GOT MONEY TO BUY MORE.: PATIENT DECLINED

## 2021-08-26 ASSESSMENT — PAIN SCALES - GENERAL
PAINLEVEL_OUTOF10: 10
PAINLEVEL_OUTOF10: 10
PAINLEVEL_OUTOF10: 6

## 2021-08-26 ASSESSMENT — ENCOUNTER SYMPTOMS
ABDOMINAL PAIN: 1
FLATUS: 0
DIARRHEA: 0
TROUBLE SWALLOWING: 0
SHORTNESS OF BREATH: 1
WHEEZING: 0
CONSTIPATION: 0
COUGH: 1
DIARRHEA: 1
VOMITING: 1
COUGH: 0
RECTAL PAIN: 0
NAUSEA: 1
VOMITING: 1
ABDOMINAL PAIN: 1
BLOOD IN STOOL: 1
NAUSEA: 1
BELCHING: 0
SHORTNESS OF BREATH: 0
CONSTIPATION: 0
RHINORRHEA: 0
BACK PAIN: 1
HEMATOCHEZIA: 0
WHEEZING: 0
ABDOMINAL DISTENTION: 0

## 2021-08-26 ASSESSMENT — PAIN DESCRIPTION - ORIENTATION: ORIENTATION: ANTERIOR

## 2021-08-26 ASSESSMENT — PAIN DESCRIPTION - FREQUENCY: FREQUENCY: CONTINUOUS

## 2021-08-26 ASSESSMENT — PAIN DESCRIPTION - DESCRIPTORS: DESCRIPTORS: ACHING

## 2021-08-26 ASSESSMENT — PAIN DESCRIPTION - LOCATION: LOCATION: HEAD

## 2021-08-26 ASSESSMENT — PAIN DESCRIPTION - PROGRESSION: CLINICAL_PROGRESSION: GRADUALLY IMPROVING

## 2021-08-26 ASSESSMENT — PAIN - FUNCTIONAL ASSESSMENT: PAIN_FUNCTIONAL_ASSESSMENT: ACTIVITIES ARE NOT PREVENTED

## 2021-08-26 ASSESSMENT — PAIN DESCRIPTION - ONSET: ONSET: ON-GOING

## 2021-08-26 ASSESSMENT — CROHNS DISEASE ACTIVITY INDEX (CDAI): CDAI SCORE: 0

## 2021-08-26 ASSESSMENT — SOCIAL DETERMINANTS OF HEALTH (SDOH): HOW HARD IS IT FOR YOU TO PAY FOR THE VERY BASICS LIKE FOOD, HOUSING, MEDICAL CARE, AND HEATING?: PATIENT DECLINED

## 2021-08-26 ASSESSMENT — PAIN DESCRIPTION - PAIN TYPE: TYPE: ACUTE PAIN

## 2021-08-26 NOTE — ED PROVIDER NOTES
O2 Device/Flow/% NOT REPORTED     Respiratory Rate NOT REPORTED     Kenton Test NOT REPORTED     Sample Site Right Radial Artery     Pt. Position SEMI-FOWLERS     Mode NOT REPORTED     Set Rate NOT REPORTED     Total Rate NOT REPORTED     VT NOT REPORTED     FIO2 NOT REPORTED     Peep/Cpap NOT REPORTED     PSV NOT REPORTED     Text for Respiratory NOT REPORTED     NOTIFICATION NOT REPORTED     NOTIFICATION TIME NOT REPORTED    Acetone   Result Value Ref Range    Beta-Hydroxybutyrate 3.71 (H) 0.02 - 0.27 mmol/L   Glucose, Whole Blood   Result Value Ref Range    POC Glucose 447 (H) 74 - 100 mg/dL   Lactate, Sepsis   Result Value Ref Range    Lactic Acid, Sepsis 1.6 0.5 - 1.9 mmol/L    Lactic Acid, Sepsis, Whole Blood NOT REPORTED 0.5 - 1.9 mmol/L   Glucose, Whole Blood   Result Value Ref Range    POC Glucose 416 (H) 74 - 100 mg/dL   POCT Glucose   Result Value Ref Range    Glucose 416 mg/dL    QC OK? y      XR CHEST (2 VW)    Result Date: 8/26/2021  EXAMINATION: TWO XRAY VIEWS OF THE CHEST 8/26/2021 1:04 pm COMPARISON: None. HISTORY: ORDERING SYSTEM PROVIDED HISTORY: Cough TECHNOLOGIST PROVIDED HISTORY: cough FINDINGS: The heart is normal in size. There is no evidence of pneumothorax, pleural effusion, infiltrate, or abnormal lung mass. There are a few small central old granulomatous calcifications and a couple in the lung bases. Minimal spondylitic changes are present in the vertebral endplates and in the osseous structures of the shoulders. Unremarkable PA and lateral chest with no evidence of acute cardiopulmonary process. CT ABDOMEN PELVIS W IV CONTRAST Additional Contrast? Radiologist Recommendation    Result Date: 8/26/2021  EXAMINATION: CT OF THE ABDOMEN AND PELVIS WITH CONTRAST 8/26/2021 2:05 pm TECHNIQUE: CT of the abdomen and pelvis was performed with the administration of intravenous contrast. Multiplanar reformatted images are provided for review.  Dose modulation, iterative reconstruction, and/or weight based adjustment of the mA/kV was utilized to reduce the radiation dose to as low as reasonably achievable. COMPARISON: None. HISTORY: ORDERING SYSTEM PROVIDED HISTORY: Lower abdominal pain TECHNOLOGIST PROVIDED HISTORY: lower abdominal pain, black stools FINDINGS: Lower Chest: No acute findings. Organs: Findings compatible with hepatic steatosis. The gallbladder, pancreas, spleen, adrenals and left kidney appear unremarkable. The right kidney appears mildly edematous and there is asymmetric right perinephric stranding. A patchy area of decreased enhancement is present in the lateral interpolar region. No hydronephrosis or stone identified. GI/Bowel: Mild inflammatory change and diverticula is noted involving the distal ascending colon, near the right kidney. No evidence for obstruction. No gross perforation or abscess formation. Scattered distal colonic diverticula are also present. Pelvis: No acute abnormality identified. Coarse calcifications are present in the right adnexa, which may be vascular. Peritoneum/Retroperitoneum: No free air or free fluid. The aorta is normal in caliber. The visceral branches are patent. Calcified atheromatous plaque is present. No lymphadenopathy. Bones/Soft Tissues: Postoperative findings in the lower abdominal wall. No acute osseous or acute soft tissue abnormality identified. 1.  Asymmetric right perinephric stranding and patchy area of decreased enhancement in the lateral interpolar right kidney. The combination of findings may represent focal pyelonephritis in the appropriate clinical setting. 2.  Diverticula and mild inflammatory change involving the ascending colon, near the right kidney. A mild acute diverticulitis or secondary colitis related to the inflammatory change in the kidney can have this appearance. Final ED Course and MDM: In brief, Cleopatra Hudson is a 44 y.o. female whose care was signed out to me by the outgoing provider. In brief, patient presented with hyperglycemic with abdominal discomfort. She did have an elevated temperature of 102.5 and therefore blood cultures and a lactic acid were ordered. With her elevated blood sugar there was also concern for DKA. Patient's beta hydroxybutyrate is elevated and her anion gap is also elevated with a decreased bicarb but her pH is normal at 7.43. With the patient's fever and white count there is concern that the elevated sugars are secondary to an infectious process. Urine shows signs of infection and CT from earlier today at 2 PM does show changes consistent with acute pyelonephritis. As patient has a white count above 12.4 temperature above 100.4 and the heart rate above 100 she does qualify for mild sepsis. She will be started on Rocephin for the acute pyelonephritis and with the possible diverticulitis noted on CT scan she will also have Flagyl added.   She will be admitted to the ICU secondary to this diagnosis for further care    Please note critical care time of 31 minutes for this patient    ED Medication Orders (From admission, onward)    Start Ordered     Status Ordering Provider    08/26/21 1945 08/26/21 1933  metronidazole (FLAGYL) 500 mg in NaCl 100 mL IVPB premix  ONCE     Question:  Antimicrobial Indications  Answer:  Intra-Abdominal Infection    Ordered ANCA GONZALEZ    08/26/21 1930 08/26/21 1925  morphine injection 4 mg  ONCE      Ordered ANCA GONZALEZ    08/26/21 1930 08/26/21 1925  ondansetron (ZOFRAN) injection 4 mg  ONCE      Ordered ANCA GONZALEZ    08/26/21 1930 08/26/21 1925  acetaminophen (TYLENOL) tablet 1,000 mg  ONCE      Ordered ANCA GONZALEZ    08/26/21 1930 08/26/21 1925  cefTRIAXone (ROCEPHIN) 1000 mg IVPB in 50 mL D5W minibag  ONCE     Question:  Antimicrobial Indications  Answer:  Urinary Tract Infection    Ordered ANCA GONZALEZ    08/26/21 1900 08/26/21 1845  0.9 % sodium chloride infusion  CONTINUOUS      Ordered CHRISTIANA THOMSON    08/26/21 1845 08/26/21 1844  0.9 % NaCl bolus  ONCE      Acknowledged THOMSON, CHRISTIANA LIZAMA    08/26/21 1804 08/26/21 1804  sodium chloride 0.9 % infusion     Note to Pharmacy: Chidi Lunaers: cabinet override    Ordered     08/26/21 1730 08/26/21 1728  0.9 % NaCl bolus  ONCE      Last MAR action: Stopped - by Kang Mak on 08/26/21 at [facility], CHRISTIANA LIZAMA    08/26/21 1730 08/26/21 1728  insulin regular (HUMULIN R;NOVOLIN R) injection 10 Units  ONCE      Last MAR action: Given - by Kang Mak on 08/26/21 at [facility] [id number]    08/26/21 1730 08/26/21 1728  insulin regular (HUMULIN R;NOVOLIN R) 100 Units in sodium chloride 0.9 % 100 mL infusion  CONTINUOUS      Last MAR action: New Bag - by Kang Mak on 08/26/21 at [facility] [id number]          Final Impression      1. Poorly controlled diabetes mellitus ([facility] [address]    2. Non-intractable vomiting with nausea, unspecified vomiting type    3. Septicemia ([facility] [address]    4.  Acute pyelonephritis        DISPOSITION Decision To Admit 08/26/2021 07:33:53 PM     (Please note that portions of this note may have been completed with a voice recognition program. Efforts were made to edit the dictations but occasionally words are mis-transcribed.)    [address], [address] Ruhilary Beltran DO  08/26/21 1939

## 2021-08-26 NOTE — TELEPHONE ENCOUNTER
Called patient to inform patient of lab results and x-rays. Informe patient need to go to [facility] ER now. Patient stated \"I  Am home. \" Informed patient she is very ill and needs to go to ER. Patient replies to writer\" I will. \"

## 2021-08-26 NOTE — PROGRESS NOTES
Name: Shaka Muhammad  : 1970         Chief Complaint:     Chief Complaint   Patient presents with    Diabetes     routine check, having black stools X 4 days with lower abdominal pain, nausea and vomiting. Not keeping anything down. History of Present Illness:      Shaka Muhammad is a 44 y.o.  female who presents with Diabetes (routine check, having black stools X 4 days with lower abdominal pain, nausea and vomiting. Not keeping anything down.)      Светлана Levy is here today for a routine office visit. Unfortunately she states she has been feeling ill for about 4 days. She states she is been having black stools, abdominal pain, cough, and fatigue. She has not been eating well and has been nauseous. She has had a few episodes of vomiting. Her sugars have been uncontrolled. Diabetes  She presents for her follow-up diabetic visit. She has type 2 diabetes mellitus. Her disease course has been worsening. Hypoglycemia symptoms include headaches. Pertinent negatives for hypoglycemia include no dizziness. Associated symptoms include fatigue and foot paresthesias. Pertinent negatives for diabetes include no chest pain, no polydipsia, no polyphagia, no polyuria and no weight loss. There are no hypoglycemic complications. Pertinent negatives for hypoglycemia complications include no blackouts, no hospitalization, no nocturnal hypoglycemia, no required assistance and no required glucagon injection. Symptoms are stable. Diabetic complications include peripheral neuropathy. Pertinent negatives for diabetic complications include no CVA, heart disease or nephropathy. Risk factors for coronary artery disease include diabetes mellitus, dyslipidemia, family history, obesity, hypertension, stress, sedentary lifestyle and post-menopausal. Current diabetic treatment includes insulin injections and oral agent (triple therapy). She is compliant with treatment some of the time. Her weight is stable.  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  Authorizing Provider   metoclopramide (REGLAN) 5 MG tablet TAKE ONE TABLET BY MOUTH THREE TIMES A DAY 6/17/21  Yes Cailtin Butcher APRN - CNP   furosemide (LASIX) 40 MG tablet Take 1 tablet by mouth daily 5/4/21  Yes Caitlin Butcher, APRN - DONYA   potassium chloride (KLOR-CON M) 10 MEQ extended release tablet Take 1 tablet by mouth 2 times daily 5/4/21  Yes Caitlin Butcher APRN - CNP   insulin detemir (LEVEMIR FLEXTOUCH) 100 UNIT/ML injection pen Inject 56 Units into the skin nightly 5/4/21  Yes BUZZ Balderrama - CNP   insulin lispro, 1 Unit Dial, (HUMALOG KWIKPEN) 100 UNIT/ML SOPN Inject 6 Units into the skin 3 times daily (before meals) 5/4/21  Yes Caitlin Butcher APRN - CNP   citalopram (CELEXA) 10 MG tablet Take 1 tablet by mouth daily 5/4/21  Yes Caitlin Butcher APRN - CNP   docusate (COLACE, DULCOLAX) 100 MG CAPS Take 100 mg by mouth 2 times daily   Yes Historical Provider, MD   blood glucose test strips (ASCENSIA AUTODISC VI;ONE TOUCH ULTRA TEST VI) strip 1 each by In Vitro route 3 times daily Dispense brand compatible with insurance 1/29/21  Yes BUZZ Hall CNP   FreeStyle Lancets MISC 1 each by Does not apply route daily Dispense brand compatible with insurance 1/29/21  Yes BUZZ Fajardo CNP   ferrous sulfate (IRON 325) 325 (65 Fe) MG tablet Take 325 mg by mouth 2 times daily   Yes Historical Provider, MD   tranexamic acid (LYSTEDA) 650 MG TABS tablet Take 2 tablets up to 3 times daily for up to 5 days as needed for heavy menses 10/30/20  Yes Trishuaremmanuel Henderson MD   cloNIDine (CATAPRES) 0.1 MG tablet TAKE ONE TABLET BY MOUTH TWICE A DAY 10/7/20  Yes Caitlin Sy (black), diarrhea, melena, nausea and vomiting. Negative for constipation, flatus, hematochezia and rectal pain. Endocrine: Negative for polydipsia, polyphagia and polyuria. Genitourinary: Positive for decreased urine volume. Negative for difficulty urinating, dysuria, frequency and hematuria. Musculoskeletal: Positive for back pain (chronic) and myalgias. Negative for arthralgias, neck pain and neck stiffness. Skin: Negative for rash. Neurological: Positive for light-headedness and headaches. Negative for dizziness and syncope. Physical Exam:   Vitals:  BP (!) 170/94 (Site: Right Upper Arm, Position: Sitting)   Pulse 132   Temp 97.5 °F (36.4 °C) (Temporal)   Resp 22   Wt 204 lb (92.5 kg)   SpO2 96%   BMI 35.02 kg/m²     Physical Exam  Vitals and nursing note reviewed. Constitutional:       General: She is not in acute distress. Appearance: Normal appearance. She is obese. She is ill-appearing. HENT:      Mouth/Throat:      Mouth: Mucous membranes are moist.   Eyes:      General: No scleral icterus. Conjunctiva/sclera: Conjunctivae normal.   Cardiovascular:      Rate and Rhythm: Regular rhythm. Tachycardia present. Pulses: Normal pulses. Pulmonary:      Effort: Pulmonary effort is normal.      Breath sounds: Normal breath sounds. No wheezing. Abdominal:      General: Bowel sounds are normal. There is no distension. Tenderness: There is abdominal tenderness. There is no guarding. Musculoskeletal:      Cervical back: Normal range of motion and neck supple. Right lower leg: No edema. Left lower leg: No edema. Lymphadenopathy:      Cervical: No cervical adenopathy. Skin:     General: Skin is warm and dry. Findings: No rash. Neurological:      Mental Status: She is alert and oriented to person, place, and time.    Psychiatric:         Behavior: Behavior normal.         Data:     Lab Results   Component Value Date     08/26/2021    K 4.5 08/26/2021    CL 84 08/26/2021    CO2 16 08/26/2021    BUN 21 08/26/2021    CREATININE 1.46 08/26/2021    GLUCOSE 538 08/26/2021    PROT 7.9 08/26/2021    LABALBU 3.7 08/26/2021    BILITOT 0.36 08/26/2021    ALKPHOS 116 08/26/2021    AST 23 08/26/2021    ALT 20 08/26/2021     Lab Results   Component Value Date    WBC 19.1 08/26/2021    RBC 4.17 08/26/2021    HGB 8.9 08/26/2021    HCT 29.6 08/26/2021    MCV 71.0 08/26/2021    MCH 21.3 08/26/2021    MCHC 30.1 08/26/2021    RDW 14.8 08/26/2021     08/26/2021    MPV 9.5 08/26/2021     No results found for: TSH  Lab Results   Component Value Date    CHOL 173 05/03/2021    HDL 43 05/03/2021    LABA1C 12.6 08/26/2021       Assessment/Plan:      Diagnosis Orders   1. Uncontrolled type 2 diabetes mellitus with hyperglycemia (HCC)  POCT glycosylated hemoglobin (Hb A1C)   2. Lower abdominal pain  CBC Auto Differential    Comprehensive Metabolic Panel    Lipase    Amylase    Urinalysis Reflex to Culture    CT ABDOMEN PELVIS W IV CONTRAST Additional Contrast? Radiologist Recommendation    COVID-19   3. Black stools  CBC Auto Differential    Comprehensive Metabolic Panel    Lipase    Amylase    Urinalysis Reflex to Culture    CT ABDOMEN PELVIS W IV CONTRAST Additional Contrast? Radiologist Recommendation    COVID-19   4. Cough  XR CHEST STANDARD (2 VW)    COVID-19     We will send for stat labs and CT. We will follow with results. 1.  Adriane received counseling on the following healthy behaviors: nutrition, exercise and medication adherence  2. Patient given educational materials - see patient instructions  3. Was a self-tracking handout given in paper form or via [employer]? No  If yes, see orders or list here. 4.  Discussed use, benefit, and side effects of prescribed medications. Barriers to medication compliance addressed. All patient questions answered. Pt voiced understanding. 5.  Reviewed prior labs and health maintenance  6.   Continue current medications,

## 2021-08-26 NOTE — PATIENT INSTRUCTIONS
SURVEY:    You may be receiving a survey from [employer] regarding your visit today. Please complete the survey to enable us to provide the highest quality of care to you and your family. If you cannot score us a very good on any question, please call the office to discuss how we could have made your experience a very good one. Thank you. Doss Brayden Butcher, APRN-CNP  Nir Jessica, APRN-CNP  Elise Banda, BALAJI Parada, BALAJI Bates, FAMILIA Mendiola    Patient Education        Counting Carbohydrates for Diabetes: Care Instructions  Your Care Instructions     You don't have to eat special foods when you have diabetes. You just have to be careful to eat healthy foods. Carbohydrates (carbs) raise blood sugar higher and quicker than any other nutrient. Carbs are found in desserts, breads and cereals, and fruit. They're also in starchy vegetables. These include potatoes, corn, and grains such as rice and pasta. Carbs are also in milk and yogurt. The more carbs you eat at one time, the higher your blood sugar will rise. Spreading carbs all through the day helps keep your blood sugar levels within your target range. Counting carbs is one of the best ways to keep your blood sugar under control. If you use insulin, counting carbs helps you match the right amount of insulin to the number of grams of carbs in a meal. Then you can change your diet and insulin dose as needed. Testing your blood sugar several times a day can help you learn how carbs affect your blood sugar. A registered dietitian or certified diabetes educator can help you plan meals and snacks. Follow-up care is a key part of your treatment and safety. Be sure to make and go to all appointments, and call your doctor if you are having problems. It's also a good idea to know your test results and keep a list of the medicines you take. How can you care for yourself at home?   Know your daily amount of carbohydrates  Your daily amount depends on several things, such as your weight, how active you are, which diabetes medicines you take, and what your goals are for your blood sugar levels. A registered dietitian or certified diabetes educator can help you plan how many carbs to include in each meal and snack. For most adults, a guideline for the daily amount of carbs is:  · 45 to 60 grams at each meal. That's about the same as 3 to 4 carbohydrate servings. · 15 to 20 grams at each snack. That's about the same as 1 carbohydrate serving. Count carbs  Counting carbs lets you know how much rapid-acting insulin to take before you eat. If you use an insulin pump, you get a constant rate of insulin during the day. So the pump must be programmed at meals. This gives you extra insulin to cover the rise in blood sugar after meals. If you take insulin:  · Learn your own insulin-to-carb ratio. You and your diabetes health professional will figure out the ratio. You can do this by testing your blood sugar after meals. For example, you may need a certain amount of insulin for every 15 grams of carbs. · Add up the carb grams in a meal. Then you can figure out how many units of insulin to take based on your insulin-to-carb ratio. · Exercise lowers blood sugar. You can use less insulin than you would if you were not doing exercise. Keep in mind that timing matters. If you exercise within 1 hour after a meal, your body may need less insulin for that meal than it would if you exercised 3 hours after the meal. Test your blood sugar to find out how exercise affects your need for insulin. If you do or don't take insulin:  · Look at labels on packaged foods. This can tell you how many carbs are in a serving. You can also use guides from the American Diabetes Association. · Be aware of portions, or serving sizes. If a package has two servings and you eat the whole package, you need to double the number of grams of carbohydrate listed for one serving.   · Protein, fat, and fiber do not raise blood sugar as much as carbs do. If you eat a lot of these nutrients in a meal, your blood sugar will rise more slowly than it would otherwise. Eat from all food groups  · Eat at least three meals a day. · Plan meals to include food from all the food groups. The food groups include grains, fruits, dairy, proteins, and vegetables. · Talk to your dietitian or diabetes educator about ways to add limited amounts of sweets into your meal plan. · If you drink alcohol, talk to your doctor. It may not be recommended when you are taking certain diabetes medicines. Where can you learn more? Go to https://Friendsurancepepiceweb.[email or web address] org and sign in to your [employer] account. Enter T038 in the Twitmusic box to learn more about \"Counting Carbohydrates for Diabetes: Care Instructions. \"     If you do not have an account, please click on the \"Sign Up Now\" link. Current as of: August 31, 2020               Content Version: 12.9  © 2006-2021 Healthwise[employer] Inc[employer]. Care instructions adapted under license by [facility] ([facility]). If you have questions about a medical condition or this instruction, always ask your healthcare professional. [place] [address] any warranty or liability for your use of this information.

## 2021-08-26 NOTE — ED PROVIDER NOTES
[facility] AT THE [facility]  EMERGENCY DEPARTMENT ENCOUNTER      Pt [address] EDWARD Warren Montalba  MRN: [id number]  Jamesgfjemma 1970  Date of evaluation: 2021  Provider: Lolis Flowers MD    CHIEF COMPLAINT     Chief Complaint   Patient presents with    Other     Pt reports she was sent by PCP for abnormal bloodwork drawn today         HISTORY OF PRESENT ILLNESS   (Location/Symptom, Timing/Onset, Context/Setting, Quality, Duration, Modifying Factors, Severity)  Note limiting factors. HPI the patient is a 38-year-old female, who was sent to the ER by her PCP. She had abnormal labs that need addressed. She states that she has been sick since Monday which is 3 days ago. She is having lower abdominal pain. And has not been able to eat and drink appropriately. Nursing Notes were reviewed. REVIEW OF SYSTEMS    (2-9 systems for level 4, 10 or more for level 5)     Review of Systems   Constitutional: Positive for activity change, appetite change and fatigue. HENT: Negative for congestion. Eyes: Positive for visual disturbance. Respiratory: Negative for cough, shortness of breath and wheezing. Cardiovascular: Negative for chest pain, palpitations and leg swelling. Gastrointestinal: Positive for abdominal pain, nausea and vomiting. Negative for abdominal distention, constipation and diarrhea. Genitourinary: Negative for dysuria and flank pain. Musculoskeletal: Negative for neck stiffness. Skin: Negative. Neurological: Negative for dizziness, light-headedness and headaches. Psychiatric/Behavioral: Negative for confusion, decreased concentration and dysphoric mood.               MEDICAL HISTORY     Past Medical History:   Diagnosis Date    Diabetes mellitus ([address]     Hyperlipidemia     Hypertension     Irregular heart rhythm     Sciatica          SURGICAL HISTORY       Past Surgical History:   Procedure Laterality Date     SECTION      COLONOSCOPY  2019    Dr Dany Balbuena- diverticulosis,hemorrhoids    COLONOSCOPY N/A 3/6/2019    COLONOSCOPY DIAGNOSTIC performed by Jessica Vickers MD at [address] [facility]  03/06/2019    Dr Ellis-bx(+H-Pylori,normal duodenal mucosa)erosive duodenitis    UPPER GASTROINTESTINAL ENDOSCOPY N/A 3/6/2019    EGD BIOPSY performed by Jessica Vickers MD at [address]       Current Discharge Medication List      CONTINUE these medications which have NOT CHANGED    Details   metoclopramide (REGLAN) 5 MG tablet TAKE ONE TABLET BY MOUTH THREE TIMES A DAY  Qty: 90 tablet, Refills: 0    Associated Diagnoses: Gastroparesis due to DM (HCC)      furosemide (LASIX) 40 MG tablet Take 1 tablet by mouth daily  Qty: 90 tablet, Refills: 1    Associated Diagnoses: Lower leg edema      potassium chloride (KLOR-CON M) 10 MEQ extended release tablet Take 1 tablet by mouth 2 times daily  Qty: 180 tablet, Refills: 1      insulin detemir (LEVEMIR FLEXTOUCH) 100 UNIT/ML injection pen Inject 56 Units into the skin nightly  Qty: 5 pen, Refills: 5    Associated Diagnoses: Uncontrolled type 2 diabetes mellitus with hyperglycemia (HCC)      citalopram (CELEXA) 10 MG tablet Take 1 tablet by mouth daily  Qty: 90 tablet, Refills: 0    Associated Diagnoses: Dysthymia      docusate (COLACE, DULCOLAX) 100 MG CAPS Take 100 mg by mouth 2 times daily      ferrous sulfate (IRON 325) 325 (65 Fe) MG tablet Take 325 mg by mouth 2 times daily      tranexamic acid (LYSTEDA) 650 MG TABS tablet Take 2 tablets up to 3 times daily for up to 5 days as needed for heavy menses  Qty: 30 tablet, Refills: 5    Associated Diagnoses: DUB (dysfunctional uterine bleeding)      cloNIDine (CATAPRES) 0.1 MG tablet TAKE ONE TABLET BY MOUTH TWICE A DAY  Qty: 180 tablet, Refills: 3    Associated Diagnoses: Essential hypertension      losartan-hydroCHLOROthiazide (HYZAAR) 100-25 MG per tablet Take 1 tablet by mouth daily  Qty: 90 tablet, Refills: 3    Associated Diagnoses: Essential hypertension      NIFEdipine (ADALAT CC) 60 MG extended release tablet Take 1 tablet by mouth daily  Qty: 90 tablet, Refills: 3    Associated Diagnoses: Essential hypertension      atorvastatin (LIPITOR) 40 MG tablet Take 1 tablet by mouth daily  Qty: 90 tablet, Refills: 3    Associated Diagnoses: Dyslipidemia      glipiZIDE (GLUCOTROL XL) 2.5 MG extended release tablet Take 1 tablet by mouth daily  Qty: 90 tablet, Refills: 3    Associated Diagnoses: Uncontrolled type 2 diabetes mellitus with hyperglycemia (HCC)      SITagliptin-metFORMIN (JANUMET XR)  MG TB24 per extended release tablet Take 2 tablets by mouth daily  Qty: 180 tablet, Refills: 3    Associated Diagnoses: Uncontrolled type 2 diabetes mellitus with hyperglycemia (HCC)      atenolol (TENORMIN) 50 MG tablet TAKE ONE TABLET BY MOUTH DAILY  Qty: 90 tablet, Refills: 3    Associated Diagnoses: Uncontrolled hypertension      insulin lispro, 1 Unit Dial, (HUMALOG KWIKPEN) 100 UNIT/ML SOPN Inject 6 Units into the skin 3 times daily (before meals)  Qty: 5 pen, Refills: 5    Associated Diagnoses: Uncontrolled type 2 diabetes mellitus with hyperglycemia (HCC)      blood glucose test strips (ASCENSIA AUTODISC VI;ONE TOUCH ULTRA TEST VI) strip 1 each by In Vitro route 3 times daily Dispense brand compatible with insurance  Qty: 100 each, Refills: 3    Associated Diagnoses: Uncontrolled type 2 diabetes mellitus with hyperglycemia (HCC)      FreeStyle Lancets MISC 1 each by Does not apply route daily Dispense brand compatible with insurance  Qty: 100 each, Refills: 3    Associated Diagnoses: Uncontrolled type 2 diabetes mellitus with hyperglycemia (HCC)      Insulin Pen Needle (PEN NEEDLES) 32G X 5 MM MISC 1 each by Does not apply route daily  Qty: 100 each, Refills: 3    Associated Diagnoses: Uncontrolled type 2 diabetes mellitus with hyperglycemia (HCC)      glucose monitoring kit (FREESTYLE) monitoring kit 1 kit by Does not apply route daily Dispense brand compatible with insurance  Qty: 1 kit, Refills: 0    Associated Diagnoses: Uncontrolled type 2 diabetes mellitus with hyperglycemia ([facility] [facility]             ALLERGIES     Patient has no known allergies. FAMILY HISTORY       Family History   Problem Relation Age of Onset    High Blood Pressure Father     Diabetes Father     Asthma Mother           SOCIAL HISTORY       Social History     Socioeconomic History    Marital status: Single     Spouse name: None    Number of children: None    Years of education: None    Highest education level: None   Occupational History     Employer: NONE   Tobacco Use    Smoking status: Never Smoker    Smokeless tobacco: Never Used   Vaping Use    Vaping Use: Never used   Substance and Sexual Activity    Alcohol use: No     Alcohol/week: 0.0 standard drinks    Drug use: No    Sexual activity: Yes     Partners: Male     Comment: none   Other Topics Concern    None   Social History Narrative    None     Social Determinants of Health     Financial Resource Strain: Unknown    Difficulty of Paying Living Expenses: Patient refused   Food Insecurity: Unknown    Worried About Running Out of Food in the Last Year: Patient refused    Ran Out of Food in the Last Year: Patient refused   Transportation Needs:     Lack of Transportation (Medical):  Lack of Transportation (Non-Medical):    Physical Activity:     Days of Exercise per Week:     Minutes of Exercise per Session:    Stress: No Stress Concern Present    Feeling of Stress : Not at all   Social Connections: Socially Isolated    Frequency of Communication with Friends and Family:  Three times a week    Frequency of Social Gatherings with Friends and Family: Twice a week    Attends [employer]ist Services: Never    Active Member of Clubs or Organizations: No    Attends Club or Organization Meetings: Never    Marital Status:    Intimate Partner Violence: Not At Risk    Fear of Current or Ex-Partner: No    Emotionally Abused: No    Physically Abused: No    Sexually Abused: No       SCREENINGS    [place] Coma Scale  Eye Opening: Spontaneous  Best Verbal Response: Oriented  Best Motor Response: Obeys commands  Doris Coma Scale Score: 15        PHYSICAL EXAM  (up to 7 for level 4, 8 or more for level 5)     ED Triage Vitals [08/26/21 1639]   BP Temp Temp Source Pulse Resp SpO2 Height Weight   (!) 147/81 102.5 °F (39.2 °C) Tympanic 140 20 97 % 5' 4\" (1.626 m) 204 lb (92.5 kg)       Physical Exam  Constitutional:       Appearance: She is obese. She is ill-appearing. She is not diaphoretic. HENT:      Head: Normocephalic and atraumatic. Mouth/Throat:      Mouth: Mucous membranes are dry. Eyes:      Extraocular Movements: Extraocular movements intact. Conjunctiva/sclera: Conjunctivae normal.      Pupils: Pupils are equal, round, and reactive to light. Cardiovascular:      Rate and Rhythm: Tachycardia present. Pulses: Normal pulses. Heart sounds: Normal heart sounds. Pulmonary:      Effort: Pulmonary effort is normal.      Breath sounds: Normal breath sounds. Abdominal:      General: Abdomen is flat. There is no distension. Tenderness: There is abdominal tenderness. Comments: She has lower abdominal pain that goes from the right lower quadrant to the hypogastric area and into the left lower quadrant. Musculoskeletal:         General: Normal range of motion. Cervical back: Normal range of motion and neck supple. Skin:     General: Skin is warm and dry. Neurological:      General: No focal deficit present. Mental Status: She is alert and oriented to person, place, and time. Mental status is at baseline. Psychiatric:         Mood and Affect: Mood normal.         Behavior: Behavior normal.         Thought Content:  Thought content normal.         Judgment: Judgment normal.         DIAGNOSTIC RESULTS     EKG: All EKG's are interpreted by the Emergency Department Physician whoeither signs or Co-signs this chart in the absence of a cardiologist.      RADIOLOGY:   Non-plain film images such as CT, Ultrasound and MRI are read by the radiologist. Plain radiographic images are visualized and preliminarily interpreted by the emergency physician     Interpretation per the Radiologist below, if available at the time of this note:    No orders to display         ED BEDSIDE ULTRASOUND:   Performed by ED Physician - none    LABS: Sodium is 125, potassium 4.5, chloride 84, CO2 16, creatinine 1.46, BUN 21, blood sugar 538 and WBCs 19.1.   Labs Reviewed   BLOOD GAS, ARTERIAL - Abnormal; Notable for the following components:       Result Value    pCO2, Arterial 26.4 (*)     HCO3, Arterial 17.1 (*)     Negative Base Excess, Art 5.4 (*)     All other components within normal limits   ACETONE - Abnormal; Notable for the following components:    Beta-Hydroxybutyrate 3.71 (*)     All other components within normal limits   URINALYSIS WITH MICROSCOPIC - Abnormal; Notable for the following components:    Glucose, Ur 3+ (*)     Bilirubin Urine SMALL (*)     Ketones, Urine 1+ (*)     Urine Hgb 2+ (*)     Protein, UA 2+ (*)     Bacteria, UA 2+ (*)     All other components within normal limits   GLUCOSE, WHOLE BLOOD - Abnormal; Notable for the following components:    POC Glucose 447 (*)     All other components within normal limits   BETA-HYDROXYBUTYRATE - Abnormal; Notable for the following components:    Beta-Hydroxybutyrate 3.73 (*)     All other components within normal limits   GLUCOSE, WHOLE BLOOD - Abnormal; Notable for the following components:    POC Glucose 416 (*)     All other components within normal limits   GLUCOSE, WHOLE BLOOD - Abnormal; Notable for the following components:    POC Glucose 234 (*)     All other components within normal limits   CBC WITH AUTO DIFFERENTIAL - Abnormal; Notable for the following components:    WBC 15.7 (*)     RBC 3.28 (*)     Hemoglobin 7.0 (*)     Hematocrit 23.0 (*)     MCV 70.1 (*)     MCH 21.3 (*)     RDW 14.7 (*)     All other components within normal limits   COMPREHENSIVE METABOLIC PANEL W/ REFLEX TO MG FOR LOW K - Abnormal; Notable for the following components:    Glucose 344 (*)     BUN 24 (*)     CREATININE 1.68 (*)     Calcium 7.7 (*)     Sodium 123 (*)     Chloride 92 (*)     CO2 19 (*)     Total Bilirubin 0.24 (*)     Albumin 2.9 (*)     Albumin/Globulin Ratio 0.8 (*)     GFR Non- 32 (*)     GFR  39 (*)     All other components within normal limits   POCT GLUCOSE - Normal   COVID-19, RAPID   CULTURE, BLOOD 2   CULTURE, BLOOD 1   CULTURE, URINE   LACTATE, SEPSIS   HEMOGLOBIN A1C   POCT GLUCOSE   POCT GLUCOSE   POCT GLUCOSE       EMERGENCY DEPARTMENT COURSE and DIFFERENTIAL DIAGNOSIS/MDM:   Vitals:    Vitals:    08/27/21 0500 08/27/21 0537 08/27/21 0600 08/27/21 0700   BP: (!) 141/62  (!) 160/77 (!) 149/78   Pulse: 115 111 111 102   Resp: 23 17 15 18   Temp:  99.5 °F (37.5 °C)     TempSrc:  Temporal     SpO2: 97% 97% 94% 97%   Weight:       Height:               MDM the patient will be admitted for further treatment. CONSULTS:  None    PROCEDURES:  Unless otherwise noted below, none     Procedures    FINAL IMPRESSION      1. Poorly controlled diabetes mellitus ([address]    2. Non-intractable vomiting with nausea, unspecified vomiting type    3. Septicemia ([address]    4. Acute pyelonephritis          DISPOSITION/PLAN   DISPOSITION Decision To Admit 08/26/2021 07:33:53 PM      PATIENT REFERRED TO:  No follow-up provider specified.     DISCHARGE MEDICATIONS:  Current Discharge Medication List                 (Please note that portions ofthis note were completed with a voice recognition program.  Efforts were made to edit the dictations but occasionally words are mis-transcribed.)      Teri Miller MD (electronically signed)  Attending Emergency Physician          Edwar Temple MD  08/27/21 [id number]

## 2021-08-27 DIAGNOSIS — Z01.818 PREOP TESTING: Primary | ICD-10-CM

## 2021-08-27 PROBLEM — A41.9 SEPSIS (HCC): Status: ACTIVE | Noted: 2021-08-27

## 2021-08-27 PROBLEM — E87.1 ACUTE HYPONATREMIA: Status: ACTIVE | Noted: 2021-08-27

## 2021-08-27 LAB
ABSOLUTE EOS #: 0 K/UL (ref 0–0.44)
ABSOLUTE IMMATURE GRANULOCYTE: 0 K/UL (ref 0–0.3)
ABSOLUTE LYMPH #: 2.36 K/UL (ref 1.1–3.7)
ABSOLUTE MONO #: 1.57 K/UL (ref 0.1–1.2)
ALBUMIN SERPL-MCNC: 2.9 G/DL (ref 3.5–5.2)
ALBUMIN/GLOBULIN RATIO: 0.8 (ref 1–2.5)
ALP BLD-CCNC: 96 U/L (ref 35–104)
ALT SERPL-CCNC: 18 U/L (ref 5–33)
ANION GAP SERPL CALCULATED.3IONS-SCNC: 12 MMOL/L (ref 9–17)
AST SERPL-CCNC: 17 U/L
BASOPHILS # BLD: 2 % (ref 0–2)
BASOPHILS ABSOLUTE: 0.31 K/UL (ref 0–0.2)
BILIRUB SERPL-MCNC: 0.24 MG/DL (ref 0.3–1.2)
BUN BLDV-MCNC: 24 MG/DL (ref 6–20)
BUN/CREAT BLD: 14 (ref 9–20)
C DIFF AG + TOXIN: NEGATIVE
CALCIUM SERPL-MCNC: 7.7 MG/DL (ref 8.6–10.4)
CHLORIDE BLD-SCNC: 92 MMOL/L (ref 98–107)
CO2: 19 MMOL/L (ref 20–31)
CREAT SERPL-MCNC: 1.68 MG/DL (ref 0.5–0.9)
DATE, STOOL #1: NORMAL
DATE, STOOL #2: NORMAL
DATE, STOOL #3: NORMAL
DIFFERENTIAL TYPE: ABNORMAL
EOSINOPHILS RELATIVE PERCENT: 0 % (ref 1–4)
ESTIMATED AVERAGE GLUCOSE: 306 MG/DL
GFR AFRICAN AMERICAN: 39 ML/MIN
GFR NON-AFRICAN AMERICAN: 32 ML/MIN
GFR SERPL CREATININE-BSD FRML MDRD: ABNORMAL ML/MIN/{1.73_M2}
GFR SERPL CREATININE-BSD FRML MDRD: ABNORMAL ML/MIN/{1.73_M2}
GLUCOSE BLD-MCNC: 151 MG/DL (ref 74–100)
GLUCOSE BLD-MCNC: 218 MG/DL (ref 74–100)
GLUCOSE BLD-MCNC: 219 MG/DL (ref 74–100)
GLUCOSE BLD-MCNC: 344 MG/DL (ref 70–99)
HBA1C MFR BLD: 12.3 % (ref 4–6)
HCT VFR BLD CALC: 23 % (ref 36.3–47.1)
HCT VFR BLD CALC: 23.7 % (ref 36.3–47.1)
HEMOCCULT SP1 STL QL: NEGATIVE
HEMOCCULT SP2 STL QL: NORMAL
HEMOCCULT SP3 STL QL: NORMAL
HEMOGLOBIN: 7 G/DL (ref 11.9–15.1)
HEMOGLOBIN: 7.2 G/DL (ref 11.9–15.1)
IMMATURE GRANULOCYTES: 0 %
LACTIC ACID, SEPSIS WHOLE BLOOD: ABNORMAL MMOL/L (ref 0.5–1.9)
LACTIC ACID, SEPSIS: 2.1 MMOL/L (ref 0.5–1.9)
LYMPHOCYTES # BLD: 15 % (ref 24–43)
MCH RBC QN AUTO: 21.3 PG (ref 25.2–33.5)
MCHC RBC AUTO-ENTMCNC: 30.4 G/DL (ref 28.4–34.8)
MCV RBC AUTO: 70.1 FL (ref 82.6–102.9)
MONOCYTES # BLD: 10 % (ref 3–12)
MORPHOLOGY: NORMAL
NRBC AUTOMATED: 0 PER 100 WBC
PDW BLD-RTO: 14.7 % (ref 11.8–14.4)
PLATELET # BLD: 286 K/UL (ref 138–453)
PLATELET ESTIMATE: ABNORMAL
PMV BLD AUTO: 10 FL (ref 8.1–13.5)
POTASSIUM SERPL-SCNC: 4.3 MMOL/L (ref 3.7–5.3)
RBC # BLD: 3.28 M/UL (ref 3.95–5.11)
RBC # BLD: ABNORMAL 10*6/UL
SEG NEUTROPHILS: 73 % (ref 36–65)
SEGMENTED NEUTROPHILS ABSOLUTE COUNT: 11.46 K/UL (ref 1.5–8.1)
SODIUM BLD-SCNC: 123 MMOL/L (ref 135–144)
SPECIMEN DESCRIPTION: NORMAL
TIME, STOOL #1: 1230
TIME, STOOL #2: NORMAL
TIME, STOOL #3: NORMAL
TOTAL PROTEIN: 6.5 G/DL (ref 6.4–8.3)
WBC # BLD: 15.7 K/UL (ref 3.5–11.3)
WBC # BLD: ABNORMAL 10*3/UL

## 2021-08-27 PROCEDURE — 83605 ASSAY OF LACTIC ACID: CPT

## 2021-08-27 PROCEDURE — 86920 COMPATIBILITY TEST SPIN: CPT

## 2021-08-27 PROCEDURE — 86901 BLOOD TYPING SEROLOGIC RH(D): CPT

## 2021-08-27 PROCEDURE — 87449 NOS EACH ORGANISM AG IA: CPT

## 2021-08-27 PROCEDURE — 82272 OCCULT BLD FECES 1-3 TESTS: CPT

## 2021-08-27 PROCEDURE — 6370000000 HC RX 637 (ALT 250 FOR IP): Performed by: FAMILY MEDICINE

## 2021-08-27 PROCEDURE — 83036 HEMOGLOBIN GLYCOSYLATED A1C: CPT

## 2021-08-27 PROCEDURE — 86850 RBC ANTIBODY SCREEN: CPT

## 2021-08-27 PROCEDURE — 2500000003 HC RX 250 WO HCPCS: Performed by: FAMILY MEDICINE

## 2021-08-27 PROCEDURE — 87324 CLOSTRIDIUM AG IA: CPT

## 2021-08-27 PROCEDURE — 6360000002 HC RX W HCPCS: Performed by: FAMILY MEDICINE

## 2021-08-27 PROCEDURE — 85014 HEMATOCRIT: CPT

## 2021-08-27 PROCEDURE — 82947 ASSAY GLUCOSE BLOOD QUANT: CPT

## 2021-08-27 PROCEDURE — 36415 COLL VENOUS BLD VENIPUNCTURE: CPT

## 2021-08-27 PROCEDURE — 94761 N-INVAS EAR/PLS OXIMETRY MLT: CPT

## 2021-08-27 PROCEDURE — 85018 HEMOGLOBIN: CPT

## 2021-08-27 PROCEDURE — 2000000000 HC ICU R&B

## 2021-08-27 PROCEDURE — 80053 COMPREHEN METABOLIC PANEL: CPT

## 2021-08-27 PROCEDURE — 86900 BLOOD TYPING SEROLOGIC ABO: CPT

## 2021-08-27 PROCEDURE — 2580000003 HC RX 258: Performed by: FAMILY MEDICINE

## 2021-08-27 PROCEDURE — 85025 COMPLETE CBC W/AUTO DIFF WBC: CPT

## 2021-08-27 RX ORDER — PROMETHAZINE HYDROCHLORIDE 25 MG/ML
12.5 INJECTION, SOLUTION INTRAMUSCULAR; INTRAVENOUS EVERY 6 HOURS PRN
Status: DISCONTINUED | OUTPATIENT
Start: 2021-08-27 | End: 2021-09-03 | Stop reason: HOSPADM

## 2021-08-27 RX ORDER — ACETAMINOPHEN 325 MG/1
650 TABLET ORAL EVERY 4 HOURS PRN
Status: DISCONTINUED | OUTPATIENT
Start: 2021-08-27 | End: 2021-09-03 | Stop reason: HOSPADM

## 2021-08-27 RX ORDER — ACETAMINOPHEN 650 MG/1
650 SUPPOSITORY RECTAL EVERY 4 HOURS PRN
Status: DISCONTINUED | OUTPATIENT
Start: 2021-08-27 | End: 2021-09-03 | Stop reason: HOSPADM

## 2021-08-27 RX ADMIN — ATORVASTATIN CALCIUM 40 MG: 40 TABLET, FILM COATED ORAL at 08:56

## 2021-08-27 RX ADMIN — CLONIDINE HYDROCHLORIDE 0.1 MG: 0.1 TABLET ORAL at 08:56

## 2021-08-27 RX ADMIN — METRONIDAZOLE 500 MG: 500 INJECTION, SOLUTION INTRAVENOUS at 04:44

## 2021-08-27 RX ADMIN — ENOXAPARIN SODIUM 40 MG: 40 INJECTION SUBCUTANEOUS at 09:03

## 2021-08-27 RX ADMIN — ACETAMINOPHEN 650 MG: 325 TABLET ORAL at 16:37

## 2021-08-27 RX ADMIN — NIFEDIPINE 60 MG: 30 TABLET, EXTENDED RELEASE ORAL at 08:56

## 2021-08-27 RX ADMIN — ONDANSETRON 4 MG: 2 INJECTION INTRAMUSCULAR; INTRAVENOUS at 00:15

## 2021-08-27 RX ADMIN — PIPERACILLIN AND TAZOBACTAM 3375 MG: 3; .375 INJECTION, POWDER, FOR SOLUTION INTRAVENOUS at 22:25

## 2021-08-27 RX ADMIN — ACETAMINOPHEN 650 MG: 325 TABLET ORAL at 20:43

## 2021-08-27 RX ADMIN — SODIUM CHLORIDE: 9 INJECTION, SOLUTION INTRAVENOUS at 19:10

## 2021-08-27 RX ADMIN — SODIUM CHLORIDE, PRESERVATIVE FREE 10 ML: 5 INJECTION INTRAVENOUS at 09:02

## 2021-08-27 RX ADMIN — CITALOPRAM HYDROBROMIDE 10 MG: 10 TABLET ORAL at 08:56

## 2021-08-27 RX ADMIN — SODIUM CHLORIDE: 9 INJECTION, SOLUTION INTRAVENOUS at 12:01

## 2021-08-27 RX ADMIN — LOSARTAN POTASSIUM 100 MG: 50 TABLET, FILM COATED ORAL at 08:56

## 2021-08-27 RX ADMIN — METOCLOPRAMIDE 5 MG: 5 TABLET ORAL at 07:28

## 2021-08-27 RX ADMIN — METFORMIN HYDROCHLORIDE 1000 MG: 500 TABLET, EXTENDED RELEASE ORAL at 08:56

## 2021-08-27 RX ADMIN — ONDANSETRON 4 MG: 2 INJECTION INTRAMUSCULAR; INTRAVENOUS at 20:42

## 2021-08-27 RX ADMIN — METRONIDAZOLE 500 MG: 500 INJECTION, SOLUTION INTRAVENOUS at 12:00

## 2021-08-27 RX ADMIN — SODIUM CHLORIDE: 9 INJECTION, SOLUTION INTRAVENOUS at 04:45

## 2021-08-27 RX ADMIN — CLONIDINE HYDROCHLORIDE 0.1 MG: 0.1 TABLET ORAL at 20:33

## 2021-08-27 RX ADMIN — FERROUS SULFATE TAB 325 MG (65 MG ELEMENTAL FE) 325 MG: 325 (65 FE) TAB at 20:33

## 2021-08-27 RX ADMIN — METOCLOPRAMIDE 5 MG: 5 TABLET ORAL at 16:37

## 2021-08-27 RX ADMIN — PIPERACILLIN AND TAZOBACTAM 3375 MG: 3; .375 INJECTION, POWDER, FOR SOLUTION INTRAVENOUS at 05:47

## 2021-08-27 RX ADMIN — POTASSIUM CHLORIDE 10 MEQ: 750 TABLET, EXTENDED RELEASE ORAL at 20:33

## 2021-08-27 RX ADMIN — INSULIN LISPRO 2 UNITS: 100 INJECTION, SOLUTION INTRAVENOUS; SUBCUTANEOUS at 20:30

## 2021-08-27 RX ADMIN — METOCLOPRAMIDE 5 MG: 5 TABLET ORAL at 11:53

## 2021-08-27 RX ADMIN — ACETAMINOPHEN 650 MG: 325 TABLET ORAL at 11:53

## 2021-08-27 RX ADMIN — SODIUM CHLORIDE, PRESERVATIVE FREE 10 ML: 5 INJECTION INTRAVENOUS at 20:33

## 2021-08-27 RX ADMIN — ALOGLIPTIN 25 MG: 25 TABLET, FILM COATED ORAL at 08:56

## 2021-08-27 RX ADMIN — ACETAMINOPHEN 650 MG: 325 TABLET ORAL at 04:44

## 2021-08-27 RX ADMIN — HYDROCHLOROTHIAZIDE 25 MG: 25 TABLET ORAL at 08:55

## 2021-08-27 RX ADMIN — ATENOLOL 50 MG: 50 TABLET ORAL at 08:56

## 2021-08-27 RX ADMIN — POTASSIUM CHLORIDE 10 MEQ: 750 TABLET, EXTENDED RELEASE ORAL at 08:56

## 2021-08-27 RX ADMIN — DOCUSATE SODIUM 100 MG: 100 CAPSULE ORAL at 08:56

## 2021-08-27 RX ADMIN — ONDANSETRON 4 MG: 2 INJECTION INTRAMUSCULAR; INTRAVENOUS at 12:29

## 2021-08-27 RX ADMIN — GLIPIZIDE 2.5 MG: 5 TABLET ORAL at 07:28

## 2021-08-27 RX ADMIN — FERROUS SULFATE TAB 325 MG (65 MG ELEMENTAL FE) 325 MG: 325 (65 FE) TAB at 08:55

## 2021-08-27 RX ADMIN — PIPERACILLIN AND TAZOBACTAM 3375 MG: 3; .375 INJECTION, POWDER, FOR SOLUTION INTRAVENOUS at 14:23

## 2021-08-27 RX ADMIN — METRONIDAZOLE 500 MG: 500 INJECTION, SOLUTION INTRAVENOUS at 20:32

## 2021-08-27 ASSESSMENT — PAIN SCALES - GENERAL
PAINLEVEL_OUTOF10: 0
PAINLEVEL_OUTOF10: 0
PAINLEVEL_OUTOF10: 7
PAINLEVEL_OUTOF10: 5
PAINLEVEL_OUTOF10: 7
PAINLEVEL_OUTOF10: 0
PAINLEVEL_OUTOF10: 3
PAINLEVEL_OUTOF10: 0

## 2021-08-27 ASSESSMENT — PAIN DESCRIPTION - LOCATION: LOCATION: HEAD

## 2021-08-27 ASSESSMENT — PAIN DESCRIPTION - PAIN TYPE: TYPE: ACUTE PAIN

## 2021-08-27 ASSESSMENT — PAIN DESCRIPTION - DESCRIPTORS: DESCRIPTORS: ACHING

## 2021-08-27 ASSESSMENT — PAIN DESCRIPTION - ONSET: ONSET: GRADUAL

## 2021-08-27 ASSESSMENT — PAIN DESCRIPTION - ORIENTATION: ORIENTATION: OTHER (COMMENT)

## 2021-08-27 ASSESSMENT — PAIN - FUNCTIONAL ASSESSMENT: PAIN_FUNCTIONAL_ASSESSMENT: ACTIVITIES ARE NOT PREVENTED

## 2021-08-27 ASSESSMENT — PAIN DESCRIPTION - PROGRESSION: CLINICAL_PROGRESSION: GRADUALLY IMPROVING

## 2021-08-27 ASSESSMENT — PAIN DESCRIPTION - FREQUENCY: FREQUENCY: CONTINUOUS

## 2021-08-27 NOTE — H&P
[address] [facility]  History and Physical        Patient:  Joao Salvador  MRN: [id number]    Chief Complaint:    Chief Complaint   Patient presents with    Other     Pt reports she was sent by PCP for abnormal bloodwork drawn today       History Obtained From:  patient, electronic medical record  PCP: BUZZ Garland CNP    History of Present Illness: The patient is a 44 y.o. female who presents with fever,chills and abdominal pain for 3 days to er. She has uncontroled diabetes also. She was seen in er and has acute pyelonephritis and blood culture positive for gram negative patito. Her ct also shows acute diverticulitis. Past Medical History:        Diagnosis Date    Diabetes mellitus ([address]     Hyperlipidemia     Hypertension     Irregular heart rhythm     Sciatica        Past Surgical History:        Procedure Laterality Date     SECTION      COLONOSCOPY  2019    Dr Manuel An- diverticulosis,hemorrhoids    COLONOSCOPY N/A 3/6/2019    COLONOSCOPY DIAGNOSTIC performed by Ryan Bonilla MD at [address]  2019    Dr Ellis-bx(+H-Pylori,normal duodenal mucosa)erosive duodenitis    UPPER GASTROINTESTINAL ENDOSCOPY N/A 3/6/2019    EGD BIOPSY performed by Ryan Bonilla MD at [facility]       Medications Prior to Admission:    Prior to Admission medications    Medication Sig Start Date End Date Taking?  Authorizing Provider   metoclopramide (REGLAN) 5 MG tablet TAKE ONE TABLET BY MOUTH THREE TIMES A DAY 21  Yes BUZZ Garland CNP   furosemide (LASIX) 40 MG tablet Take 1 tablet by mouth daily 21  Yes BUZZ Garland CNP   potassium chloride (KLOR-CON M) 10 MEQ extended release tablet Take 1 tablet by mouth 2 times daily 21  Yes BUZZ Garland CNP   insulin detemir (LEVEMIR FLEXTOUCH) 100 UNIT/ML injection pen Inject 56 Units into the skin nightly 21  Yes BUZZ Garland CNP   citalopram (CELEXA) 10 MG tablet Take 1 tablet by mouth daily 5/4/21  Yes BUZZ Carvalho CNP   docusate (COLACE, DULCOLAX) 100 MG CAPS Take 100 mg by mouth 2 times daily   Yes Historical Provider, MD   ferrous sulfate (IRON 325) 325 (65 Fe) MG tablet Take 325 mg by mouth 2 times daily   Yes Historical Provider, MD   tranexamic acid (LYSTEDA) 650 MG TABS tablet Take 2 tablets up to 3 times daily for up to 5 days as needed for heavy menses 10/30/20  Yes [place] MD   cloNIDine (CATAPRES) 0.1 MG tablet TAKE ONE TABLET BY MOUTH TWICE A DAY 10/7/20  Yes BUZZ Carvalho CNP   losartan-hydroCHLOROthiazide (HYZAAR) 100-25 MG per tablet Take 1 tablet by mouth daily 10/7/20  Yes BUZZ Carvalho CNP   NIFEdipine (ADALAT CC) 60 MG extended release tablet Take 1 tablet by mouth daily 10/7/20  Yes BUZZ Carvalho CNP   atorvastatin (LIPITOR) 40 MG tablet Take 1 tablet by mouth daily 10/7/20  Yes BUZZ Carvalho - DONYA   glipiZIDE (GLUCOTROL XL) 2.5 MG extended release tablet Take 1 tablet by mouth daily 10/7/20  Yes BUZZ Carvalho CNP   SITagliptin-metFORMIN (JANUMET XR)  MG TB24 per extended release tablet Take 2 tablets by mouth daily 8/26/20  Yes BUZZ Carvalho CNP   atenolol (TENORMIN) 50 MG tablet TAKE ONE TABLET BY MOUTH DAILY 3/27/20  Yes BUZZ Carvalho CNP   insulin lispro, 1 Unit Dial, (HUMALOG KWIKPEN) 100 UNIT/ML SOPN Inject 6 Units into the skin 3 times daily (before meals) 5/4/21   BUZZ Carvalho CNP   blood glucose test strips (ASCENSIA AUTODISC VI;ONE TOUCH ULTRA TEST VI) strip 1 each by In Vitro route 3 times daily Dispense brand compatible with insurance 1/29/21   BUZZ Hall CNP   FreeStyle Lancets MISC 1 each by Does not apply route daily Dispense brand compatible with insurance 1/29/21   Nir Jessica, APRN - CNP   Insulin Pen Needle (PEN NEEDLES) 32G X 5 MM MISC 1 each by Does not apply route daily 8/6/20   Sukhjinder Butcher, APRN - CNP glucose monitoring kit (FREESTYLE) monitoring kit 1 kit by Does not apply route daily Dispense brand compatible with insurance 11/20/19   Marcial Butcher, APRN - CNP       Allergies:  Patient has no known allergies. Social History:   TOBACCO:   reports that she has never smoked. She has never used smokeless tobacco.  ETOH:   reports no history of alcohol use. Family History:       Problem Relation Age of Onset    High Blood Pressure Father     Diabetes Father     Asthma Mother        Review of Systems:  Constitutional:positive  for fevers, and positive for chills. Respiratory: negative for shortness of breath, negative for cough, and negative for wheezing  Cardiovascular: negative for chest pain, negative for palpitations, and negative for syncope  Gastrointestinal: positive for abdominal pain, positive for nausea,negative for vomiting, negative for diarrhea, negative for constipation, and negative for hematochezia or melena  Genitourinary: negative for dysuria, negative for urinary urgency, negative for urinary frequency, and negative for hematuria  Neurological: negative for unilateral weakness, numbness or tingling. All other systems were reviewed with the patient and are negative except as stated    Objective:    Vitals:   Temp: 98.9 °F (37.2 °C)  BP: 115/67  Resp: 18  Pulse: 92  SpO2: 98 %  -----------------------------------------------------------------  Exam:  GEN:    Awake, alert and oriented x3. EYES:  EOMI, pupils equal   NECK: Supple. No lymphadenopathy. No carotid bruit  CVS:    regular rate and rhythm, no audible murmur  PULM:  CTA, no wheezes, rales or rhonchi, no acute respiratory distress  ABD:    Bowels sounds normal.  Abdomen is soft. No distention. rt flank and upper quadrant tenderness to palpation. No guarding or rbound  EXT:   no edema bilaterally . No calf tenderness. NEURO: Moves all extremities. Motor and sensory are grossly intact  SKIN:  No rashes. No skin lesions. -----------------------------------------------------------------  Diagnostic Data:   · All diagnostic data was reviewed  Lab Results   Component Value Date    WBC 15.7 (H) 08/27/2021    HGB 7.0 (L) 08/27/2021    MCV 70.1 (L) 08/27/2021     08/27/2021      Lab Results   Component Value Date    GLUCOSE 344 (H) 08/27/2021    BUN 24 (H) 08/27/2021    CREATININE 1.68 (H) 08/27/2021     (L) 08/27/2021    K 4.3 08/27/2021    CALCIUM 7.7 (L) 08/27/2021    CL 92 (L) 08/27/2021    CO2 19 (L) 08/27/2021     Lab Results   Component Value Date    WBCUA 20 TO 50 08/26/2021    RBCUA 2 TO 5 08/26/2021    EPITHUA 0 TO 2 08/26/2021    LEUKOCYTESUR NEGATIVE 08/26/2021    SPECGRAV 1.015 08/26/2021    GLUCOSEU 3+ (A) 08/26/2021    KETUA 1+ (A) 08/26/2021    PROTEINU 2+ (A) 08/26/2021    HGBUR 2+ (A) 08/26/2021    CASTUA NOT REPORTED 08/26/2021    CRYSTUA NOT REPORTED 08/26/2021    BACTERIA 2+ (A) 08/26/2021    YEAST NOT REPORTED 08/26/2021       Assessment:     Principal Problem:    Acute pyelonephritis  Active Problems:    Essential hypertension    Type 2 diabetes mellitus with diabetic nephropathy, with long-term current use of insulin (HCC)    Diverticulitis of colon without hemorrhage    Sepsis ([facility] [address]  Resolved Problems:    * No resolved hospital problems.  *      Plan:     Problem List     * (Principal) Acute pyelonephritis           · This patient requires inpatient admission because of acute pyelonephritis requiring iv antibiotics  · Factors affecting the medical complexity of this patient include       1- uncontrolled diabetes requiring iv fluids,monitoring of blood sugars and electrolytes      2- sepsis- blood culture positive for gram neg patito- continue antibiotics and fluid replacement     3- acute diverticulitis- continue zosyn and flagyl  · Estimated length of stay is 2 days  · Discussed patient's symptoms and data results including labs and imaging studies with the ER MD at time of admission  · High risk drug monitoring: none  ·     CORE MEASURES  · DVT prophylaxis: Lovenox  · Decubitus ulcer present on admission: No  · CODE STATUS: FULL CODE  · Nutrition Status: good   · Physical therapy: Yes   · Old Charts reviewed: Yes  · EKG Reviewed: Yes  · Advance Directive Addressed: Yes, wants all resuccitative measures.  Has not designated poa  · Total time in evaluating and formulating plan of care- 39 min    Cezar Puckett MD , M.D.  8/27/2021  11:10 AM

## 2021-08-27 NOTE — PROGRESS NOTES
Patient arrives to ICU rm 305 per cart from ER. Patient stands up from cot and pivots to bed independently with help with IV lines. See noted VS and assessment. Respirations unlabored. Complains of headache and some nausea, which is better after receiving medication in the ER.   Continue to monitor

## 2021-08-27 NOTE — PROGRESS NOTES
Comprehensive Nutrition Assessment    Type and Reason for Visit:  Initial, Positive Nutrition Screen (MST 2)    Nutrition Recommendations/Plan:   1. Continue current diet (modify to CC 3 carbs per meal at home). 2. Start apple ensure clear TID with meals (modify to vanilla glucerna when able to tolerate it). 3. F/u CC diet education needs. 4. Recommend to consider comprehensive outpatient diabetes education. Nutrition Assessment:  1. inadequate oral intakes r/t altered GI aeb PO 1-25%, N/D. 2. Altered nutrition related lab values r/t endocrine dysfunction aeb A1c 12.6. Admitted with sepsis, acute pyelonephritis. c/o not feeling well, can't tolerate solid foods, would like apple ensure clear, broth, and jello, dietary notified. Pt prefers not to answer questions at this time. Further visit deferred. Nurse reports Pt having nausea and diarrhea, Pt given zofran. f/u with diet education needs. Would like vanilla supplement when she is feeling better. Recommend CC 3 carbs per meal diet at home and vanilla Glucerna supplement when PO improves. Recommend comprehensive outpatient diabetes education due to poorly controlled DM. Malnutrition Assessment:  Malnutrition Status: At risk for malnutrition (Comment)    Context:  Acute Illness     Findings of the 6 clinical characteristics of malnutrition:  Energy Intake:  Mild decrease in energy intake (Comment) (PO 1-25% since admission)  Weight Loss:  No significant weight loss     Body Fat Loss:  No significant body fat loss     Muscle Mass Loss:  No significant muscle mass loss    Fluid Accumulation:  No significant fluid accumulation     Strength:  Not Performed    Estimated Daily Nutrient Needs:  Energy (kcal):  3644-5940 (15-18/kg); Weight Used for Energy Requirements:  Current     Protein (g):  72-83g (1.3-1.5g/kg); Weight Used for Protein Requirements:  Ideal        Fluid (ml/day):  1920 ml (20/kg);  Method Used for Fluid Requirements:  ml/Kg Nutrition Related Findings:  no visual indices of malnutrition       Wounds:  None       Current Nutrition Therapies:    ADULT DIET; Regular; 4 carb choices (60 gm/meal)  Adult Oral Nutrition Supplement; Clear Liquid Oral Supplement    Anthropometric Measures:  · Height: 5' 4\" (162.6 cm)  · Current Body Weight: 212 lb 1.6 oz (96.2 kg)   · Admission Body Weight: 212 lb 1.6 oz (96.2 kg)    · Usual Body Weight: 220 lb 12.8 oz (100.2 kg)     · Ideal Body Weight: 120 lbs; % Ideal Body Weight 176.8 %   · BMI: 36.4  · BMI Categories: Obese Class 2 (BMI 35.0 -39.9)       Nutrition Diagnosis:   · Inadequate oral intake related to altered GI function as evidenced by intake 0-25%, nausea, diarrhea      Nutrition Interventions:   Food and/or Nutrient Delivery:  Continue Current Diet, Start Oral Nutrition Supplement  Nutrition Education/Counseling:  Education needed   Coordination of Nutrition Care:  Continue to monitor while inpatient    Goals:  PO > 75% of meals and supplements with improved glycemic control       Recent Labs     08/26/21  1250 08/26/21  1838 08/27/21  0515   *  --  123*   K 4.5  --  4.3   CL 84*  --  92*   CO2 16*  --  19*   BUN 21*  --  24*   CREATININE 1.46*  --  1.68*   GLUCOSE 538* 416 344*   ALT 20  --  18   ALKPHOS 116*  --  96   GFR             --                  Lab Results   Component Value Date    LABALBU 2.9 08/27/2021      Lab Results   Component Value Date    LABA1C 12.6 08/26/2021     Recent Labs     08/26/21  1800 08/26/21  1837 08/26/21  2112 08/27/21  1123   POCGLU 447* 416* 234* 218*     Lab Results   Component Value Date    TRIG 376 05/03/2021    HDL 43 05/03/2021     Nutrition Monitoring and Evaluation:   Behavioral-Environmental Outcomes:  None Identified   Food/Nutrient Intake Outcomes:  Food and Nutrient Intake  Physical Signs/Symptoms Outcomes:  Biochemical Data, Weight     Discharge Planning:     Too soon to determine, Recommend pursue outpatient diabetes education Electronically signed by Neymar Guajardo RD, LD on 8/27/21 at 12:22 PM EDT    Contact: [phone]

## 2021-08-27 NOTE — PROGRESS NOTES
Discussed discharge plans with the patient. Patient is a 44year old female here with Acute pyelonephritis . She is alert , oriented , pleasant , and cooperative during our conversation. Patient is single and lives alone. She uses no medical equipment. Patient does her own cooking and house keeping. She is independent with her ADL's. Patient manages her own medications and drives. She has no services in the home. Her PCP is Debbrah Kayser Might, APRN - CNP. She has medical insurance that helps with medication costs. The discharge plan is home with no services at this time. She does not have advance directives. LSW to monitor and assist with any needs or concerns as they arise.     KADY Ornelas

## 2021-08-27 NOTE — PLAN OF CARE
Problem: Pain:  Description: Pain management should include both nonpharmacologic and pharmacologic interventions. Goal: Pain level will decrease  Description: Pain level will decrease  8/27/2021 0757 by Zuleika Joe RN  Outcome: Ongoing  Note: Pt is able to rate pain using a 0-10 scale. Medication, ice and repositioning reduce the pain if needed and pt is aware that it is available. Will continue to monitor. Problem: Pain:  Description: Pain management should include both nonpharmacologic and pharmacologic interventions. Goal: Control of acute pain  Description: Control of acute pain  8/27/2021 0757 by Zuleika Joe RN  Outcome: Ongoing     Problem: Pain:  Description: Pain management should include both nonpharmacologic and pharmacologic interventions. Goal: Control of chronic pain  Description: Control of chronic pain  8/27/2021 0757 by Zuleika Joe RN  Outcome: Ongoing     Problem: Falls - Risk of:  Goal: Will remain free from falls  Description: Will remain free from falls  8/27/2021 0757 by Zuleika Joe RN  Outcome: Ongoing  Note: Pt has no falls and is continuing to be monitored. Fall precautions remain intact. Bracelet on pt, star on, bed low and locked, alarm on, side rails up, call light and personal belongings within reach, and room clear and clean of clutter. Problem: Falls - Risk of:  Goal: Absence of physical injury  Description: Absence of physical injury  8/27/2021 0757 by Zuleika Joe RN  Outcome: Ongoing     Problem: Discharge Planning:  Goal: Discharged to appropriate level of care  Description: Discharged to appropriate level of care  8/27/2021 0757 by Zuleiak Joe RN  Outcome: Ongoing     Problem: Infection, Septic Shock:  Goal: Will show no infection signs and symptoms  Description: Will show no infection signs and symptoms  8/27/2021 0757 by Zuleika Joe RN  Outcome: Ongoing  Note: IV antibiotics ordered.       Problem: Serum Glucose Level - Abnormal:  Goal: Ability to maintain appropriate glucose levels will improve  Description: Ability to maintain appropriate glucose levels will improve  8/27/2021 0757 by Liz Alcala RN  Outcome: Ongoing  Note: Blood sugars checked ACHS. Problem: Tissue Perfusion, Altered:  Goal: Circulatory function within specified parameters  Description: Circulatory function within specified parameters  8/27/2021 0757 by Liz Alcala RN  Outcome: Ongoing     Problem: Venous Thromboembolism:  Goal: Will show no signs or symptoms of venous thromboembolism  Description: Will show no signs or symptoms of venous thromboembolism  8/27/2021 0757 by Liz Alcala RN  Outcome: Ongoing  Note: Lovenox ordered.       Problem: Venous Thromboembolism:  Goal: Absence of signs or symptoms of impaired coagulation  Description: Absence of signs or symptoms of impaired coagulation  8/27/2021 0757 by Liz Alcala RN  Outcome: Ongoing     Problem: Gas Exchange - Impaired:  Goal: Levels of oxygenation will improve  Description: Levels of oxygenation will improve  8/27/2021 0757 by Liz Alcala RN  Outcome: Completed     Problem: Infection - Ventilator-Associated Pneumonia:  Goal: Absence of pulmonary infection  Description: Absence of pulmonary infection  8/27/2021 0757 by Liz Alcala RN  Outcome: Completed

## 2021-08-27 NOTE — PROGRESS NOTES
Awakens easily, see noted VS and assessment. Respirations unlabored. States headache is gone, but nausea remains. Denies any other complaints or any further needs.   Continue to monitor

## 2021-08-27 NOTE — PROGRESS NOTES
Dr. Gabriel Villa updated on positive blood cultures, hemoglobin and sodium levels. See orders. He would like patient to stay in ICU. Fernando Gong, LUCAS supervisor updated and patient as well. Will continue to monitor.

## 2021-08-27 NOTE — PROGRESS NOTES
Spoke with Dr. Anyi Nair at nurses station, asked about any other medication for fevers, Dr. Anyi Nair declined because of patients kidney function, modified tylenol to be given every 4 hours. Will continue to monitor.

## 2021-08-27 NOTE — PLAN OF CARE
Problem: Pain:  Goal: Pain level will decrease  Description: Pain level will decrease  Outcome: Ongoing     Problem: Pain:  Goal: Control of acute pain  Description: Control of acute pain  Outcome: Ongoing     Problem: Pain:  Goal: Control of chronic pain  Description: Control of chronic pain  Outcome: Ongoing     Problem: Falls - Risk of:  Goal: Will remain free from falls  Description: Will remain free from falls  Outcome: Ongoing     Problem: Falls - Risk of:  Goal: Absence of physical injury  Description: Absence of physical injury  Outcome: Ongoing     Problem: Discharge Planning:  Goal: Discharged to appropriate level of care  Description: Discharged to appropriate level of care  Outcome: Ongoing     Problem: Gas Exchange - Impaired:  Goal: Levels of oxygenation will improve  Description: Levels of oxygenation will improve  Outcome: Ongoing  Note: Patient remains 96% on room air with lungs clear throughout     Problem: Infection, Septic Shock:  Goal: Will show no infection signs and symptoms  Description: Will show no infection signs and symptoms  Outcome: Ongoing  Note: Patient has fever, tylenol given in ER and will recheck. Continue to monitor     Problem: Infection - Ventilator-Associated Pneumonia:  Goal: Absence of pulmonary infection  Description: Absence of pulmonary infection  Outcome: Ongoing  Note: Patient remains 96% on room air with lungs clear throughout     Problem: Serum Glucose Level - Abnormal:  Goal: Ability to maintain appropriate glucose levels will improve  Description: Ability to maintain appropriate glucose levels will improve  Outcome: Ongoing  Note: FSBS rechecked AC/HS and prn. Continue to monitor     Problem: Tissue Perfusion, Altered:  Goal: Circulatory function within specified parameters  Description: Circulatory function within specified parameters  Outcome: Ongoing  Note: Peripheral pulse palpable with brisk capillary refill.   Continue to monitor     Problem: Venous Thromboembolism:  Goal: Will show no signs or symptoms of venous thromboembolism  Description: Will show no signs or symptoms of venous thromboembolism  Outcome: Ongoing  Note: Patient denies any signs or symptoms of DVT.  Continue to monitor     Problem: Venous Thromboembolism:  Goal: Absence of signs or symptoms of impaired coagulation  Description: Absence of signs or symptoms of impaired coagulation  Outcome: Ongoing

## 2021-08-27 NOTE — PROGRESS NOTES
Patient had bowel movement at this time but mixed with urine. Unable to collect occult stool. Linens changed at this time. Patient sitting up in chair. Will continue to monitor.

## 2021-08-28 LAB
ABSOLUTE EOS #: 0.15 K/UL (ref 0–0.44)
ABSOLUTE IMMATURE GRANULOCYTE: 0.15 K/UL (ref 0–0.3)
ABSOLUTE LYMPH #: 1.78 K/UL (ref 1.1–3.7)
ABSOLUTE MONO #: 1.48 K/UL (ref 0.1–1.2)
ALBUMIN SERPL-MCNC: 3.1 G/DL (ref 3.5–5.2)
ALBUMIN/GLOBULIN RATIO: 0.9 (ref 1–2.5)
ALP BLD-CCNC: 149 U/L (ref 35–104)
ALT SERPL-CCNC: 36 U/L (ref 5–33)
ANION GAP SERPL CALCULATED.3IONS-SCNC: 12 MMOL/L (ref 9–17)
AST SERPL-CCNC: 46 U/L
BASOPHILS # BLD: 1 % (ref 0–2)
BASOPHILS ABSOLUTE: 0.15 K/UL (ref 0–0.2)
BILIRUB SERPL-MCNC: 0.18 MG/DL (ref 0.3–1.2)
BUN BLDV-MCNC: 25 MG/DL (ref 6–20)
BUN/CREAT BLD: 14 (ref 9–20)
CALCIUM SERPL-MCNC: 7.6 MG/DL (ref 8.6–10.4)
CHLORIDE BLD-SCNC: 104 MMOL/L (ref 98–107)
CO2: 18 MMOL/L (ref 20–31)
CREAT SERPL-MCNC: 1.75 MG/DL (ref 0.5–0.9)
CULTURE: ABNORMAL
DIFFERENTIAL TYPE: ABNORMAL
EOSINOPHILS RELATIVE PERCENT: 1 % (ref 1–4)
GFR AFRICAN AMERICAN: 37 ML/MIN
GFR NON-AFRICAN AMERICAN: 31 ML/MIN
GFR SERPL CREATININE-BSD FRML MDRD: ABNORMAL ML/MIN/{1.73_M2}
GFR SERPL CREATININE-BSD FRML MDRD: ABNORMAL ML/MIN/{1.73_M2}
GLUCOSE BLD-MCNC: 170 MG/DL (ref 70–99)
GLUCOSE BLD-MCNC: 170 MG/DL (ref 74–100)
GLUCOSE BLD-MCNC: 179 MG/DL (ref 74–100)
GLUCOSE BLD-MCNC: 189 MG/DL (ref 74–100)
GLUCOSE BLD-MCNC: 239 MG/DL (ref 74–100)
HCT VFR BLD CALC: 22.1 % (ref 36.3–47.1)
HCT VFR BLD CALC: 23.6 % (ref 36.3–47.1)
HEMOGLOBIN: 6.7 G/DL (ref 11.9–15.1)
HEMOGLOBIN: 7.3 G/DL (ref 11.9–15.1)
IMMATURE GRANULOCYTES: 1 %
LYMPHOCYTES # BLD: 12 % (ref 24–43)
Lab: ABNORMAL
Lab: ABNORMAL
MCH RBC QN AUTO: 21.3 PG (ref 25.2–33.5)
MCHC RBC AUTO-ENTMCNC: 30.3 G/DL (ref 28.4–34.8)
MCV RBC AUTO: 70.4 FL (ref 82.6–102.9)
MONOCYTES # BLD: 10 % (ref 3–12)
MORPHOLOGY: ABNORMAL
NRBC AUTOMATED: 0 PER 100 WBC
PDW BLD-RTO: 15.3 % (ref 11.8–14.4)
PLATELET # BLD: 312 K/UL (ref 138–453)
PLATELET ESTIMATE: ABNORMAL
PMV BLD AUTO: 10.1 FL (ref 8.1–13.5)
POTASSIUM SERPL-SCNC: 3.9 MMOL/L (ref 3.7–5.3)
RBC # BLD: 3.14 M/UL (ref 3.95–5.11)
RBC # BLD: ABNORMAL 10*6/UL
SEG NEUTROPHILS: 75 % (ref 36–65)
SEGMENTED NEUTROPHILS ABSOLUTE COUNT: 11.09 K/UL (ref 1.5–8.1)
SODIUM BLD-SCNC: 134 MMOL/L (ref 135–144)
SPECIMEN DESCRIPTION: ABNORMAL
SPECIMEN DESCRIPTION: ABNORMAL
TOTAL PROTEIN: 6.6 G/DL (ref 6.4–8.3)
WBC # BLD: 14.8 K/UL (ref 3.5–11.3)
WBC # BLD: ABNORMAL 10*3/UL

## 2021-08-28 PROCEDURE — 2500000003 HC RX 250 WO HCPCS: Performed by: FAMILY MEDICINE

## 2021-08-28 PROCEDURE — 82947 ASSAY GLUCOSE BLOOD QUANT: CPT

## 2021-08-28 PROCEDURE — 6370000000 HC RX 637 (ALT 250 FOR IP): Performed by: INTERNAL MEDICINE

## 2021-08-28 PROCEDURE — 6370000000 HC RX 637 (ALT 250 FOR IP): Performed by: FAMILY MEDICINE

## 2021-08-28 PROCEDURE — 2000000000 HC ICU R&B

## 2021-08-28 PROCEDURE — 36430 TRANSFUSION BLD/BLD COMPNT: CPT

## 2021-08-28 PROCEDURE — 94761 N-INVAS EAR/PLS OXIMETRY MLT: CPT

## 2021-08-28 PROCEDURE — 2580000003 HC RX 258: Performed by: INTERNAL MEDICINE

## 2021-08-28 PROCEDURE — 36415 COLL VENOUS BLD VENIPUNCTURE: CPT

## 2021-08-28 PROCEDURE — 6360000002 HC RX W HCPCS: Performed by: FAMILY MEDICINE

## 2021-08-28 PROCEDURE — 85025 COMPLETE CBC W/AUTO DIFF WBC: CPT

## 2021-08-28 PROCEDURE — 80053 COMPREHEN METABOLIC PANEL: CPT

## 2021-08-28 PROCEDURE — 1200000000 HC SEMI PRIVATE

## 2021-08-28 PROCEDURE — 6360000002 HC RX W HCPCS: Performed by: INTERNAL MEDICINE

## 2021-08-28 PROCEDURE — 85014 HEMATOCRIT: CPT

## 2021-08-28 PROCEDURE — P9016 RBC LEUKOCYTES REDUCED: HCPCS

## 2021-08-28 PROCEDURE — 2580000003 HC RX 258: Performed by: FAMILY MEDICINE

## 2021-08-28 PROCEDURE — 85018 HEMOGLOBIN: CPT

## 2021-08-28 RX ORDER — SODIUM CHLORIDE 9 MG/ML
INJECTION, SOLUTION INTRAVENOUS PRN
Status: DISCONTINUED | OUTPATIENT
Start: 2021-08-28 | End: 2021-09-03 | Stop reason: HOSPADM

## 2021-08-28 RX ORDER — GUAIFENESIN/DEXTROMETHORPHAN 100-10MG/5
5 SYRUP ORAL EVERY 4 HOURS PRN
Status: DISCONTINUED | OUTPATIENT
Start: 2021-08-28 | End: 2021-09-03 | Stop reason: HOSPADM

## 2021-08-28 RX ADMIN — PIPERACILLIN AND TAZOBACTAM 3375 MG: 3; .375 INJECTION, POWDER, FOR SOLUTION INTRAVENOUS at 22:12

## 2021-08-28 RX ADMIN — CLONIDINE HYDROCHLORIDE 0.1 MG: 0.1 TABLET ORAL at 20:47

## 2021-08-28 RX ADMIN — SODIUM CHLORIDE: 9 INJECTION, SOLUTION INTRAVENOUS at 22:12

## 2021-08-28 RX ADMIN — METRONIDAZOLE 500 MG: 500 INJECTION, SOLUTION INTRAVENOUS at 04:04

## 2021-08-28 RX ADMIN — METOCLOPRAMIDE 5 MG: 5 TABLET ORAL at 07:44

## 2021-08-28 RX ADMIN — SODIUM CHLORIDE, PRESERVATIVE FREE 10 ML: 5 INJECTION INTRAVENOUS at 09:25

## 2021-08-28 RX ADMIN — SODIUM CHLORIDE: 9 INJECTION, SOLUTION INTRAVENOUS at 10:56

## 2021-08-28 RX ADMIN — METRONIDAZOLE 500 MG: 500 INJECTION, SOLUTION INTRAVENOUS at 13:00

## 2021-08-28 RX ADMIN — POTASSIUM CHLORIDE 10 MEQ: 750 TABLET, EXTENDED RELEASE ORAL at 20:47

## 2021-08-28 RX ADMIN — METRONIDAZOLE 500 MG: 500 INJECTION, SOLUTION INTRAVENOUS at 20:47

## 2021-08-28 RX ADMIN — GUAIFENESIN AND DEXTROMETHORPHAN 5 ML: 100; 10 SYRUP ORAL at 20:47

## 2021-08-28 RX ADMIN — METOCLOPRAMIDE 5 MG: 5 TABLET ORAL at 16:24

## 2021-08-28 RX ADMIN — INSULIN LISPRO 3 UNITS: 100 INJECTION, SOLUTION INTRAVENOUS; SUBCUTANEOUS at 20:49

## 2021-08-28 RX ADMIN — ACETAMINOPHEN 650 MG: 325 TABLET ORAL at 19:17

## 2021-08-28 RX ADMIN — METOCLOPRAMIDE 5 MG: 5 TABLET ORAL at 11:18

## 2021-08-28 RX ADMIN — PIPERACILLIN AND TAZOBACTAM 3375 MG: 3; .375 INJECTION, POWDER, FOR SOLUTION INTRAVENOUS at 14:06

## 2021-08-28 RX ADMIN — SODIUM CHLORIDE: 9 INJECTION, SOLUTION INTRAVENOUS at 02:51

## 2021-08-28 RX ADMIN — INSULIN GLARGINE 56 UNITS: 100 INJECTION, SOLUTION SUBCUTANEOUS at 20:47

## 2021-08-28 RX ADMIN — PROMETHAZINE HYDROCHLORIDE 12.5 MG: 25 INJECTION, SOLUTION INTRAMUSCULAR; INTRAVENOUS at 06:58

## 2021-08-28 RX ADMIN — PIPERACILLIN AND TAZOBACTAM 3375 MG: 3; .375 INJECTION, POWDER, FOR SOLUTION INTRAVENOUS at 05:23

## 2021-08-28 RX ADMIN — FERROUS SULFATE TAB 325 MG (65 MG ELEMENTAL FE) 325 MG: 325 (65 FE) TAB at 20:47

## 2021-08-28 RX ADMIN — SODIUM CHLORIDE, PRESERVATIVE FREE 10 ML: 5 INJECTION INTRAVENOUS at 20:47

## 2021-08-28 ASSESSMENT — PAIN DESCRIPTION - ONSET: ONSET: GRADUAL

## 2021-08-28 ASSESSMENT — PAIN DESCRIPTION - LOCATION: LOCATION: HEAD

## 2021-08-28 ASSESSMENT — PAIN SCALES - GENERAL
PAINLEVEL_OUTOF10: 0
PAINLEVEL_OUTOF10: 7
PAINLEVEL_OUTOF10: 7

## 2021-08-28 ASSESSMENT — PAIN DESCRIPTION - PAIN TYPE: TYPE: ACUTE PAIN

## 2021-08-28 ASSESSMENT — PAIN DESCRIPTION - FREQUENCY: FREQUENCY: INTERMITTENT

## 2021-08-28 ASSESSMENT — PAIN - FUNCTIONAL ASSESSMENT: PAIN_FUNCTIONAL_ASSESSMENT: ACTIVITIES ARE NOT PREVENTED

## 2021-08-28 ASSESSMENT — PAIN DESCRIPTION - PROGRESSION: CLINICAL_PROGRESSION: GRADUALLY WORSENING

## 2021-08-28 ASSESSMENT — PAIN DESCRIPTION - DESCRIPTORS: DESCRIPTORS: HEADACHE

## 2021-08-28 NOTE — PROGRESS NOTES
Patient refuses morning medications due to nausea and dry heaves. PRN phenergan was given prior but minimal relief. Patient denies any other needs.

## 2021-08-28 NOTE — PROGRESS NOTES
Blood transfusion began, patient educated of transfusion reactions. Writer will stay at the bedside for the first 15 minutes.

## 2021-08-28 NOTE — PLAN OF CARE
Problem: Pain:  Goal: Pain level will decrease  Description: Pain level will decrease  Outcome: Ongoing  Note: Pt is able to verbalize when in pain. Pt denies pain at this time. Will continue to monitor. Problem: Falls - Risk of:  Goal: Will remain free from falls  Description: Will remain free from falls  Outcome: Ongoing  Note: Bed in low position. Wheels locked. 2/4 side rails are up. Fall band on. Call light within reach. Problem: Infection, Septic Shock:  Goal: Will show no infection signs and symptoms  Description: Will show no infection signs and symptoms  Outcome: Ongoing  Note: Temperature monitored and Tylenol given as needed. Pt is getting IV Zosyn and Flagyl, will continue to monitor. Problem: Serum Glucose Level - Abnormal:  Goal: Ability to maintain appropriate glucose levels will improve  Description: Ability to maintain appropriate glucose levels will improve  Outcome: Ongoing  Note: Monitoring blood sugars and insulin given as ordered.

## 2021-08-28 NOTE — PROGRESS NOTES
Progress Note    SUBJECTIVE:  FU related to nausea. No vomiting. Phenergan given helped some. OBJECTIVE:    Vitals:   TEMPERATURE:  Current - Temp: 99 °F (37.2 °C); Max - Temp  Av.8 °F (37.7 °C)  Min: 98.1 °F (36.7 °C)  Max: 102.6 °F (39.2 °C)  RESPIRATIONS RANGE: Resp  Av  Min: 12  Max: 26  PULSE RANGE: Pulse  Av.4  Min: 88  Max: 103  BLOOD PRESSURE RANGE:  Systolic (29RGH), FGD:385 , Min:84 , HAP:738   ; Diastolic (39XVD), TST:18, Min:48, Max:94    PULSE OXIMETRY RANGE: SpO2  Av %  Min: 92 %  Max: 99 %  24HR INTAKE/OUTPUT:    Intake/Output Summary (Last 24 hours) at 2021 0911  Last data filed at 2021 0405  Gross per 24 hour   Intake 4100.4 ml   Output 900 ml   Net 3200.4 ml     -----------------------------------------------------------------  Exam:  General: alert  HEENT: Supple neck & negative  Heart: Regular  Lungs: clear to auscultation bilaterally & no retractions  Abdomen: Mild tenderness around the abdomen. No CVA tenderness.   Extremities:  No edema   Neuro: NonFocal     -----------------------------------------------------------------  Diagnostic Data:  Lab Results   Component Value Date    WBC 14.8 (H) 2021    HGB 6.7 (LL) 2021     2021       Lab Results   Component Value Date    BUN 25 (H) 2021    CREATININE 1.75 (H) 2021     (L) 2021    K 3.9 2021    CALCIUM 7.6 (L) 2021     2021    CO2 18 (L) 2021    LABGLOM 31 (L) 2021       Lab Results   Component Value Date    WBCUA 20 TO 50 2021    RBCUA 2 TO 5 2021    EPITHUA 0 TO 2 2021    LEUKOCYTESUR NEGATIVE 2021    SPECGRAV 1.015 2021    GLUCOSEU 3+ (A) 2021    KETUA 1+ (A) 2021    PROTEINU 2+ (A) 2021    HGBUR 2+ (A) 2021    CASTUA NOT REPORTED 2021    CRYSTUA NOT REPORTED 2021    BACTERIA 2+ (A) 2021    YEAST NOT REPORTED 2021       No results found for: MYOGLOBIN, TROPONINT, CKTOTAL, CKMB, PROBNP    XR CHEST (2 VW)    Result Date: 8/26/2021  EXAMINATION: TWO XRAY VIEWS OF THE CHEST 8/26/2021 1:04 pm COMPARISON: None. HISTORY: ORDERING SYSTEM PROVIDED HISTORY: Cough TECHNOLOGIST PROVIDED HISTORY: cough FINDINGS: The heart is normal in size. There is no evidence of pneumothorax, pleural effusion, infiltrate, or abnormal lung mass. There are a few small central old granulomatous calcifications and a couple in the lung bases. Minimal spondylitic changes are present in the vertebral endplates and in the osseous structures of the shoulders. Unremarkable PA and lateral chest with no evidence of acute cardiopulmonary process. CT ABDOMEN PELVIS W IV CONTRAST Additional Contrast? Radiologist Recommendation    Result Date: 8/26/2021  EXAMINATION: CT OF THE ABDOMEN AND PELVIS WITH CONTRAST 8/26/2021 2:05 pm TECHNIQUE: CT of the abdomen and pelvis was performed with the administration of intravenous contrast. Multiplanar reformatted images are provided for review. Dose modulation, iterative reconstruction, and/or weight based adjustment of the mA/kV was utilized to reduce the radiation dose to as low as reasonably achievable. COMPARISON: None. HISTORY: ORDERING SYSTEM PROVIDED HISTORY: Lower abdominal pain TECHNOLOGIST PROVIDED HISTORY: lower abdominal pain, black stools FINDINGS: Lower Chest: No acute findings. Organs: Findings compatible with hepatic steatosis. The gallbladder, pancreas, spleen, adrenals and left kidney appear unremarkable. The right kidney appears mildly edematous and there is asymmetric right perinephric stranding. A patchy area of decreased enhancement is present in the lateral interpolar region. No hydronephrosis or stone identified. GI/Bowel: Mild inflammatory change and diverticula is noted involving the distal ascending colon, near the right kidney. No evidence for obstruction. No gross perforation or abscess formation.   Scattered distal colonic diverticula are also present. Pelvis: No acute abnormality identified. Coarse calcifications are present in the right adnexa, which may be vascular. Peritoneum/Retroperitoneum: No free air or free fluid. The aorta is normal in caliber. The visceral branches are patent. Calcified atheromatous plaque is present. No lymphadenopathy. Bones/Soft Tissues: Postoperative findings in the lower abdominal wall. No acute osseous or acute soft tissue abnormality identified. 1.  Asymmetric right perinephric stranding and patchy area of decreased enhancement in the lateral interpolar right kidney. The combination of findings may represent focal pyelonephritis in the appropriate clinical setting. 2.  Diverticula and mild inflammatory change involving the ascending colon, near the right kidney. A mild acute diverticulitis or secondary colitis related to the inflammatory change in the kidney can have this appearance. ASSESSMENT:    Principal Problem:    Acute pyelonephritis  Active Problems:    Essential hypertension    Type 2 diabetes mellitus with diabetic nephropathy, with long-term current use of insulin ([facility])    Diverticulitis of colon without hemorrhage    Sepsis ([address]    Acute hyponatremia  Resolved Problems:    * No resolved hospital problems.  *      Patient Active Problem List    Diagnosis Date Noted    Sepsis ([address] 08/27/2021    Acute hyponatremia 08/27/2021    Acute pyelonephritis 08/26/2021    Type 2 diabetes mellitus with diabetic nephropathy, with long-term current use of insulin ([address] 08/26/2021    Diverticulitis of colon without hemorrhage 08/26/2021    Continuous opioid dependence ([address] 02/03/2021    Chronic pain of right knee 10/04/2019    Internal hemorrhoids     Diverticulosis of colon     Iron deficiency anemia 01/10/2019    Malabsorption due to intolerance, not elsewhere classified 01/10/2019    Microcytic anemia 12/20/2018    Dyslipidemia 12/20/2018    Uncontrolled type 2 diabetes mellitus with hyperglycemia ([facility] [address] 12/19/2018    Essential hypertension 12/19/2018    Lumbar back pain with radiculopathy affecting right lower extremity 12/19/2018       PLAN:  · [facility]  · Reduce pain meds. ?  Nausea causing  · Critical Care Time: 0  · Antibiotics      Familia Grayson MD , M.D.

## 2021-08-29 LAB
-: NORMAL
ABO/RH: NORMAL
ABSOLUTE EOS #: 0.24 K/UL (ref 0–0.44)
ABSOLUTE IMMATURE GRANULOCYTE: 0.16 K/UL (ref 0–0.3)
ABSOLUTE LYMPH #: 1.92 K/UL (ref 1.1–3.7)
ABSOLUTE MONO #: 1.31 K/UL (ref 0.1–1.2)
ALBUMIN SERPL-MCNC: 3 G/DL (ref 3.5–5.2)
ALBUMIN/GLOBULIN RATIO: 0.8 (ref 1–2.5)
ALP BLD-CCNC: 147 U/L (ref 35–104)
ALT SERPL-CCNC: 37 U/L (ref 5–33)
ANION GAP SERPL CALCULATED.3IONS-SCNC: 11 MMOL/L (ref 9–17)
ANTIBODY SCREEN: NEGATIVE
ARM BAND NUMBER: NORMAL
AST SERPL-CCNC: 37 U/L
BASOPHILS # BLD: 1 % (ref 0–2)
BASOPHILS ABSOLUTE: 0.07 K/UL (ref 0–0.2)
BILIRUB SERPL-MCNC: 0.22 MG/DL (ref 0.3–1.2)
BLD PROD TYP BPU: NORMAL
BUN BLDV-MCNC: 15 MG/DL (ref 6–20)
BUN/CREAT BLD: 15 (ref 9–20)
CALCIUM SERPL-MCNC: 7.5 MG/DL (ref 8.6–10.4)
CHLORIDE BLD-SCNC: 105 MMOL/L (ref 98–107)
CO2: 16 MMOL/L (ref 20–31)
CREAT SERPL-MCNC: 1.03 MG/DL (ref 0.5–0.9)
CROSSMATCH RESULT: NORMAL
CULTURE: ABNORMAL
DIFFERENTIAL TYPE: ABNORMAL
DISPENSE STATUS BLOOD BANK: NORMAL
EOSINOPHILS RELATIVE PERCENT: 2 % (ref 1–4)
EXPIRATION DATE: NORMAL
GFR AFRICAN AMERICAN: >60 ML/MIN
GFR NON-AFRICAN AMERICAN: 56 ML/MIN
GFR SERPL CREATININE-BSD FRML MDRD: ABNORMAL ML/MIN/{1.73_M2}
GFR SERPL CREATININE-BSD FRML MDRD: ABNORMAL ML/MIN/{1.73_M2}
GLUCOSE BLD-MCNC: 135 MG/DL (ref 74–100)
GLUCOSE BLD-MCNC: 136 MG/DL (ref 74–100)
GLUCOSE BLD-MCNC: 158 MG/DL (ref 74–100)
GLUCOSE BLD-MCNC: 177 MG/DL (ref 74–100)
GLUCOSE BLD-MCNC: 188 MG/DL (ref 70–99)
HCT VFR BLD CALC: 25.4 % (ref 36.3–47.1)
HEMOGLOBIN: 7.9 G/DL (ref 11.9–15.1)
IMMATURE GRANULOCYTES: 1 %
LYMPHOCYTES # BLD: 13 % (ref 24–43)
Lab: ABNORMAL
Lab: ABNORMAL
MCH RBC QN AUTO: 22.1 PG (ref 25.2–33.5)
MCHC RBC AUTO-ENTMCNC: 31.1 G/DL (ref 28.4–34.8)
MCV RBC AUTO: 70.9 FL (ref 82.6–102.9)
MONOCYTES # BLD: 9 % (ref 3–12)
NRBC AUTOMATED: 0 PER 100 WBC
PDW BLD-RTO: 16.9 % (ref 11.8–14.4)
PLATELET # BLD: 389 K/UL (ref 138–453)
PLATELET ESTIMATE: ABNORMAL
PMV BLD AUTO: 10 FL (ref 8.1–13.5)
POTASSIUM SERPL-SCNC: 3.7 MMOL/L (ref 3.7–5.3)
RBC # BLD: 3.58 M/UL (ref 3.95–5.11)
RBC # BLD: ABNORMAL 10*6/UL
REASON FOR REJECTION: NORMAL
SEG NEUTROPHILS: 74 % (ref 36–65)
SEGMENTED NEUTROPHILS ABSOLUTE COUNT: 11.47 K/UL (ref 1.5–8.1)
SODIUM BLD-SCNC: 132 MMOL/L (ref 135–144)
SPECIMEN DESCRIPTION: ABNORMAL
SPECIMEN DESCRIPTION: ABNORMAL
TOTAL PROTEIN: 6.9 G/DL (ref 6.4–8.3)
TRANSFUSION STATUS: NORMAL
UNIT DIVISION: 0
UNIT NUMBER: NORMAL
WBC # BLD: 15.2 K/UL (ref 3.5–11.3)
WBC # BLD: ABNORMAL 10*3/UL
ZZ NTE CLEAN UP: ORDERED TEST: NORMAL
ZZ NTE WITH NAME CLEAN UP: SPECIMEN SOURCE: NORMAL

## 2021-08-29 PROCEDURE — 2500000003 HC RX 250 WO HCPCS: Performed by: FAMILY MEDICINE

## 2021-08-29 PROCEDURE — 2580000003 HC RX 258: Performed by: INTERNAL MEDICINE

## 2021-08-29 PROCEDURE — 6370000000 HC RX 637 (ALT 250 FOR IP): Performed by: INTERNAL MEDICINE

## 2021-08-29 PROCEDURE — 80053 COMPREHEN METABOLIC PANEL: CPT

## 2021-08-29 PROCEDURE — 2580000003 HC RX 258: Performed by: FAMILY MEDICINE

## 2021-08-29 PROCEDURE — 1200000000 HC SEMI PRIVATE

## 2021-08-29 PROCEDURE — 94761 N-INVAS EAR/PLS OXIMETRY MLT: CPT

## 2021-08-29 PROCEDURE — 85025 COMPLETE CBC W/AUTO DIFF WBC: CPT

## 2021-08-29 PROCEDURE — 6370000000 HC RX 637 (ALT 250 FOR IP): Performed by: FAMILY MEDICINE

## 2021-08-29 PROCEDURE — 6360000002 HC RX W HCPCS: Performed by: FAMILY MEDICINE

## 2021-08-29 PROCEDURE — 36415 COLL VENOUS BLD VENIPUNCTURE: CPT

## 2021-08-29 PROCEDURE — 82947 ASSAY GLUCOSE BLOOD QUANT: CPT

## 2021-08-29 RX ADMIN — DOCUSATE SODIUM 100 MG: 100 CAPSULE ORAL at 20:46

## 2021-08-29 RX ADMIN — ATENOLOL 50 MG: 50 TABLET ORAL at 08:01

## 2021-08-29 RX ADMIN — FERROUS SULFATE TAB 325 MG (65 MG ELEMENTAL FE) 325 MG: 325 (65 FE) TAB at 08:01

## 2021-08-29 RX ADMIN — ATORVASTATIN CALCIUM 40 MG: 40 TABLET, FILM COATED ORAL at 08:01

## 2021-08-29 RX ADMIN — CITALOPRAM HYDROBROMIDE 10 MG: 10 TABLET ORAL at 08:01

## 2021-08-29 RX ADMIN — METOCLOPRAMIDE 5 MG: 5 TABLET ORAL at 06:44

## 2021-08-29 RX ADMIN — NIFEDIPINE 60 MG: 30 TABLET, EXTENDED RELEASE ORAL at 08:01

## 2021-08-29 RX ADMIN — METOCLOPRAMIDE 5 MG: 5 TABLET ORAL at 11:30

## 2021-08-29 RX ADMIN — PIPERACILLIN AND TAZOBACTAM 3375 MG: 3; .375 INJECTION, POWDER, FOR SOLUTION INTRAVENOUS at 05:36

## 2021-08-29 RX ADMIN — GUAIFENESIN AND DEXTROMETHORPHAN 5 ML: 100; 10 SYRUP ORAL at 07:33

## 2021-08-29 RX ADMIN — PIPERACILLIN AND TAZOBACTAM 3375 MG: 3; .375 INJECTION, POWDER, FOR SOLUTION INTRAVENOUS at 21:33

## 2021-08-29 RX ADMIN — GLIPIZIDE 2.5 MG: 5 TABLET ORAL at 06:44

## 2021-08-29 RX ADMIN — POTASSIUM CHLORIDE 10 MEQ: 750 TABLET, EXTENDED RELEASE ORAL at 20:47

## 2021-08-29 RX ADMIN — METRONIDAZOLE 500 MG: 500 INJECTION, SOLUTION INTRAVENOUS at 11:30

## 2021-08-29 RX ADMIN — GUAIFENESIN AND DEXTROMETHORPHAN 5 ML: 100; 10 SYRUP ORAL at 01:50

## 2021-08-29 RX ADMIN — METRONIDAZOLE 500 MG: 500 INJECTION, SOLUTION INTRAVENOUS at 19:41

## 2021-08-29 RX ADMIN — HYDROCHLOROTHIAZIDE 25 MG: 25 TABLET ORAL at 08:01

## 2021-08-29 RX ADMIN — LOSARTAN POTASSIUM 100 MG: 50 TABLET, FILM COATED ORAL at 08:01

## 2021-08-29 RX ADMIN — SODIUM CHLORIDE: 9 INJECTION, SOLUTION INTRAVENOUS at 19:35

## 2021-08-29 RX ADMIN — PIPERACILLIN AND TAZOBACTAM 3375 MG: 3; .375 INJECTION, POWDER, FOR SOLUTION INTRAVENOUS at 13:40

## 2021-08-29 RX ADMIN — CLONIDINE HYDROCHLORIDE 0.1 MG: 0.1 TABLET ORAL at 20:46

## 2021-08-29 RX ADMIN — INSULIN LISPRO 2 UNITS: 100 INJECTION, SOLUTION INTRAVENOUS; SUBCUTANEOUS at 20:46

## 2021-08-29 RX ADMIN — SODIUM CHLORIDE: 9 INJECTION, SOLUTION INTRAVENOUS at 11:30

## 2021-08-29 RX ADMIN — METFORMIN HYDROCHLORIDE 1000 MG: 500 TABLET, EXTENDED RELEASE ORAL at 08:01

## 2021-08-29 RX ADMIN — POTASSIUM CHLORIDE 10 MEQ: 750 TABLET, EXTENDED RELEASE ORAL at 08:01

## 2021-08-29 RX ADMIN — METOCLOPRAMIDE 5 MG: 5 TABLET ORAL at 16:17

## 2021-08-29 RX ADMIN — FERROUS SULFATE TAB 325 MG (65 MG ELEMENTAL FE) 325 MG: 325 (65 FE) TAB at 20:46

## 2021-08-29 RX ADMIN — CLONIDINE HYDROCHLORIDE 0.1 MG: 0.1 TABLET ORAL at 08:01

## 2021-08-29 RX ADMIN — INSULIN GLARGINE 56 UNITS: 100 INJECTION, SOLUTION SUBCUTANEOUS at 20:43

## 2021-08-29 RX ADMIN — METRONIDAZOLE 500 MG: 500 INJECTION, SOLUTION INTRAVENOUS at 04:05

## 2021-08-29 RX ADMIN — ALOGLIPTIN 25 MG: 25 TABLET, FILM COATED ORAL at 08:01

## 2021-08-29 ASSESSMENT — PAIN SCALES - GENERAL
PAINLEVEL_OUTOF10: 7
PAINLEVEL_OUTOF10: 0
PAINLEVEL_OUTOF10: 0

## 2021-08-29 NOTE — PLAN OF CARE
Problem: Falls - Risk of:  Goal: Will remain free from falls  Description: Will remain free from falls  8/29/2021 0915 by Veda Gonzalez RN  Outcome: Met This Shift  Note: Patient remains free of falls while ambulating to the bathroom and around the room  8/28/2021 2317 by Mercy Gomez RN  Outcome: Ongoing  Note: Bed in low position. Wheels locked. 2/4 side rails are up. Fall band on. Call light within reach. Problem: Pain:  Goal: Pain level will decrease  Description: Pain level will decrease  8/29/2021 0915 by Veda Gonzalez RN  Outcome: Ongoing  Note: Patient denies pain at this time  8/28/2021 2317 by Mercy Gomez RN  Outcome: Ongoing  Note: Pt is able to verbalize when in pain. Pt denies pain at this time. Will continue to monitor. Problem: Serum Glucose Level - Abnormal:  Goal: Ability to maintain appropriate glucose levels will improve  Description: Ability to maintain appropriate glucose levels will improve  8/29/2021 0915 by Veda Gonzalez RN  Outcome: Ongoing  Note: Patients BS remains high and requires insulin coverage. 8/28/2021 2317 by Mercy oGmez RN  Outcome: Ongoing  Note: Monitoring blood sugars and insulin given as ordered.

## 2021-08-29 NOTE — PROGRESS NOTES
Pt sitting up in chair when writer entered room. Pt is A&O x4. Vitals and assessment as charted. Pt stated she had a headache and rated it a 7 out of 10. Tylenol 650mg PO given. Will continue to monitor. Call light within reach.

## 2021-08-29 NOTE — PLAN OF CARE
Problem: Pain:  Goal: Pain level will decrease  Description: Pain level will decrease  Outcome: Ongoing  Note: Pt is able to verbalize when in pain. Pt denies pain at this time. Will continue to monitor. Problem: Falls - Risk of:  Goal: Will remain free from falls  Description: Will remain free from falls  Outcome: Ongoing  Note: Bed in low position. Wheels locked. 2/4 side rails are up. Fall band on. Call light within reach. Problem: Serum Glucose Level - Abnormal:  Goal: Ability to maintain appropriate glucose levels will improve  Description: Ability to maintain appropriate glucose levels will improve  Outcome: Ongoing  Note: Monitoring blood sugars and insulin given as ordered.

## 2021-08-29 NOTE — PROGRESS NOTES
Ronny Flynn M.D. ICU Progress Note     SUBJECTIVE:    Pt seen and examined in the ICU for follow up of sepsis due to acute diverticulitis and Acute pyelonephritis. BP's have been stable, elevated. She still feels nauseous and has lower abdominal pain. She denies fever or chills today. ROS:   Constitutional: negative  for fevers, and negative for chills. Respiratory: negative for shortness of breath, negative for cough, and negative for wheezing  Cardiovascular: negative for chest pain, and negative for palpitations  Gastrointestinal: positive for abdominal pain, positive for nausea,positive for vomiting, negative for diarrhea, and negative for constipation    All other systems were reviewed with the patient and are negative unless otherwise stated in HPI. OBJECTIVE:    Vitals:   Temp: 97.4 °F (36.3 °C)  Temp range:    Temp  Av.2 °F (36.8 °C)  Min: 97.4 °F (36.3 °C)  Max: 99.1 °F (37.3 °C)    BP: (!) 175/71  BP Range:      Systolic (38FMO), PMI:493 , Min:123 , TDN:248      Diastolic (33SSJ), VFN:76, Min:57, Max:85    Pulse: 98  Pulse Range:    Pulse  Av.1  Min: 92  Max: 100    Resp: 28  Resp Range:   Resp  Av.3  Min: 14  Max: 28    SpO2: 95 % on room air  SpO2 range:   SpO2  Av.1 %  Min: 95 %  Max: 99 %    24HR INTAKE/OUTPUT:      Intake/Output Summary (Last 24 hours) at 2021 0856  Last data filed at 2021 0851  Gross per 24 hour   Intake 5588 ml   Output 300 ml   Net 5288 ml     -----------------------------------------------------------------  Exam:  GEN:    Awake, alert and oriented x3. EYES:  EOMI, pupils equal   NECK: Supple. No lymphadenopathy. No carotid bruit  CVS:    regular rate and rhythm, no audible murmur  PULM:  CTA, no wheezes, rales or rhonchi, no acute respiratory distress  ABD:    Bowels sounds normal.  Abdomen is soft. No distention. LLQ tenderness to palpation. EXT:   no edema bilaterally . No calf tenderness. NEURO: Moves all extremities. Motor and sensory are grossly intact  SKIN:  No rashes. No skin lesions.     -----------------------------------------------------------------  Diagnostic Data:    · All lines, drips, IV sites and medications reviewed  · All available data reviewed  Lab Results   Component Value Date    WBC 15.2 (H) 08/29/2021    HGB 7.9 (L) 08/29/2021    MCV 70.9 (L) 08/29/2021     08/29/2021     Lab Results   Component Value Date    SEGS 74 (H) 08/29/2021    LYMPHOPCT 13 (L) 08/29/2021    MONOPCT 9 08/29/2021    EOSRELPCT 2 08/29/2021    BASOPCT 1 08/29/2021    NEUTROABS 11.47 (H) 08/29/2021      Lab Results   Component Value Date    GLUCOSE 188 (H) 08/29/2021    BUN 15 08/29/2021    CREATININE 1.03 (H) 08/29/2021     (L) 08/29/2021    K 3.7 08/29/2021    CALCIUM 7.5 (L) 08/29/2021     08/29/2021    CO2 16 (L) 08/29/2021     No results found for: LACTA    No orders to display       PROBLEM LIST:  Principal Problem:    Acute pyelonephritis  Active Problems:    Essential hypertension    Type 2 diabetes mellitus with diabetic nephropathy, with long-term current use of insulin ([facility])    Diverticulitis of colon without hemorrhage    Sepsis ([facility] [address]    Acute hyponatremia  Resolved Problems:    * No resolved hospital problems.  *      ASSESSMENT / PLAN:  Sepsis due to acute pyelonephritis and acute diverticulitis  · Continue Zosyn / Flagyl to cover both the pyelo and diverticulitis  · Blood cultures = GNRs x 2 bottles  · Zofran prn  · Diabetes mellitus type 2  · Continue Lantus / Humalog SS  · Continue Alogliptan  · Hypoglycemic protocol in place  · Hypertension  · Continue Atenolol, Clonidine, HCTZ, Losartan, Nifedipine  · Nutrition status: at risk for malnutrition: dietician consult initiated during hositalization  · DVT prophylaxis: Lovenox   · High risk medications: none   · Disposition:  Discharge plan is pending  · Total critical care time caring for this patient with life threatening, unstable organ failure, including direct patient contact, management of life support systems, review of data including imaging and labs, discussions with other team members and physicians at least 30 minutes so far today, excluding separately billable procedures.     Madiha Rivers MD , MDONALD.  8/29/2021  8:56 AM

## 2021-08-30 PROBLEM — A41.51 SEPSIS DUE TO ESCHERICHIA COLI (HCC): Status: ACTIVE | Noted: 2021-08-27

## 2021-08-30 LAB
ABSOLUTE EOS #: 0.36 K/UL (ref 0–0.44)
ABSOLUTE IMMATURE GRANULOCYTE: 0.28 K/UL (ref 0–0.3)
ABSOLUTE LYMPH #: 2.25 K/UL (ref 1.1–3.7)
ABSOLUTE MONO #: 1.27 K/UL (ref 0.1–1.2)
ALBUMIN SERPL-MCNC: 3 G/DL (ref 3.5–5.2)
ALBUMIN/GLOBULIN RATIO: 0.8 (ref 1–2.5)
ALP BLD-CCNC: 159 U/L (ref 35–104)
ALT SERPL-CCNC: 29 U/L (ref 5–33)
ANION GAP SERPL CALCULATED.3IONS-SCNC: 12 MMOL/L (ref 9–17)
AST SERPL-CCNC: 25 U/L
BASOPHILS # BLD: 1 % (ref 0–2)
BASOPHILS ABSOLUTE: 0.11 K/UL (ref 0–0.2)
BILIRUB SERPL-MCNC: 0.18 MG/DL (ref 0.3–1.2)
BUN BLDV-MCNC: 15 MG/DL (ref 6–20)
BUN/CREAT BLD: 13 (ref 9–20)
CALCIUM SERPL-MCNC: 7.7 MG/DL (ref 8.6–10.4)
CHLORIDE BLD-SCNC: 106 MMOL/L (ref 98–107)
CO2: 17 MMOL/L (ref 20–31)
CREAT SERPL-MCNC: 1.2 MG/DL (ref 0.5–0.9)
DATE, STOOL #1: ABNORMAL
DATE, STOOL #2: ABNORMAL
DATE, STOOL #3: ABNORMAL
DIFFERENTIAL TYPE: ABNORMAL
EOSINOPHILS RELATIVE PERCENT: 3 % (ref 1–4)
GFR AFRICAN AMERICAN: 57 ML/MIN
GFR NON-AFRICAN AMERICAN: 47 ML/MIN
GFR SERPL CREATININE-BSD FRML MDRD: ABNORMAL ML/MIN/{1.73_M2}
GFR SERPL CREATININE-BSD FRML MDRD: ABNORMAL ML/MIN/{1.73_M2}
GLUCOSE BLD-MCNC: 104 MG/DL (ref 74–100)
GLUCOSE BLD-MCNC: 114 MG/DL (ref 74–100)
GLUCOSE BLD-MCNC: 141 MG/DL (ref 70–99)
GLUCOSE BLD-MCNC: 142 MG/DL (ref 74–100)
GLUCOSE BLD-MCNC: 164 MG/DL (ref 74–100)
HCT VFR BLD CALC: 23.8 % (ref 36.3–47.1)
HEMOCCULT SP1 STL QL: POSITIVE
HEMOCCULT SP2 STL QL: ABNORMAL
HEMOCCULT SP3 STL QL: ABNORMAL
HEMOGLOBIN: 7.3 G/DL (ref 11.9–15.1)
IMMATURE GRANULOCYTES: 2 %
LYMPHOCYTES # BLD: 16 % (ref 24–43)
MCH RBC QN AUTO: 21.7 PG (ref 25.2–33.5)
MCHC RBC AUTO-ENTMCNC: 30.7 G/DL (ref 28.4–34.8)
MCV RBC AUTO: 70.6 FL (ref 82.6–102.9)
MONOCYTES # BLD: 9 % (ref 3–12)
NRBC AUTOMATED: 0 PER 100 WBC
PDW BLD-RTO: 17.1 % (ref 11.8–14.4)
PLATELET # BLD: 394 K/UL (ref 138–453)
PLATELET ESTIMATE: ABNORMAL
PMV BLD AUTO: 9.8 FL (ref 8.1–13.5)
POTASSIUM SERPL-SCNC: 3.7 MMOL/L (ref 3.7–5.3)
RBC # BLD: 3.37 M/UL (ref 3.95–5.11)
RBC # BLD: ABNORMAL 10*6/UL
SEG NEUTROPHILS: 69 % (ref 36–65)
SEGMENTED NEUTROPHILS ABSOLUTE COUNT: 9.58 K/UL (ref 1.5–8.1)
SODIUM BLD-SCNC: 135 MMOL/L (ref 135–144)
TIME, STOOL #1: 1345
TIME, STOOL #2: ABNORMAL
TIME, STOOL #3: ABNORMAL
TOTAL PROTEIN: 7 G/DL (ref 6.4–8.3)
WBC # BLD: 13.9 K/UL (ref 3.5–11.3)
WBC # BLD: ABNORMAL 10*3/UL

## 2021-08-30 PROCEDURE — 94761 N-INVAS EAR/PLS OXIMETRY MLT: CPT

## 2021-08-30 PROCEDURE — 2580000003 HC RX 258: Performed by: FAMILY MEDICINE

## 2021-08-30 PROCEDURE — 2500000003 HC RX 250 WO HCPCS: Performed by: FAMILY MEDICINE

## 2021-08-30 PROCEDURE — 1200000000 HC SEMI PRIVATE

## 2021-08-30 PROCEDURE — 36415 COLL VENOUS BLD VENIPUNCTURE: CPT

## 2021-08-30 PROCEDURE — 2580000003 HC RX 258: Performed by: INTERNAL MEDICINE

## 2021-08-30 PROCEDURE — 80053 COMPREHEN METABOLIC PANEL: CPT

## 2021-08-30 PROCEDURE — 6370000000 HC RX 637 (ALT 250 FOR IP): Performed by: FAMILY MEDICINE

## 2021-08-30 PROCEDURE — 6360000002 HC RX W HCPCS: Performed by: FAMILY MEDICINE

## 2021-08-30 PROCEDURE — 82947 ASSAY GLUCOSE BLOOD QUANT: CPT

## 2021-08-30 PROCEDURE — 85025 COMPLETE CBC W/AUTO DIFF WBC: CPT

## 2021-08-30 PROCEDURE — 82272 OCCULT BLD FECES 1-3 TESTS: CPT

## 2021-08-30 RX ORDER — INSULIN GLARGINE 100 [IU]/ML
50 INJECTION, SOLUTION SUBCUTANEOUS NIGHTLY
Status: DISCONTINUED | OUTPATIENT
Start: 2021-08-31 | End: 2021-09-03 | Stop reason: HOSPADM

## 2021-08-30 RX ORDER — SODIUM CHLORIDE 9 MG/ML
INJECTION, SOLUTION INTRAVENOUS CONTINUOUS
Status: DISCONTINUED | OUTPATIENT
Start: 2021-08-30 | End: 2021-08-31

## 2021-08-30 RX ADMIN — PIPERACILLIN AND TAZOBACTAM 3375 MG: 3; .375 INJECTION, POWDER, FOR SOLUTION INTRAVENOUS at 05:37

## 2021-08-30 RX ADMIN — SODIUM CHLORIDE: 9 INJECTION, SOLUTION INTRAVENOUS at 07:14

## 2021-08-30 RX ADMIN — PIPERACILLIN AND TAZOBACTAM 3375 MG: 3; .375 INJECTION, POWDER, FOR SOLUTION INTRAVENOUS at 13:42

## 2021-08-30 RX ADMIN — METRONIDAZOLE 500 MG: 500 INJECTION, SOLUTION INTRAVENOUS at 03:56

## 2021-08-30 RX ADMIN — FERROUS SULFATE TAB 325 MG (65 MG ELEMENTAL FE) 325 MG: 325 (65 FE) TAB at 21:07

## 2021-08-30 RX ADMIN — ALOGLIPTIN 25 MG: 25 TABLET, FILM COATED ORAL at 08:29

## 2021-08-30 RX ADMIN — METFORMIN HYDROCHLORIDE 1000 MG: 500 TABLET, EXTENDED RELEASE ORAL at 08:30

## 2021-08-30 RX ADMIN — ATENOLOL 50 MG: 50 TABLET ORAL at 08:30

## 2021-08-30 RX ADMIN — FERROUS SULFATE TAB 325 MG (65 MG ELEMENTAL FE) 325 MG: 325 (65 FE) TAB at 08:30

## 2021-08-30 RX ADMIN — ATORVASTATIN CALCIUM 40 MG: 40 TABLET, FILM COATED ORAL at 08:30

## 2021-08-30 RX ADMIN — METRONIDAZOLE 500 MG: 500 INJECTION, SOLUTION INTRAVENOUS at 21:06

## 2021-08-30 RX ADMIN — NIFEDIPINE 60 MG: 30 TABLET, EXTENDED RELEASE ORAL at 08:29

## 2021-08-30 RX ADMIN — PIPERACILLIN AND TAZOBACTAM 3375 MG: 3; .375 INJECTION, POWDER, FOR SOLUTION INTRAVENOUS at 23:03

## 2021-08-30 RX ADMIN — METOCLOPRAMIDE 5 MG: 5 TABLET ORAL at 11:37

## 2021-08-30 RX ADMIN — POTASSIUM CHLORIDE 10 MEQ: 750 TABLET, EXTENDED RELEASE ORAL at 08:30

## 2021-08-30 RX ADMIN — METOCLOPRAMIDE 5 MG: 5 TABLET ORAL at 16:07

## 2021-08-30 RX ADMIN — HYDROCHLOROTHIAZIDE 25 MG: 25 TABLET ORAL at 08:29

## 2021-08-30 RX ADMIN — METOCLOPRAMIDE 5 MG: 5 TABLET ORAL at 07:16

## 2021-08-30 RX ADMIN — CLONIDINE HYDROCHLORIDE 0.1 MG: 0.1 TABLET ORAL at 21:06

## 2021-08-30 RX ADMIN — LOSARTAN POTASSIUM 100 MG: 50 TABLET, FILM COATED ORAL at 08:30

## 2021-08-30 RX ADMIN — CLONIDINE HYDROCHLORIDE 0.1 MG: 0.1 TABLET ORAL at 08:29

## 2021-08-30 RX ADMIN — GLIPIZIDE 2.5 MG: 5 TABLET ORAL at 08:30

## 2021-08-30 RX ADMIN — CITALOPRAM HYDROBROMIDE 10 MG: 10 TABLET ORAL at 08:30

## 2021-08-30 RX ADMIN — POTASSIUM CHLORIDE 10 MEQ: 750 TABLET, EXTENDED RELEASE ORAL at 21:07

## 2021-08-30 RX ADMIN — METRONIDAZOLE 500 MG: 500 INJECTION, SOLUTION INTRAVENOUS at 11:37

## 2021-08-30 ASSESSMENT — PAIN SCALES - GENERAL
PAINLEVEL_OUTOF10: 0

## 2021-08-30 NOTE — FLOWSHEET NOTE
Awake, resting in bed watching TV. Vitals checked and assessment completed. Denies pain at present time.  this morning.  Call light within reach, continue to monitor

## 2021-08-30 NOTE — PROGRESS NOTES
Progress Note  Cristel Ramos MD    OBJECTIVE:    Patient seen for f/u of Acute pyelonephritis. She continues to have rt lower abdominal pain and nausea     ROS:   Constitutional: negative  for fevers, and negative for chills. Respiratory: negative for shortness of breath, negative for cough, and negative for wheezing  Cardiovascular: negative for chest pain, and negative for palpitations  Gastrointestinal: positive for abdominal pain, positive for nausea,negative for vomiting, negative for diarrhea, and negative for constipation     All other systems were reviewed with the patient and are negative unless otherwise stated in HPI    OBJECTIVE:  Vitals:   Temp: 99.9 °F (37.7 °C)  BP: 139/80  Resp: 18  Pulse: 89  SpO2: 97 %    24HR INTAKE/OUTPUT:      Intake/Output Summary (Last 24 hours) at 8/30/2021 0808  Last data filed at 8/29/2021 1127  Gross per 24 hour   Intake 1914 ml   Output --   Net 1914 ml     -----------------------------------------------------------------  Exam:  GEN:    Awake, alert and oriented x3. EYES:  EOMI, pupils equal   NECK: Supple. No lymphadenopathy. No carotid bruit  CVS:    regular rate and rhythm, no audible murmur  PULM:  CTA, no wheezes, rales or rhonchi, no acute respiratory distress  ABD:    Bowels sounds normal.  Abdomen is soft. No distention. RLQ tenderness to palpation. EXT:   no edema bilaterally . No calf tenderness. NEURO: Moves all extremities. Motor and sensory are grossly intact  SKIN:  No rashes.   No skin lesions.    -----------------------------------------------------------------  Diagnostic Data:    · All available data reviewed  Lab Results   Component Value Date    WBC 13.9 (H) 08/30/2021    HGB 7.3 (L) 08/30/2021    MCV 70.6 (L) 08/30/2021     08/30/2021      Lab Results   Component Value Date    GLUCOSE 141 (H) 08/30/2021    BUN 15 08/30/2021    CREATININE 1.20 (H) 08/30/2021     08/30/2021    K 3.7 08/30/2021    CALCIUM 7.7 (L) 08/30/2021  08/30/2021    CO2 17 (L) 08/30/2021       PROBLEM LIST:  Principal Problem:    Acute pyelonephritis  Active Problems:    Essential hypertension    Type 2 diabetes mellitus with diabetic nephropathy, with long-term current use of insulin (HCC)    Diverticulitis of colon without hemorrhage    Sepsis due to Escherichia coli (HCC)    Acute hyponatremia  Resolved Problems:    * No resolved hospital problems. *      ASSESSMENT / PLAN:  Acute pyelonephritis  · Continue iv rocephin as per sensitivity    · Sepsis- to continue iv rocephin as per sensitivity  · Anemia- hb 7.7 after transfusion. Had colonoscopy in 2019- showed diverticulosis. egd showed erosive duodenitis. If hemacult positive- will need repeat egd  · Acute diverticulitis- - still symptomatic- continue iv rocephin and flagyl- repeat ct abdomen if no improvement  · Type 2 dm- blood sugars improving   · Nutrition status:  Well developed, well nourished with no malnutrition  · DVT prophylaxis: Lovenox   · High risk medications: none   · Disposition:  Discharge plan is home    Saleem Sarabia MD , M.D.  8/30/2021  8:08 AM

## 2021-08-31 LAB
ABSOLUTE EOS #: 0.45 K/UL (ref 0–0.44)
ABSOLUTE IMMATURE GRANULOCYTE: 0.33 K/UL (ref 0–0.3)
ABSOLUTE LYMPH #: 2.25 K/UL (ref 1.1–3.7)
ABSOLUTE MONO #: 1.32 K/UL (ref 0.1–1.2)
ALBUMIN SERPL-MCNC: 2.9 G/DL (ref 3.5–5.2)
ALBUMIN/GLOBULIN RATIO: 0.8 (ref 1–2.5)
ALP BLD-CCNC: 153 U/L (ref 35–104)
ALT SERPL-CCNC: 25 U/L (ref 5–33)
ANION GAP SERPL CALCULATED.3IONS-SCNC: 11 MMOL/L (ref 9–17)
AST SERPL-CCNC: 17 U/L
BASOPHILS # BLD: 1 % (ref 0–2)
BASOPHILS ABSOLUTE: 0.07 K/UL (ref 0–0.2)
BILIRUB SERPL-MCNC: 0.19 MG/DL (ref 0.3–1.2)
BUN BLDV-MCNC: 11 MG/DL (ref 6–20)
BUN/CREAT BLD: 11 (ref 9–20)
CALCIUM SERPL-MCNC: 7.9 MG/DL (ref 8.6–10.4)
CHLORIDE BLD-SCNC: 107 MMOL/L (ref 98–107)
CO2: 17 MMOL/L (ref 20–31)
CREAT SERPL-MCNC: 0.98 MG/DL (ref 0.5–0.9)
DIFFERENTIAL TYPE: ABNORMAL
EOSINOPHILS RELATIVE PERCENT: 4 % (ref 1–4)
GFR AFRICAN AMERICAN: >60 ML/MIN
GFR NON-AFRICAN AMERICAN: 60 ML/MIN
GFR SERPL CREATININE-BSD FRML MDRD: ABNORMAL ML/MIN/{1.73_M2}
GFR SERPL CREATININE-BSD FRML MDRD: ABNORMAL ML/MIN/{1.73_M2}
GLUCOSE BLD-MCNC: 106 MG/DL (ref 74–100)
GLUCOSE BLD-MCNC: 115 MG/DL (ref 70–99)
GLUCOSE BLD-MCNC: 116 MG/DL (ref 74–100)
GLUCOSE BLD-MCNC: 157 MG/DL (ref 74–100)
GLUCOSE BLD-MCNC: 191 MG/DL (ref 74–100)
GLUCOSE BLD-MCNC: 79 MG/DL (ref 74–100)
GLUCOSE BLD-MCNC: 90 MG/DL (ref 74–100)
HCT VFR BLD CALC: 22.9 % (ref 36.3–47.1)
HEMOGLOBIN: 7.2 G/DL (ref 11.9–15.1)
IMMATURE GRANULOCYTES: 3 %
LYMPHOCYTES # BLD: 18 % (ref 24–43)
MCH RBC QN AUTO: 22.4 PG (ref 25.2–33.5)
MCHC RBC AUTO-ENTMCNC: 31.4 G/DL (ref 28.4–34.8)
MCV RBC AUTO: 71.1 FL (ref 82.6–102.9)
MONOCYTES # BLD: 11 % (ref 3–12)
NRBC AUTOMATED: 0 PER 100 WBC
PDW BLD-RTO: 17.4 % (ref 11.8–14.4)
PLATELET # BLD: 441 K/UL (ref 138–453)
PLATELET ESTIMATE: ABNORMAL
PMV BLD AUTO: 9.3 FL (ref 8.1–13.5)
POTASSIUM SERPL-SCNC: 4 MMOL/L (ref 3.7–5.3)
RBC # BLD: 3.22 M/UL (ref 3.95–5.11)
RBC # BLD: ABNORMAL 10*6/UL
SEG NEUTROPHILS: 63 % (ref 36–65)
SEGMENTED NEUTROPHILS ABSOLUTE COUNT: 7.84 K/UL (ref 1.5–8.1)
SODIUM BLD-SCNC: 135 MMOL/L (ref 135–144)
TOTAL PROTEIN: 6.7 G/DL (ref 6.4–8.3)
WBC # BLD: 12.3 K/UL (ref 3.5–11.3)
WBC # BLD: ABNORMAL 10*3/UL

## 2021-08-31 PROCEDURE — 82947 ASSAY GLUCOSE BLOOD QUANT: CPT

## 2021-08-31 PROCEDURE — 1200000000 HC SEMI PRIVATE

## 2021-08-31 PROCEDURE — 80053 COMPREHEN METABOLIC PANEL: CPT

## 2021-08-31 PROCEDURE — 2580000003 HC RX 258: Performed by: FAMILY MEDICINE

## 2021-08-31 PROCEDURE — 6360000002 HC RX W HCPCS: Performed by: FAMILY MEDICINE

## 2021-08-31 PROCEDURE — 85025 COMPLETE CBC W/AUTO DIFF WBC: CPT

## 2021-08-31 PROCEDURE — 94761 N-INVAS EAR/PLS OXIMETRY MLT: CPT

## 2021-08-31 PROCEDURE — 36415 COLL VENOUS BLD VENIPUNCTURE: CPT

## 2021-08-31 PROCEDURE — 99253 IP/OBS CNSLTJ NEW/EST LOW 45: CPT | Performed by: SURGERY

## 2021-08-31 PROCEDURE — 6370000000 HC RX 637 (ALT 250 FOR IP): Performed by: FAMILY MEDICINE

## 2021-08-31 PROCEDURE — 2500000003 HC RX 250 WO HCPCS: Performed by: FAMILY MEDICINE

## 2021-08-31 RX ORDER — FUROSEMIDE 10 MG/ML
40 INJECTION INTRAMUSCULAR; INTRAVENOUS ONCE
Status: COMPLETED | OUTPATIENT
Start: 2021-08-31 | End: 2021-08-31

## 2021-08-31 RX ADMIN — CEFTRIAXONE 1000 MG: 1 INJECTION, POWDER, FOR SOLUTION INTRAMUSCULAR; INTRAVENOUS at 08:47

## 2021-08-31 RX ADMIN — FUROSEMIDE 40 MG: 10 INJECTION, SOLUTION INTRAMUSCULAR; INTRAVENOUS at 08:43

## 2021-08-31 RX ADMIN — ALOGLIPTIN 25 MG: 25 TABLET, FILM COATED ORAL at 08:42

## 2021-08-31 RX ADMIN — METRONIDAZOLE 500 MG: 500 INJECTION, SOLUTION INTRAVENOUS at 12:38

## 2021-08-31 RX ADMIN — METOCLOPRAMIDE 5 MG: 5 TABLET ORAL at 16:31

## 2021-08-31 RX ADMIN — METOCLOPRAMIDE 5 MG: 5 TABLET ORAL at 06:36

## 2021-08-31 RX ADMIN — CLONIDINE HYDROCHLORIDE 0.1 MG: 0.1 TABLET ORAL at 08:42

## 2021-08-31 RX ADMIN — POTASSIUM CHLORIDE 10 MEQ: 750 TABLET, EXTENDED RELEASE ORAL at 20:41

## 2021-08-31 RX ADMIN — PIPERACILLIN AND TAZOBACTAM 3375 MG: 3; .375 INJECTION, POWDER, FOR SOLUTION INTRAVENOUS at 05:21

## 2021-08-31 RX ADMIN — GLIPIZIDE 2.5 MG: 5 TABLET ORAL at 08:42

## 2021-08-31 RX ADMIN — METOCLOPRAMIDE 5 MG: 5 TABLET ORAL at 11:28

## 2021-08-31 RX ADMIN — ATORVASTATIN CALCIUM 40 MG: 40 TABLET, FILM COATED ORAL at 08:43

## 2021-08-31 RX ADMIN — SODIUM CHLORIDE: 9 INJECTION, SOLUTION INTRAVENOUS at 03:20

## 2021-08-31 RX ADMIN — ATENOLOL 50 MG: 50 TABLET ORAL at 08:42

## 2021-08-31 RX ADMIN — METFORMIN HYDROCHLORIDE 1000 MG: 500 TABLET, EXTENDED RELEASE ORAL at 08:42

## 2021-08-31 RX ADMIN — METRONIDAZOLE 500 MG: 500 INJECTION, SOLUTION INTRAVENOUS at 04:11

## 2021-08-31 RX ADMIN — SODIUM CHLORIDE, PRESERVATIVE FREE 10 ML: 5 INJECTION INTRAVENOUS at 20:43

## 2021-08-31 RX ADMIN — METRONIDAZOLE 500 MG: 500 INJECTION, SOLUTION INTRAVENOUS at 20:41

## 2021-08-31 RX ADMIN — CITALOPRAM HYDROBROMIDE 10 MG: 10 TABLET ORAL at 08:42

## 2021-08-31 RX ADMIN — CLONIDINE HYDROCHLORIDE 0.1 MG: 0.1 TABLET ORAL at 20:40

## 2021-08-31 RX ADMIN — NIFEDIPINE 60 MG: 30 TABLET, EXTENDED RELEASE ORAL at 08:42

## 2021-08-31 RX ADMIN — FERROUS SULFATE TAB 325 MG (65 MG ELEMENTAL FE) 325 MG: 325 (65 FE) TAB at 20:41

## 2021-08-31 RX ADMIN — LOSARTAN POTASSIUM 100 MG: 50 TABLET, FILM COATED ORAL at 08:42

## 2021-08-31 RX ADMIN — POTASSIUM CHLORIDE 10 MEQ: 750 TABLET, EXTENDED RELEASE ORAL at 08:42

## 2021-08-31 RX ADMIN — FERROUS SULFATE TAB 325 MG (65 MG ELEMENTAL FE) 325 MG: 325 (65 FE) TAB at 08:42

## 2021-08-31 RX ADMIN — HYDROCHLOROTHIAZIDE 25 MG: 25 TABLET ORAL at 08:43

## 2021-08-31 ASSESSMENT — PAIN SCALES - GENERAL
PAINLEVEL_OUTOF10: 0

## 2021-08-31 NOTE — PLAN OF CARE
Problem: Pain:  Goal: Pain level will decrease  8/31/2021 0936 by Drea Da Silva RN  Outcome: Ongoing  8/30/2021 2232 by Tresa Dubose RN  Outcome: Ongoing  Goal: Control of acute pain  Outcome: Ongoing  Goal: Control of chronic pain  Outcome: Ongoing     Problem: Falls - Risk of:  Goal: Will remain free from falls  8/31/2021 0936 by Drea Da Silva RN  Outcome: Ongoing  8/30/2021 2232 by Tresa Dubose RN  Outcome: Ongoing  Goal: Absence of physical injury  Outcome: Ongoing     Problem: Discharge Planning:  Goal: Discharged to appropriate level of care  Outcome: Ongoing     Problem: Infection, Septic Shock:  Goal: Will show no infection signs and symptoms  Outcome: Ongoing     Problem: Serum Glucose Level - Abnormal:  Goal: Ability to maintain appropriate glucose levels will improve  Outcome: Ongoing     Problem: Tissue Perfusion, Altered:  Goal: Circulatory function within specified parameters  Outcome: Ongoing     Problem: Venous Thromboembolism:  Goal: Will show no signs or symptoms of venous thromboembolism  Outcome: Ongoing  Goal: Absence of signs or symptoms of impaired coagulation  Outcome: Ongoing     Problem: Nutrition  Goal: Optimal nutrition therapy  Outcome: Ongoing

## 2021-08-31 NOTE — PROGRESS NOTES
Pt resting in bed quietly with eyes closed. Respirations even and unlabored. No distress noted. IV Fluids infusing per order. Cardiac monitor continues to show NSR. Call light in reach. Will continue to monitor.

## 2021-08-31 NOTE — PROGRESS NOTES
FSBS = 104. No lantus or humalog given at this time. Vanilla ice cream given to pt per request. Will re-check FSBS through the night as ordered. Pt also refused to take her colace. Will continue to monitor.

## 2021-08-31 NOTE — PLAN OF CARE
Problem: Pain:  Goal: Pain level will decrease  Description: Pain level will decrease  Outcome: Ongoing  Note: Pt denies pain at this time. Pain meds given as needed & as ordered. Will continue to assess. Problem: Falls - Risk of:  Goal: Will remain free from falls  Description: Will remain free from falls  Outcome: Ongoing  Note: Pt is at medium risk for falls. Non skid socks on. Bed in low position and wheels locked. Fall sign posted and bracelet on. Siderails up x2. Pt is A & O x 4, uses call light properly. Call light within reach. Will continue to assess.

## 2021-08-31 NOTE — PROGRESS NOTES
Pt resting in bed quietly watching TV. Assessment and vital signs as charted. Pt denies pain and is A & O x 4. Cardiac monitor continues to show NSR. Pt denies any other needs at this time. Call light in reach. Will continue to monitor.

## 2021-09-01 ENCOUNTER — ANESTHESIA EVENT (OUTPATIENT)
Dept: OPERATING ROOM | Age: 51
DRG: 720 | End: 2021-09-01
Payer: MEDICARE

## 2021-09-01 ENCOUNTER — ANESTHESIA (OUTPATIENT)
Dept: OPERATING ROOM | Age: 51
DRG: 720 | End: 2021-09-01
Payer: MEDICARE

## 2021-09-01 VITALS — OXYGEN SATURATION: 87 % | DIASTOLIC BLOOD PRESSURE: 86 MMHG | SYSTOLIC BLOOD PRESSURE: 177 MMHG

## 2021-09-01 LAB
ABSOLUTE EOS #: 0.44 K/UL (ref 0–0.44)
ABSOLUTE IMMATURE GRANULOCYTE: 0.29 K/UL (ref 0–0.3)
ABSOLUTE LYMPH #: 2.42 K/UL (ref 1.1–3.7)
ABSOLUTE MONO #: 1.25 K/UL (ref 0.1–1.2)
ALBUMIN SERPL-MCNC: 3 G/DL (ref 3.5–5.2)
ALBUMIN/GLOBULIN RATIO: 0.8 (ref 1–2.5)
ALP BLD-CCNC: 142 U/L (ref 35–104)
ALT SERPL-CCNC: 21 U/L (ref 5–33)
ANION GAP SERPL CALCULATED.3IONS-SCNC: 13 MMOL/L (ref 9–17)
AST SERPL-CCNC: 17 U/L
BASOPHILS # BLD: 1 % (ref 0–2)
BASOPHILS ABSOLUTE: 0.06 K/UL (ref 0–0.2)
BILIRUB SERPL-MCNC: <0.1 MG/DL (ref 0.3–1.2)
BUN BLDV-MCNC: 9 MG/DL (ref 6–20)
BUN/CREAT BLD: 11 (ref 9–20)
CALCIUM SERPL-MCNC: 8.6 MG/DL (ref 8.6–10.4)
CHLORIDE BLD-SCNC: 104 MMOL/L (ref 98–107)
CO2: 18 MMOL/L (ref 20–31)
CREAT SERPL-MCNC: 0.81 MG/DL (ref 0.5–0.9)
DIFFERENTIAL TYPE: ABNORMAL
EOSINOPHILS RELATIVE PERCENT: 4 % (ref 1–4)
GFR AFRICAN AMERICAN: >60 ML/MIN
GFR NON-AFRICAN AMERICAN: >60 ML/MIN
GFR SERPL CREATININE-BSD FRML MDRD: ABNORMAL ML/MIN/{1.73_M2}
GFR SERPL CREATININE-BSD FRML MDRD: ABNORMAL ML/MIN/{1.73_M2}
GLUCOSE BLD-MCNC: 106 MG/DL (ref 74–100)
GLUCOSE BLD-MCNC: 106 MG/DL (ref 74–100)
GLUCOSE BLD-MCNC: 225 MG/DL (ref 74–100)
GLUCOSE BLD-MCNC: 89 MG/DL (ref 74–100)
GLUCOSE BLD-MCNC: 95 MG/DL (ref 70–99)
HCT VFR BLD CALC: 23.9 % (ref 36.3–47.1)
HEMOGLOBIN: 7.5 G/DL (ref 11.9–15.1)
IMMATURE GRANULOCYTES: 3 %
LYMPHOCYTES # BLD: 21 % (ref 24–43)
MCH RBC QN AUTO: 22.2 PG (ref 25.2–33.5)
MCHC RBC AUTO-ENTMCNC: 31.4 G/DL (ref 28.4–34.8)
MCV RBC AUTO: 70.7 FL (ref 82.6–102.9)
MONOCYTES # BLD: 11 % (ref 3–12)
NRBC AUTOMATED: 0 PER 100 WBC
PDW BLD-RTO: 17.7 % (ref 11.8–14.4)
PLATELET # BLD: 520 K/UL (ref 138–453)
PLATELET ESTIMATE: ABNORMAL
PMV BLD AUTO: 9.2 FL (ref 8.1–13.5)
POTASSIUM SERPL-SCNC: 3.9 MMOL/L (ref 3.7–5.3)
RBC # BLD: 3.38 M/UL (ref 3.95–5.11)
RBC # BLD: ABNORMAL 10*6/UL
SEG NEUTROPHILS: 60 % (ref 36–65)
SEGMENTED NEUTROPHILS ABSOLUTE COUNT: 7.05 K/UL (ref 1.5–8.1)
SODIUM BLD-SCNC: 135 MMOL/L (ref 135–144)
TOTAL PROTEIN: 7 G/DL (ref 6.4–8.3)
WBC # BLD: 11.5 K/UL (ref 3.5–11.3)
WBC # BLD: ABNORMAL 10*3/UL

## 2021-09-01 PROCEDURE — 2580000003 HC RX 258: Performed by: NURSE ANESTHETIST, CERTIFIED REGISTERED

## 2021-09-01 PROCEDURE — 36415 COLL VENOUS BLD VENIPUNCTURE: CPT

## 2021-09-01 PROCEDURE — 6360000002 HC RX W HCPCS: Performed by: FAMILY MEDICINE

## 2021-09-01 PROCEDURE — 2580000003 HC RX 258: Performed by: FAMILY MEDICINE

## 2021-09-01 PROCEDURE — 3609012400 HC EGD TRANSORAL BIOPSY SINGLE/MULTIPLE: Performed by: SURGERY

## 2021-09-01 PROCEDURE — 80053 COMPREHEN METABOLIC PANEL: CPT

## 2021-09-01 PROCEDURE — 94761 N-INVAS EAR/PLS OXIMETRY MLT: CPT

## 2021-09-01 PROCEDURE — 2709999900 HC NON-CHARGEABLE SUPPLY: Performed by: SURGERY

## 2021-09-01 PROCEDURE — 2500000003 HC RX 250 WO HCPCS: Performed by: NURSE ANESTHETIST, CERTIFIED REGISTERED

## 2021-09-01 PROCEDURE — 82947 ASSAY GLUCOSE BLOOD QUANT: CPT

## 2021-09-01 PROCEDURE — 88305 TISSUE EXAM BY PATHOLOGIST: CPT

## 2021-09-01 PROCEDURE — 7100000011 HC PHASE II RECOVERY - ADDTL 15 MIN: Performed by: SURGERY

## 2021-09-01 PROCEDURE — 0DB78ZX EXCISION OF STOMACH, PYLORUS, VIA NATURAL OR ARTIFICIAL OPENING ENDOSCOPIC, DIAGNOSTIC: ICD-10-PCS | Performed by: SURGERY

## 2021-09-01 PROCEDURE — 82941 ASSAY OF GASTRIN: CPT

## 2021-09-01 PROCEDURE — 6370000000 HC RX 637 (ALT 250 FOR IP): Performed by: SURGERY

## 2021-09-01 PROCEDURE — 6360000002 HC RX W HCPCS: Performed by: NURSE ANESTHETIST, CERTIFIED REGISTERED

## 2021-09-01 PROCEDURE — 0DB48ZX EXCISION OF ESOPHAGOGASTRIC JUNCTION, VIA NATURAL OR ARTIFICIAL OPENING ENDOSCOPIC, DIAGNOSTIC: ICD-10-PCS | Performed by: SURGERY

## 2021-09-01 PROCEDURE — 85025 COMPLETE CBC W/AUTO DIFF WBC: CPT

## 2021-09-01 PROCEDURE — 3700000000 HC ANESTHESIA ATTENDED CARE: Performed by: SURGERY

## 2021-09-01 PROCEDURE — 2500000003 HC RX 250 WO HCPCS: Performed by: FAMILY MEDICINE

## 2021-09-01 PROCEDURE — 2500000003 HC RX 250 WO HCPCS: Performed by: SURGERY

## 2021-09-01 PROCEDURE — 7100000010 HC PHASE II RECOVERY - FIRST 15 MIN: Performed by: SURGERY

## 2021-09-01 PROCEDURE — 1200000000 HC SEMI PRIVATE

## 2021-09-01 PROCEDURE — 88342 IMHCHEM/IMCYTCHM 1ST ANTB: CPT

## 2021-09-01 PROCEDURE — 2580000003 HC RX 258: Performed by: SURGERY

## 2021-09-01 RX ORDER — PROPOFOL 10 MG/ML
INJECTION, EMULSION INTRAVENOUS PRN
Status: DISCONTINUED | OUTPATIENT
Start: 2021-09-01 | End: 2021-09-01 | Stop reason: SDUPTHER

## 2021-09-01 RX ORDER — PROPOFOL 10 MG/ML
INJECTION, EMULSION INTRAVENOUS CONTINUOUS PRN
Status: DISCONTINUED | OUTPATIENT
Start: 2021-09-01 | End: 2021-09-01 | Stop reason: SDUPTHER

## 2021-09-01 RX ORDER — SUCRALFATE 1 G/1
1 TABLET ORAL EVERY 6 HOURS SCHEDULED
Status: DISCONTINUED | OUTPATIENT
Start: 2021-09-01 | End: 2021-09-03 | Stop reason: HOSPADM

## 2021-09-01 RX ORDER — LABETALOL HYDROCHLORIDE 5 MG/ML
INJECTION, SOLUTION INTRAVENOUS PRN
Status: DISCONTINUED | OUTPATIENT
Start: 2021-09-01 | End: 2021-09-01 | Stop reason: SDUPTHER

## 2021-09-01 RX ORDER — LIDOCAINE HYDROCHLORIDE 20 MG/ML
INJECTION, SOLUTION EPIDURAL; INFILTRATION; INTRACAUDAL; PERINEURAL PRN
Status: DISCONTINUED | OUTPATIENT
Start: 2021-09-01 | End: 2021-09-01 | Stop reason: SDUPTHER

## 2021-09-01 RX ORDER — SODIUM CHLORIDE, SODIUM LACTATE, POTASSIUM CHLORIDE, CALCIUM CHLORIDE 600; 310; 30; 20 MG/100ML; MG/100ML; MG/100ML; MG/100ML
INJECTION, SOLUTION INTRAVENOUS CONTINUOUS PRN
Status: DISCONTINUED | OUTPATIENT
Start: 2021-09-01 | End: 2021-09-01 | Stop reason: SDUPTHER

## 2021-09-01 RX ADMIN — SODIUM CHLORIDE, PRESERVATIVE FREE 10 ML: 5 INJECTION INTRAVENOUS at 10:16

## 2021-09-01 RX ADMIN — LIDOCAINE HYDROCHLORIDE 100 MG: 20 INJECTION, SOLUTION EPIDURAL; INFILTRATION; INTRACAUDAL; PERINEURAL at 16:39

## 2021-09-01 RX ADMIN — SUCRALFATE 1 G: 1 TABLET ORAL at 18:02

## 2021-09-01 RX ADMIN — LIDOCAINE HYDROCHLORIDE 100 MG: 20 INJECTION, SOLUTION EPIDURAL; INFILTRATION; INTRACAUDAL; PERINEURAL at 16:42

## 2021-09-01 RX ADMIN — LOSARTAN POTASSIUM 100 MG: 50 TABLET, FILM COATED ORAL at 18:01

## 2021-09-01 RX ADMIN — NIFEDIPINE 60 MG: 30 TABLET, EXTENDED RELEASE ORAL at 18:02

## 2021-09-01 RX ADMIN — CITALOPRAM HYDROBROMIDE 10 MG: 10 TABLET ORAL at 18:01

## 2021-09-01 RX ADMIN — INSULIN LISPRO 3 UNITS: 100 INJECTION, SOLUTION INTRAVENOUS; SUBCUTANEOUS at 21:17

## 2021-09-01 RX ADMIN — CEFTRIAXONE 1000 MG: 1 INJECTION, POWDER, FOR SOLUTION INTRAMUSCULAR; INTRAVENOUS at 10:16

## 2021-09-01 RX ADMIN — ATENOLOL 50 MG: 50 TABLET ORAL at 18:01

## 2021-09-01 RX ADMIN — METRONIDAZOLE 500 MG: 500 INJECTION, SOLUTION INTRAVENOUS at 12:06

## 2021-09-01 RX ADMIN — METRONIDAZOLE 500 MG: 500 INJECTION, SOLUTION INTRAVENOUS at 21:11

## 2021-09-01 RX ADMIN — SODIUM CHLORIDE, POTASSIUM CHLORIDE, SODIUM LACTATE AND CALCIUM CHLORIDE: 600; 310; 30; 20 INJECTION, SOLUTION INTRAVENOUS at 16:13

## 2021-09-01 RX ADMIN — PROPOFOL 180 MCG/KG/MIN: 10 INJECTION, EMULSION INTRAVENOUS at 16:39

## 2021-09-01 RX ADMIN — LABETALOL HYDROCHLORIDE 10 MG: 5 INJECTION, SOLUTION INTRAVENOUS at 16:45

## 2021-09-01 RX ADMIN — ATORVASTATIN CALCIUM 40 MG: 40 TABLET, FILM COATED ORAL at 18:01

## 2021-09-01 RX ADMIN — CLONIDINE HYDROCHLORIDE 0.1 MG: 0.1 TABLET ORAL at 21:09

## 2021-09-01 RX ADMIN — METRONIDAZOLE 500 MG: 500 INJECTION, SOLUTION INTRAVENOUS at 04:27

## 2021-09-01 RX ADMIN — POTASSIUM CHLORIDE 10 MEQ: 750 TABLET, EXTENDED RELEASE ORAL at 21:09

## 2021-09-01 RX ADMIN — METOCLOPRAMIDE 5 MG: 5 TABLET ORAL at 18:02

## 2021-09-01 RX ADMIN — PROPOFOL 80 MG: 10 INJECTION, EMULSION INTRAVENOUS at 16:39

## 2021-09-01 RX ADMIN — FERROUS SULFATE TAB 325 MG (65 MG ELEMENTAL FE) 325 MG: 325 (65 FE) TAB at 21:09

## 2021-09-01 RX ADMIN — SODIUM CHLORIDE, PRESERVATIVE FREE 10 ML: 5 INJECTION INTRAVENOUS at 21:09

## 2021-09-01 ASSESSMENT — PAIN - FUNCTIONAL ASSESSMENT
PAIN_FUNCTIONAL_ASSESSMENT: 0-10
PAIN_FUNCTIONAL_ASSESSMENT: ACTIVITIES ARE NOT PREVENTED

## 2021-09-01 ASSESSMENT — PAIN DESCRIPTION - PROGRESSION: CLINICAL_PROGRESSION: NOT CHANGED

## 2021-09-01 ASSESSMENT — PAIN DESCRIPTION - FREQUENCY: FREQUENCY: CONTINUOUS

## 2021-09-01 ASSESSMENT — PAIN SCALES - GENERAL
PAINLEVEL_OUTOF10: 0
PAINLEVEL_OUTOF10: 7
PAINLEVEL_OUTOF10: 0

## 2021-09-01 ASSESSMENT — PAIN DESCRIPTION - DESCRIPTORS: DESCRIPTORS: ACHING

## 2021-09-01 ASSESSMENT — PAIN DESCRIPTION - ONSET: ONSET: ON-GOING

## 2021-09-01 ASSESSMENT — PAIN DESCRIPTION - PAIN TYPE: TYPE: ACUTE PAIN

## 2021-09-01 NOTE — PLAN OF CARE
Problem: Pain:  Goal: Pain level will decrease  Description: Pain level will decrease  8/31/2021 2153 by Nicolas Crocker RN  Outcome: Ongoing  Note: Notify staff of returning or increasing pain. Utilize pain medication as appropriate. Use non-pharmaceutical means for pain management ie: warm blanket, ice, repositioning. 8/31/2021 0936 by Ramez Santoyo RN  Outcome: Ongoing  Goal: Control of acute pain  Description: Control of acute pain  8/31/2021 2153 by Nicolas Crocker RN  Outcome: Ongoing  8/31/2021 0936 by Ramez Santoyo RN  Outcome: Ongoing  Goal: Control of chronic pain  Description: Control of chronic pain  8/31/2021 2153 by Nicolas Crocker RN  Outcome: Ongoing  8/31/2021 0936 by Ramez Santoyo RN  Outcome: Ongoing     Problem: Falls - Risk of:  Goal: Will remain free from falls  Description: Will remain free from falls  8/31/2021 2153 by Nicolas Crocker RN  Outcome: Ongoing  Note: Call light and bedside table with in reach. Bed and chair wheels locked at all times, with bed in lowest position.  Frequent checks and needs meet in appropriate timing.    8/31/2021 0936 by Ramez Santoyo RN  Outcome: Ongoing  Goal: Absence of physical injury  Description: Absence of physical injury  8/31/2021 2153 by Nicolas Crocker RN  Outcome: Ongoing  8/31/2021 0936 by Ramez Santoyo RN  Outcome: Ongoing     Problem: Discharge Planning:  Goal: Discharged to appropriate level of care  Description: Discharged to appropriate level of care  8/31/2021 2153 by Nicolas Crocker RN  Outcome: Ongoing  8/31/2021 0936 by Ramez Santoyo RN  Outcome: Ongoing     Problem: Infection, Septic Shock:  Goal: Will show no infection signs and symptoms  Description: Will show no infection signs and symptoms  8/31/2021 2153 by Nicolas Crocker RN  Outcome: Ongoing  8/31/2021 0936 by Ramez Santoyo RN  Outcome: Ongoing     Problem: Serum Glucose Level - Abnormal:  Goal: Ability to maintain appropriate glucose levels will improve  Description: Ability to maintain appropriate glucose levels will improve  8/31/2021 2153 by Rohit Olsen RN  Outcome: Ongoing  8/31/2021 0936 by Amie Russo RN  Outcome: Ongoing     Problem: Tissue Perfusion, Altered:  Goal: Circulatory function within specified parameters  Description: Circulatory function within specified parameters  8/31/2021 2153 by Rohit Olsen RN  Outcome: Ongoing  8/31/2021 0936 by Amie Russo RN  Outcome: Ongoing     Problem: Venous Thromboembolism:  Goal: Will show no signs or symptoms of venous thromboembolism  Description: Will show no signs or symptoms of venous thromboembolism  8/31/2021 0936 by Amie Russo RN  Outcome: Ongoing  Goal: Absence of signs or symptoms of impaired coagulation  Description: Absence of signs or symptoms of impaired coagulation  8/31/2021 0936 by Amie Russo RN  Outcome: Ongoing

## 2021-09-01 NOTE — PROGRESS NOTES
Discharge Criteria    Inpatients must meet Criteria 1 through 7. All other patients are either YES or N/A. If a NO is chosen then Anesthesia or Surgeon must be notified. 1.  Minimum 30 minutes after last dose of sedative medication, minimum 120 minutes after last dose of reversal agent. Yes      2. Systolic BP stable within 20 mmHg for 30 minutes & systolic BP between 90 & 279 or within 10 mmHg of baseline. Yes      3. Pulse between 60 and 100 or within 10 bpm of baseline. Yes      4. Spontaneous respiratory rate >/= 10 per minute. Yes      5. SaO2 >/= 95 or  >/= baseline. Yes      6. Able to cough and swallow or return to baseline function. Yes      7. Alert and oriented or return to baseline mental status. Yes      8. Demonstrates controlled, coordinated movements, ambulates with steady gait, or return to baseline activity function. N/A      9. Minimal or no pain or nausea, or at a level tolerable and acceptable to patient. Yes      10. Takes and retains oral fluids as allowed. Yes      11. Procedural / perioperative site stable. Minimal or no bleeding. Yes          12. If GI endoscopy procedure, minimal or no abdominal distention or passing flatus. Yes      13. Written discharge instructions and emergency telephone number provided. N/A      14. Accompanied by a responsible adult.     N/A

## 2021-09-01 NOTE — PROGRESS NOTES
Writer in room to assess vitals and shift assessment. Patient denies pain or nausea. Surgery consent signed and added to chart. Patient denies any needs at this time. Call light and bedside table within reach. Will continue to monitor.

## 2021-09-01 NOTE — PROGRESS NOTES
Comprehensive Nutrition Assessment    Type and Reason for Visit:  Reassess    Nutrition Recommendations/Plan:  Encourage oral intakes after EGD    Nutrition Assessment:  Continued inadequate nutrient intakes r/t altered GI status, AEB NPO for EGD and low PO when diet allowed. Refused Ensure Clear per diet office, and altered supplement to vanilla Glucerna after previous diet advancement (but no intakes recorded of it). Would recommend to resume post procudure if accepting. Not appopriate for education at this time r/t EGD. Malnutrition Assessment:  Malnutrition Status: At risk for malnutrition (Comment)    Context:  Acute Illness     Findings of the 6 clinical characteristics of malnutrition:  Energy Intake:  Mild decrease in energy intake (Comment) (PO 1-25% since admission)  Weight Loss:  No significant weight loss     Body Fat Loss:  No significant body fat loss     Muscle Mass Loss:  No significant muscle mass loss    Fluid Accumulation:  No significant fluid accumulation     Strength:  Not Performed    Estimated Daily Nutrient Needs:  Energy (kcal):  5948-3273 (15-18/kg); Weight Used for Energy Requirements:  Current     Protein (g):  72-83g (1.3-1.5g/kg); Weight Used for Protein Requirements:  Ideal        Fluid (ml/day):  1920 ml (20/kg);  Method Used for Fluid Requirements:  ml/Kg      Nutrition Related Findings:  no visual indices of malnutrition       Wounds:  None       Current Nutrition Therapies:    Diet NPO    Anthropometric Measures:  · Height: 5' 4\" (162.6 cm)  · Current Body Weight: 229 lb 8 oz (104.1 kg)   · Admission Body Weight: 212 lb 1.6 oz (96.2 kg)    · Usual Body Weight: 220 lb 12.8 oz (100.2 kg)     · Ideal Body Weight: 120 lbs; % Ideal Body Weight 176.8 %   · BMI: 39.4  · Adjusted Body Weight:  ; No Adjustment   · BMI Categories: Obese Class 2 (BMI 35.0 -39.9)       Nutrition Diagnosis:   · Inadequate oral intake related to altered GI function as evidenced by intake 0-25%, nausea, diarrhea    · Altered nutrition-related lab values related to endocrine dysfuntion as evidenced by lab values    Lab Results   Component Value Date     09/01/2021    K 3.9 09/01/2021     09/01/2021    CO2 18 (L) 09/01/2021    BUN 9 09/01/2021    CREATININE 0.81 09/01/2021    GLUCOSE 95 09/01/2021    CALCIUM 8.6 09/01/2021    PROT 7.0 09/01/2021    LABALBU 3.0 (L) 09/01/2021    BILITOT <0.10 (L) 09/01/2021    ALKPHOS 142 (H) 09/01/2021    AST 17 09/01/2021    ALT 21 09/01/2021    LABGLOM >60 09/01/2021    GFRAA >60 09/01/2021     Recent Labs     08/31/21  0410 08/31/21  0520 08/31/21  0646 08/31/21  1113 08/31/21  1609 08/31/21  2039 09/01/21  0649 09/01/21  1208   POCGLU 90 116* 106* 191* 157* 79 89 106*     Nutrition Interventions:   Food and/or Nutrient Delivery:  Continue Current Diet, Continue Oral Nutrition Supplement  Nutrition Education/Counseling:  Education needed   Coordination of Nutrition Care:  Continue to monitor while inpatient    Goals:  PO > 75% of meals and supplements with improved glycemic control       Nutrition Monitoring and Evaluation:   Behavioral-Environmental Outcomes:  None Identified   Food/Nutrient Intake Outcomes:  Diet Advancement/Tolerance, Food and Nutrient Intake, Supplement Intake  Physical Signs/Symptoms Outcomes:  Biochemical Data, Weight     Discharge Planning:     Too soon to determine, Recommend pursue outpatient diabetes education     Electronically signed by Gabriela Carrillo RD, LD on 9/1/21 at 12:58 PM EDT    Contact: [phone]

## 2021-09-01 NOTE — PROGRESS NOTES
09/01/2021    CO2 18 (L) 09/01/2021       PROBLEM LIST:  Principal Problem:    Acute pyelonephritis  Active Problems:    Essential hypertension    Type 2 diabetes mellitus with diabetic nephropathy, with long-term current use of insulin (HCC)    Diverticulitis of colon without hemorrhage    Sepsis due to Escherichia coli (HCC)    Acute hyponatremia  Resolved Problems:    * No resolved hospital problems. *      ASSESSMENT / PLAN:  Acute pyelonephritis  · Continue iv rocephin as per sensitivity    · Sepsis-  iv rocephin as per sensitivity  · Anemia- hb 7.5 ,obtain egd  · Acute diverticulitis- - improving,continue rocephin and flagyl  · Type 2 dm- blood sugars improving   · Nutrition status:  Well developed, well nourished with no malnutrition  · DVT prophylaxis: scd  · High risk medications: none   · Disposition:  Discharge plan is home    [address], MD , M.D.  9/1/2021  3:00 PM

## 2021-09-01 NOTE — ANESTHESIA PRE PROCEDURE
Department of Anesthesiology  Preprocedure Note       Name:  Urban Prasad   Age:  44 y.o.  :  1970                                          MRN:  [id number]         Date:  2021      Surgeon: Tina Plunkett):  Tam Payne MD    Procedure: Procedure(s):  EGD ESOPHAGOGASTRODUODENOSCOPY    Medications prior to admission:   Prior to Admission medications    Medication Sig Start Date End Date Taking?  Authorizing Provider   metoclopramide (REGLAN) 5 MG tablet TAKE ONE TABLET BY MOUTH THREE TIMES A DAY 21  Yes Latoya Butcher APRN - CNP   furosemide (LASIX) 40 MG tablet Take 1 tablet by mouth daily 21  Yes BUZZ Lawson - CNP   potassium chloride (KLOR-CON M) 10 MEQ extended release tablet Take 1 tablet by mouth 2 times daily 21  Yes Latoya Butcher APRN - CNP   insulin detemir (LEVEMIR FLEXTOUCH) 100 UNIT/ML injection pen Inject 56 Units into the skin nightly 21  Yes Latoya Butcher APRN - CNP   citalopram (CELEXA) 10 MG tablet Take 1 tablet by mouth daily 21  Yes Latoya Butcher APRN - CNP   docusate (COLACE, DULCOLAX) 100 MG CAPS Take 100 mg by mouth 2 times daily   Yes Historical Provider, MD   ferrous sulfate (IRON 325) 325 (65 Fe) MG tablet Take 325 mg by mouth 2 times daily   Yes Historical Provider, MD   tranexamic acid (LYSTEDA) 650 MG TABS tablet Take 2 tablets up to 3 times daily for up to 5 days as needed for heavy menses 10/30/20  Yes Isma Paredes MD   cloNIDine (CATAPRES) 0.1 MG tablet TAKE ONE TABLET BY MOUTH TWICE A DAY 10/7/20  Yes Latoya Butcher APRN Mara NAQVI   losartan-hydroCHLOROthiazide (HYZAAR) 100-25 MG per tablet Take 1 tablet by mouth daily 10/7/20  Yes Latoya Butcher APRN - CNP   NIFEdipine (ADALAT CC) 60 MG extended release tablet Take 1 tablet by mouth daily 10/7/20  Yes Latoya Butcher APRN - CNP   atorvastatin (LIPITOR) 40 MG tablet Take 1 tablet by mouth daily 10/7/20  Yes BUZZ Lawson - DONYA   glipiZIDE (GLUCOTROL XL) 2.5 MG extended release tablet Take 1 tablet by mouth daily 10/7/20  Yes BUZZ Fuentes CNP   SITagliptin-metFORMIN (JANUMET XR)  MG TB24 per extended release tablet Take 2 tablets by mouth daily 8/26/20  Yes BUZZ Fuentes CNP   atenolol (TENORMIN) 50 MG tablet TAKE ONE TABLET BY MOUTH DAILY 3/27/20  Yes BUZZ Fuentes CNP   insulin lispro, 1 Unit Dial, (HUMALOG KWIKPEN) 100 UNIT/ML SOPN Inject 6 Units into the skin 3 times daily (before meals) 5/4/21   BUZZ Fuentes CNP   blood glucose test strips (ASCENSIA AUTODISC VI;ONE TOUCH ULTRA TEST VI) strip 1 each by In Vitro route 3 times daily Dispense brand compatible with insurance 1/29/21   BUZZ Hall CNP   FreeStyle Lancets MISC 1 each by Does not apply route daily Dispense brand compatible with insurance 1/29/21   BUZZ Hall CNP   Insulin Pen Needle (PEN NEEDLES) 32G X 5 MM MISC 1 each by Does not apply route daily 8/6/20   BUZZ Fuentes CNP   glucose monitoring kit (FREESTYLE) monitoring kit 1 kit by Does not apply route daily Dispense brand compatible with insurance 11/20/19   BUZZ Fuentes CNP       Current medications:    Current Facility-Administered Medications   Medication Dose Route Frequency Provider Last Rate Last Admin    [MAR Hold] cefTRIAXone (ROCEPHIN) 1000 mg IVPB in 50 mL D5W minibag  1,000 mg IntraVENous Q24H Nicholas Liriano MD   Stopped at 09/01/21 1046    [MAR Hold] insulin glargine (LANTUS) injection vial 50 Units  50 Units SubCUTAneous Nightly Nicholas Liriano MD        [facility] Hold] 0.9 % sodium chloride infusion   IntraVENous PRN Lexa Larry MD        [facility] Hold] guaiFENesin-dextromethorphan (ROBITUSSIN DM) 100-10 MG/5ML syrup 5 mL  5 mL Oral Q4H PRN Lexa Larry MD   5 mL at 08/29/21 0733    [MAR Hold] acetaminophen (TYLENOL) tablet 650 mg  650 mg Oral Q4H PRN Cristel Trevizo MD   650 mg at 08/28/21 1917    Or    [MAR Hold] acetaminophen (TYLENOL) suppository 650 mg  650 mg Rectal Q4H PRN Coleman Brown MD        [MAR Hold] promethazine (PHENERGAN) injection 12.5 mg  12.5 mg IntraMUSCular Q6H PRN Laurent Rose MD   12.5 mg at 08/28/21 0658    [MAR Hold] atenolol (TENORMIN) tablet 50 mg  50 mg Oral Daily Cristel Huerta MD   50 mg at 08/31/21 0842    [MAR Hold] atorvastatin (LIPITOR) tablet 40 mg  40 mg Oral Daily Cristel Huerta MD   40 mg at 08/31/21 0843    [MAR Hold] citalopram (CELEXA) tablet 10 mg  10 mg Oral Daily Coleman Brown MD   10 mg at 08/31/21 0842    [MAR Hold] cloNIDine (CATAPRES) tablet 0.1 mg  0.1 mg Oral BID Cristel Huerta MD   0.1 mg at 08/31/21 2040    [MAR Hold] docusate sodium (COLACE) capsule 100 mg  100 mg Oral BID Coleman Brown MD   100 mg at 08/29/21 2046    [MAR Hold] ferrous sulfate (IRON 325) tablet 325 mg  325 mg Oral BID Coleman Brown MD   325 mg at 08/31/21 2041    [MAR Hold] glipiZIDE (GLUCOTROL) tablet 2.5 mg  2.5 mg Oral QAM AC Cristel Huerta MD   2.5 mg at 08/31/21 0842    [MAR Hold] metoclopramide (REGLAN) tablet 5 mg  5 mg Oral TID AC Cristel Huerta MD   5 mg at 08/31/21 1631    [MAR Hold] NIFEdipine (ADALAT CC) extended release tablet 60 mg  60 mg Oral Daily Coleman Brown MD   60 mg at 08/31/21 0842    [MAR Hold] potassium chloride (KLOR-CON M) extended release tablet 10 mEq  10 mEq Oral BID Coleman Brown MD   10 mEq at 08/31/21 2041    [MAR Hold] glucose (GLUTOSE) 40 % oral gel 15 g  15 g Oral PRN Coleman Brown MD        [MAR Hold] dextrose 50 % IV solution  12.5 g IntraVENous PRN Coleman Brown MD        [MAR Hold] glucagon (rDNA) injection 1 mg  1 mg IntraMUSCular PRN Coleman Brown MD        [MAR Hold] dextrose 5 % solution  100 mL/hr IntraVENous PRN Coleman Brown MD        [facility] Hold] sodium chloride flush 0.9 % injection 5-40 mL  5-40 mL IntraVENous 2 times per day Coleman Brown MD   10 mL at 09/01/21 1016    [MAR Hold] sodium chloride flush 0.9 % injection 5-40 mL  5-40 mL IntraVENous PRN Richard Limon MD        [facility] Hold] 0.9 % sodium chloride infusion  25 mL IntraVENous PRN Richard Limon MD        [facility] Hold] ondansetron (ZOFRAN-ODT) disintegrating tablet 4 mg  4 mg Oral Q8H PRN Richard Limon MD        Or   Merlin Montano [facility] Hold] ondansetron (ZOFRAN) injection 4 mg  4 mg IntraVENous Q6H PRN Richard Limon MD   4 mg at 08/27/21 2042    [MAR Hold] polyethylene glycol (GLYCOLAX) packet 17 g  17 g Oral Daily PRN Richard Limon MD        [facility] Hold] insulin lispro (HUMALOG) injection vial 0-18 Units  0-18 Units SubCUTAneous TID WC Cristel Lockhart MD   3 Units at 08/31/21 1632    [MAR Hold] insulin lispro (HUMALOG) injection vial 0-9 Units  0-9 Units SubCUTAneous Nightly Cristel Lockhart MD   2 Units at 08/29/21 2046    [MAR Hold] metronidazole (FLAGYL) 500 mg in NaCl 100 mL IVPB premix  500 mg IntraVENous Q8H Richard Limon MD   Stopped at 09/01/21 1310    [MAR Hold] losartan (COZAAR) tablet 100 mg  100 mg Oral Daily Cristel Lockhart MD   100 mg at 08/31/21 0842    And    [MAR Hold] hydroCHLOROthiazide (HYDRODIURIL) tablet 25 mg  25 mg Oral Daily Richard Limon MD   25 mg at 08/31/21 0843    [MAR Hold] metFORMIN (Earnest Luis) extended release tablet 1,000 mg  1,000 mg Oral Daily with breakfast Richard Limon MD   1,000 mg at 08/31/21 9139    And    [MAR Hold] alogliptin (NESINA) tablet 25 mg  25 mg Oral Daily Richard Limon MD   25 mg at 08/31/21 0364       Allergies:  No Known Allergies    Problem List:    Patient Active Problem List   Diagnosis Code    Uncontrolled type 2 diabetes mellitus with hyperglycemia ([facility] [address] E11.65    Essential hypertension I10    Lumbar back pain with radiculopathy affecting right lower extremity M54.16    Microcytic anemia D50.9    Dyslipidemia E78.5    Iron deficiency anemia D50.9    Malabsorption due to intolerance, not elsewhere classified K90.49    Internal hemorrhoids K64.8  Diverticulosis of colon K57.30    Chronic pain of right knee M25.561, G89.29    Continuous opioid dependence (HCC) F11.20    Acute pyelonephritis N10    Type 2 diabetes mellitus with diabetic nephropathy, with long-term current use of insulin (HCC) E11.21, Z79.4    Diverticulitis of colon without hemorrhage K57.32    Sepsis due to Escherichia coli ([facility]) A41.51    Acute hyponatremia E87.1       Past Medical History:        Diagnosis Date    Diabetes mellitus ([facility] [address]     Hyperlipidemia     Hypertension     Irregular heart rhythm     Sciatica        Past Surgical History:        Procedure Laterality Date     SECTION      COLONOSCOPY  2019    Dr Eileen Solo- diverticulosis,hemorrhoids    COLONOSCOPY N/A 3/6/2019    COLONOSCOPY DIAGNOSTIC performed by Salvador Leary MD at [place] [address]  2019    Dr Ellis-bx(+H-Pylori,normal duodenal mucosa)erosive duodenitis    UPPER GASTROINTESTINAL ENDOSCOPY N/A 3/6/2019    EGD BIOPSY performed by Salvador Leary MD at [address] [place] [address] History:    Social History     Tobacco Use    Smoking status: Never Smoker    Smokeless tobacco: Never Used   Substance Use Topics    Alcohol use: No     Alcohol/week: 0.0 standard drinks                                Counseling given: Not Answered      Vital Signs (Current):   Vitals:    21 0415 21 0745 21 1245 21 1521   BP:  (!) 153/82 (!) 167/96 (!) 188/85   Pulse:  85 85 87   Resp:  20 18 17   Temp:  36.7 °C (98.1 °F) 36.4 °C (97.5 °F) 36.5 °C (97.7 °F)   TempSrc:  Temporal Temporal Temporal   SpO2:  99% 100% 98%   Weight: 229 lb 7 oz (104.1 kg)      Height:                                                  BP Readings from Last 3 Encounters:   21 (!) 188/85   21 (!) 170/94   21 139/77       NPO Status: Time of last liquid consumption: 233                        Time of last solid consumption:                         Date of last liquid consumption: 08/31/21                        Date of last solid food consumption: 08/31/21    BMI:   Wt Readings from Last 3 Encounters:   09/01/21 229 lb 7 oz (104.1 kg)   08/26/21 204 lb (92.5 kg)   06/30/21 215 lb (97.5 kg)     Body mass index is 39.38 kg/m².     CBC:   Lab Results   Component Value Date    WBC 11.5 09/01/2021    RBC 3.38 09/01/2021    HGB 7.5 09/01/2021    HCT 23.9 09/01/2021    MCV 70.7 09/01/2021    RDW 17.7 09/01/2021     09/01/2021       CMP:   Lab Results   Component Value Date     09/01/2021    K 3.9 09/01/2021     09/01/2021    CO2 18 09/01/2021    BUN 9 09/01/2021    CREATININE 0.81 09/01/2021    GFRAA >60 09/01/2021    LABGLOM >60 09/01/2021    GLUCOSE 95 09/01/2021    PROT 7.0 09/01/2021    CALCIUM 8.6 09/01/2021    BILITOT <0.10 09/01/2021    ALKPHOS 142 09/01/2021    AST 17 09/01/2021    ALT 21 09/01/2021       POC Tests:   Recent Labs     09/01/21  1208   POCGLU 106*       Coags: No results found for: PROTIME, INR, APTT    HCG (If Applicable):   Lab Results   Component Value Date    PREGTESTUR NEGATIVE 12/11/2018        ABGs:   Lab Results   Component Value Date    PHART 7.430 08/26/2021    PO2ART 89.9 08/26/2021    VPZ4BYS 26.4 08/26/2021    EAL1FDA 17.1 08/26/2021    V9AMDETX 97.2 08/26/2021        Type & Screen (If Applicable):  No results found for: LABABO, LABRH    Drug/Infectious Status (If Applicable):  No results found for: HIV, HEPCAB    COVID-19 Screening (If Applicable):   Lab Results   Component Value Date    COVID19 Not Detected 08/26/2021           Anesthesia Evaluation  Patient summary reviewed and Nursing notes reviewed no history of anesthetic complications:   Airway: Mallampati: I  TM distance: >3 FB   Neck ROM: full  Mouth opening: > = 3 FB Dental:      Comment: some missing molars    Pulmonary:Negative Pulmonary ROS and normal exam  breath sounds clear to auscultation                             Cardiovascular:  Exercise tolerance: good (>4 METS),   (+) hypertension: severe and moderate, hyperlipidemia    (-) dysrhythmias      Rhythm: regular  Rate: normal                    Neuro/Psych:   (+) depression/anxiety    (-) neuromuscular disease           GI/Hepatic/Renal:   (+) renal disease: CRI, morbid obesity          Endo/Other:    (+) DiabetesType II DM, poorly controlled, , blood dyscrasia: anemia:., .                  ROS comment: acute and chronic (iron deficiency) anemia, opiod dependence  recovering from e-coli sepsis on this admission Abdominal:             Vascular: negative vascular ROS. Other Findings:             Anesthesia Plan      general and TIVA     ASA 3       Induction: intravenous. Anesthetic plan and risks discussed with patient.                       BUZZ Araujo - JUAN ANTONIO   9/1/2021

## 2021-09-01 NOTE — BRIEF OP NOTE
Brief Postoperative Note      Patient: Jaja Abreu  YOB: 1970  MRN: [id number]    Date of Procedure: 9/1/2021    Pre-Op Diagnosis:      1. Iron deficient anemia     2. Epigastric pain, reflux symptoms     3. History of duodenitis 3/2019     4. Acute pyelonephritis with E. coli septicemia    Post-Op Diagnosis:      1. Distal esophagitis, LA grade A     2. Type I superficial gastric ulceration, body and lesser curve     3. Mild antral gastritis with mild duodenitis       Operation:     1. EGD     2.  GE junction biopsies     3. Type I ulcer margin biopsies, lesser curvature     4. Prepyloric antral biopsies    Surgeon(s):  Melinda Maurice MD    Assistant:  * No surgical staff found *    Anesthesia: Monitor Anesthesia Care    Estimated Blood Loss (mL): less than 45XM     Complications: None    Specimens:   ID Type Source Tests Collected by Time Destination   A :  Tissue Stomach SURGICAL PATHOLOGY Melinda Maurice MD 9/1/2021 1643    B :  Tissue Gastric SURGICAL PATHOLOGY Melinda Maurice MD 9/1/2021 1645    C :  Tissue GE Junction Biopsy SURGICAL PATHOLOGY Melinda Maurice MD 9/1/2021 1646      Findings:  As above.     Dictated # [id number]    Electronically signed by Melinda Maurice MD on 9/1/2021 at 5:01 PM

## 2021-09-01 NOTE — PLAN OF CARE
Problem: Pain:  Goal: Control of acute pain  Description: Control of acute pain  9/1/2021 1102 by Parveen Alexis RN  Outcome: Ongoing  Note: Pain assessed every four hours and as needed using 0-10 pain scale. Pt educated on scale and uses scale appropriately. Encourage pt to notify staff of pain before pain becomes uncontrollable. Correlate periods of heavy activity after pain medication administration. Use pharmacological and non pharmacological methods for pain relief such as: warm blankets, ice, television, reading, or rest.       Problem: Falls - Risk of:  Goal: Will remain free from falls  Description: Will remain free from falls  9/1/2021 1102 by Parveen Alexis RN  Outcome: Ongoing  Note: Call light in reach at all times, frequent checks, bed in lowest position, wheels of bed and chair locked, non skid footwear on, appropriate transfer techniques, personal items within reach, walkways free of obstructions, fall risk armband and sign displayed, Beauchamp fall risk score per protocol. No falls this shift, will continue to monitor. Problem: Discharge Planning:  Goal: Discharged to appropriate level of care  Description: Discharged to appropriate level of care  9/1/2021 1102 by Parveen Alexis RN  Outcome: Ongoing  Note: Patient from home, plans to return home upon discharge     Problem: Infection, Septic Shock:  Goal: Will show no infection signs and symptoms  Description: Will show no infection signs and symptoms  9/1/2021 1102 by Parveen Alexis RN  Outcome: Ongoing  Note: Monitor lab work. IV antibiotics per orders. Will continue to monitor.        Problem: Serum Glucose Level - Abnormal:  Goal: Ability to maintain appropriate glucose levels will improve  Description: Ability to maintain appropriate glucose levels will improve  9/1/2021 1102 by Parveen Alexis RN  Outcome: Ongoing  Note: Finger stick ac/hs, coverage available when needed     Problem: Tissue Perfusion, Altered:  Goal: Circulatory function within specified parameters  Description: Circulatory function within specified parameters  9/1/2021 1102 by Chandler Duffy RN  Outcome: Ongoing  Note: Pulses palpable all four extremities     Problem: Venous Thromboembolism:  Goal: Will show no signs or symptoms of venous thromboembolism  Description: Will show no signs or symptoms of venous thromboembolism  Outcome: Ongoing  Note: SCD per orders

## 2021-09-01 NOTE — CONSULTS
Deloris Chen is an 44 y.o.  female. Allergies: No Known Allergies    Principal Problem:    Acute pyelonephritis  Active Problems:    Essential hypertension    Type 2 diabetes mellitus with diabetic nephropathy, with long-term current use of insulin (HCC)    Diverticulitis of colon without hemorrhage    Sepsis due to Escherichia coli (HCC)    Acute hyponatremia  Resolved Problems:    * No resolved hospital problems. *    Blood pressure (!) 153/82, pulse 85, temperature 98.1 °F (36.7 °C), temperature source Temporal, resp. rate 20, height 5' 4\" (1.626 m), weight 229 lb 7 oz (104.1 kg), last menstrual period 07/28/2021, SpO2 99 %, unknown if currently breastfeeding. HPI    Ms Ashley Kenyon is a pleasant 49-year-old female admitted August 27, 2021 through [facility] for treatment of acute pyelonephritis, uncontrolled diabetes, sepsis with blood cultures positive for E. coli, anemia. Baseline H/H December 2018 9.7/32. On admission, WBC 19, H/H 9/30. This morning, H/H down to 7.2/33. She states that she has had a history of anemia since she began having children many years ago. EGD and colonoscopy in March 2019 showing erosive duodenitis, descending/sigmoid diverticulosis, and small internal hemorrhoids. At that time, she had significant epigastric pain and reflux symptoms. She currently has similar discomfort. No recent weight changes, 229 pounds, BMI 39. Bowel movements are typically daily, formed, brown. No cough nor other respiratory symptoms. No history of COVID-19. Vaccination schedule complete. No family history of GI malignancies. Patient has never smoked. At this time, she is resting comfortably. No new complaints. Review of Systems   Constitutional: Negative for activity change, appetite change, chills, fever and unexpected weight change. HENT: Negative for nosebleeds, sneezing, sore throat and trouble swallowing. Eyes: Negative for visual disturbance.    Respiratory: Negative for cough, choking and shortness of breath. Cardiovascular: Negative for chest pain, palpitations and leg swelling. Gastrointestinal: Positive for abdominal distention and abdominal pain. Negative for blood in stool, nausea and vomiting. Genitourinary: Negative for dysuria, flank pain and hematuria. Musculoskeletal: Positive for arthralgias and back pain. Negative for gait problem and myalgias. Allergic/Immunologic: Negative for immunocompromised state. Neurological: Negative for dizziness, seizures, syncope, weakness and headaches. Hematological: Does not bruise/bleed easily. Psychiatric/Behavioral: Negative for confusion and sleep disturbance.         Past Medical History:   Diagnosis Date    Diabetes mellitus ([facility] [address]     Hyperlipidemia     Hypertension     Irregular heart rhythm     Sciatica        Past Surgical History:   Procedure Laterality Date     SECTION      COLONOSCOPY  2019    Dr Prosper Jaime- diverticulosis,hemorrhoids    COLONOSCOPY N/A 3/6/2019    COLONOSCOPY DIAGNOSTIC performed by Monique Carlson MD at [address]  2019    Dr Ellis-bx(+H-Pylori,normal duodenal mucosa)erosive duodenitis    UPPER GASTROINTESTINAL ENDOSCOPY N/A 3/6/2019    EGD BIOPSY performed by Monique Carlson MD at [facility]       Family History   Problem Relation Age of Onset    High Blood Pressure Father     Diabetes Father     Asthma Mother        Allergies:  See list    Current Facility-Administered Medications   Medication Dose Route Frequency Provider Last Rate Last Admin    cefTRIAXone (ROCEPHIN) 1000 mg IVPB in 50 mL D5W minibag  1,000 mg IntraVENous Q24H Shweta Lawson MD   Stopped at 21 0920    insulin glargine (LANTUS) injection vial 50 Units  50 Units SubCUTAneous Nightly Shweta Lawson MD        0.9 % sodium chloride infusion   IntraVENous PRN Shonna Kennedy MD        guaiFENesin-dextromethorphan (ROBITUSSIN DM) 100-10 MG/5ML syrup 5 mL  5 mL Oral Q4H PRN Marilyn Glaser MD   5 mL at 08/29/21 0733    acetaminophen (TYLENOL) tablet 650 mg  650 mg Oral Q4H PRN Cristel Edward MD   650 mg at 08/28/21 1917    Or    acetaminophen (TYLENOL) suppository 650 mg  650 mg Rectal Q4H PRN Cristel Edward MD        promethazine (PHENERGAN) injection 12.5 mg  12.5 mg IntraMUSCular Q6H PRN Marilyn Glaser MD   12.5 mg at 08/28/21 0658    atenolol (TENORMIN) tablet 50 mg  50 mg Oral Daily Cristel Edward MD   50 mg at 08/31/21 0842    atorvastatin (LIPITOR) tablet 40 mg  40 mg Oral Daily Cristel Edward MD   40 mg at 08/31/21 0843    citalopram (CELEXA) tablet 10 mg  10 mg Oral Daily Avis Sanders MD   10 mg at 08/31/21 5126    cloNIDine (CATAPRES) tablet 0.1 mg  0.1 mg Oral BID Avis Sanders MD   0.1 mg at 08/31/21 2040    docusate sodium (COLACE) capsule 100 mg  100 mg Oral BID Avis Sanders MD   100 mg at 08/29/21 2046    ferrous sulfate (IRON 325) tablet 325 mg  325 mg Oral BID Avis Sanders MD   325 mg at 08/31/21 2041    glipiZIDE (GLUCOTROL) tablet 2.5 mg  2.5 mg Oral QAM AC Avis Sanders MD   2.5 mg at 08/31/21 0842    metoclopramide (REGLAN) tablet 5 mg  5 mg Oral TID AC Cristel Edward MD   5 mg at 08/31/21 1631    NIFEdipine (ADALAT CC) extended release tablet 60 mg  60 mg Oral Daily Avis Sanders MD   60 mg at 08/31/21 0842    potassium chloride (KLOR-CON M) extended release tablet 10 mEq  10 mEq Oral BID Cristel Edward MD   10 mEq at 08/31/21 2041    glucose (GLUTOSE) 40 % oral gel 15 g  15 g Oral PRN Avis Sanders MD        dextrose 50 % IV solution  12.5 g IntraVENous PRN Avis Sanders MD        glucagon (rDNA) injection 1 mg  1 mg IntraMUSCular PRN Avis Sanders MD        dextrose 5 % solution  100 mL/hr IntraVENous PRN Avis Sanders MD        sodium chloride flush 0.9 % injection 5-40 mL  5-40 mL IntraVENous 2 times per day Avis Sanders MD 10 mL at 08/31/21 2043    sodium chloride flush 0.9 % injection 5-40 mL  5-40 mL IntraVENous PRN Cristel Valladares MD        0.9 % sodium chloride infusion  25 mL IntraVENous PRN Cristel Valladares MD        ondansetron (ZOFRAN-ODT) disintegrating tablet 4 mg  4 mg Oral Q8H PRN Cristel Valladares MD        Or    ondansetron (ZOFRAN) injection 4 mg  4 mg IntraVENous Q6H PRN Milton Adair MD   4 mg at 08/27/21 2042    polyethylene glycol (GLYCOLAX) packet 17 g  17 g Oral Daily PRN Milton Adair MD        insulin lispro (HUMALOG) injection vial 0-18 Units  0-18 Units SubCUTAneous TID WC Cristel Valladares MD   3 Units at 08/31/21 1632    insulin lispro (HUMALOG) injection vial 0-9 Units  0-9 Units SubCUTAneous Nightly Cristel Valladares MD   2 Units at 08/29/21 2046    metronidazole (FLAGYL) 500 mg in NaCl 100 mL IVPB premix  500 mg IntraVENous Will MD Glenna   Stopped at 09/01/21 0600    losartan (COZAAR) tablet 100 mg  100 mg Oral Daily Cristel Valladares MD   100 mg at 08/31/21 3297    And    hydroCHLOROthiazide (HYDRODIURIL) tablet 25 mg  25 mg Oral Daily Cristel Valladares MD   25 mg at 08/31/21 0843    metFORMIN (GLUCOPHAGE-XR) extended release tablet 1,000 mg  1,000 mg Oral Daily with breakfast Milton Adair MD   1,000 mg at 08/31/21 4601    And    alogliptin (NESINA) tablet 25 mg  25 mg Oral Daily Milton Adair MD   25 mg at 08/31/21 3698       Social History     Socioeconomic History    Marital status: Single     Spouse name: None    Number of children: None    Years of education: None    Highest education level: None   Occupational History     Employer: NONE   Tobacco Use    Smoking status: Never Smoker    Smokeless tobacco: Never Used   Vaping Use    Vaping Use: Never used   Substance and Sexual Activity    Alcohol use: No     Alcohol/week: 0.0 standard drinks    Drug use: No    Sexual activity: Yes     Partners: Male     Comment: none   Other Topics Concern    None   Social History Narrative    None     Social Determinants of Health     Financial Resource Strain: Unknown    Difficulty of Paying Living Expenses: Patient refused   Food Insecurity: Unknown    Worried About Running Out of Food in the Last Year: Patient refused    Ran Out of Food in the Last Year: Patient refused   Transportation Needs:     Lack of Transportation (Medical):  Lack of Transportation (Non-Medical):    Physical Activity:     Days of Exercise per Week:     Minutes of Exercise per Session:    Stress: No Stress Concern Present    Feeling of Stress : Not at all   Social Connections: Socially Isolated    Frequency of Communication with Friends and Family: Three times a week    Frequency of Social Gatherings with Friends and Family: Twice a week    Attends Anglican Services: Never    Active Member of Clubs or Organizations: No    Attends Club or Organization Meetings: Never    Marital Status:    Intimate Partner Violence: Not At Risk    Fear of Current or Ex-Partner: No    Emotionally Abused: No    Physically Abused: No    Sexually Abused: No       Physical Exam  Vitals and nursing note reviewed. Constitutional:       Appearance: She is well-developed. HENT:      Head: Normocephalic and atraumatic. Eyes:      General: No scleral icterus. Conjunctiva/sclera: Conjunctivae normal.      Pupils: Pupils are equal, round, and reactive to light. Neck:      Vascular: No JVD. Trachea: No tracheal deviation. Cardiovascular:      Rate and Rhythm: Normal rate and regular rhythm. Pulmonary:      Effort: Pulmonary effort is normal. No respiratory distress. Chest:      Chest wall: No tenderness. Abdominal:      General: There is no distension. Palpations: Abdomen is soft. There is no mass. Tenderness: There is abdominal tenderness (epigastric). There is no guarding or rebound. Musculoskeletal:         General: Normal range of motion. COMPARISON:   None.       HISTORY:   ORDERING SYSTEM PROVIDED HISTORY: Lower abdominal pain   TECHNOLOGIST PROVIDED HISTORY:       lower abdominal pain, black stools       FINDINGS:   Lower Chest: No acute findings.       Organs: Findings compatible with hepatic steatosis.  The gallbladder,   pancreas, spleen, adrenals and left kidney appear unremarkable.  The right   kidney appears mildly edematous and there is asymmetric right perinephric   stranding.  A patchy area of decreased enhancement is present in the lateral   interpolar region.  No hydronephrosis or stone identified.       GI/Bowel: Mild inflammatory change and diverticula is noted involving the   distal ascending colon, near the right kidney.  No evidence for obstruction. No gross perforation or abscess formation.  Scattered distal colonic   diverticula are also present.       Pelvis: No acute abnormality identified.  Coarse calcifications are present   in the right adnexa, which may be vascular.       Peritoneum/Retroperitoneum: No free air or free fluid.  The aorta is normal   in caliber.  The visceral branches are patent. Calcified atheromatous plaque   is present.  No lymphadenopathy.       Bones/Soft Tissues: Postoperative findings in the lower abdominal wall.  No   acute osseous or acute soft tissue abnormality identified.           Impression   1.  Asymmetric right perinephric stranding and patchy area of decreased   enhancement in the lateral interpolar right kidney.  The combination of   findings may represent focal pyelonephritis in the appropriate clinical   setting.       2.  Diverticula and mild inflammatory change involving the ascending colon,   near the right kidney.  A mild acute diverticulitis or secondary colitis   related to the inflammatory change in the kidney can have this appearance.             Assessment:    3  55-year-old female admitted for acute pyelonephritis  2.  E. coli septicemia  3. Iron deficient anemia  4. Epigastric pain with history of duodenitis 2019  5. Descending sigmoid diverticulosis with mild sigmoid diverticulitis  6. BMI 39    Plan:    Discussed at length with Ms. Marco Wolff her recent pyelonephritis with E. coli septicemia, long history of iron deficient anemia with known descending and sigmoid diverticulosis, epigastric pain with reflux symptoms, history of duodenitis, etc.  Patient is currently stable and improving. We will proceed with diagnostic EGD. Risks, benefits, alternatives thoroughly reviewed and accepted by Ms. Gunderson including GI bleeding, perforation, missed lesions, COVID-19 exposure/infection, etc.  Patient conveys clear understanding and is in agreement.     Antonio Thomas MD  8/31/2021

## 2021-09-01 NOTE — ANESTHESIA POSTPROCEDURE EVALUATION
Department of Anesthesiology  Postprocedure Note    Patient: China Adams  MRN: [id number]  YOB: 1970  Date of evaluation: 9/1/2021  Time:  5:08 PM     Procedure Summary     Date: 09/01/21 Room / Location: [address] [place] [address]    Anesthesia Start: 9468 Anesthesia Stop: 3823    Procedure: EGD BIOPSY (N/A ) Diagnosis: (ACUTE DIVERTICULITIS)    Surgeons: Ibrahima Samano MD Responsible Provider: BUZZ Ring CRNA    Anesthesia Type: general, TIVA ASA Status: 3          Anesthesia Type: general, TIVA    Luis Angel Phase I:      Luis Angel Phase II: Luis Angel Score: 10    Last vitals: Reviewed and per EMR flowsheets.        Anesthesia Post Evaluation    Patient location during evaluation: PACU  Patient participation: complete - patient participated  Level of consciousness: awake and alert  Pain score: 0  Airway patency: patent  Nausea & Vomiting: no nausea and no vomiting  Complications: no  Cardiovascular status: blood pressure returned to baseline  Respiratory status: acceptable and room air  Hydration status: stable

## 2021-09-01 NOTE — PROGRESS NOTES
Writer at bedside for shift assessment. . Patient sitting up in bed watching tv, respirations even and unlabored while on room air. Vitals obtained and assessment completed, see flow sheet for details. Pt updated plan to go to surgery at approximately 13:30. Pt verbalizes understanding, denies other needs at this time. Call light in reach. Will continue to monitor.

## 2021-09-02 PROBLEM — K29.80 DUODENITIS: Status: ACTIVE | Noted: 2021-09-02

## 2021-09-02 PROBLEM — K25.0 ACUTE GASTRIC ULCER WITH HEMORRHAGE: Status: ACTIVE | Noted: 2021-09-01

## 2021-09-02 LAB
ABSOLUTE EOS #: 0.34 K/UL (ref 0–0.44)
ABSOLUTE IMMATURE GRANULOCYTE: 0.29 K/UL (ref 0–0.3)
ABSOLUTE LYMPH #: 2.45 K/UL (ref 1.1–3.7)
ABSOLUTE MONO #: 1.12 K/UL (ref 0.1–1.2)
ALBUMIN SERPL-MCNC: 3 G/DL (ref 3.5–5.2)
ALBUMIN/GLOBULIN RATIO: 0.8 (ref 1–2.5)
ALP BLD-CCNC: 124 U/L (ref 35–104)
ALT SERPL-CCNC: 19 U/L (ref 5–33)
ANION GAP SERPL CALCULATED.3IONS-SCNC: 12 MMOL/L (ref 9–17)
AST SERPL-CCNC: 13 U/L
BASOPHILS # BLD: 1 % (ref 0–2)
BASOPHILS ABSOLUTE: 0.06 K/UL (ref 0–0.2)
BILIRUB SERPL-MCNC: 0.18 MG/DL (ref 0.3–1.2)
BUN BLDV-MCNC: 6 MG/DL (ref 6–20)
BUN/CREAT BLD: 9 (ref 9–20)
CALCIUM SERPL-MCNC: 8.9 MG/DL (ref 8.6–10.4)
CHLORIDE BLD-SCNC: 102 MMOL/L (ref 98–107)
CO2: 20 MMOL/L (ref 20–31)
CREAT SERPL-MCNC: 0.64 MG/DL (ref 0.5–0.9)
DIFFERENTIAL TYPE: ABNORMAL
EOSINOPHILS RELATIVE PERCENT: 3 % (ref 1–4)
GFR AFRICAN AMERICAN: >60 ML/MIN
GFR NON-AFRICAN AMERICAN: >60 ML/MIN
GFR SERPL CREATININE-BSD FRML MDRD: ABNORMAL ML/MIN/{1.73_M2}
GFR SERPL CREATININE-BSD FRML MDRD: ABNORMAL ML/MIN/{1.73_M2}
GLUCOSE BLD-MCNC: 183 MG/DL (ref 74–100)
GLUCOSE BLD-MCNC: 197 MG/DL (ref 74–100)
GLUCOSE BLD-MCNC: 200 MG/DL (ref 74–100)
GLUCOSE BLD-MCNC: 211 MG/DL (ref 70–99)
GLUCOSE BLD-MCNC: 96 MG/DL (ref 74–100)
HCT VFR BLD CALC: 24.6 % (ref 36.3–47.1)
HEMOGLOBIN: 7.5 G/DL (ref 11.9–15.1)
IMMATURE GRANULOCYTES: 3 %
LYMPHOCYTES # BLD: 22 % (ref 24–43)
MCH RBC QN AUTO: 22 PG (ref 25.2–33.5)
MCHC RBC AUTO-ENTMCNC: 30.5 G/DL (ref 28.4–34.8)
MCV RBC AUTO: 72.1 FL (ref 82.6–102.9)
MONOCYTES # BLD: 10 % (ref 3–12)
NRBC AUTOMATED: 0 PER 100 WBC
PDW BLD-RTO: 17.6 % (ref 11.8–14.4)
PLATELET # BLD: 565 K/UL (ref 138–453)
PLATELET ESTIMATE: ABNORMAL
PMV BLD AUTO: 8.9 FL (ref 8.1–13.5)
POTASSIUM SERPL-SCNC: 4.1 MMOL/L (ref 3.7–5.3)
RBC # BLD: 3.41 M/UL (ref 3.95–5.11)
RBC # BLD: ABNORMAL 10*6/UL
SEG NEUTROPHILS: 61 % (ref 36–65)
SEGMENTED NEUTROPHILS ABSOLUTE COUNT: 6.78 K/UL (ref 1.5–8.1)
SODIUM BLD-SCNC: 134 MMOL/L (ref 135–144)
TOTAL PROTEIN: 6.9 G/DL (ref 6.4–8.3)
WBC # BLD: 11 K/UL (ref 3.5–11.3)
WBC # BLD: ABNORMAL 10*3/UL

## 2021-09-02 PROCEDURE — 2500000003 HC RX 250 WO HCPCS: Performed by: SURGERY

## 2021-09-02 PROCEDURE — 1200000000 HC SEMI PRIVATE

## 2021-09-02 PROCEDURE — 6370000000 HC RX 637 (ALT 250 FOR IP): Performed by: FAMILY MEDICINE

## 2021-09-02 PROCEDURE — 2580000003 HC RX 258: Performed by: SURGERY

## 2021-09-02 PROCEDURE — 94761 N-INVAS EAR/PLS OXIMETRY MLT: CPT

## 2021-09-02 PROCEDURE — 80053 COMPREHEN METABOLIC PANEL: CPT

## 2021-09-02 PROCEDURE — 6370000000 HC RX 637 (ALT 250 FOR IP): Performed by: SURGERY

## 2021-09-02 PROCEDURE — 85025 COMPLETE CBC W/AUTO DIFF WBC: CPT

## 2021-09-02 PROCEDURE — 82947 ASSAY GLUCOSE BLOOD QUANT: CPT

## 2021-09-02 PROCEDURE — 6360000002 HC RX W HCPCS: Performed by: SURGERY

## 2021-09-02 PROCEDURE — 36415 COLL VENOUS BLD VENIPUNCTURE: CPT

## 2021-09-02 RX ORDER — PANTOPRAZOLE SODIUM 40 MG/1
40 TABLET, DELAYED RELEASE ORAL
Status: DISCONTINUED | OUTPATIENT
Start: 2021-09-02 | End: 2021-09-03 | Stop reason: HOSPADM

## 2021-09-02 RX ADMIN — SODIUM CHLORIDE, PRESERVATIVE FREE 10 ML: 5 INJECTION INTRAVENOUS at 10:22

## 2021-09-02 RX ADMIN — INSULIN GLARGINE 50 UNITS: 100 INJECTION, SOLUTION SUBCUTANEOUS at 20:33

## 2021-09-02 RX ADMIN — INSULIN LISPRO 3 UNITS: 100 INJECTION, SOLUTION INTRAVENOUS; SUBCUTANEOUS at 20:33

## 2021-09-02 RX ADMIN — ONDANSETRON 4 MG: 4 TABLET, ORALLY DISINTEGRATING ORAL at 13:46

## 2021-09-02 RX ADMIN — DOCUSATE SODIUM 100 MG: 100 CAPSULE ORAL at 20:27

## 2021-09-02 RX ADMIN — FERROUS SULFATE TAB 325 MG (65 MG ELEMENTAL FE) 325 MG: 325 (65 FE) TAB at 08:17

## 2021-09-02 RX ADMIN — POTASSIUM CHLORIDE 10 MEQ: 750 TABLET, EXTENDED RELEASE ORAL at 20:27

## 2021-09-02 RX ADMIN — ATORVASTATIN CALCIUM 40 MG: 40 TABLET, FILM COATED ORAL at 08:17

## 2021-09-02 RX ADMIN — CLONIDINE HYDROCHLORIDE 0.1 MG: 0.1 TABLET ORAL at 20:27

## 2021-09-02 RX ADMIN — DOCUSATE SODIUM 100 MG: 100 CAPSULE ORAL at 08:20

## 2021-09-02 RX ADMIN — METOCLOPRAMIDE 5 MG: 5 TABLET ORAL at 07:35

## 2021-09-02 RX ADMIN — SUCRALFATE 1 G: 1 TABLET ORAL at 12:04

## 2021-09-02 RX ADMIN — POTASSIUM CHLORIDE 10 MEQ: 750 TABLET, EXTENDED RELEASE ORAL at 08:18

## 2021-09-02 RX ADMIN — METRONIDAZOLE 500 MG: 500 INJECTION, SOLUTION INTRAVENOUS at 20:31

## 2021-09-02 RX ADMIN — CLONIDINE HYDROCHLORIDE 0.1 MG: 0.1 TABLET ORAL at 08:17

## 2021-09-02 RX ADMIN — ALOGLIPTIN 25 MG: 25 TABLET, FILM COATED ORAL at 08:20

## 2021-09-02 RX ADMIN — METRONIDAZOLE 500 MG: 500 INJECTION, SOLUTION INTRAVENOUS at 12:04

## 2021-09-02 RX ADMIN — METRONIDAZOLE 500 MG: 500 INJECTION, SOLUTION INTRAVENOUS at 05:10

## 2021-09-02 RX ADMIN — PANTOPRAZOLE SODIUM 40 MG: 40 TABLET, DELAYED RELEASE ORAL at 07:35

## 2021-09-02 RX ADMIN — METOCLOPRAMIDE 5 MG: 5 TABLET ORAL at 12:03

## 2021-09-02 RX ADMIN — CITALOPRAM HYDROBROMIDE 10 MG: 10 TABLET ORAL at 08:17

## 2021-09-02 RX ADMIN — LOSARTAN POTASSIUM 100 MG: 50 TABLET, FILM COATED ORAL at 08:17

## 2021-09-02 RX ADMIN — SUCRALFATE 1 G: 1 TABLET ORAL at 17:10

## 2021-09-02 RX ADMIN — GLIPIZIDE 2.5 MG: 5 TABLET ORAL at 07:35

## 2021-09-02 RX ADMIN — ATENOLOL 50 MG: 50 TABLET ORAL at 08:17

## 2021-09-02 RX ADMIN — SUCRALFATE 1 G: 1 TABLET ORAL at 05:10

## 2021-09-02 RX ADMIN — NIFEDIPINE 60 MG: 30 TABLET, EXTENDED RELEASE ORAL at 08:18

## 2021-09-02 RX ADMIN — SUCRALFATE 1 G: 1 TABLET ORAL at 00:54

## 2021-09-02 RX ADMIN — METFORMIN HYDROCHLORIDE 1000 MG: 500 TABLET, EXTENDED RELEASE ORAL at 08:18

## 2021-09-02 RX ADMIN — CEFTRIAXONE 1000 MG: 1 INJECTION, POWDER, FOR SOLUTION INTRAMUSCULAR; INTRAVENOUS at 10:23

## 2021-09-02 RX ADMIN — HYDROCHLOROTHIAZIDE 25 MG: 25 TABLET ORAL at 08:18

## 2021-09-02 RX ADMIN — FERROUS SULFATE TAB 325 MG (65 MG ELEMENTAL FE) 325 MG: 325 (65 FE) TAB at 20:27

## 2021-09-02 RX ADMIN — METOCLOPRAMIDE 5 MG: 5 TABLET ORAL at 17:10

## 2021-09-02 RX ADMIN — SODIUM CHLORIDE, PRESERVATIVE FREE 10 ML: 5 INJECTION INTRAVENOUS at 20:35

## 2021-09-02 ASSESSMENT — PAIN SCALES - GENERAL: PAINLEVEL_OUTOF10: 0

## 2021-09-02 NOTE — PROGRESS NOTES
Patient's IV started burning after starting the antibiotic. Melinda Gilliam RN stopped the infusion and flushed the IV site. Writer removed the IV at this time. Will inform oncoming nurse as patient is a difficult stick.

## 2021-09-02 NOTE — PROGRESS NOTES
Writer at bedside for shift assessment. Patient sitting up in bed, respirations are even and unlabored while on room air. Vitals obtained and morning assessment completed, see flowsheet for details. Assisted pt up to chair. 0700 medications given, pt denies further needs at this time. Call light in reach, will continue to monitor.

## 2021-09-02 NOTE — PROGRESS NOTES
Patient is resting in bed with family in the room. She denies needs or questions at this time. Surgery vitals in progress. Will continue to monitor and assess.

## 2021-09-02 NOTE — PLAN OF CARE
Problem: Pain:  Goal: Pain level will decrease  Description: Pain level will decrease  9/2/2021 1551 by Brice Kyle RN  Outcome: Ongoing  Note: Pain assessed every four hours and as needed using 0-10 pain scale. Pt educated on scale and uses scale appropriately. Encourage pt to notify staff of pain before pain becomes uncontrollable. Correlate periods of heavy activity after pain medication administration. Use pharmacological and non pharmacological methods for pain relief such as: warm blankets, ice, television, reading, or rest.        Problem: Falls - Risk of:  Goal: Will remain free from falls  Description: Will remain free from falls  9/2/2021 1551 by Brice Kyle RN  Outcome: Ongoing  Note: Call light in reach at all times, frequent checks, bed in lowest position, wheels of bed and chair locked, non skid footwear on, appropriate transfer techniques, personal items within reach, walkways free of obstructions, fall risk armband and sign displayed, Beauchamp fall risk score per protocol. No falls this shift, will continue to monitor. Problem: Discharge Planning:  Goal: Discharged to appropriate level of care  Description: Discharged to appropriate level of care  9/2/2021 1551 by Brice Kyle RN  Outcome: Ongoing  Note: Pt from home plans to return home upon d/c     Problem: Infection, Septic Shock:  Goal: Will show no infection signs and symptoms  Description: Will show no infection signs and symptoms  9/2/2021 1551 by Brice Kyle RN  Outcome: Ongoing  Note: Monitor lab work. IV antibiotics per orders. Will continue to monitor.         Problem: Serum Glucose Level - Abnormal:  Goal: Ability to maintain appropriate glucose levels will improve  Description: Ability to maintain appropriate glucose levels will improve  9/2/2021 1551 by Brice Kyle RN  Outcome: Ongoing  Note: Fingerstick ac/hs, insulin per protocol      Problem: Tissue Perfusion, Altered:  Goal: Circulatory function within specified parameters  Description: Circulatory function within specified parameters  9/2/2021 1551 by Shanna Cherry RN  Outcome: Ongoing  Note: Vitals per routine, pulses palpable all 4 extremities, tele monitor in place     Problem: Venous Thromboembolism:  Goal: Will show no signs or symptoms of venous thromboembolism  Description: Will show no signs or symptoms of venous thromboembolism  9/2/2021 1551 by Shanna Cherry RN  Outcome: Ongoing  Note: Pt ambulates in room, scds at times

## 2021-09-02 NOTE — PLAN OF CARE
Problem: Pain:  Goal: Pain level will decrease  Description: Pain level will decrease  Outcome: Ongoing  Goal: Control of acute pain  Description: Control of acute pain  Outcome: Ongoing  Goal: Control of chronic pain  Description: Control of chronic pain  Outcome: Ongoing     Problem: Falls - Risk of:  Goal: Will remain free from falls  Description: Will remain free from falls  Outcome: Ongoing  Goal: Absence of physical injury  Description: Absence of physical injury  Outcome: Ongoing     Problem: Discharge Planning:  Goal: Discharged to appropriate level of care  Description: Discharged to appropriate level of care  Outcome: Ongoing     Problem: Infection, Septic Shock:  Goal: Will show no infection signs and symptoms  Description: Will show no infection signs and symptoms  Outcome: Ongoing     Problem: Serum Glucose Level - Abnormal:  Goal: Ability to maintain appropriate glucose levels will improve  Description: Ability to maintain appropriate glucose levels will improve  Outcome: Ongoing     Problem: Tissue Perfusion, Altered:  Goal: Circulatory function within specified parameters  Description: Circulatory function within specified parameters  Outcome: Ongoing     Problem: Venous Thromboembolism:  Goal: Will show no signs or symptoms of venous thromboembolism  Description: Will show no signs or symptoms of venous thromboembolism  Outcome: Ongoing  Goal: Absence of signs or symptoms of impaired coagulation  Description: Absence of signs or symptoms of impaired coagulation  Outcome: Ongoing     Problem: Nutrition  Goal: Optimal nutrition therapy  Outcome: Ongoing

## 2021-09-02 NOTE — OP NOTE
[address] [place], [place] [address]                                OPERATIVE REPORT    PATIENT NAME: Marylin Felty                 :        1970  MED REC NO:   [id number]                              ROOM:       8299  ACCOUNT NO:   [de-identified]                           ADMIT DATE: 2021  PROVIDER:     Rudy Biggs    DATE OF PROCEDURE:  2021    ATTENDING SURGEON:  Dr. Rudy Biggs. MEDICAL ATTENDING:  Gabriel Villa MD.    PREOPERATIVE DIAGNOSES:  1. Iron-deficient anemia. 2.  Epigastric pain with reflux symptoms. 3.  History of duodenitis in 2019.  4.  Acute pyelonephritis with E. coli septicemia. POSTOPERATIVE DIAGNOSES:  1. Distal esophagitis, LA grade A.  2.  Type 1 superficial gastric ulceration, body and lesser curvature. 3.  Mild antral gastritis with mild duodenitis. OPERATION PERFORMED:  1. Esophagogastroduodenoscopy. 2.  GE junction biopsies. 3.  Type 1 ulcer margin biopsies, lesser curvature. 4.  Prepyloric antral biopsies. ANESTHESIA:  MAC.    ESTIMATED BLOOD LOSS:  Less than 20 mL. INDICATIONS:  The patient is a pleasant 47-year-old white female  admitted for treatment of acute pyelonephritis, uncontrolled diabetes,  and E. coli septicemia. She was found to be anemic with a hemoglobin of  7.2. The patient states she has had a long history of anemia since  having children many years ago. EGD and colonoscopy in 2019 showing  erosive duodenitis, descending and sigmoid diverticulosis, and small  internal hemorrhoids. She has current epigastric pain and reflux  symptoms similar to what she experienced in 2019. BMI of 39. No family  history of GI malignancy. The patient has never smoked. At this time,  diagnostic EGD is indicated.     DESCRIPTION OF PROCEDURE:  After obtaining informed consent with  discussion of risks, benefits, and alternatives including a risk of GI  bleeding, perforation, missed lesions, COVID-19 exposure/infection,  etc., the patient was taken to the endoscopy suite and placed in the  left lateral recumbent position. Following adequate IV sedation, an  endoscope was passed over the tongue into the posterior pharynx. Vocal  folds were visualized and appeared normal.  The scope was directed into  the esophagus and onto the GE junction. Upper and mid esophagus  appeared normal.  In the distal esophagus at the GE junction, distal  esophagitis was noted, LA grade A.  GE junction biopsies were obtained. Pylorus was patent. The stomach was entered, had normal distensibility. On retroflexion, the fundus and cardia appeared normal.  The gastric  body had diffuse erosive gastritis with superficial ulceration along  both lesser and greater curvature. Ulcer margin biopsies were obtained. There was evidence of recent bleeding. The prepyloric antrum had very  mild superficial gastritis. Prepyloric antral biopsies were obtained. The pylorus was patent. The duodenum was entered. There was mild  superficial duodenitis of the first portion of the duodenum. Second,  third, and fourth portions of the duodenum were normal.  There was no  duodenal ulceration. The stomach was decompressed by suction as the  scope was removed. The patient tolerated the procedure well and was  transferred to PACU in stable condition. SPECIMENS:  1.  GE junction biopsies. 2.  Type 1 ulcer margin biopsies, lesser curvature. 3.  Prepyloric antral biopsies. DRAINS:  None. COMPLICATIONS:  None. DISPOSITION:  To PACU, awake, alert and stable. Following recovery, we  will return the patient to the medical floor for ongoing care of acute  pyelonephritis. We will gradually advance diet to a bland diet. Continue proton-pump inhibition. Add Carafate. Followup will be with  me as an outpatient to review pathology.   The EGD findings of  superficial gastric ulceration are likely contributory to the patient's  current iron-deficient anemia, which should return to baseline of 10  with appropriate treatment.         Loretto Brittle    D: 09/01/2021 17:12:44       T: 09/01/2021 17:15:12     [email or web address]  Job#: [id number]     Doc#: [id number]    CC:  Shmuel Valdes

## 2021-09-02 NOTE — PROGRESS NOTES
Follow up with the patient regarding the discharge plan. The plan is still home with no services.     KADY Byrd

## 2021-09-02 NOTE — PROGRESS NOTES
Patient remains awake in bed with family in the room. She is talking and laughing and denies pain or needs at this time. Will continue to monitor and assess.

## 2021-09-02 NOTE — PROGRESS NOTES
Progress Note  Cristel Ramos MD    OBJECTIVE:    Patient seen for f/u of Acute pyelonephritis. Abdominal pain and nausea better. Hb -pending . Diarrhea better.   egd- gastric ulcer and duodenitis    ROS:   Constitutional: negative  for fevers, and negative for chills. Respiratory: negative for shortness of breath, negative for cough, and negative for wheezing  Cardiovascular: negative for chest pain, and negative for palpitations  Gastrointestinal: abdominal pain better negative for nausea,negative for vomiting, negative for diarrhea, and negative for constipation     All other systems were reviewed with the patient and are negative unless otherwise stated in HPI    OBJECTIVE:  Vitals:   Temp: 98.5 °F (36.9 °C)  BP: (!) 163/79 (after walking to the bathroom and back)  Resp: 16  Pulse: 89  SpO2: 99 %    24HR INTAKE/OUTPUT:      Intake/Output Summary (Last 24 hours) at 9/2/2021 0629  Last data filed at 9/2/2021 0557  Gross per 24 hour   Intake 770 ml   Output --   Net 770 ml     -----------------------------------------------------------------  Exam:  GEN:    Awake, alert and oriented x3. EYES:  EOMI, pupils equal   NECK: Supple. No lymphadenopathy. No carotid bruit  CVS:    regular rate and rhythm, no audible murmur  PULM:  CTA, no wheezes, rales or rhonchi, no acute respiratory distress  ABD:    Bowels sounds normal.  Abdomen is soft. No distention. no tenderness to palpation. EXT:   no edema bilaterally . No calf tenderness. NEURO: Moves all extremities. Motor and sensory are grossly intact  SKIN:  No rashes.   No skin lesions.    -----------------------------------------------------------------  Diagnostic Data:    · All available data reviewed  Lab Results   Component Value Date    WBC 11.5 (H) 09/01/2021    HGB 7.5 (L) 09/01/2021    MCV 70.7 (L) 09/01/2021     (H) 09/01/2021      Lab Results   Component Value Date    GLUCOSE 95 09/01/2021    BUN 9 09/01/2021    CREATININE 0.81 09/01/2021  09/01/2021    K 3.9 09/01/2021    CALCIUM 8.6 09/01/2021     09/01/2021    CO2 18 (L) 09/01/2021       PROBLEM LIST:  Principal Problem:    Acute pyelonephritis  Active Problems:    Essential hypertension    Type 2 diabetes mellitus with diabetic nephropathy, with long-term current use of insulin (HCC)    Diverticulitis of colon without hemorrhage    Sepsis due to Escherichia coli ([address]    Acute hyponatremia    Acute gastric ulcer with hemorrhage    Duodenitis  Resolved Problems:    * No resolved hospital problems. *      ASSESSMENT / PLAN:  Acute pyelonephritis  · Continue iv rocephin as per sensitivity    · Sepsis-  iv rocephin as per sensitivity  · Acute diverticulitis- better with iv rocephin and flagyl  · Acute gastric ulcer with hemorrhage- -carafate and protonix,await biopsy,no NSAIDS,check hb  · Type 2 dm- blood sugars improving   · Nutrition status:  Well developed, well nourished with no malnutrition  · DVT prophylaxis: scd  · High risk medications: none   · Disposition:  Discharge plan is home    Cezar Puckett MD , M.D.  9/2/2021  6:29 AM

## 2021-09-03 VITALS
SYSTOLIC BLOOD PRESSURE: 162 MMHG | HEART RATE: 72 BPM | WEIGHT: 224.3 LBS | BODY MASS INDEX: 38.29 KG/M2 | HEIGHT: 64 IN | DIASTOLIC BLOOD PRESSURE: 94 MMHG | OXYGEN SATURATION: 99 % | RESPIRATION RATE: 16 BRPM | TEMPERATURE: 97.2 F

## 2021-09-03 LAB
ABSOLUTE EOS #: 0.39 K/UL (ref 0–0.44)
ABSOLUTE IMMATURE GRANULOCYTE: 0.35 K/UL (ref 0–0.3)
ABSOLUTE LYMPH #: 2.8 K/UL (ref 1.1–3.7)
ABSOLUTE MONO #: 0.98 K/UL (ref 0.1–1.2)
BASOPHILS # BLD: 1 % (ref 0–2)
BASOPHILS ABSOLUTE: 0.05 K/UL (ref 0–0.2)
DIFFERENTIAL TYPE: ABNORMAL
EOSINOPHILS RELATIVE PERCENT: 4 % (ref 1–4)
GLUCOSE BLD-MCNC: 114 MG/DL (ref 74–100)
GLUCOSE BLD-MCNC: 123 MG/DL (ref 74–100)
GLUCOSE BLD-MCNC: 90 MG/DL (ref 74–100)
HCT VFR BLD CALC: 25 % (ref 36.3–47.1)
HEMOGLOBIN: 7.6 G/DL (ref 11.9–15.1)
IMMATURE GRANULOCYTES: 3 %
LYMPHOCYTES # BLD: 26 % (ref 24–43)
MCH RBC QN AUTO: 21.8 PG (ref 25.2–33.5)
MCHC RBC AUTO-ENTMCNC: 30.4 G/DL (ref 28.4–34.8)
MCV RBC AUTO: 71.8 FL (ref 82.6–102.9)
MONOCYTES # BLD: 9 % (ref 3–12)
NRBC AUTOMATED: 0 PER 100 WBC
PDW BLD-RTO: 17.5 % (ref 11.8–14.4)
PLATELET # BLD: 555 K/UL (ref 138–453)
PLATELET ESTIMATE: ABNORMAL
PMV BLD AUTO: 8.6 FL (ref 8.1–13.5)
RBC # BLD: 3.48 M/UL (ref 3.95–5.11)
RBC # BLD: ABNORMAL 10*6/UL
SEG NEUTROPHILS: 57 % (ref 36–65)
SEGMENTED NEUTROPHILS ABSOLUTE COUNT: 6.17 K/UL (ref 1.5–8.1)
SURGICAL PATHOLOGY REPORT: NORMAL
WBC # BLD: 10.7 K/UL (ref 3.5–11.3)
WBC # BLD: ABNORMAL 10*3/UL

## 2021-09-03 PROCEDURE — 36415 COLL VENOUS BLD VENIPUNCTURE: CPT

## 2021-09-03 PROCEDURE — 2580000003 HC RX 258: Performed by: SURGERY

## 2021-09-03 PROCEDURE — 6370000000 HC RX 637 (ALT 250 FOR IP): Performed by: FAMILY MEDICINE

## 2021-09-03 PROCEDURE — 2500000003 HC RX 250 WO HCPCS: Performed by: SURGERY

## 2021-09-03 PROCEDURE — 85025 COMPLETE CBC W/AUTO DIFF WBC: CPT

## 2021-09-03 PROCEDURE — 6370000000 HC RX 637 (ALT 250 FOR IP): Performed by: SURGERY

## 2021-09-03 PROCEDURE — 82947 ASSAY GLUCOSE BLOOD QUANT: CPT

## 2021-09-03 PROCEDURE — 6360000002 HC RX W HCPCS: Performed by: SURGERY

## 2021-09-03 PROCEDURE — 94761 N-INVAS EAR/PLS OXIMETRY MLT: CPT

## 2021-09-03 RX ORDER — METFORMIN HYDROCHLORIDE EXTENDED-RELEASE TABLETS 1000 MG/1
1000 TABLET, FILM COATED, EXTENDED RELEASE ORAL
Qty: 30 TABLET | Refills: 3 | Status: SHIPPED | OUTPATIENT
Start: 2021-09-04 | End: 2021-09-04

## 2021-09-03 RX ORDER — SUCRALFATE 1 G/1
1 TABLET ORAL 4 TIMES DAILY
Qty: 120 TABLET | Refills: 3 | Status: SHIPPED | OUTPATIENT
Start: 2021-09-03 | End: 2021-09-04

## 2021-09-03 RX ORDER — PANTOPRAZOLE SODIUM 40 MG/1
40 TABLET, DELAYED RELEASE ORAL
Qty: 30 TABLET | Refills: 3 | Status: SHIPPED | OUTPATIENT
Start: 2021-09-04

## 2021-09-03 RX ORDER — SODIUM CHLORIDE 0.9 % (FLUSH) 0.9 %
1 SYRINGE (ML) INJECTION 2 TIMES DAILY
Qty: 2 EACH | Refills: 0 | Status: SHIPPED | OUTPATIENT
Start: 2021-09-03 | End: 2021-10-13 | Stop reason: ALTCHOICE

## 2021-09-03 RX ADMIN — ATORVASTATIN CALCIUM 40 MG: 40 TABLET, FILM COATED ORAL at 10:00

## 2021-09-03 RX ADMIN — POTASSIUM CHLORIDE 10 MEQ: 750 TABLET, EXTENDED RELEASE ORAL at 10:00

## 2021-09-03 RX ADMIN — ATENOLOL 50 MG: 50 TABLET ORAL at 10:00

## 2021-09-03 RX ADMIN — GLIPIZIDE 2.5 MG: 5 TABLET ORAL at 07:16

## 2021-09-03 RX ADMIN — PANTOPRAZOLE SODIUM 40 MG: 40 TABLET, DELAYED RELEASE ORAL at 07:16

## 2021-09-03 RX ADMIN — SUCRALFATE 1 G: 1 TABLET ORAL at 12:46

## 2021-09-03 RX ADMIN — METFORMIN HYDROCHLORIDE 1000 MG: 500 TABLET, EXTENDED RELEASE ORAL at 10:00

## 2021-09-03 RX ADMIN — SUCRALFATE 1 G: 1 TABLET ORAL at 05:32

## 2021-09-03 RX ADMIN — DOCUSATE SODIUM 100 MG: 100 CAPSULE ORAL at 10:00

## 2021-09-03 RX ADMIN — HYDROCHLOROTHIAZIDE 25 MG: 25 TABLET ORAL at 10:00

## 2021-09-03 RX ADMIN — FERROUS SULFATE TAB 325 MG (65 MG ELEMENTAL FE) 325 MG: 325 (65 FE) TAB at 10:00

## 2021-09-03 RX ADMIN — CITALOPRAM HYDROBROMIDE 10 MG: 10 TABLET ORAL at 10:00

## 2021-09-03 RX ADMIN — LOSARTAN POTASSIUM 100 MG: 50 TABLET, FILM COATED ORAL at 10:00

## 2021-09-03 RX ADMIN — METRONIDAZOLE 500 MG: 500 INJECTION, SOLUTION INTRAVENOUS at 04:36

## 2021-09-03 RX ADMIN — METRONIDAZOLE 500 MG: 500 INJECTION, SOLUTION INTRAVENOUS at 12:46

## 2021-09-03 RX ADMIN — CEFTRIAXONE 1000 MG: 1 INJECTION, POWDER, FOR SOLUTION INTRAMUSCULAR; INTRAVENOUS at 10:00

## 2021-09-03 RX ADMIN — SUCRALFATE 1 G: 1 TABLET ORAL at 00:52

## 2021-09-03 RX ADMIN — METOCLOPRAMIDE 5 MG: 5 TABLET ORAL at 07:16

## 2021-09-03 RX ADMIN — CLONIDINE HYDROCHLORIDE 0.1 MG: 0.1 TABLET ORAL at 10:00

## 2021-09-03 RX ADMIN — METOCLOPRAMIDE 5 MG: 5 TABLET ORAL at 12:47

## 2021-09-03 RX ADMIN — ALOGLIPTIN 25 MG: 25 TABLET, FILM COATED ORAL at 10:00

## 2021-09-03 RX ADMIN — SODIUM CHLORIDE, PRESERVATIVE FREE 10 ML: 5 INJECTION INTRAVENOUS at 10:00

## 2021-09-03 RX ADMIN — NIFEDIPINE 60 MG: 30 TABLET, EXTENDED RELEASE ORAL at 10:00

## 2021-09-03 ASSESSMENT — PAIN SCALES - GENERAL
PAINLEVEL_OUTOF10: 0

## 2021-09-03 NOTE — PROGRESS NOTES
Has refused both Ensure Clear and Glucerna. Have discontinued them in orders. PO intakes for meals remain only fair at best per I/O.      Electronically signed by Rhett Dickey RDN, DHEERAJ 9/3/2021, 5:52 AM

## 2021-09-03 NOTE — PROGRESS NOTES
Progress Note  Cristel Ramos MD    OBJECTIVE:    Patient seen for f/u of Acute pyelonephritis. Abdominal pain and nausea better. Hb -7.6 . Diarrhea better. Wants to go home    ROS:   Constitutional: negative  for fevers, and negative for chills. Respiratory: negative for shortness of breath, negative for cough, and negative for wheezing  Cardiovascular: negative for chest pain, and negative for palpitations  Gastrointestinal: abdominal pain better negative for nausea,negative for vomiting, negative for diarrhea, and negative for constipation     All other systems were reviewed with the patient and are negative unless otherwise stated in HPI    OBJECTIVE:  Vitals:   Temp: 97.2 °F (36.2 °C)  BP: (!) 162/94  Resp: 16  Pulse: 72  SpO2: 99 %    24HR INTAKE/OUTPUT:      Intake/Output Summary (Last 24 hours) at 9/3/2021 0817  Last data filed at 9/2/2021 0929  Gross per 24 hour   Intake 300 ml   Output --   Net 300 ml     -----------------------------------------------------------------  Exam:  GEN:    Awake, alert and oriented x3. EYES:  EOMI, pupils equal   NECK: Supple. No lymphadenopathy. No carotid bruit  CVS:    regular rate and rhythm, no audible murmur  PULM:  CTA, no wheezes, rales or rhonchi, no acute respiratory distress  ABD:    Bowels sounds normal.  Abdomen is soft. No distention. no tenderness to palpation. EXT:   no edema bilaterally . No calf tenderness. NEURO: Moves all extremities. Motor and sensory are grossly intact  SKIN:  No rashes.   No skin lesions.    -----------------------------------------------------------------  Diagnostic Data:    · All available data reviewed  Lab Results   Component Value Date    WBC 10.7 09/03/2021    HGB 7.6 (L) 09/03/2021    MCV 71.8 (L) 09/03/2021     (H) 09/03/2021      Lab Results   Component Value Date    GLUCOSE 211 (H) 09/02/2021    BUN 6 09/02/2021    CREATININE 0.64 09/02/2021     (L) 09/02/2021    K 4.1 09/02/2021    CALCIUM 8.9 09/02/2021     09/02/2021    CO2 20 09/02/2021       PROBLEM LIST:  Principal Problem:    Acute pyelonephritis  Active Problems:    Essential hypertension    Type 2 diabetes mellitus with diabetic nephropathy, with long-term current use of insulin (HCC)    Diverticulitis of colon without hemorrhage    Sepsis due to Escherichia coli (HCC)    Acute hyponatremia    Acute gastric ulcer with hemorrhage    Duodenitis  Resolved Problems:    * No resolved hospital problems. *      ASSESSMENT / PLAN:  Acute pyelonephritis  · Continue iv rocephin     · Sepsis-  iv rocephin as per sensitivity. Repeat cultures after completion of antibiotics  · Acute diverticulitis- better with iv rocephin and flagyl  · Acute gastric ulcer with hemorrhage- -carafate and protonix,await biopsy,no NSAIDS,check hb  · Type 2 dm- blood sugars improving   · Nutrition status:  Well developed, well nourished with no malnutrition  · DVT prophylaxis: scd  · High risk medications: none   · Disposition:  Discharge plan is home    Ariel Mireles MD , M.D.  9/3/2021  8:17 AM

## 2021-09-03 NOTE — PROGRESS NOTES
Pt. Is alert and oriented sitting in chair at this time. Pt. Appears comfortable. Pt. Denies any pain. Pt. Vitals are assessed, vitals are WDL. Pt. Skin is normal to ethnicity, warm and dry. Pt. Positioned for comfort, call light is in reach and gripper socks on. Will continue to monitor pt.

## 2021-09-03 NOTE — PROGRESS NOTES
Discharge instructions given to pt. No questions, pt verbalized understanding all instructions. Pt aware of follow up appointments as listed on AVS. Pt d/c'd off unit at this time, via wheelchair, with family, to home. Belongings in hand. No issues noted.

## 2021-09-03 NOTE — PLAN OF CARE
Problem: Pain:  Description: Pain management should include both nonpharmacologic and pharmacologic interventions. Goal: Pain level will decrease  Description: Pain level will decrease  9/3/2021 0153 by Alma River RN  Note: Pain assessed every four hours and as needed using 0-10 pain scale. Pt educated on scale and uses scale appropriately. Encourage pt to notify staff of pain before pain becomes uncontrollable. Correlate periods of heavy activity after pain medication administration. Use pharmacological and non pharmacological methods for pain relief such as: warm blankets, ice, television, reading, or rest.          Problem: Falls - Risk of:  Goal: Will remain free from falls  Description: Will remain free from falls  9/3/2021 0153 by Alma River RN  Note: Call light in reach at all times, frequent checks, bed in lowest position, wheels of bed and chair locked, non skid footwear on, appropriate transfer techniques, personal items within reach, walkways free of obstructions, fall risk armband and sign displayed, [place] fall risk score per protocol. No falls this shift, will continue to monitor.

## 2021-09-03 NOTE — DISCHARGE INSTR - DIET

## 2021-09-03 NOTE — DISCHARGE SUMMARY
Physician Discharge Summary  Cristel Ramos MD       Patient ID:  Mikel Justice  [id number]  1970    Admission date: 8/26/2021    Discharge date: 9/3/2021     Admitting Physician: Richard Limon MD     Primary Care Physician: Zoran Butcher, APRN - CNP     Primary Discharge Diagnoses:   Patient Active Problem List    Diagnosis Date Noted    Duodenitis 09/02/2021    Acute gastric ulcer with hemorrhage 09/01/2021    Sepsis due to Escherichia coli ([address] 08/27/2021    Acute hyponatremia 08/27/2021    Acute pyelonephritis 08/26/2021    Type 2 diabetes mellitus with diabetic nephropathy, with long-term current use of insulin ([address] 08/26/2021    Diverticulitis of colon without hemorrhage 08/26/2021    Continuous opioid dependence ([address] 02/03/2021    Chronic pain of right knee 10/04/2019    Internal hemorrhoids     Diverticulosis of colon     Iron deficiency anemia 01/10/2019    Malabsorption due to intolerance, not elsewhere classified 01/10/2019    Microcytic anemia 12/20/2018    Dyslipidemia 12/20/2018    Uncontrolled type 2 diabetes mellitus with hyperglycemia ([address] 12/19/2018    Essential hypertension 12/19/2018    Lumbar back pain with radiculopathy affecting right lower extremity 12/19/2018       Additional Diagnoses:       Diagnosis Date    Diabetes mellitus ([address]     Hyperlipidemia     Hypertension     Irregular heart rhythm     Sciatica          Review of Systems:  Constitutional: negative for fevers or chills  Eyes: negative for visual disturbance   ENT: negative for sore throat or nasal congestion  Respiratory: negative for shortness of breath or cough  Cardiovascular: negative for chest pain ,palpitations,pnd,syncope  Gastrointestinal: negative for abd pain, nausea, vomiting, diarrhea , constipation,hemetemesis,say,blood in stool  Genitourinary: negative for dysuria, urgency ,frequency,hematuria  Integument/breast: negative for skin rash or lesions  Neurological: negative for unilateral weakness, numbness or tingling. Skeletal Muscular: no joint pain,jont swelling,back pain              Physical exam:      -----------------------------------------------------------------  Exam:  GEN:   A & O x3, no apparent distress  EYES: No gross abnormalities. NECK: normal, supple, no lymphadenopathy,  no carotid bruits  PULM: clear to auscultation bilaterally- no wheezes, rales or rhonchi, normal air movement, no respiratory distress  COR: regular rate & rhythm, no murmurs and no gallops  ABD:  soft, non-tender, non-distended, normal bowel sounds, no masses or organomegaly  EXT:   no cyanosis, clubbing or edema present    NEURO: negative  SKIN:  no rashes or significant lesions  -----------------------------------------------------------------          Hospital Course: The patient was admitted for the above. She was treated with iv antibiotics as per sensitivity and improved over the course of her hospitalization. She had anemia and required one unit of prbc. Due to her anemia,egd was done and found to have gastric ulcer and duodenitis,started on protonix and carafate. Consultants:    · gen surgery    Procedures:    · egd    Complications:   · none    Significant Diagnostic Studies:   XR CHEST (2 VW)    Result Date: 8/26/2021  EXAMINATION: TWO XRAY VIEWS OF THE CHEST 8/26/2021 1:04 pm COMPARISON: None. HISTORY: ORDERING SYSTEM PROVIDED HISTORY: Cough TECHNOLOGIST PROVIDED HISTORY: cough FINDINGS: The heart is normal in size. There is no evidence of pneumothorax, pleural effusion, infiltrate, or abnormal lung mass. There are a few small central old granulomatous calcifications and a couple in the lung bases. Minimal spondylitic changes are present in the vertebral endplates and in the osseous structures of the shoulders. Unremarkable PA and lateral chest with no evidence of acute cardiopulmonary process.      CT ABDOMEN PELVIS W IV CONTRAST Additional Contrast? Radiologist Recommendation    Result Date: 8/26/2021  EXAMINATION: CT OF THE ABDOMEN AND PELVIS WITH CONTRAST 8/26/2021 2:05 pm TECHNIQUE: CT of the abdomen and pelvis was performed with the administration of intravenous contrast. Multiplanar reformatted images are provided for review. Dose modulation, iterative reconstruction, and/or weight based adjustment of the mA/kV was utilized to reduce the radiation dose to as low as reasonably achievable. COMPARISON: None. HISTORY: ORDERING SYSTEM PROVIDED HISTORY: Lower abdominal pain TECHNOLOGIST PROVIDED HISTORY: lower abdominal pain, black stools FINDINGS: Lower Chest: No acute findings. Organs: Findings compatible with hepatic steatosis. The gallbladder, pancreas, spleen, adrenals and left kidney appear unremarkable. The right kidney appears mildly edematous and there is asymmetric right perinephric stranding. A patchy area of decreased enhancement is present in the lateral interpolar region. No hydronephrosis or stone identified. GI/Bowel: Mild inflammatory change and diverticula is noted involving the distal ascending colon, near the right kidney. No evidence for obstruction. No gross perforation or abscess formation. Scattered distal colonic diverticula are also present. Pelvis: No acute abnormality identified. Coarse calcifications are present in the right adnexa, which may be vascular. Peritoneum/Retroperitoneum: No free air or free fluid. The aorta is normal in caliber. The visceral branches are patent. Calcified atheromatous plaque is present. No lymphadenopathy. Bones/Soft Tissues: Postoperative findings in the lower abdominal wall. No acute osseous or acute soft tissue abnormality identified. 1.  Asymmetric right perinephric stranding and patchy area of decreased enhancement in the lateral interpolar right kidney. The combination of findings may represent focal pyelonephritis in the appropriate clinical setting.  2.  Diverticula and mild inflammatory change involving the ascending colon, near the right kidney. A mild acute diverticulitis or secondary colitis related to the inflammatory change in the kidney can have this appearance.      EGD    Result Date: 9/1/2021  No dictation     Recent Results (from the past 96 hour(s))   Glucose, Whole Blood    Collection Time: 08/30/21  4:09 PM   Result Value Ref Range    POC Glucose 142 (H) 74 - 100 mg/dL   Glucose, Whole Blood    Collection Time: 08/30/21  9:02 PM   Result Value Ref Range    POC Glucose 104 (H) 74 - 100 mg/dL   Glucose, Whole Blood    Collection Time: 08/31/21  4:10 AM   Result Value Ref Range    POC Glucose 90 74 - 100 mg/dL   Glucose, Whole Blood    Collection Time: 08/31/21  5:20 AM   Result Value Ref Range    POC Glucose 116 (H) 74 - 100 mg/dL   CBC auto differential    Collection Time: 08/31/21  6:20 AM   Result Value Ref Range    WBC 12.3 (H) 3.5 - 11.3 k/uL    RBC 3.22 (L) 3.95 - 5.11 m/uL    Hemoglobin 7.2 (L) 11.9 - 15.1 g/dL    Hematocrit 22.9 (L) 36.3 - 47.1 %    MCV 71.1 (L) 82.6 - 102.9 fL    MCH 22.4 (L) 25.2 - 33.5 pg    MCHC 31.4 28.4 - 34.8 g/dL    RDW 17.4 (H) 11.8 - 14.4 %    Platelets 160 119 - 143 k/uL    MPV 9.3 8.1 - 13.5 fL    NRBC Automated 0.0 0.0 per 100 WBC    Differential Type NOT REPORTED     Seg Neutrophils 63 36 - 65 %    Lymphocytes 18 (L) 24 - 43 %    Monocytes 11 3 - 12 %    Eosinophils % 4 1 - 4 %    Basophils 1 0 - 2 %    Immature Granulocytes 3 (H) 0 %    Segs Absolute 7.84 1.50 - 8.10 k/uL    Absolute Lymph # 2.25 1.10 - 3.70 k/uL    Absolute Mono # 1.32 (H) 0.10 - 1.20 k/uL    Absolute Eos # 0.45 (H) 0.00 - 0.44 k/uL    Basophils Absolute 0.07 0.00 - 0.20 k/uL    Absolute Immature Granulocyte 0.33 (H) 0.00 - 0.30 k/uL    WBC Morphology NOT REPORTED     RBC Morphology NOT REPORTED     Platelet Estimate NOT REPORTED    Comprehensive metabolic panel    Collection Time: 08/31/21  6:20 AM   Result Value Ref Range    Glucose 115 (H) 70 - 99 mg/dL    BUN 11 6 - 20 mg/dL CREATININE 0.98 (H) 0.50 - 0.90 mg/dL    Bun/Cre Ratio 11 9 - 20    Calcium 7.9 (L) 8.6 - 10.4 mg/dL    Sodium 135 135 - 144 mmol/L    Potassium 4.0 3.7 - 5.3 mmol/L    Chloride 107 98 - 107 mmol/L    CO2 17 (L) 20 - 31 mmol/L    Anion Gap 11 9 - 17 mmol/L    Alkaline Phosphatase 153 (H) 35 - 104 U/L    ALT 25 5 - 33 U/L    AST 17 <32 U/L    Total Bilirubin 0.19 (L) 0.3 - 1.2 mg/dL    Total Protein 6.7 6.4 - 8.3 g/dL    Albumin 2.9 (L) 3.5 - 5.2 g/dL    Albumin/Globulin Ratio 0.8 (L) 1.0 - 2.5    GFR Non-African American 60 (L) >60 mL/min    GFR African American >60 >60 mL/min    GFR Comment          GFR Staging         Glucose, Whole Blood    Collection Time: 08/31/21  6:46 AM   Result Value Ref Range    POC Glucose 106 (H) 74 - 100 mg/dL   Glucose, Whole Blood    Collection Time: 08/31/21 11:13 AM   Result Value Ref Range    POC Glucose 191 (H) 74 - 100 mg/dL   Glucose, Whole Blood    Collection Time: 08/31/21  4:09 PM   Result Value Ref Range    POC Glucose 157 (H) 74 - 100 mg/dL   Glucose, Whole Blood    Collection Time: 08/31/21  8:39 PM   Result Value Ref Range    POC Glucose 79 74 - 100 mg/dL   CBC auto differential    Collection Time: 09/01/21  6:30 AM   Result Value Ref Range    WBC 11.5 (H) 3.5 - 11.3 k/uL    RBC 3.38 (L) 3.95 - 5.11 m/uL    Hemoglobin 7.5 (L) 11.9 - 15.1 g/dL    Hematocrit 23.9 (L) 36.3 - 47.1 %    MCV 70.7 (L) 82.6 - 102.9 fL    MCH 22.2 (L) 25.2 - 33.5 pg    MCHC 31.4 28.4 - 34.8 g/dL    RDW 17.7 (H) 11.8 - 14.4 %    Platelets 166 (H) 829 - 453 k/uL    MPV 9.2 8.1 - 13.5 fL    NRBC Automated 0.0 0.0 per 100 WBC    Differential Type NOT REPORTED     Seg Neutrophils 60 36 - 65 %    Lymphocytes 21 (L) 24 - 43 %    Monocytes 11 3 - 12 %    Eosinophils % 4 1 - 4 %    Basophils 1 0 - 2 %    Immature Granulocytes 3 (H) 0 %    Segs Absolute 7.05 1.50 - 8.10 k/uL    Absolute Lymph # 2.42 1.10 - 3.70 k/uL    Absolute Mono # 1.25 (H) 0.10 - 1.20 k/uL    Absolute Eos # 0.44 0.00 - 0.44 k/uL Basophils Absolute 0.06 0.00 - 0.20 k/uL    Absolute Immature Granulocyte 0.29 0.00 - 0.30 k/uL    WBC Morphology NOT REPORTED     RBC Morphology NOT REPORTED     Platelet Estimate NOT REPORTED    Comprehensive metabolic panel    Collection Time: 09/01/21  6:30 AM   Result Value Ref Range    Glucose 95 70 - 99 mg/dL    BUN 9 6 - 20 mg/dL    CREATININE 0.81 0.50 - 0.90 mg/dL    Bun/Cre Ratio 11 9 - 20    Calcium 8.6 8.6 - 10.4 mg/dL    Sodium 135 135 - 144 mmol/L    Potassium 3.9 3.7 - 5.3 mmol/L    Chloride 104 98 - 107 mmol/L    CO2 18 (L) 20 - 31 mmol/L    Anion Gap 13 9 - 17 mmol/L    Alkaline Phosphatase 142 (H) 35 - 104 U/L    ALT 21 5 - 33 U/L    AST 17 <32 U/L    Total Bilirubin <0.10 (L) 0.3 - 1.2 mg/dL    Total Protein 7.0 6.4 - 8.3 g/dL    Albumin 3.0 (L) 3.5 - 5.2 g/dL    Albumin/Globulin Ratio 0.8 (L) 1.0 - 2.5    GFR Non-African American >60 >60 mL/min    GFR African American >60 >60 mL/min    GFR Comment          GFR Staging         Glucose, Whole Blood    Collection Time: 09/01/21  6:49 AM   Result Value Ref Range    POC Glucose 89 74 - 100 mg/dL   Glucose, Whole Blood    Collection Time: 09/01/21 12:08 PM   Result Value Ref Range    POC Glucose 106 (H) 74 - 100 mg/dL   Glucose, Whole Blood    Collection Time: 09/01/21  5:36 PM   Result Value Ref Range    POC Glucose 106 (H) 74 - 100 mg/dL   Glucose, Whole Blood    Collection Time: 09/01/21  9:15 PM   Result Value Ref Range    POC Glucose 225 (H) 74 - 100 mg/dL   CBC auto differential    Collection Time: 09/02/21  5:50 AM   Result Value Ref Range    WBC 11.0 3.5 - 11.3 k/uL    RBC 3.41 (L) 3.95 - 5.11 m/uL    Hemoglobin 7.5 (L) 11.9 - 15.1 g/dL    Hematocrit 24.6 (L) 36.3 - 47.1 %    MCV 72.1 (L) 82.6 - 102.9 fL    MCH 22.0 (L) 25.2 - 33.5 pg    MCHC 30.5 28.4 - 34.8 g/dL    RDW 17.6 (H) 11.8 - 14.4 %    Platelets 218 (H) 803 - 453 k/uL    MPV 8.9 8.1 - 13.5 fL    NRBC Automated 0.0 0.0 per 100 WBC    Differential Type NOT REPORTED     Seg Neutrophils 61 36 - 65 %    Lymphocytes 22 (L) 24 - 43 %    Monocytes 10 3 - 12 %    Eosinophils % 3 1 - 4 %    Basophils 1 0 - 2 %    Immature Granulocytes 3 (H) 0 %    Segs Absolute 6.78 1.50 - 8.10 k/uL    Absolute Lymph # 2.45 1.10 - 3.70 k/uL    Absolute Mono # 1.12 0.10 - 1.20 k/uL    Absolute Eos # 0.34 0.00 - 0.44 k/uL    Basophils Absolute 0.06 0.00 - 0.20 k/uL    Absolute Immature Granulocyte 0.29 0.00 - 0.30 k/uL    WBC Morphology NOT REPORTED     RBC Morphology NOT REPORTED     Platelet Estimate NOT REPORTED    Comprehensive metabolic panel    Collection Time: 09/02/21  5:50 AM   Result Value Ref Range    Glucose 211 (H) 70 - 99 mg/dL    BUN 6 6 - 20 mg/dL    CREATININE 0.64 0.50 - 0.90 mg/dL    Bun/Cre Ratio 9 9 - 20    Calcium 8.9 8.6 - 10.4 mg/dL    Sodium 134 (L) 135 - 144 mmol/L    Potassium 4.1 3.7 - 5.3 mmol/L    Chloride 102 98 - 107 mmol/L    CO2 20 20 - 31 mmol/L    Anion Gap 12 9 - 17 mmol/L    Alkaline Phosphatase 124 (H) 35 - 104 U/L    ALT 19 5 - 33 U/L    AST 13 <32 U/L    Total Bilirubin 0.18 (L) 0.3 - 1.2 mg/dL    Total Protein 6.9 6.4 - 8.3 g/dL    Albumin 3.0 (L) 3.5 - 5.2 g/dL    Albumin/Globulin Ratio 0.8 (L) 1.0 - 2.5    GFR Non-African American >60 >60 mL/min    GFR African American >60 >60 mL/min    GFR Comment          GFR Staging         Glucose, Whole Blood    Collection Time: 09/02/21  7:06 AM   Result Value Ref Range    POC Glucose 197 (H) 74 - 100 mg/dL   Glucose, Whole Blood    Collection Time: 09/02/21 11:10 AM   Result Value Ref Range    POC Glucose 183 (H) 74 - 100 mg/dL   Glucose, Whole Blood    Collection Time: 09/02/21  4:13 PM   Result Value Ref Range    POC Glucose 96 74 - 100 mg/dL   Glucose, Whole Blood    Collection Time: 09/02/21  8:17 PM   Result Value Ref Range    POC Glucose 200 (H) 74 - 100 mg/dL   CBC auto differential    Collection Time: 09/03/21  6:10 AM   Result Value Ref Range    WBC 10.7 3.5 - 11.3 k/uL    RBC 3.48 (L) 3.95 - 5.11 m/uL    Hemoglobin 7.6 (L) 11.9 - 15.1 g/dL    Hematocrit 25.0 (L) 36.3 - 47.1 %    MCV 71.8 (L) 82.6 - 102.9 fL    MCH 21.8 (L) 25.2 - 33.5 pg    MCHC 30.4 28.4 - 34.8 g/dL    RDW 17.5 (H) 11.8 - 14.4 %    Platelets 681 (H) 886 - 453 k/uL    MPV 8.6 8.1 - 13.5 fL    NRBC Automated 0.0 0.0 per 100 WBC    Differential Type NOT REPORTED     Seg Neutrophils 57 36 - 65 %    Lymphocytes 26 24 - 43 %    Monocytes 9 3 - 12 %    Eosinophils % 4 1 - 4 %    Basophils 1 0 - 2 %    Immature Granulocytes 3 (H) 0 %    Segs Absolute 6.17 1.50 - 8.10 k/uL    Absolute Lymph # 2.80 1.10 - 3.70 k/uL    Absolute Mono # 0.98 0.10 - 1.20 k/uL    Absolute Eos # 0.39 0.00 - 0.44 k/uL    Basophils Absolute 0.05 0.00 - 0.20 k/uL    Absolute Immature Granulocyte 0.35 (H) 0.00 - 0.30 k/uL    WBC Morphology NOT REPORTED     RBC Morphology NOT REPORTED     Platelet Estimate NOT REPORTED    Glucose, Whole Blood    Collection Time: 09/03/21  6:12 AM   Result Value Ref Range    POC Glucose 123 (H) 74 - 100 mg/dL   Glucose, Whole Blood    Collection Time: 09/03/21 11:22 AM   Result Value Ref Range    POC Glucose 90 74 - 100 mg/dL     ·     Discharge Condition:   · stable    Disposition:   · home    Discharge Medications:     Shira Goldman   Home Medication Instructions [id number]    Printed on:09/03/21 9847   Medication Information                      atenolol (TENORMIN) 50 MG tablet  TAKE ONE TABLET BY MOUTH DAILY             atorvastatin (LIPITOR) 40 MG tablet  Take 1 tablet by mouth daily             blood glucose test strips (ASCENSIA AUTODISC VI;ONE TOUCH ULTRA TEST VI) strip  1 each by In Vitro route 3 times daily Dispense brand compatible with insurance             cefTRIAXone (ROCEPHIN) infusion  Infuse 1,000 mg intravenously every 24 hours Compound per protocol             citalopram (CELEXA) 10 MG tablet  Take 1 tablet by mouth daily             cloNIDine (CATAPRES) 0.1 MG tablet  TAKE ONE TABLET BY MOUTH TWICE A DAY             docusate (COLACE, DULCOLAX) 100 MG CAPS  Take 100 mg by mouth 2 times daily             ferrous sulfate (IRON 325) 325 (65 Fe) MG tablet  Take 325 mg by mouth 2 times daily             FreeStyle Lancets MISC  1 each by Does not apply route daily Dispense brand compatible with insurance             furosemide (LASIX) 40 MG tablet  Take 1 tablet by mouth daily             glipiZIDE (GLUCOTROL XL) 2.5 MG extended release tablet  Take 1 tablet by mouth daily             glucose monitoring kit (FREESTYLE) monitoring kit  1 kit by Does not apply route daily Dispense brand compatible with insurance             insulin detemir (LEVEMIR FLEXTOUCH) 100 UNIT/ML injection pen  Inject 56 Units into the skin nightly             insulin lispro, 1 Unit Dial, (HUMALOG KWIKPEN) 100 UNIT/ML SOPN  Inject 6 Units into the skin 3 times daily (before meals)             Insulin Pen Needle (PEN NEEDLES) 32G X 5 MM MISC  1 each by Does not apply route daily             losartan-hydroCHLOROthiazide (HYZAAR) 100-25 MG per tablet  Take 1 tablet by mouth daily             metFORMIN (FORTAMET) 1000 MG extended release tablet  Take 1 tablet by mouth daily (with breakfast)             metoclopramide (REGLAN) 5 MG tablet  TAKE ONE TABLET BY MOUTH THREE TIMES A DAY             NIFEdipine (ADALAT CC) 60 MG extended release tablet  Take 1 tablet by mouth daily             pantoprazole (PROTONIX) 40 MG tablet  Take 1 tablet by mouth every morning (before breakfast)             potassium chloride (KLOR-CON M) 10 MEQ extended release tablet  Take 1 tablet by mouth 2 times daily             SITagliptin-metFORMIN (JANUMET XR)  MG TB24 per extended release tablet  Take 2 tablets by mouth daily             Sodium Chloride Flush (SALINE FLUSH) 0.9 % SOLN  Infuse 1 mL intravenously 2 times daily             sucralfate (CARAFATE) 1 GM tablet  Take 1 tablet by mouth 4 times daily             tranexamic acid (LYSTEDA) 650 MG TABS tablet  Take 2 tablets up to 3 times daily for up to 5 days as needed for heavy menses                 · Resume all home medications unless otherwise directed  · Add carfate and protonix. Rocephin 1 gm iv once tomorrow. ·     Patient Instructions:   · Activity: activity as tolerated  · Diet: diabetic diet  · Wound Care: none needed  · Other: blood and urine culture on 9/5/2021  · Follow up with PCP in 1 wk as directed      Time Spent on discharge services is 40 minutes in the examination, evaluation, counseling and review of medications and discharge plan.     Signed:  Arlene Baugh MD, M.D.  9/3/2021  3:33 PM

## 2021-09-04 ENCOUNTER — HOSPITAL ENCOUNTER (OUTPATIENT)
Dept: INFUSION THERAPY | Age: 51
Discharge: HOME | End: 2021-09-04
Payer: MEDICARE

## 2021-09-04 VITALS
HEART RATE: 88 BPM | SYSTOLIC BLOOD PRESSURE: 121 MMHG | DIASTOLIC BLOOD PRESSURE: 68 MMHG | TEMPERATURE: 96.1 F | RESPIRATION RATE: 20 BRPM

## 2021-09-04 LAB — GASTRIN: 33 PG/ML (ref 0–100)

## 2021-09-04 PROCEDURE — 2580000003 HC RX 258: Performed by: FAMILY MEDICINE

## 2021-09-04 PROCEDURE — 6360000002 HC RX W HCPCS: Performed by: FAMILY MEDICINE

## 2021-09-04 PROCEDURE — 96365 THER/PROPH/DIAG IV INF INIT: CPT

## 2021-09-04 RX ORDER — SODIUM CHLORIDE 0.9 % (FLUSH) 0.9 %
5-40 SYRINGE (ML) INJECTION PRN
Status: DISCONTINUED | OUTPATIENT
Start: 2021-09-04 | End: 2021-09-05 | Stop reason: HOSPADM

## 2021-09-04 RX ORDER — SODIUM CHLORIDE 9 MG/ML
25 INJECTION, SOLUTION INTRAVENOUS PRN
Status: DISCONTINUED | OUTPATIENT
Start: 2021-09-04 | End: 2021-09-05 | Stop reason: HOSPADM

## 2021-09-04 RX ORDER — SODIUM CHLORIDE 0.9 % (FLUSH) 0.9 %
5-40 SYRINGE (ML) INJECTION EVERY 12 HOURS SCHEDULED
Status: DISCONTINUED | OUTPATIENT
Start: 2021-09-04 | End: 2021-09-05 | Stop reason: HOSPADM

## 2021-09-04 RX ADMIN — CEFTRIAXONE 1000 MG: 1 INJECTION, POWDER, FOR SOLUTION INTRAMUSCULAR; INTRAVENOUS at 10:16

## 2021-09-04 RX ADMIN — SODIUM CHLORIDE, PRESERVATIVE FREE 10 ML: 5 INJECTION INTRAVENOUS at 10:13

## 2021-09-04 RX ADMIN — SODIUM CHLORIDE, PRESERVATIVE FREE 10 ML: 5 INJECTION INTRAVENOUS at 10:47

## 2021-09-11 ENCOUNTER — HOSPITAL ENCOUNTER (OUTPATIENT)
Age: 51
Discharge: HOME | End: 2021-09-11
Payer: MEDICARE

## 2021-09-11 DIAGNOSIS — A41.51 SEPSIS DUE TO ESCHERICHIA COLI, UNSPECIFIED WHETHER ACUTE ORGAN DYSFUNCTION PRESENT (HCC): ICD-10-CM

## 2021-09-11 DIAGNOSIS — N10 ACUTE PYELONEPHRITIS: ICD-10-CM

## 2021-09-11 PROCEDURE — 86403 PARTICLE AGGLUT ANTBDY SCRN: CPT

## 2021-09-11 PROCEDURE — 36415 COLL VENOUS BLD VENIPUNCTURE: CPT

## 2021-09-11 PROCEDURE — 87040 BLOOD CULTURE FOR BACTERIA: CPT

## 2021-09-11 PROCEDURE — 87205 SMEAR GRAM STAIN: CPT

## 2021-09-11 PROCEDURE — 87086 URINE CULTURE/COLONY COUNT: CPT

## 2021-09-12 ENCOUNTER — HOSPITAL ENCOUNTER (EMERGENCY)
Age: 51
Discharge: HOME | End: 2021-09-12
Attending: FAMILY MEDICINE
Payer: MEDICARE

## 2021-09-12 VITALS
OXYGEN SATURATION: 99 % | HEART RATE: 90 BPM | HEIGHT: 64 IN | TEMPERATURE: 97.7 F | DIASTOLIC BLOOD PRESSURE: 93 MMHG | BODY MASS INDEX: 34.83 KG/M2 | WEIGHT: 204 LBS | RESPIRATION RATE: 18 BRPM | SYSTOLIC BLOOD PRESSURE: 157 MMHG

## 2021-09-12 DIAGNOSIS — R78.81 POSITIVE BLOOD CULTURE: ICD-10-CM

## 2021-09-12 DIAGNOSIS — N39.0 COMPLICATED URINARY TRACT INFECTION: Primary | ICD-10-CM

## 2021-09-12 LAB
-: NORMAL
ABSOLUTE EOS #: 0.33 K/UL (ref 0–0.44)
ABSOLUTE IMMATURE GRANULOCYTE: <0.03 K/UL (ref 0–0.3)
ABSOLUTE LYMPH #: 2.5 K/UL (ref 1.1–3.7)
ABSOLUTE MONO #: 0.51 K/UL (ref 0.1–1.2)
ALBUMIN SERPL-MCNC: 3.6 G/DL (ref 3.5–5.2)
ALBUMIN/GLOBULIN RATIO: 1.1 (ref 1–2.5)
ALP BLD-CCNC: 76 U/L (ref 35–104)
ALT SERPL-CCNC: 39 U/L (ref 5–33)
AMORPHOUS: NORMAL
ANION GAP SERPL CALCULATED.3IONS-SCNC: 14 MMOL/L (ref 9–17)
AST SERPL-CCNC: 30 U/L
BACTERIA: NORMAL
BASOPHILS # BLD: 2 % (ref 0–2)
BASOPHILS ABSOLUTE: 0.1 K/UL (ref 0–0.2)
BILIRUB SERPL-MCNC: 0.16 MG/DL (ref 0.3–1.2)
BILIRUBIN URINE: NEGATIVE
BUN BLDV-MCNC: 12 MG/DL (ref 6–20)
BUN/CREAT BLD: 14 (ref 9–20)
CALCIUM SERPL-MCNC: 9 MG/DL (ref 8.6–10.4)
CASTS UA: NORMAL /LPF
CHLORIDE BLD-SCNC: 101 MMOL/L (ref 98–107)
CO2: 22 MMOL/L (ref 20–31)
COLOR: YELLOW
COMMENT UA: ABNORMAL
CREAT SERPL-MCNC: 0.84 MG/DL (ref 0.5–0.9)
CRYSTALS, UA: NORMAL /HPF
CULTURE: NORMAL
DIFFERENTIAL TYPE: ABNORMAL
EOSINOPHILS RELATIVE PERCENT: 5 % (ref 1–4)
EPITHELIAL CELLS UA: NORMAL /HPF (ref 0–25)
GFR AFRICAN AMERICAN: >60 ML/MIN
GFR NON-AFRICAN AMERICAN: >60 ML/MIN
GFR SERPL CREATININE-BSD FRML MDRD: ABNORMAL ML/MIN/{1.73_M2}
GFR SERPL CREATININE-BSD FRML MDRD: ABNORMAL ML/MIN/{1.73_M2}
GLUCOSE BLD-MCNC: 127 MG/DL (ref 70–99)
GLUCOSE URINE: NEGATIVE
HCT VFR BLD CALC: 29.1 % (ref 36.3–47.1)
HEMOGLOBIN: 8.7 G/DL (ref 11.9–15.1)
IMMATURE GRANULOCYTES: 0 %
KETONES, URINE: NEGATIVE
LEUKOCYTE ESTERASE, URINE: NEGATIVE
LYMPHOCYTES # BLD: 38 % (ref 24–43)
Lab: NORMAL
MCH RBC QN AUTO: 21.4 PG (ref 25.2–33.5)
MCHC RBC AUTO-ENTMCNC: 29.9 G/DL (ref 28.4–34.8)
MCV RBC AUTO: 71.5 FL (ref 82.6–102.9)
MONOCYTES # BLD: 8 % (ref 3–12)
MUCUS: NORMAL
NITRITE, URINE: NEGATIVE
NRBC AUTOMATED: 0 PER 100 WBC
OTHER OBSERVATIONS UA: NORMAL
PDW BLD-RTO: 17 % (ref 11.8–14.4)
PH UA: 8 (ref 5–9)
PLATELET # BLD: ABNORMAL K/UL (ref 138–453)
PLATELET ESTIMATE: ABNORMAL
PLATELET, FLUORESCENCE: 321 K/UL (ref 138–453)
PLATELET, IMMATURE FRACTION: 4.1 % (ref 1.1–10.3)
PMV BLD AUTO: ABNORMAL FL (ref 8.1–13.5)
POTASSIUM SERPL-SCNC: 4.2 MMOL/L (ref 3.7–5.3)
PROTEIN UA: ABNORMAL
RBC # BLD: 4.07 M/UL (ref 3.95–5.11)
RBC # BLD: ABNORMAL 10*6/UL
RBC UA: NORMAL /HPF (ref 0–2)
RENAL EPITHELIAL, UA: NORMAL /HPF
SEG NEUTROPHILS: 47 % (ref 36–65)
SEGMENTED NEUTROPHILS ABSOLUTE COUNT: 3.08 K/UL (ref 1.5–8.1)
SODIUM BLD-SCNC: 137 MMOL/L (ref 135–144)
SPECIFIC GRAVITY UA: 1.01 (ref 1.01–1.02)
SPECIMEN DESCRIPTION: NORMAL
TOTAL PROTEIN: 6.9 G/DL (ref 6.4–8.3)
TRICHOMONAS: NORMAL
TURBIDITY: CLEAR
URINE HGB: ABNORMAL
UROBILINOGEN, URINE: NORMAL
WBC # BLD: 6.5 K/UL (ref 3.5–11.3)
WBC # BLD: ABNORMAL 10*3/UL
WBC UA: NORMAL /HPF (ref 0–5)
YEAST: NORMAL

## 2021-09-12 PROCEDURE — 81001 URINALYSIS AUTO W/SCOPE: CPT

## 2021-09-12 PROCEDURE — 96372 THER/PROPH/DIAG INJ SC/IM: CPT

## 2021-09-12 PROCEDURE — 6360000002 HC RX W HCPCS: Performed by: FAMILY MEDICINE

## 2021-09-12 PROCEDURE — 36415 COLL VENOUS BLD VENIPUNCTURE: CPT

## 2021-09-12 PROCEDURE — 85055 RETICULATED PLATELET ASSAY: CPT

## 2021-09-12 PROCEDURE — 99283 EMERGENCY DEPT VISIT LOW MDM: CPT

## 2021-09-12 PROCEDURE — 85025 COMPLETE CBC W/AUTO DIFF WBC: CPT

## 2021-09-12 PROCEDURE — 80053 COMPREHEN METABOLIC PANEL: CPT

## 2021-09-12 RX ORDER — CEFTRIAXONE 1 G/1
1000 INJECTION, POWDER, FOR SOLUTION INTRAMUSCULAR; INTRAVENOUS ONCE
Status: COMPLETED | OUTPATIENT
Start: 2021-09-12 | End: 2021-09-12

## 2021-09-12 RX ADMIN — CEFTRIAXONE 1000 MG: 1 INJECTION, POWDER, FOR SOLUTION INTRAMUSCULAR; INTRAVENOUS at 13:46

## 2021-09-12 ASSESSMENT — ENCOUNTER SYMPTOMS
RESPIRATORY NEGATIVE: 1
GASTROINTESTINAL NEGATIVE: 1
ALLERGIC/IMMUNOLOGIC NEGATIVE: 1
EYES NEGATIVE: 1

## 2021-09-12 NOTE — ED PROVIDER NOTES
[facility]      Pt Name: Arlene De La Fuente  MRN: [id number]  [place] 1970  Date of evaluation: 9/12/2021  Provider: Esequiel Martínez MD    CHIEF COMPLAINT     Chief Complaint   Patient presents with    Other     Pt states she was recently admitted to hospital for sepsis and UTI. She had repeat outpatient blood cultures yesterday and was called to return to hospital for positive results         HISTORY OF PRESENT ILLNESS   (Location/Symptom, Timing/Onset, Context/Setting,Quality, Duration, Modifying Factors, Severity)  Note limiting factors. Arlene De La Fuente is a53 y.o. female who presents to the emergency department      Patient is a 46years old female with a history of diabetes presented to ER complaining of having had the results of a blood culture called back to her as positive. Apparently complicated UTI definite she is diabetic she was in the hospital for a few days. Due to the fact that she was having headaches and felt somewhat under the weather she got some blood cultures yesterday and today one of them came back positive. She denies any fever, chills, sweats, urinary symptoms. She reports some fatigue and some lack of appetite but she is better than when she was in the hospital.          Nursing Notes werereviewed. REVIEW OF SYSTEMS    (2-9 systems for level 4, 10 or more for level 5)     Review of Systems   Constitutional: Positive for activity change and fatigue. Negative for appetite change, chills, diaphoresis, fever and unexpected weight change. HENT: Negative. Eyes: Negative. Respiratory: Negative. Cardiovascular: Negative. Gastrointestinal: Negative. Endocrine: Negative. Genitourinary: Negative. Musculoskeletal: Negative. Skin: Negative. Allergic/Immunologic: Negative. Neurological: Negative. Hematological: Negative. Psychiatric/Behavioral: Negative.         Except as noted above the remainder of the review of systems was reviewed and negative.        PAST MEDICAL HISTORY     Past Medical History:   Diagnosis Date    Diabetes mellitus ([address]     Hyperlipidemia     Hypertension     Irregular heart rhythm     Sciatica          SURGICALHISTORY       Past Surgical History:   Procedure Laterality Date     SECTION      COLONOSCOPY  2019    Dr Tony Plasencia- diverticulosis,hemorrhoids    COLONOSCOPY N/A 3/6/2019    COLONOSCOPY DIAGNOSTIC performed by Nasra Gross MD at [address], COLON, DIAGNOSTIC  2021    EGD with biopsies    UPPER GASTROINTESTINAL ENDOSCOPY  2019    Dr Ellis-bx(+H-Pylori,normal duodenal mucosa)erosive duodenitis    UPPER GASTROINTESTINAL ENDOSCOPY N/A 3/6/2019    EGD BIOPSY performed by Nasra Gross MD at [address] [facility] 2021    EGD BIOPSY performed by Teresa Wen MD at [address] [facility]       Discharge Medication List as of 2021  2:34 PM      CONTINUE these medications which have NOT CHANGED    Details   pantoprazole (PROTONIX) 40 MG tablet Take 1 tablet by mouth every morning (before breakfast), Disp-30 tablet, R-3Normal      furosemide (LASIX) 40 MG tablet Take 1 tablet by mouth daily, Disp-90 tablet, R-1Normal      potassium chloride (KLOR-CON M) 10 MEQ extended release tablet Take 1 tablet by mouth 2 times daily, Disp-180 tablet, R-1Normal      insulin detemir (LEVEMIR FLEXTOUCH) 100 UNIT/ML injection pen Inject 56 Units into the skin nightly, Disp-5 pen, R-5Normal      insulin lispro, 1 Unit Dial, (HUMALOG KWIKPEN) 100 UNIT/ML SOPN Inject 6 Units into the skin 3 times daily (before meals), Disp-5 pen, R-5Normal      citalopram (CELEXA) 10 MG tablet Take 1 tablet by mouth daily, Disp-90 tablet, R-0Normal      docusate (COLACE, DULCOLAX) 100 MG CAPS Take 100 mg by mouth 2 times dailyHistorical Med      ferrous sulfate (IRON 325) 325 (65 Fe) MG tablet Take 325 mg by mouth 2 times dailyHistorical Med      tranexamic acid (LYSTEDA) 650 MG TABS tablet Take 2 tablets up to 3 times daily for up to 5 days as needed for heavy menses, Disp-30 tablet,R-5Normal      cloNIDine (CATAPRES) 0.1 MG tablet TAKE ONE TABLET BY MOUTH TWICE A DAY, Disp-180 tablet,R-3Normal      losartan-hydroCHLOROthiazide (HYZAAR) 100-25 MG per tablet Take 1 tablet by mouth daily, Disp-90 tablet,R-3Normal      NIFEdipine (ADALAT CC) 60 MG extended release tablet Take 1 tablet by mouth daily, Disp-90 tablet,R-3Normal      atorvastatin (LIPITOR) 40 MG tablet Take 1 tablet by mouth daily, Disp-90 tablet,R-3Normal      glipiZIDE (GLUCOTROL XL) 2.5 MG extended release tablet Take 1 tablet by mouth daily, Disp-90 tablet,R-3Normal      SITagliptin-metFORMIN (JANUMET XR)  MG TB24 per extended release tablet Take 2 tablets by mouth daily, Disp-180 tablet,R-3Normal      atenolol (TENORMIN) 50 MG tablet TAKE ONE TABLET BY MOUTH DAILY, Disp-90 tablet, R-3Normal      cefTRIAXone (ROCEPHIN) infusion Infuse 1,000 mg intravenously every 24 hours Compound per protocol, Disp-1 g, R-0Print      Sodium Chloride Flush (SALINE FLUSH) 0.9 % SOLN Infuse 1 mL intravenously 2 times daily, Disp-2 each, R-0Print      blood glucose test strips (ASCENSIA AUTODISC VI;ONE TOUCH ULTRA TEST VI) strip 3 TIMES DAILY Starting Fri 1/29/2021, Disp-100 each, R-3, NormalDispense brand compatible with insurance      FreeStyle Lancets MISC DAILY Starting Fri 1/29/2021, Disp-100 each, R-3, NormalDispense brand compatible with insurance      Insulin Pen Needle (PEN NEEDLES) 32G X 5 MM MISC 1 each by Does not apply route daily, Disp-100 each,R-3Normal      glucose monitoring kit (FREESTYLE) monitoring kit DAILY Starting Wed 11/20/2019, Disp-1 kit, R-0, NormalDispense brand compatible with insurance                  Patient has no known allergies.     FAMILY HISTORY       Family History   Problem Relation Age of Onset    High Blood Pressure Father     Diabetes (1.626 m) 204 lb (92.5 kg)       Physical Exam  Vitals and nursing note reviewed. Constitutional:       General: She is not in acute distress. Appearance: Normal appearance. She is not ill-appearing, toxic-appearing or diaphoretic. HENT:      Head: Normocephalic and atraumatic. Nose: Nose normal.      Mouth/Throat:      Mouth: Mucous membranes are moist.   Eyes:      Pupils: Pupils are equal, round, and reactive to light. Cardiovascular:      Rate and Rhythm: Normal rate and regular rhythm. Heart sounds: Normal heart sounds. Pulmonary:      Effort: Pulmonary effort is normal.      Breath sounds: Normal breath sounds. Abdominal:      General: Abdomen is flat. Musculoskeletal:         General: Normal range of motion. Cervical back: Normal range of motion and neck supple. Skin:     General: Skin is warm. Capillary Refill: Capillary refill takes less than 2 seconds. Neurological:      General: No focal deficit present. Mental Status: She is alert and oriented to person, place, and time.          DIAGNOSTIC RESULTS     EKG: All EKG's are interpreted by the Emergency Department Physician who either signs orCo-signs this chart in the absence of a cardiologist.        RADIOLOGY:   plain film images such as CT, Ultrasound and MRI are read by the radiologist. Plain radiographic images are visualized and preliminarily interpreted by the emergency physician with the below findings:        Interpretation per the Radiologist below, ifavailable at the time of this note:    No orders to display         ED BEDSIDE ULTRASOUND:   Performed by ED Physician - none    LABS:  Labs Reviewed   CBC WITH AUTO DIFFERENTIAL - Abnormal; Notable for the following components:       Result Value    Hemoglobin 8.7 (*)     Hematocrit 29.1 (*)     MCV 71.5 (*)     MCH 21.4 (*)     RDW 17.0 (*)     Eosinophils % 5 (*)     All other components within normal limits   COMPREHENSIVE METABOLIC PANEL - Abnormal; Notable for the following components:    Glucose 127 (*)     ALT 39 (*)     Total Bilirubin 0.16 (*)     All other components within normal limits   URINE RT REFLEX TO CULTURE - Abnormal; Notable for the following components:    Urine Hgb 3+ (*)     Protein, UA 1+ (*)     All other components within normal limits   IMMATURE PLATELET FRACTION   MICROSCOPIC URINALYSIS       All other labs were within normal range ornot returned as of this dictation. EMERGENCY DEPARTMENT COURSE and DIFFERENTIAL DIAGNOSIS/MDM:   Vitals:    Vitals:    09/12/21 1005   BP: (!) 157/93   Pulse: 90   Resp: 18   Temp: 97.7 °F (36.5 °C)   TempSrc: Tympanic   SpO2: 99%   Weight: 204 lb (92.5 kg)   Height: 5' 4\" (1.626 m)           MDM  Number of Diagnoses or Management Options  Complicated urinary tract infection  Positive blood culture  Diagnosis management comments: Patient with a recent complicated UTI presents to the ER due to the fact that she had a positive blood culture. I discussed with her PCP Dr. Lola Duke who suggested that she is set up Rocephin outpatient treatments for her. She received a first dose of the Rocephin while in the ER here. CRITICAL CARE TIME   Total CriticalCare time was  minutes, excluding separately reportable procedures. There was a high probability of clinically significant/life threatening deterioration in the patient's condition which required my urgent intervention. CONSULTS:  None    PROCEDURES:  Unlessotherwise noted below, none     Procedures    FINAL IMPRESSION      1. Complicated urinary tract infection    2.  Positive blood culture          DISPOSITION/PLAN   DISPOSITION        PATIENT REFERRED TO:  Shonna Kennedy MD  [address] [address]  [phone]    In 1 day        DISCHARGE MEDICATIONS:  Discharge Medication List as of 9/12/2021  2:34 PM                 (Please note that portions of this note were completed with a voice recognition program.  Efforts were made to edit the dictations but occasionally words are mis-transcribed.)      Dimitri Trammell MD (electronically signed)  Attending Emergency Physician            Dimitri Trammell MD  09/15/21 [facility]  09/28/21 [id number]

## 2021-09-12 NOTE — ED NOTES
Contacted Dr. Ludmila Murray as pcp per Dr. Clancy Files request.     Mike DÍAZ Reser  09/12/21 3530

## 2021-09-13 DIAGNOSIS — N39.0 COMPLICATED URINARY TRACT INFECTION: ICD-10-CM

## 2021-09-13 LAB
CULTURE: ABNORMAL
Lab: ABNORMAL
SPECIMEN DESCRIPTION: ABNORMAL

## 2021-09-13 RX ORDER — CEFTRIAXONE 1 G/1
1000 INJECTION, POWDER, FOR SOLUTION INTRAMUSCULAR; INTRAVENOUS ONCE
Status: CANCELLED
Start: 2021-09-13 | End: 2021-09-13

## 2021-09-15 DIAGNOSIS — E11.65 UNCONTROLLED TYPE 2 DIABETES MELLITUS WITH HYPERGLYCEMIA (HCC): ICD-10-CM

## 2021-09-15 NOTE — TELEPHONE ENCOUNTER
Health Maintenance   Topic Date Due    Breast cancer screen  Never done    Diabetic retinal exam  07/08/2021    Flu vaccine (1) 09/01/2021    Hepatitis B vaccine (1 of 3 - Risk 3-dose series) 10/07/2021 (Originally 12/27/1987)    Shingles Vaccine (1 of 2) 10/07/2021 (Originally 12/27/2018)    DTaP/Tdap/Td vaccine (1 - Tdap) 02/03/2022 (Originally 12/27/1987)    Hepatitis C screen  02/03/2022 (Originally 1970)    HIV screen  02/03/2022 (Originally 12/27/1983)    Diabetic foot exam  10/07/2021    A1C test (Diabetic or Prediabetic)  11/27/2021    Lipid screen  05/03/2022    Potassium monitoring  09/12/2022    Creatinine monitoring  09/12/2022    Cervical cancer screen  02/26/2025    Colon cancer screen colonoscopy  03/06/2029    Pneumococcal 0-64 years Vaccine (2 of 2 - PPSV23) 12/27/2033    COVID-19 Vaccine  Completed    Hepatitis A vaccine  Aged Out    Hib vaccine  Aged Out    Meningococcal (ACWY) vaccine  Aged Out             (applicable per patient's age: Cancer Screenings, Depression Screening, Fall Risk Screening, Immunizations)    Hemoglobin A1C (%)   Date Value   08/27/2021 12.3 (H)   08/26/2021 12.6   05/03/2021 10.4 (H)     Microalb/Crt.  Ratio (mcg/mg creat)   Date Value   05/03/2021 515 (H)     LDL Cholesterol (mg/dL)   Date Value   05/03/2021 55     AST (U/L)   Date Value   09/12/2021 30     ALT (U/L)   Date Value   09/12/2021 39 (H)     BUN (mg/dL)   Date Value   09/12/2021 12      (goal A1C is < 7)   (goal LDL is <100) need 30-50% reduction from baseline     BP Readings from Last 3 Encounters:   09/12/21 (!) 157/93   09/04/21 121/68   09/03/21 (!) 162/94    (goal /80)      All Future Testing planned in [facility]:  Lab Frequency Next Occurrence   EVA PRISCILA DIGITAL SCREEN BILATERAL Once 08/04/2021   COVID-19 Once 08/26/2021   COVID-19 Once 08/27/2021   Hemoglobin and Hematocrit, Blood, Post Transfusion POST TRANSFUSION        Next Visit Date:  Future Appointments   Date Time Provider Murphy Velarde   9/17/2021 12:40 PM Chelsea Butcher, APRN - CNP [facility]   10/20/2021  9:30 AM BUZZ Henao - BARTOL[facility]FF [facility]            Patient Active Problem List:     Uncontrolled type 2 diabetes mellitus with hyperglycemia ([facility]     Essential hypertension     Lumbar back pain with radiculopathy affecting right lower extremity     Microcytic anemia     Dyslipidemia     Iron deficiency anemia     Malabsorption due to intolerance, not elsewhere classified     Internal hemorrhoids     Diverticulosis of colon     Chronic pain of right knee     Continuous opioid dependence ([address]     Acute pyelonephritis     Type 2 diabetes mellitus with diabetic nephropathy, with long-term current use of insulin (HCC)     Diverticulitis of colon without hemorrhage     Sepsis due to Escherichia coli ([address]     Acute hyponatremia     Acute gastric ulcer with hemorrhage     Duodenitis     Complicated urinary tract infection

## 2021-09-15 NOTE — TELEPHONE ENCOUNTER
----- Message from Xavier Wiggins sent at 9/15/2021 10:05 AM EDT -----  Subject: Refill Request    QUESTIONS  Name of Medication? SITagliptin-metFORMIN (JANUMET XR)  MG TB24 per   extended release tablet  Patient-reported dosage and instructions? once a day  How many days do you have left? 0  Preferred Pharmacy? [facility]  Pharmacy phone number (if available)? [phone]  Additional Information for Provider? patient said the medication    and needs a refill  ---------------------------------------------------------------------------  --------------  CALL BACK INFO  What is the best way for the office to contact you? OK to leave message on   voicemail  Preferred Call Back Phone Number?  [phone]

## 2021-09-16 LAB
CULTURE: NORMAL
Lab: NORMAL
SPECIMEN DESCRIPTION: NORMAL

## 2021-10-13 ENCOUNTER — TELEPHONE (OUTPATIENT)
Dept: PRIMARY CARE CLINIC | Age: 51
End: 2021-10-13

## 2021-10-13 ENCOUNTER — HOSPITAL ENCOUNTER (OUTPATIENT)
Age: 51
Discharge: HOME | End: 2021-10-13
Payer: MEDICARE

## 2021-10-13 ENCOUNTER — OFFICE VISIT (OUTPATIENT)
Dept: PRIMARY CARE CLINIC | Age: 51
End: 2021-10-13
Payer: MEDICARE

## 2021-10-13 VITALS
HEART RATE: 92 BPM | TEMPERATURE: 97.1 F | OXYGEN SATURATION: 100 % | RESPIRATION RATE: 22 BRPM | SYSTOLIC BLOOD PRESSURE: 170 MMHG | DIASTOLIC BLOOD PRESSURE: 90 MMHG | WEIGHT: 208.7 LBS | BODY MASS INDEX: 35.82 KG/M2

## 2021-10-13 DIAGNOSIS — R30.0 DYSURIA: ICD-10-CM

## 2021-10-13 DIAGNOSIS — K25.0 ACUTE GASTRIC ULCER WITH HEMORRHAGE: Primary | ICD-10-CM

## 2021-10-13 DIAGNOSIS — D50.0 BLOOD LOSS ANEMIA: ICD-10-CM

## 2021-10-13 DIAGNOSIS — I10 UNCONTROLLED HYPERTENSION: ICD-10-CM

## 2021-10-13 DIAGNOSIS — E11.65 UNCONTROLLED TYPE 2 DIABETES MELLITUS WITH HYPERGLYCEMIA (HCC): ICD-10-CM

## 2021-10-13 LAB
-: ABNORMAL
ABSOLUTE EOS #: 0.34 K/UL (ref 0–0.44)
ABSOLUTE IMMATURE GRANULOCYTE: 0.03 K/UL (ref 0–0.3)
ABSOLUTE LYMPH #: 2.64 K/UL (ref 1.1–3.7)
ABSOLUTE MONO #: 0.54 K/UL (ref 0.1–1.2)
ABSOLUTE RETIC #: 0.07 M/UL (ref 0.03–0.08)
AMORPHOUS: ABNORMAL
BACTERIA: ABNORMAL
BASOPHILS # BLD: 1 % (ref 0–2)
BASOPHILS ABSOLUTE: 0.05 K/UL (ref 0–0.2)
BILIRUBIN URINE: NEGATIVE
CASTS UA: ABNORMAL /LPF
COLOR: YELLOW
COMMENT UA: ABNORMAL
CRYSTALS, UA: ABNORMAL /HPF
DIFFERENTIAL TYPE: ABNORMAL
EOSINOPHILS RELATIVE PERCENT: 4 % (ref 1–4)
EPITHELIAL CELLS UA: ABNORMAL /HPF (ref 0–25)
GLUCOSE URINE: ABNORMAL
HCT VFR BLD CALC: 30.3 % (ref 36.3–47.1)
HEMOGLOBIN: 8.9 G/DL (ref 11.9–15.1)
IMMATURE GRANULOCYTES: 0 %
IMMATURE RETIC FRACT: 29.4 % (ref 2.7–18.3)
KETONES, URINE: NEGATIVE
LEUKOCYTE ESTERASE, URINE: NEGATIVE
LYMPHOCYTES # BLD: 33 % (ref 24–43)
MCH RBC QN AUTO: 21.8 PG (ref 25.2–33.5)
MCHC RBC AUTO-ENTMCNC: 29.4 G/DL (ref 28.4–34.8)
MCV RBC AUTO: 74.3 FL (ref 82.6–102.9)
MONOCYTES # BLD: 7 % (ref 3–12)
MUCUS: ABNORMAL
NITRITE, URINE: NEGATIVE
NRBC AUTOMATED: 0 PER 100 WBC
OTHER OBSERVATIONS UA: ABNORMAL
PDW BLD-RTO: 17.5 % (ref 11.8–14.4)
PH UA: 7 (ref 5–9)
PLATELET # BLD: 347 K/UL (ref 138–453)
PLATELET ESTIMATE: ABNORMAL
PMV BLD AUTO: 9.7 FL (ref 8.1–13.5)
PROTEIN UA: ABNORMAL
RBC # BLD: 4.08 M/UL (ref 3.95–5.11)
RBC # BLD: ABNORMAL 10*6/UL
RBC UA: ABNORMAL /HPF (ref 0–2)
RENAL EPITHELIAL, UA: ABNORMAL /HPF
RETIC %: 1.7 % (ref 0.5–1.9)
RETIC HEMOGLOBIN: 25.1 PG (ref 28.2–35.7)
SEG NEUTROPHILS: 55 % (ref 36–65)
SEGMENTED NEUTROPHILS ABSOLUTE COUNT: 4.32 K/UL (ref 1.5–8.1)
SPECIFIC GRAVITY UA: 1.01 (ref 1.01–1.02)
TRICHOMONAS: ABNORMAL
TURBIDITY: CLEAR
URINE HGB: NEGATIVE
UROBILINOGEN, URINE: NORMAL
WBC # BLD: 7.9 K/UL (ref 3.5–11.3)
WBC # BLD: ABNORMAL 10*3/UL
WBC UA: ABNORMAL /HPF (ref 0–5)
YEAST: ABNORMAL

## 2021-10-13 PROCEDURE — 1111F DSCHRG MED/CURRENT MED MERGE: CPT | Performed by: NURSE PRACTITIONER

## 2021-10-13 PROCEDURE — 3017F COLORECTAL CA SCREEN DOC REV: CPT | Performed by: NURSE PRACTITIONER

## 2021-10-13 PROCEDURE — 2022F DILAT RTA XM EVC RTNOPTHY: CPT | Performed by: NURSE PRACTITIONER

## 2021-10-13 PROCEDURE — G8427 DOCREV CUR MEDS BY ELIG CLIN: HCPCS | Performed by: NURSE PRACTITIONER

## 2021-10-13 PROCEDURE — 85045 AUTOMATED RETICULOCYTE COUNT: CPT

## 2021-10-13 PROCEDURE — 81001 URINALYSIS AUTO W/SCOPE: CPT

## 2021-10-13 PROCEDURE — 3046F HEMOGLOBIN A1C LEVEL >9.0%: CPT | Performed by: NURSE PRACTITIONER

## 2021-10-13 PROCEDURE — 36415 COLL VENOUS BLD VENIPUNCTURE: CPT

## 2021-10-13 PROCEDURE — 1036F TOBACCO NON-USER: CPT | Performed by: NURSE PRACTITIONER

## 2021-10-13 PROCEDURE — G8417 CALC BMI ABV UP PARAM F/U: HCPCS | Performed by: NURSE PRACTITIONER

## 2021-10-13 PROCEDURE — 85025 COMPLETE CBC W/AUTO DIFF WBC: CPT

## 2021-10-13 PROCEDURE — G8484 FLU IMMUNIZE NO ADMIN: HCPCS | Performed by: NURSE PRACTITIONER

## 2021-10-13 PROCEDURE — 99214 OFFICE O/P EST MOD 30 MIN: CPT | Performed by: NURSE PRACTITIONER

## 2021-10-13 RX ORDER — INSULIN LISPRO 100 [IU]/ML
6 INJECTION, SOLUTION INTRAVENOUS; SUBCUTANEOUS
Qty: 5 PEN | Refills: 5 | Status: SHIPPED | OUTPATIENT
Start: 2021-10-13

## 2021-10-13 RX ORDER — POTASSIUM CHLORIDE 750 MG/1
10 TABLET, EXTENDED RELEASE ORAL 2 TIMES DAILY
Qty: 180 TABLET | Refills: 1 | Status: SHIPPED | OUTPATIENT
Start: 2021-10-13

## 2021-10-13 RX ORDER — CLONIDINE HYDROCHLORIDE 0.2 MG/1
TABLET ORAL
Qty: 90 TABLET | Refills: 3 | Status: SHIPPED | OUTPATIENT
Start: 2021-10-13

## 2021-10-13 RX ORDER — ATENOLOL 50 MG/1
TABLET ORAL
Qty: 90 TABLET | Refills: 3 | Status: SHIPPED | OUTPATIENT
Start: 2021-10-13

## 2021-10-13 RX ORDER — FUROSEMIDE 40 MG/1
40 TABLET ORAL DAILY
Qty: 90 TABLET | Refills: 1 | Status: SHIPPED | OUTPATIENT
Start: 2021-10-13

## 2021-10-13 RX ORDER — ATORVASTATIN CALCIUM 40 MG/1
40 TABLET, FILM COATED ORAL DAILY
Qty: 90 TABLET | Refills: 3 | Status: SHIPPED | OUTPATIENT
Start: 2021-10-13

## 2021-10-13 RX ORDER — GLIPIZIDE 2.5 MG/1
2.5 TABLET, EXTENDED RELEASE ORAL DAILY
Qty: 90 TABLET | Refills: 3 | Status: SHIPPED | OUTPATIENT
Start: 2021-10-13

## 2021-10-13 RX ORDER — LOSARTAN POTASSIUM AND HYDROCHLOROTHIAZIDE 25; 100 MG/1; MG/1
1 TABLET ORAL DAILY
Qty: 90 TABLET | Refills: 3 | Status: SHIPPED | OUTPATIENT
Start: 2021-10-13

## 2021-10-13 RX ORDER — NIFEDIPINE 60 MG/1
60 TABLET, FILM COATED, EXTENDED RELEASE ORAL DAILY
Qty: 90 TABLET | Refills: 3 | Status: SHIPPED | OUTPATIENT
Start: 2021-10-13

## 2021-10-13 RX ORDER — INSULIN DETEMIR 100 [IU]/ML
56 INJECTION, SOLUTION SUBCUTANEOUS NIGHTLY
Qty: 5 PEN | Refills: 5 | Status: SHIPPED | OUTPATIENT
Start: 2021-10-13

## 2021-10-13 NOTE — TELEPHONE ENCOUNTER
[employer] forgot to mention at her appt earlier today that when she was in hospital, the doctor recommended she follow up with a kidney doctor. Do you think she needs to see one?   If so, will you do referral?

## 2021-10-13 NOTE — PROGRESS NOTES
Post-Discharge Transitional Care Management Services or Hospital Follow Up      Magdalene Mendoza   YOB: 1970    Date of Office Visit:  10/13/2021  Date of Hospital Admission: 9/12/21  Date of Hospital Discharge: 9/12/21  Risk of hospital readmission (high >=14%.  Medium >=10%) :Readmission Risk Score: 24      Care management risk score Rising risk (score 2-5) and Complex Care (Scores >=6): 2     Non face to face  following discharge, date last encounter closed (first attempt may have been earlier): *No documented post hospital discharge outreach found in the last 14 days    Call initiated 2 business days of discharge: *No response recorded in the last 14 days    Patient Active Problem List   Diagnosis    Uncontrolled type 2 diabetes mellitus with hyperglycemia ([address]    Essential hypertension    Lumbar back pain with radiculopathy affecting right lower extremity    Microcytic anemia    Dyslipidemia    Iron deficiency anemia    Malabsorption due to intolerance, not elsewhere classified    Internal hemorrhoids    Diverticulosis of colon    Chronic pain of right knee    Continuous opioid dependence ([address]    Acute pyelonephritis    Type 2 diabetes mellitus with diabetic nephropathy, with long-term current use of insulin ([facility])    Diverticulitis of colon without hemorrhage    Sepsis due to Escherichia coli ([address]    Acute hyponatremia    Acute gastric ulcer with hemorrhage    Duodenitis    Complicated urinary tract infection       No Known Allergies    Medications listed as ordered at the time of discharge from [facility] Medication Instructions DELIA:    Printed on:10/13/21 0959   Medication Information                      atenolol (TENORMIN) 50 MG tablet  TAKE ONE TABLET BY MOUTH DAILY             atorvastatin (LIPITOR) 40 MG tablet  Take 1 tablet by mouth daily             blood glucose test strips (ASCENSIA AUTODISC VI;ONE TOUCH ULTRA TEST VI) strip  1 each by In Vitro route 3 times daily Dispense brand compatible with insurance             cloNIDine (CATAPRES) 0.2 MG tablet  TAKE ONE TABLET BY MOUTH TWICE A DAY             docusate (COLACE, DULCOLAX) 100 MG CAPS  Take 100 mg by mouth 2 times daily             ferrous sulfate (IRON 325) 325 (65 Fe) MG tablet  Take 325 mg by mouth 2 times daily             FreeStyle Lancets MISC  1 each by Does not apply route daily Dispense brand compatible with insurance             furosemide (LASIX) 40 MG tablet  Take 1 tablet by mouth daily             glipiZIDE (GLUCOTROL XL) 2.5 MG extended release tablet  Take 1 tablet by mouth daily             glucose monitoring kit (FREESTYLE) monitoring kit  1 kit by Does not apply route daily Dispense brand compatible with insurance             insulin detemir (LEVEMIR FLEXTOUCH) 100 UNIT/ML injection pen  Inject 56 Units into the skin nightly             insulin lispro, 1 Unit Dial, (HUMALOG KWIKPEN) 100 UNIT/ML SOPN  Inject 6 Units into the skin 3 times daily (before meals)             Insulin Pen Needle (PEN NEEDLES) 32G X 5 MM MISC  1 each by Does not apply route daily             losartan-hydroCHLOROthiazide (HYZAAR) 100-25 MG per tablet  Take 1 tablet by mouth daily             NIFEdipine (ADALAT CC) 60 MG extended release tablet  Take 1 tablet by mouth daily             pantoprazole (PROTONIX) 40 MG tablet  Take 1 tablet by mouth every morning (before breakfast)             potassium chloride (KLOR-CON M) 10 MEQ extended release tablet  Take 1 tablet by mouth 2 times daily             SITagliptin-metFORMIN (JANUMET XR)  MG TB24 per extended release tablet  Take 2 tablets by mouth daily             tranexamic acid (LYSTEDA) 650 MG TABS tablet  Take 2 tablets up to 3 times daily for up to 5 days as needed for heavy menses                   Medications marked \"taking\" at this time  Outpatient Medications Marked as Taking for the 10/13/21 encounter (Office Visit) with Lissette Cotton Might, APRN - CNP   Medication Sig Dispense Refill    cloNIDine (CATAPRES) 0.2 MG tablet TAKE ONE TABLET BY MOUTH TWICE A DAY 90 tablet 3    losartan-hydroCHLOROthiazide (HYZAAR) 100-25 MG per tablet Take 1 tablet by mouth daily 90 tablet 3    NIFEdipine (ADALAT CC) 60 MG extended release tablet Take 1 tablet by mouth daily 90 tablet 3    atorvastatin (LIPITOR) 40 MG tablet Take 1 tablet by mouth daily 90 tablet 3    glipiZIDE (GLUCOTROL XL) 2.5 MG extended release tablet Take 1 tablet by mouth daily 90 tablet 3    atenolol (TENORMIN) 50 MG tablet TAKE ONE TABLET BY MOUTH DAILY 90 tablet 3    furosemide (LASIX) 40 MG tablet Take 1 tablet by mouth daily 90 tablet 1    potassium chloride (KLOR-CON M) 10 MEQ extended release tablet Take 1 tablet by mouth 2 times daily 180 tablet 1    insulin detemir (LEVEMIR FLEXTOUCH) 100 UNIT/ML injection pen Inject 56 Units into the skin nightly 5 pen 5    insulin lispro, 1 Unit Dial, (HUMALOG KWIKPEN) 100 UNIT/ML SOPN Inject 6 Units into the skin 3 times daily (before meals) 5 pen 5    SITagliptin-metFORMIN (JANUMET XR)  MG TB24 per extended release tablet Take 2 tablets by mouth daily 180 tablet 3    pantoprazole (PROTONIX) 40 MG tablet Take 1 tablet by mouth every morning (before breakfast) 30 tablet 3    docusate (COLACE, DULCOLAX) 100 MG CAPS Take 100 mg by mouth 2 times daily      blood glucose test strips (ASCENSIA AUTODISC VI;ONE TOUCH ULTRA TEST VI) strip 1 each by In Vitro route 3 times daily Dispense brand compatible with insurance 100 each 3    FreeStyle Lancets MISC 1 each by Does not apply route daily Dispense brand compatible with insurance 100 each 3    ferrous sulfate (IRON 325) 325 (65 Fe) MG tablet Take 325 mg by mouth 2 times daily      tranexamic acid (LYSTEDA) 650 MG TABS tablet Take 2 tablets up to 3 times daily for up to 5 days as needed for heavy menses 30 tablet 5    Insulin Pen Needle (PEN NEEDLES) 32G X 5 MM MISC 1 each by Does not apply route daily 100 each 3    glucose monitoring kit (FREESTYLE) monitoring kit 1 kit by Does not apply route daily Dispense brand compatible with insurance 1 kit 0        Medications patient taking as of now reconciled against medications ordered at time of hospital discharge: Yes    Chief Complaint   Patient presents with   [address] from [facility]     9/12/2021 UTI,septic. Not little better       History of Present illness - Follow up of Hospital diagnosis(es):     Hospital Course: The patient was admitted for the above. She was treated with iv antibiotics as per sensitivity and improved over the course of her hospitalization. She had anemia and required one unit of prbc. Due to her anemia,egd was done and found to have gastric ulcer and duodenitis,started on protonix and carafate    Inpatient course: Discharge summary reviewed- see chart. Interval history/Current status:     [facility] comes in today states she is feeling somewhat better but still fatigued. She did not complete her course of antibiotics as requested. We are also unsure if she even has her medications for hypertension. They were ordered over a year ago. She states she is taking some of her medications but did not take all of them this morning. Her diabetes remains uncontrolled. She states she has had no GI bleeding including black or tarry stools or any bright red blood. I would like her to obtain lab work today as she is experiencing dysuria along with fatigue. We will follow with results. A comprehensive review of systems was negative except for what was noted in the HPI. Vitals:    10/13/21 0852 10/13/21 0855   BP: (!) 180/88 (!) 170/90   Site: Left Upper Arm Right Upper Arm   Position: Sitting    Pulse: 88 92   Resp: 22    Temp: 97.1 °F (36.2 °C)    TempSrc: Temporal    SpO2: 100%    Weight: 208 lb 11.2 oz (94.7 kg)      Body mass index is 35.82 kg/m².    Wt Readings from Last 3 Encounters:   10/13/21 208 lb 11.2 oz (94.7

## 2021-10-14 ENCOUNTER — TELEPHONE (OUTPATIENT)
Dept: PRIMARY CARE CLINIC | Age: 51
End: 2021-10-14

## 2021-10-14 NOTE — TELEPHONE ENCOUNTER
----- Message from [address] [facility], APRN - CNP sent at 10/14/2021  2:55 PM EDT -----  Blood count is slightly improving.  Needs more time

## 2021-10-21 DIAGNOSIS — E11.65 UNCONTROLLED TYPE 2 DIABETES MELLITUS WITH HYPERGLYCEMIA (HCC): ICD-10-CM

## 2021-10-21 RX ORDER — PEN NEEDLE, DIABETIC 32GX 5/32"
NEEDLE, DISPOSABLE MISCELLANEOUS
Qty: 100 EACH | Refills: 0 | Status: SHIPPED | OUTPATIENT
Start: 2021-10-21

## 2021-10-21 NOTE — TELEPHONE ENCOUNTER
Health Maintenance   Topic Date Due    Breast cancer screen  Never done    DTaP/Tdap/Td vaccine (1 - Tdap) 02/03/2022 (Originally 12/27/1987)    Hepatitis C screen  02/03/2022 (Originally 1970)    HIV screen  02/03/2022 (Originally 12/27/1983)    Diabetic foot exam  10/13/2022 (Originally 10/7/2021)    Hepatitis B vaccine (1 of 3 - Risk 3-dose series) 10/13/2022 (Originally 12/27/1987)    Flu vaccine (1) 10/13/2022 (Originally 9/1/2021)    Shingles Vaccine (1 of 2) 10/13/2022 (Originally 12/27/2018)    Diabetic retinal exam  10/23/2022 (Originally 7/8/2021)    COVID-19 Vaccine (3 - Pfizer booster) 11/04/2021    A1C test (Diabetic or Prediabetic)  11/27/2021    Lipid screen  05/03/2022    Potassium monitoring  09/12/2022    Creatinine monitoring  09/12/2022    Cervical cancer screen  02/26/2025    Colon cancer screen colonoscopy  03/06/2029    Pneumococcal 0-64 years Vaccine (2 of 2 - PPSV23) 12/27/2033    Hepatitis A vaccine  Aged Out    Hib vaccine  Aged Out    Meningococcal (ACWY) vaccine  Aged Out             (applicable per patient's age: Cancer Screenings, Depression Screening, Fall Risk Screening, Immunizations)    Hemoglobin A1C (%)   Date Value   08/27/2021 12.3 (H)   08/26/2021 12.6   05/03/2021 10.4 (H)     Microalb/Crt.  Ratio (mcg/mg creat)   Date Value   05/03/2021 515 (H)     LDL Cholesterol (mg/dL)   Date Value   05/03/2021 55     AST (U/L)   Date Value   09/12/2021 30     ALT (U/L)   Date Value   09/12/2021 39 (H)     BUN (mg/dL)   Date Value   09/12/2021 12      (goal A1C is < 7)   (goal LDL is <100) need 30-50% reduction from baseline     BP Readings from Last 3 Encounters:   10/13/21 (!) 170/90   09/12/21 (!) 157/93   09/04/21 121/68    (goal /80)      All Future Testing planned in [facility]:  Lab Frequency Next Occurrence   EVA PRISCILA DIGITAL SCREEN BILATERAL Once 08/04/2021   COVID-19 Once 08/27/2021   Hemoglobin and Hematocrit, Blood, Post Transfusion POST TRANSFUSION        Next Visit Date:  Future Appointments   Date Time Provider Murphy Velarde   11/16/2021  8:40 AM Brenda Butcher, APRN - CNP [facility]   12/9/2021  3:00 PM [facility] MAMMOGRAPHY ROOM AT [facility]. [address] [facility] Rad            Patient Active Problem List:     Uncontrolled type 2 diabetes mellitus with hyperglycemia ([address]     Essential hypertension     Lumbar back pain with radiculopathy affecting right lower extremity     Microcytic anemia     Dyslipidemia     Iron deficiency anemia     Malabsorption due to intolerance, not elsewhere classified     Internal hemorrhoids     Diverticulosis of colon     Chronic pain of right knee     Continuous opioid dependence ([address]     Acute pyelonephritis     Type 2 diabetes mellitus with diabetic nephropathy, with long-term current use of insulin (HCC)     Diverticulitis of colon without hemorrhage     Sepsis due to Escherichia coli ([address]     Acute hyponatremia     Acute gastric ulcer with hemorrhage     Duodenitis     Complicated urinary tract infection

## 2022-11-07 DIAGNOSIS — I10 UNCONTROLLED HYPERTENSION: ICD-10-CM

## 2022-11-07 RX ORDER — ATENOLOL 50 MG/1
TABLET ORAL
Qty: 30 TABLET | OUTPATIENT
Start: 2022-11-07

## 2022-11-23 RX ORDER — CLONIDINE HYDROCHLORIDE 0.2 MG/1
TABLET ORAL
Qty: 90 TABLET | Refills: 3 | OUTPATIENT
Start: 2022-11-23

## 2022-12-16 RX ORDER — CLONIDINE HYDROCHLORIDE 0.2 MG/1
TABLET ORAL
Qty: 90 TABLET | Refills: 3 | OUTPATIENT
Start: 2022-12-16

## 2022-12-30 DIAGNOSIS — E11.65 UNCONTROLLED TYPE 2 DIABETES MELLITUS WITH HYPERGLYCEMIA (HCC): ICD-10-CM

## 2022-12-30 RX ORDER — SITAGLIPTIN AND METFORMIN HYDROCHLORIDE 50; 500 MG/1; MG/1
TABLET, FILM COATED, EXTENDED RELEASE ORAL
Qty: 60 TABLET | OUTPATIENT
Start: 2022-12-30

## 2023-02-02 ENCOUNTER — NURSE TRIAGE (OUTPATIENT)
Dept: OTHER | Facility: CLINIC | Age: 53
End: 2023-02-02

## 2023-02-02 NOTE — TELEPHONE ENCOUNTER
Location of patient: [facility] call from [place] at [facility] with [employer]. Subjective: Caller states \"My legs are hard and it just started happening. It was my right leg but that was awhile ago and now it's the left. It is like someone is beating on my bones. My stomach has been blowing up. The last few nights I have these pains in my chest.\"     Current Symptoms: left sided sharp chest pain intermittent past 2 nights, not at this time. Episodes last approx 20 minutes. 1 episode each night. Localized. Non-radiating. No trauma. SOB last night, worse when walking up stairs past few months. Onset: 2 days ago; intermittent    Associated Symptoms: bilateral leg swelling x1 year. Left worse x3 months, painful. Abd distension making SOB worse x1 year. Headaches. Lightheaded. Pain Severity: denies CP at this time. 8/10; left leg pain. aching, sore ; constant. 5/10 Generalized abd pain aching ; constant     Temperature: denies     What has been tried: Donne Dach for abd pain. Gabapentin for leg pain. Tylenol. LMP: NA Pregnant: NA    Recommended disposition: Go to ED/UCC Now (Or to Office with PCP Approval)    Care advice provided, patient verbalizes understanding; denies any other questions or concerns; instructed to call back for any new or worsening symptoms. Patient/caller agrees to proceed to nearest urgent care or  Emergency Department. Call back after visit to set up appt with new provider for follow-up care. Attention Provider: Thank you for allowing me to participate in the care of your patient. The patient was connected to triage in response to information provided to the ECC/PSC. Please do not respond through this encounter as the response is not directed to a shared pool.         Reason for Disposition   Chest pain lasting longer than 5 minutes and occurred in last 3 days (72 hours) (Exception: feels exactly the same as previously diagnosed heartburn and has accompanying sour taste in mouth)    Protocols used: Chest Pain-ADULT-[place]

## 2023-02-07 LAB — MAMMOGRAPHY, EXTERNAL: NORMAL

## 2023-02-27 ENCOUNTER — HOSPITAL ENCOUNTER (EMERGENCY)
Age: 53
Discharge: HOME | End: 2023-02-27
Attending: EMERGENCY MEDICINE
Payer: COMMERCIAL

## 2023-02-27 ENCOUNTER — APPOINTMENT (OUTPATIENT)
Dept: GENERAL RADIOLOGY | Age: 53
End: 2023-02-27
Payer: COMMERCIAL

## 2023-02-27 VITALS
DIASTOLIC BLOOD PRESSURE: 93 MMHG | TEMPERATURE: 97 F | HEART RATE: 75 BPM | RESPIRATION RATE: 10 BRPM | SYSTOLIC BLOOD PRESSURE: 176 MMHG | OXYGEN SATURATION: 99 %

## 2023-02-27 DIAGNOSIS — N39.0 ACUTE UTI: Primary | ICD-10-CM

## 2023-02-27 DIAGNOSIS — N17.9 AKI (ACUTE KIDNEY INJURY) (HCC): ICD-10-CM

## 2023-02-27 LAB
ABSOLUTE EOS #: 0.34 K/UL (ref 0–0.44)
ABSOLUTE IMMATURE GRANULOCYTE: 0.05 K/UL (ref 0–0.3)
ABSOLUTE LYMPH #: 2.99 K/UL (ref 1.1–3.7)
ABSOLUTE MONO #: 0.75 K/UL (ref 0.1–1.2)
ALBUMIN SERPL-MCNC: 3.9 G/DL (ref 3.5–5.2)
ALBUMIN/GLOBULIN RATIO: 1 (ref 1–2.5)
ALP SERPL-CCNC: 92 U/L (ref 35–104)
ALT SERPL-CCNC: 29 U/L (ref 5–33)
ANION GAP SERPL CALCULATED.3IONS-SCNC: 11 MMOL/L (ref 9–17)
AST SERPL-CCNC: 18 U/L
BACTERIA: ABNORMAL
BASOPHILS # BLD: 1 % (ref 0–2)
BASOPHILS ABSOLUTE: 0.07 K/UL (ref 0–0.2)
BILIRUB SERPL-MCNC: 0.3 MG/DL (ref 0.3–1.2)
BILIRUBIN URINE: NEGATIVE
BUN SERPL-MCNC: 22 MG/DL (ref 6–20)
BUN/CREAT BLD: 20 (ref 9–20)
CALCIUM SERPL-MCNC: 9.5 MG/DL (ref 8.6–10.4)
CHLORIDE SERPL-SCNC: 96 MMOL/L (ref 98–107)
CO2 SERPL-SCNC: 27 MMOL/L (ref 20–31)
COLOR: YELLOW
CREAT SERPL-MCNC: 1.12 MG/DL (ref 0.5–0.9)
EOSINOPHILS RELATIVE PERCENT: 3 % (ref 1–4)
EPITHELIAL CELLS UA: ABNORMAL /HPF (ref 0–25)
FLUAV AG SPEC QL: NEGATIVE
FLUBV AG SPEC QL: NEGATIVE
GFR SERPL CREATININE-BSD FRML MDRD: 58 ML/MIN/1.73M2
GLUCOSE SERPL-MCNC: 371 MG/DL (ref 70–99)
GLUCOSE UR STRIP.AUTO-MCNC: ABNORMAL MG/DL
HCT VFR BLD AUTO: 35.6 % (ref 36.3–47.1)
HGB BLD-MCNC: 11.2 G/DL (ref 11.9–15.1)
IMMATURE GRANULOCYTES: 1 %
KETONES UR STRIP.AUTO-MCNC: NEGATIVE MG/DL
LACTIC ACID, SEPSIS: 2.4 MMOL/L (ref 0.5–1.9)
LEUKOCYTE ESTERASE UR QL STRIP.AUTO: ABNORMAL
LYMPHOCYTES # BLD: 30 % (ref 24–43)
MCH RBC QN AUTO: 23.4 PG (ref 25.2–33.5)
MCHC RBC AUTO-ENTMCNC: 31.5 G/DL (ref 28.4–34.8)
MCV RBC AUTO: 74.5 FL (ref 82.6–102.9)
MONOCYTES # BLD: 8 % (ref 3–12)
NITRITE UR QL STRIP.AUTO: NEGATIVE
NRBC AUTOMATED: 0 PER 100 WBC
PDW BLD-RTO: 14.9 % (ref 11.8–14.4)
PLATELET # BLD AUTO: 327 K/UL (ref 138–453)
PMV BLD AUTO: 9.8 FL (ref 8.1–13.5)
POTASSIUM SERPL-SCNC: 4.2 MMOL/L (ref 3.7–5.3)
PROT SERPL-MCNC: 7.7 G/DL (ref 6.4–8.3)
PROT UR STRIP.AUTO-MCNC: 6.5 MG/DL (ref 5–9)
PROT UR STRIP.AUTO-MCNC: ABNORMAL MG/DL
RBC # BLD: 4.78 M/UL (ref 3.95–5.11)
RBC CLUMPS #/AREA URNS AUTO: ABNORMAL /HPF (ref 0–2)
SARS-COV-2 RDRP RESP QL NAA+PROBE: NOT DETECTED
SEG NEUTROPHILS: 57 % (ref 36–65)
SEGMENTED NEUTROPHILS ABSOLUTE COUNT: 5.68 K/UL (ref 1.5–8.1)
SODIUM SERPL-SCNC: 134 MMOL/L (ref 135–144)
SPECIFIC GRAVITY UA: 1.02 (ref 1.01–1.02)
SPECIMEN DESCRIPTION: NORMAL
TROPONIN I SERPL DL<=0.01 NG/ML-MCNC: 10 NG/L (ref 0–14)
TROPONIN I SERPL DL<=0.01 NG/ML-MCNC: 11 NG/L (ref 0–14)
TURBIDITY: ABNORMAL
URINE HGB: ABNORMAL
UROBILINOGEN, URINE: NORMAL
WBC # BLD AUTO: 9.9 K/UL (ref 3.5–11.3)
WBC UA: ABNORMAL /HPF (ref 0–5)

## 2023-02-27 PROCEDURE — 2580000003 HC RX 258: Performed by: EMERGENCY MEDICINE

## 2023-02-27 PROCEDURE — 81001 URINALYSIS AUTO W/SCOPE: CPT

## 2023-02-27 PROCEDURE — 83605 ASSAY OF LACTIC ACID: CPT

## 2023-02-27 PROCEDURE — 99285 EMERGENCY DEPT VISIT HI MDM: CPT

## 2023-02-27 PROCEDURE — 36415 COLL VENOUS BLD VENIPUNCTURE: CPT

## 2023-02-27 PROCEDURE — 80053 COMPREHEN METABOLIC PANEL: CPT

## 2023-02-27 PROCEDURE — 87086 URINE CULTURE/COLONY COUNT: CPT

## 2023-02-27 PROCEDURE — 87186 SC STD MICRODIL/AGAR DIL: CPT

## 2023-02-27 PROCEDURE — 84484 ASSAY OF TROPONIN QUANT: CPT

## 2023-02-27 PROCEDURE — 87077 CULTURE AEROBIC IDENTIFY: CPT

## 2023-02-27 PROCEDURE — 71045 X-RAY EXAM CHEST 1 VIEW: CPT

## 2023-02-27 PROCEDURE — 87635 SARS-COV-2 COVID-19 AMP PRB: CPT

## 2023-02-27 PROCEDURE — 87804 INFLUENZA ASSAY W/OPTIC: CPT

## 2023-02-27 PROCEDURE — 93005 ELECTROCARDIOGRAM TRACING: CPT | Performed by: EMERGENCY MEDICINE

## 2023-02-27 PROCEDURE — 85025 COMPLETE CBC W/AUTO DIFF WBC: CPT

## 2023-02-27 RX ORDER — CEPHALEXIN 500 MG/1
500 CAPSULE ORAL 3 TIMES DAILY
Qty: 21 CAPSULE | Refills: 0 | Status: SHIPPED | OUTPATIENT
Start: 2023-02-27 | End: 2023-03-06

## 2023-02-27 RX ORDER — 0.9 % SODIUM CHLORIDE 0.9 %
1000 INTRAVENOUS SOLUTION INTRAVENOUS ONCE
Status: COMPLETED | OUTPATIENT
Start: 2023-02-27 | End: 2023-02-27

## 2023-02-27 RX ADMIN — SODIUM CHLORIDE 1000 ML: 9 INJECTION, SOLUTION INTRAVENOUS at 14:27

## 2023-02-27 ASSESSMENT — ENCOUNTER SYMPTOMS
SORE THROAT: 0
DIARRHEA: 1
COUGH: 1
BACK PAIN: 0
ABDOMINAL DISTENTION: 0
NAUSEA: 0
SHORTNESS OF BREATH: 1

## 2023-02-27 ASSESSMENT — PAIN - FUNCTIONAL ASSESSMENT: PAIN_FUNCTIONAL_ASSESSMENT: 0-10

## 2023-02-27 ASSESSMENT — PAIN SCALES - GENERAL: PAINLEVEL_OUTOF10: 7

## 2023-02-27 ASSESSMENT — PAIN DESCRIPTION - ORIENTATION: ORIENTATION: LEFT

## 2023-02-27 ASSESSMENT — PAIN DESCRIPTION - LOCATION: LOCATION: CHEST

## 2023-02-27 NOTE — DISCHARGE INSTRUCTIONS
Take the antibiotic as prescribed. Drink plenty of fluids. Have a follow-up with your primary care doctor later this week and also have your kidney function tested again. Return to the emergency department if symptoms get worse. If you have any concerns or questions regarding your care today, please discuss with your nurse or physician prior to leaving the emergency department. Thank you for allowing us to take care of you at [facility]..  In the next few days you may receive a survey by mail or e-mail asking about the care you received during this visit. Please complete this if you are able, as this feedback helps us provide the best care possible.

## 2023-02-27 NOTE — ED PROVIDER NOTES
[facility] ED  EMERGENCY DEPARTMENT ENCOUNTER      Pt Name: Sienna Yan  MRN: [id number]  [place] 1970  Date of evaluation: 2/27/2023  Provider: Rigoberto Reyes DO    CHIEF COMPLAINT       Chief Complaint   Patient presents with    Chest Pain    Shortness of Breath     Symptoms started a few days ago         HISTORY OF PRESENT ILLNESS   (Location/Symptom, Timing/Onset, Context/Setting, Quality, Duration, Modifying Factors, Severity)  Note limiting factors. Sienna Yan is a 45 y.o. female who presents to the emergency department complains of feeling tired and sleepy for the last few days. Patient states that she started having some right-sided chest pain 2 weeks ago and then 4 days ago she started having some shortness of breath. She also has a cough that is nonproductive. She complains of diarrhea over the past 2 days with some headaches. She denies any fever or chills but states that she just feels tired in general.  She denies any recent sick exposures. She does complain of possibly having a UTI as her urine has been cloudy and malodorous. Patient is fully vaccinated. She denies any history of heart disease but does have diabetes and is on insulin. She denies any other concerns at this time. REVIEW OF SYSTEMS    (2-9 systems for level 4, 10 or more for level 5)     Review of Systems   Constitutional:  Positive for fatigue. Negative for fever. HENT:  Negative for congestion and sore throat. Eyes:  Negative for visual disturbance. Respiratory:  Positive for cough and shortness of breath. Cardiovascular:  Positive for chest pain. Negative for palpitations. Gastrointestinal:  Positive for diarrhea. Negative for abdominal distention and nausea. Genitourinary:  Negative for difficulty urinating and dysuria. Musculoskeletal:  Negative for back pain. Skin:  Negative for rash. Psychiatric/Behavioral:  Negative for suicidal ideas.       Except as noted above the remainder of the review of systems was reviewed and negative.        PAST MEDICAL HISTORY     Past Medical History:   Diagnosis Date    Diabetes mellitus ([address]     Hyperlipidemia     Hypertension     Irregular heart rhythm     Sciatica          SURGICAL HISTORY       Past Surgical History:   Procedure Laterality Date     SECTION      COLONOSCOPY  2019    Dr Frances Hickman- diverticulosis,hemorrhoids    COLONOSCOPY N/A 3/6/2019    COLONOSCOPY DIAGNOSTIC performed by Ken Araujo MD at [address], COLON, DIAGNOSTIC  2021    EGD with biopsies    UPPER GASTROINTESTINAL ENDOSCOPY  2019    Dr Ellis-bx(+H-Pylori,normal duodenal mucosa)erosive duodenitis    UPPER GASTROINTESTINAL ENDOSCOPY N/A 3/6/2019    EGD BIOPSY performed by Ken Araujo MD at [address] [facility] 2021    EGD BIOPSY performed by Dominick Ramesh MD at [address] [facility]       Previous Medications    ATENOLOL (TENORMIN) 50 MG TABLET    TAKE ONE TABLET BY MOUTH DAILY    ATORVASTATIN (LIPITOR) 40 MG TABLET    Take 1 tablet by mouth daily    BD PEN NEEDLE AUSTEN U/F 32G X 4 MM MISC    USE ONE DAILY    BLOOD GLUCOSE TEST STRIPS (ASCENSIA AUTODISC VI;ONE TOUCH ULTRA TEST VI) STRIP    1 each by In Vitro route 3 times daily Dispense brand compatible with insurance    CLONIDINE (CATAPRES) 0.2 MG TABLET    TAKE ONE TABLET BY MOUTH TWICE A DAY    DOCUSATE (COLACE, DULCOLAX) 100 MG CAPS    Take 100 mg by mouth 2 times daily    FERROUS SULFATE (IRON 325) 325 (65 FE) MG TABLET    Take 325 mg by mouth 2 times daily    FREESTYLE LANCETS MISC    1 each by Does not apply route daily Dispense brand compatible with insurance    FUROSEMIDE (LASIX) 40 MG TABLET    Take 1 tablet by mouth daily    GLIPIZIDE (GLUCOTROL XL) 2.5 MG EXTENDED RELEASE TABLET    Take 1 tablet by mouth daily    GLUCOSE MONITORING KIT (FREESTYLE) MONITORING KIT    1 kit by Does not apply route daily Dispense brand compatible with insurance    INSULIN DETEMIR (LEVEMIR FLEXTOUCH) 100 UNIT/ML INJECTION PEN    Inject 56 Units into the skin nightly    INSULIN LISPRO, 1 UNIT DIAL, (HUMALOG KWIKPEN) 100 UNIT/ML SOPN    Inject 6 Units into the skin 3 times daily (before meals)    LOSARTAN-HYDROCHLOROTHIAZIDE (HYZAAR) 100-25 MG PER TABLET    Take 1 tablet by mouth daily    NIFEDIPINE (ADALAT CC) 60 MG EXTENDED RELEASE TABLET    Take 1 tablet by mouth daily    PANTOPRAZOLE (PROTONIX) 40 MG TABLET    Take 1 tablet by mouth every morning (before breakfast)    POTASSIUM CHLORIDE (KLOR-CON M) 10 MEQ EXTENDED RELEASE TABLET    Take 1 tablet by mouth 2 times daily    SITAGLIPTIN-METFORMIN (JANUMET XR)  MG TB24 PER EXTENDED RELEASE TABLET    Take 2 tablets by mouth daily    TRANEXAMIC ACID (LYSTEDA) 650 MG TABS TABLET    Take 2 tablets up to 3 times daily for up to 5 days as needed for heavy menses       ALLERGIES     Patient has no known allergies.     FAMILY HISTORY       Family History   Problem Relation Age of Onset    High Blood Pressure Father     Diabetes Father     Asthma Mother           SOCIAL HISTORY       Social History     Socioeconomic History    Marital status: Single   Occupational History     Employer: NONE   Tobacco Use    Smoking status: Never    Smokeless tobacco: Never   Vaping Use    Vaping Use: Never used   Substance and Sexual Activity    Alcohol use: No     Alcohol/week: 0.0 standard drinks    Drug use: No    Sexual activity: Yes     Partners: Male     Comment: none       SCREENINGS        Doris Coma Scale  Eye Opening: Spontaneous  Best Verbal Response: Oriented  Best Motor Response: Obeys commands  Doris Coma Scale Score: 15               PHYSICAL EXAM    (up to 7 for level 4, 8 or more for level 5)     ED Triage Vitals [02/27/23 1237]   BP Temp Temp Source Heart Rate Resp SpO2 Height Weight   (!) 140/70 97 °F (36.1 °C) Tympanic 77 18 99 % -- --       Physical Exam  Constitutional:       General: She is not in acute distress. Appearance: Normal appearance. She is not toxic-appearing. HENT:      Head: Normocephalic and atraumatic. Mouth/Throat:      Mouth: Mucous membranes are moist.   Eyes:      Extraocular Movements: Extraocular movements intact. Pupils: Pupils are equal, round, and reactive to light. Cardiovascular:      Rate and Rhythm: Normal rate and regular rhythm. Pulses: Normal pulses. Heart sounds: Normal heart sounds. Pulmonary:      Effort: Pulmonary effort is normal.      Breath sounds: Normal breath sounds. Abdominal:      General: Abdomen is flat. Bowel sounds are normal.      Palpations: Abdomen is soft. Musculoskeletal:         General: Normal range of motion. Skin:     General: Skin is warm and dry. Capillary Refill: Capillary refill takes less than 2 seconds. Neurological:      General: No focal deficit present. Mental Status: She is alert and oriented to person, place, and time. Psychiatric:         Mood and Affect: Mood normal.       DIAGNOSTIC RESULTS     EKG: All EKG's are interpreted by the Emergency Department Physician who either signs or Co-signs this chart in the absence of a cardiologist.    Sinus rhythm with occasional PVCs. Heart rate of 78. RADIOLOGY:   Non-plain film images such as CT, Ultrasound and MRI are read by the radiologist. Plain radiographic images are visualized and preliminarily interpreted by the emergency physician with the below findings:      Interpretation per the Radiologist below, if available at the time of this note:    XR CHEST PORTABLE   Final Result   Unremarkable portable chest radiograph.                ED BEDSIDE ULTRASOUND:   Performed by ED Physician - none    LABS:  Labs Reviewed   COMPREHENSIVE METABOLIC PANEL - Abnormal; Notable for the following components:       Result Value    Glucose 371 (*)     BUN 22 (*)     Creatinine 1.12 (*)     Est, Glom Filt Rate 58 (*)     Sodium 134 (*)     Chloride 96 (*) All other components within normal limits   CBC WITH AUTO DIFFERENTIAL - Abnormal; Notable for the following components:    Hemoglobin 11.2 (*)     Hematocrit 35.6 (*)     MCV 74.5 (*)     MCH 23.4 (*)     RDW 14.9 (*)     Immature Granulocytes 1 (*)     All other components within normal limits   URINALYSIS WITH MICROSCOPIC - Abnormal; Notable for the following components:    Turbidity UA SLIGHTLY CLOUDY (*)     Glucose, Ur 3+ (*)     Urine Hgb TRACE (*)     Protein, UA 4+ (*)     Leukocyte Esterase, Urine TRACE (*)     Bacteria, UA 3+ (*)     All other components within normal limits   LACTATE, SEPSIS - Abnormal; Notable for the following components:    Lactic Acid, Sepsis 2.4 (*)     All other components within normal limits   COVID-19, RAPID   RAPID INFLUENZA A/B ANTIGENS   CULTURE, URINE   TROPONIN   TROPONIN       All other labs were within normal range or not returned as of this dictation. EMERGENCY DEPARTMENT COURSE and DIFFERENTIAL DIAGNOSIS/MDM:   Vitals:    Vitals:    02/27/23 1420 02/27/23 1454 02/27/23 1500 02/27/23 1515   BP:  (!) 172/47 (!) 159/85 (!) 179/97   Pulse: 75 76 74 76   Resp: 14 17 15 14   Temp:       TempSrc:       SpO2: 100%            MDM     Amount and/or Complexity of Data Reviewed  Clinical lab tests: ordered and reviewed  Tests in the radiology section of CPT®: ordered and reviewed  Tests in the medicine section of CPT®: ordered and reviewed  Discussion of test results with the performing providers: yes  Decide to obtain previous medical records or to obtain history from someone other than the patient: yes  Review and summarize past medical records: yes  Independent visualization of images, tracings, or specimens: yes    Risk of Complications, Morbidity, and/or Mortality  Presenting problems: moderate  Diagnostic procedures: moderate  Management options: moderate    Patient Progress  Patient progress: stable        REASSESSMENT        Discussed results with the patient.   She does have a presence of UTI so we will start her on cephalexin for 7 days. She also appears to have a slight kidney injury so patient received 1 L of IV fluids while here in the emergency department. Patient remains hemodynamically stable at this time. I advised her to follow-up with her primary care doctor later this week to have her kidney function tested again. We will send the urine for culture and she can follow-up on those as well. Patient denies any other concerns at this time. Advised her to return if symptoms get worse. FINAL IMPRESSION      1. Acute UTI    2. ANGELITA (acute kidney injury) [facility])          DISPOSITION/PLAN   DISPOSITION Discharge - Pending Orders Complete 02/27/2023 03:22:26 PM      PATIENT REFERRED TO:  Primary care doctor    In 1 week      DISCHARGE MEDICATIONS:  New Prescriptions    CEPHALEXIN (KEFLEX) 500 MG CAPSULE    Take 1 capsule by mouth 3 times daily for 7 days     Controlled Substances Monitoring:     No flowsheet data found.     (Please note that portions of this note were completed with a voice recognition program.  Efforts were made to edit the dictations but occasionally words are mis-transcribed.)    Rozina Fishman DO (electronically signed)  Attending Emergency Physician            Rozina Fishman DO  02/27/23 0645

## 2023-03-02 LAB
EKG ATRIAL RATE: 78 BPM
EKG P AXIS: 52 DEGREES
EKG P-R INTERVAL: 152 MS
EKG Q-T INTERVAL: 404 MS
EKG QRS DURATION: 86 MS
EKG QTC CALCULATION (BAZETT): 460 MS
EKG R AXIS: -22 DEGREES
EKG T AXIS: 41 DEGREES
EKG VENTRICULAR RATE: 78 BPM
MICROORGANISM SPEC CULT: ABNORMAL
SPECIMEN DESCRIPTION: ABNORMAL

## 2023-03-02 PROCEDURE — 93010 ELECTROCARDIOGRAM REPORT: CPT | Performed by: INTERNAL MEDICINE

## 2023-03-16 LAB
ALBUMIN SERPL-MCNC: 3.6 G/DL
ALP BLD-CCNC: 70 U/L
ALT SERPL-CCNC: 61 U/L
ANION GAP SERPL CALCULATED.3IONS-SCNC: 11 MMOL/L
AST SERPL-CCNC: 56 U/L
BASOPHILS ABSOLUTE: 0.1 /ΜL
BASOPHILS RELATIVE PERCENT: 0.3 %
BILIRUB SERPL-MCNC: 0.2 MG/DL (ref 0.1–1.4)
BUN BLDV-MCNC: 19 MG/DL
CALCIUM SERPL-MCNC: 9.2 MG/DL
CHLORIDE BLD-SCNC: 98 MMOL/L
CO2: 28 MMOL/L
CREAT SERPL-MCNC: 1.2 MG/DL
EGFR: 54
EOSINOPHILS ABSOLUTE: 0.3 /ΜL
EOSINOPHILS RELATIVE PERCENT: 3.5 %
FERRITIN: 754.1 NG/ML (ref 9–150)
FOLATE: 11.4
GLUCOSE BLD-MCNC: 438 MG/DL
HCT VFR BLD CALC: 32.6 % (ref 36–46)
HEMOGLOBIN: 10.5 G/DL (ref 12–16)
IRON: 49
LYMPHOCYTES ABSOLUTE: 2.9 /ΜL
LYMPHOCYTES RELATIVE PERCENT: 32.6 %
MCH RBC QN AUTO: 22.8 PG
MCHC RBC AUTO-ENTMCNC: 32 G/DL
MCV RBC AUTO: 71.2 FL
MONOCYTES ABSOLUTE: 0.7 /ΜL
MONOCYTES RELATIVE PERCENT: 19 %
NEUTROPHILS ABSOLUTE: 4.8 /ΜL
NEUTROPHILS RELATIVE PERCENT: 55.1 %
PDW BLD-RTO: 15.4 %
PLATELET # BLD: 357 K/ΜL
PMV BLD AUTO: 6.8 FL
POTASSIUM SERPL-SCNC: 4.6 MMOL/L
RBC # BLD: 4.58 10^6/ΜL
SODIUM BLD-SCNC: 132 MMOL/L
TOTAL PROTEIN: 6.9
VITAMIN B-12: 478
WBC # BLD: 8.8 10^3/ML

## 2023-03-18 LAB
ALBUMIN: 3.1
BASOPHILS ABSOLUTE: ABNORMAL
BASOPHILS RELATIVE PERCENT: ABNORMAL
BILIRUBIN, URINE: NEGATIVE
BLOOD, URINE: NEGATIVE
BUN BLDV-MCNC: 27 MG/DL
CALCIUM SERPL-MCNC: 9.2 MG/DL
CHLORIDE BLD-SCNC: 99 MMOL/L
CLARITY: CLEAR
CO2: 25 MMOL/L
COLOR: ABNORMAL
CREAT SERPL-MCNC: 1.14 MG/DL
CREATININE, URINE: 66.5
EGFR: 57
EOSINOPHILS ABSOLUTE: ABNORMAL
EOSINOPHILS RELATIVE PERCENT: ABNORMAL
GLUCOSE BLD-MCNC: 270 MG/DL
GLUCOSE URINE: >1000
HCT VFR BLD CALC: 34.5 % (ref 36–46)
HEMOGLOBIN: 10.6 G/DL (ref 12–16)
KETONES, URINE: NEGATIVE
LEUKOCYTE ESTERASE, URINE: NEGATIVE
LYMPHOCYTES ABSOLUTE: ABNORMAL
LYMPHOCYTES RELATIVE PERCENT: ABNORMAL
MAGNESIUM: 1.8 MG/DL
MCH RBC QN AUTO: 22.1 PG
MCHC RBC AUTO-ENTMCNC: 30.7 G/DL
MCV RBC AUTO: 71.9 FL
MICROALBUMIN/CREAT 24H UR: 2352 MG/G{CREAT}
MICROALBUMIN/CREAT UR-RTO: 3536.8
MONOCYTES ABSOLUTE: ABNORMAL
MONOCYTES RELATIVE PERCENT: ABNORMAL
NEUTROPHILS ABSOLUTE: ABNORMAL
NEUTROPHILS RELATIVE PERCENT: ABNORMAL
NITRITE, URINE: NEGATIVE
PDW BLD-RTO: 15.7 %
PH UA: 6.5 (ref 4.5–8)
PHOSPHORUS: 4.2 MG/DL
PLATELET # BLD: 364 K/ΜL
PMV BLD AUTO: 7.5 FL
POTASSIUM SERPL-SCNC: 4.4 MMOL/L
PROTEIN UA: POSITIVE
PTH INTACT: 81
RBC # BLD: 4.79 10^6/ΜL
SODIUM BLD-SCNC: 134 MMOL/L
SPECIFIC GRAVITY, URINE: 1.01
URIC ACID: 8.5
UROBILINOGEN, URINE: NORMAL
VITAMIN D 25-HYDROXY: 13
VITAMIN D2, 25 HYDROXY: ABNORMAL
VITAMIN D3,25 HYDROXY: ABNORMAL
WBC # BLD: 8.5 10^3/ML

## 2023-04-06 LAB
ALBUMIN SERPL-MCNC: 3.5 G/DL
ALP BLD-CCNC: 68 U/L
ALT SERPL-CCNC: 34 U/L
ANION GAP SERPL CALCULATED.3IONS-SCNC: 15 MMOL/L
AST SERPL-CCNC: 26 U/L
BILIRUB SERPL-MCNC: 0.6 MG/DL (ref 0.1–1.4)
BUN BLDV-MCNC: 43 MG/DL
CALCIUM SERPL-MCNC: 9 MG/DL
CHLORIDE BLD-SCNC: 101 MMOL/L
CHOLESTEROL, TOTAL: 222 MG/DL
CHOLESTEROL/HDL RATIO: ABNORMAL
CO2: 23 MMOL/L
CREAT SERPL-MCNC: 1.43 MG/DL
EGFR: 44
ESTIMATED AVERAGE GLUCOSE: 298
GLUCOSE BLD-MCNC: 76 MG/DL
HBA1C MFR BLD: 12 %
HDLC SERPL-MCNC: 41 MG/DL (ref 35–70)
LDL CHOLESTEROL CALCULATED: ABNORMAL
NONHDLC SERPL-MCNC: ABNORMAL MG/DL
POTASSIUM SERPL-SCNC: 3.3 MMOL/L
SODIUM BLD-SCNC: 136 MMOL/L
TOTAL PROTEIN: 7.2
TRIGL SERPL-MCNC: 470 MG/DL
TSH SERPL DL<=0.05 MIU/L-ACNC: 3.25 UIU/ML
VLDLC SERPL CALC-MCNC: 94 MG/DL

## 2023-04-17 LAB
ALBUMIN SERPL-MCNC: 3.8 G/DL
ALP BLD-CCNC: 74 U/L
ALT SERPL-CCNC: 34 U/L
ANION GAP SERPL CALCULATED.3IONS-SCNC: 13 MMOL/L
AST SERPL-CCNC: 26 U/L
BASOPHILS ABSOLUTE: 0.1 /ΜL
BASOPHILS RELATIVE PERCENT: 1 %
BILIRUB SERPL-MCNC: 0.3 MG/DL (ref 0.1–1.4)
BUN BLDV-MCNC: 27 MG/DL
CALCIUM SERPL-MCNC: 8.8 MG/DL
CHLORIDE BLD-SCNC: 103 MMOL/L
CO2: 24 MMOL/L
CREAT SERPL-MCNC: 1.16 MG/DL
EGFR: 56
EOSINOPHILS ABSOLUTE: 0.4 /ΜL
EOSINOPHILS RELATIVE PERCENT: 4.2 %
GLUCOSE BLD-MCNC: 183 MG/DL
HCT VFR BLD CALC: 36.1 % (ref 36–46)
HEMOGLOBIN: 11.4 G/DL (ref 12–16)
LYMPHOCYTES ABSOLUTE: 3.2 /ΜL
LYMPHOCYTES RELATIVE PERCENT: 30 %
MCH RBC QN AUTO: 23 PG
MCHC RBC AUTO-ENTMCNC: 31.6 G/DL
MCV RBC AUTO: 72.8 FL
MONOCYTES ABSOLUTE: 0.9 /ΜL
MONOCYTES RELATIVE PERCENT: 8 %
NEUTROPHILS ABSOLUTE: 6.1 /ΜL
NEUTROPHILS RELATIVE PERCENT: 56.8 %
PDW BLD-RTO: 15.8 %
PLATELET # BLD: 348 K/ΜL
PMV BLD AUTO: 8.1 FL
POTASSIUM SERPL-SCNC: 3.8 MMOL/L
RBC # BLD: 4.96 10^6/ΜL
SODIUM BLD-SCNC: 136 MMOL/L
TOTAL PROTEIN: 7.6
WBC # BLD: 10.7 10^3/ML

## 2023-04-19 DIAGNOSIS — E11.65 UNCONTROLLED TYPE 2 DIABETES MELLITUS WITH HYPERGLYCEMIA (HCC): ICD-10-CM

## 2023-04-19 RX ORDER — INSULIN LISPRO 100 [IU]/ML
INJECTION, SOLUTION INTRAVENOUS; SUBCUTANEOUS
Qty: 3 ML | OUTPATIENT
Start: 2023-04-19

## 2023-05-22 LAB
ALBUMIN SERPL-MCNC: 4 G/DL
ALP BLD-CCNC: 79 U/L
ALT SERPL-CCNC: 30 U/L
ANION GAP SERPL CALCULATED.3IONS-SCNC: 14 MMOL/L
AST SERPL-CCNC: 18 U/L
BASOPHILS ABSOLUTE: 0.1 /ΜL
BASOPHILS ABSOLUTE: 0.1 /ΜL
BASOPHILS RELATIVE PERCENT: 0.6 %
BASOPHILS RELATIVE PERCENT: 0.6 %
BILIRUB SERPL-MCNC: 0.4 MG/DL (ref 0.1–1.4)
BUN BLDV-MCNC: 23 MG/DL
CALCIUM SERPL-MCNC: 9.1 MG/DL
CHLORIDE BLD-SCNC: 100 MMOL/L
CO2: 21 MMOL/L
CREAT SERPL-MCNC: 1.2 MG/DL
EGFR: 54
EOSINOPHILS ABSOLUTE: 0 /ΜL
EOSINOPHILS ABSOLUTE: 0 /ΜL
EOSINOPHILS RELATIVE PERCENT: 0.1 %
EOSINOPHILS RELATIVE PERCENT: 0.1 %
FERRITIN: 954.4 NG/ML (ref 9–150)
FOLATE: 21.8
GLUCOSE BLD-MCNC: 453 MG/DL
HCT VFR BLD CALC: 35.1 % (ref 36–46)
HCT VFR BLD CALC: 35.1 % (ref 36–46)
HEMOGLOBIN: 11.2 G/DL (ref 12–16)
HEMOGLOBIN: 11.2 G/DL (ref 12–16)
LYMPHOCYTES ABSOLUTE: 0.8 /ΜL
LYMPHOCYTES ABSOLUTE: 0.8 /ΜL
LYMPHOCYTES RELATIVE PERCENT: 7.9 %
LYMPHOCYTES RELATIVE PERCENT: 7.9 %
MCH RBC QN AUTO: 23.2 PG
MCH RBC QN AUTO: 23.2 PG
MCHC RBC AUTO-ENTMCNC: 32 G/DL
MCHC RBC AUTO-ENTMCNC: 32 G/DL
MCV RBC AUTO: 72.2 FL
MCV RBC AUTO: 72.2 FL
MONOCYTES ABSOLUTE: 0.1 /ΜL
MONOCYTES ABSOLUTE: 0.1 /ΜL
MONOCYTES RELATIVE PERCENT: 0.6 %
MONOCYTES RELATIVE PERCENT: 0.6 %
NEUTROPHILS ABSOLUTE: 9.3 /ΜL
NEUTROPHILS ABSOLUTE: 9.3 /ΜL
NEUTROPHILS RELATIVE PERCENT: 90.8 %
NEUTROPHILS RELATIVE PERCENT: 90.8 %
PDW BLD-RTO: 16.1 %
PDW BLD-RTO: 7.4 %
PLATELET # BLD: 16.1 K/ΜL
PLATELET # BLD: 361 K/ΜL
PMV BLD AUTO: 361 FL
PMV BLD AUTO: 7.4 FL
POTASSIUM SERPL-SCNC: 4.1 MMOL/L
RBC # BLD: 4.86 10^6/ΜL
RBC # BLD: 4.86 10^6/ΜL
SODIUM BLD-SCNC: 131 MMOL/L
TOTAL PROTEIN: 7.4
VITAMIN B-12: 504
WBC # BLD: 10.2 10^3/ML
WBC # BLD: 10.2 10^3/ML

## 2023-07-26 LAB — CA 125: 9.1

## 2023-08-03 LAB
ALBUMIN SERPL-MCNC: 3.1 G/DL
ALP BLD-CCNC: 59 U/L
ALT SERPL-CCNC: 21 U/L
ANION GAP SERPL CALCULATED.3IONS-SCNC: 11 MMOL/L
AST SERPL-CCNC: 19 U/L
BILIRUB SERPL-MCNC: 0.5 MG/DL (ref 0.1–1.4)
BUN BLDV-MCNC: 22 MG/DL
CALCIUM SERPL-MCNC: 8.9 MG/DL
CHLORIDE BLD-SCNC: 107 MMOL/L
CHOLESTEROL, TOTAL: 219 MG/DL
CHOLESTEROL/HDL RATIO: ABNORMAL
CO2: 25 MMOL/L
CREAT SERPL-MCNC: 1.24 MG/DL
EGFR: 52
ESTIMATED AVERAGE GLUCOSE: 180
GLUCOSE BLD-MCNC: 151 MG/DL
HBA1C MFR BLD: 7.9 %
HDLC SERPL-MCNC: 53 MG/DL (ref 35–70)
LDL CHOLESTEROL CALCULATED: 120 MG/DL (ref 0–160)
NONHDLC SERPL-MCNC: ABNORMAL MG/DL
POTASSIUM SERPL-SCNC: 3.7 MMOL/L
SODIUM BLD-SCNC: 139 MMOL/L
TOTAL PROTEIN: 6.7
TRIGL SERPL-MCNC: 232 MG/DL
TSH SERPL DL<=0.05 MIU/L-ACNC: 5.81 UIU/ML
VLDLC SERPL CALC-MCNC: 46 MG/DL

## 2023-09-26 LAB
ALBUMIN SERPL-MCNC: 3.9 G/DL
ALP BLD-CCNC: 71 U/L
ALT SERPL-CCNC: 35 U/L
ANION GAP SERPL CALCULATED.3IONS-SCNC: 12 MMOL/L
AST SERPL-CCNC: 17 U/L
BASOPHILS ABSOLUTE: 0.1 /ΜL
BASOPHILS RELATIVE PERCENT: 1.3 %
BILIRUB SERPL-MCNC: 0.4 MG/DL (ref 0.1–1.4)
BUN BLDV-MCNC: 31 MG/DL
CALCIUM SERPL-MCNC: 9.4 MG/DL
CHLORIDE BLD-SCNC: 104 MMOL/L
CO2: 25 MMOL/L
CREAT SERPL-MCNC: 1.42 MG/DL
EGFR: 44
EOSINOPHILS ABSOLUTE: 0.3 /ΜL
EOSINOPHILS RELATIVE PERCENT: 5 %
FERRITIN: 1302 NG/ML (ref 9–150)
FOLATE: 7.4
GLUCOSE BLD-MCNC: 166 MG/DL
HCT VFR BLD CALC: 35 % (ref 36–46)
HEMOGLOBIN: 11.2 G/DL (ref 12–16)
IRON: 119
LYMPHOCYTES ABSOLUTE: 2 /ΜL
LYMPHOCYTES RELATIVE PERCENT: 30.8 %
MCH RBC QN AUTO: 23.8 PG
MCHC RBC AUTO-ENTMCNC: 32 G/DL
MCV RBC AUTO: 74.3 FL
MONOCYTES ABSOLUTE: 0.4 /ΜL
MONOCYTES RELATIVE PERCENT: 6.8 %
NEUTROPHILS ABSOLUTE: 3.7 /ΜL
NEUTROPHILS RELATIVE PERCENT: 56.1 %
PDW BLD-RTO: 14.2 %
PLATELET # BLD: 264 K/ΜL
PMV BLD AUTO: 7.7 FL
POTASSIUM SERPL-SCNC: 3.8 MMOL/L
RBC # BLD: 4.7 10^6/ΜL
SODIUM BLD-SCNC: 137 MMOL/L
TOTAL PROTEIN: 7
VITAMIN B-12: 403
WBC # BLD: 6.5 10^3/ML

## 2023-12-28 LAB
BASOPHILS ABSOLUTE: 0.1 /ΜL
BASOPHILS RELATIVE PERCENT: 0.7 %
BUN BLDV-MCNC: 25 MG/DL
CALCIUM SERPL-MCNC: 10.2 MG/DL
CHLORIDE BLD-SCNC: 100 MMOL/L
CO2: 28 MMOL/L
CREAT SERPL-MCNC: 1.27 MG/DL
EGFR: 50
EOSINOPHILS ABSOLUTE: 0.4 /ΜL
EOSINOPHILS RELATIVE PERCENT: 4.8 %
GLUCOSE BLD-MCNC: 122 MG/DL
HCT VFR BLD CALC: 35.9 % (ref 36–46)
HEMOGLOBIN: 11.3 G/DL (ref 12–16)
LYMPHOCYTES ABSOLUTE: 3 /ΜL
LYMPHOCYTES RELATIVE PERCENT: 36.7 %
MCH RBC QN AUTO: 23.1 PG
MCHC RBC AUTO-ENTMCNC: 31.4 G/DL
MCV RBC AUTO: 73.6 FL
MONOCYTES ABSOLUTE: 0.6 /ΜL
MONOCYTES RELATIVE PERCENT: 6.8 %
NEUTROPHILS ABSOLUTE: 4.2 /ΜL
NEUTROPHILS RELATIVE PERCENT: 51 %
PDW BLD-RTO: 14.8 %
PLATELET # BLD: 373 K/ΜL
PMV BLD AUTO: 7.3 FL
POTASSIUM SERPL-SCNC: 4.1 MMOL/L
RBC # BLD: 4.88 10^6/ΜL
SODIUM BLD-SCNC: 141 MMOL/L
WBC # BLD: 8.1 10^3/ML

## 2024-03-29 LAB
CREATININE URINE: 66.33 MG/DL
MICROALBUMIN/CREAT 24H UR: 144.6 MG/DL
MICROALBUMIN/CREAT UR-RTO: 2180 MG/G

## 2024-04-08 ENCOUNTER — NURSE TRIAGE (OUTPATIENT)
Dept: OTHER | Facility: CLINIC | Age: 54
End: 2024-04-08

## 2024-04-08 NOTE — TELEPHONE ENCOUNTER
Location of patient: [place]    Received call from Jeanne at [facility]; Patient with Red Flag Complaint requesting to establish care with Valerie GRIFFIN.    Subjective: Caller states \"headaches, stomach pains, sweats\"     Current Symptoms: Headache comes and goes, right now has mid stomach pain, a little below bellybutton. Denies vomiting, loose stools    Onset: 2 days ago; unchanged    Associated Symptoms: reduced activity, reduced appetite    Pain Severity: 7/10; throbbing; intermittent    Temperature: denies fever     What has been tried: nothing    LMP: NA Pregnant: NA    Recommended disposition: See in Office Today, advised UCC if unable to be seen today.    Care advice provided, patient verbalizes understanding; denies any other questions or concerns; instructed to call back for any new or worsening symptoms.    Patient/Caller agrees with recommended disposition; writer provided warm transfer to Minal at [facility] for appointment scheduling    Attention Provider:  Thank you for allowing me to participate in the care of your patient.  The patient was connected to triage in response to information provided to the [facility].  Please do not respond through this encounter as the response is not directed to a shared pool.    Reason for Disposition   MODERATE pain (e.g., interferes with normal activities that comes and goes (cramps) lasts > 24 hours  (Exception: Pain with Vomiting or Diarrhea - see that Protocol.)    Protocols used: Abdominal Pain - Female-ADULT-[place]

## 2024-04-21 SDOH — HEALTH STABILITY: PHYSICAL HEALTH: ON AVERAGE, HOW MANY DAYS PER WEEK DO YOU ENGAGE IN MODERATE TO STRENUOUS EXERCISE (LIKE A BRISK WALK)?: 1 DAY

## 2024-04-21 SDOH — HEALTH STABILITY: PHYSICAL HEALTH: ON AVERAGE, HOW MANY MINUTES DO YOU ENGAGE IN EXERCISE AT THIS LEVEL?: 10 MIN

## 2024-04-22 ENCOUNTER — OFFICE VISIT (OUTPATIENT)
Dept: FAMILY MEDICINE CLINIC | Age: 54
End: 2024-04-22
Payer: MEDICAID

## 2024-04-22 VITALS
HEART RATE: 83 BPM | HEIGHT: 64 IN | SYSTOLIC BLOOD PRESSURE: 128 MMHG | DIASTOLIC BLOOD PRESSURE: 72 MMHG | WEIGHT: 224 LBS | BODY MASS INDEX: 38.24 KG/M2 | RESPIRATION RATE: 16 BRPM | OXYGEN SATURATION: 99 %

## 2024-04-22 DIAGNOSIS — E11.21 TYPE 2 DIABETES MELLITUS WITH DIABETIC NEPHROPATHY, WITH LONG-TERM CURRENT USE OF INSULIN (HCC): Primary | ICD-10-CM

## 2024-04-22 DIAGNOSIS — Z79.4 TYPE 2 DIABETES MELLITUS WITH DIABETIC NEPHROPATHY, WITH LONG-TERM CURRENT USE OF INSULIN (HCC): Primary | ICD-10-CM

## 2024-04-22 DIAGNOSIS — E03.9 ACQUIRED HYPOTHYROIDISM: ICD-10-CM

## 2024-04-22 DIAGNOSIS — Z00.00 ENCOUNTER FOR MEDICAL EXAMINATION TO ESTABLISH CARE: ICD-10-CM

## 2024-04-22 DIAGNOSIS — R53.83 OTHER FATIGUE: ICD-10-CM

## 2024-04-22 DIAGNOSIS — I10 ESSENTIAL HYPERTENSION: ICD-10-CM

## 2024-04-22 LAB
25(OH)D3 SERPL-MCNC: 14 NG/ML (ref 30–100)
BASOPHILS ABSOLUTE: 0.1 THOU/MM3 (ref 0–0.1)
BASOPHILS NFR BLD AUTO: 0.9 %
BILIRUBIN, POC: NORMAL
BLOOD URINE, POC: NEGATIVE
CLARITY, POC: CLEAR
COLOR, POC: YELLOW
DEPRECATED MEAN GLUCOSE BLD GHB EST-ACNC: 228 MG/DL (ref 70–126)
DEPRECATED RDW RBC AUTO: 43.8 FL (ref 35–45)
EOSINOPHIL NFR BLD AUTO: 4 %
EOSINOPHILS ABSOLUTE: 0.4 THOU/MM3 (ref 0–0.4)
ERYTHROCYTE [DISTWIDTH] IN BLOOD BY AUTOMATED COUNT: 15 % (ref 11.5–14.5)
GLUCOSE URINE, POC: >=1000
HBA1C MFR BLD HPLC: 9.6 % (ref 4.4–6.4)
HCT VFR BLD AUTO: 35.3 % (ref 37–47)
HGB BLD-MCNC: 10.6 GM/DL (ref 12–16)
IMM GRANULOCYTES # BLD AUTO: 0.04 THOU/MM3 (ref 0–0.07)
IMM GRANULOCYTES NFR BLD AUTO: 0.4 %
KETONES, POC: NEGATIVE
LEUKOCYTE EST, POC: NEGATIVE
LYMPHOCYTES ABSOLUTE: 2.7 THOU/MM3 (ref 1–4.8)
LYMPHOCYTES NFR BLD AUTO: 30.1 %
MCH RBC QN AUTO: 24 PG (ref 26–33)
MCHC RBC AUTO-ENTMCNC: 30 GM/DL (ref 32.2–35.5)
MCV RBC AUTO: 79.9 FL (ref 81–99)
MONOCYTES ABSOLUTE: 0.8 THOU/MM3 (ref 0.4–1.3)
MONOCYTES NFR BLD AUTO: 9 %
NEUTROPHILS NFR BLD AUTO: 55.6 %
NITRITE, POC: NEGATIVE
NRBC BLD AUTO-RTO: 0 /100 WBC
PH, POC: 6
PLATELET # BLD AUTO: 349 THOU/MM3 (ref 130–400)
PMV BLD AUTO: 10.8 FL (ref 9.4–12.4)
PROTEIN, POC: >=300
RBC # BLD AUTO: 4.42 MILL/MM3 (ref 4.2–5.4)
SEGMENTED NEUTROPHILS ABSOLUTE COUNT: 5.1 THOU/MM3 (ref 1.8–7.7)
SPECIFIC GRAVITY, POC: 1.02
UROBILINOGEN, POC: 0.2
WBC # BLD AUTO: 9.1 THOU/MM3 (ref 4.8–10.8)

## 2024-04-22 PROCEDURE — 3074F SYST BP LT 130 MM HG: CPT | Performed by: NURSE PRACTITIONER

## 2024-04-22 PROCEDURE — 81003 URINALYSIS AUTO W/O SCOPE: CPT | Performed by: NURSE PRACTITIONER

## 2024-04-22 PROCEDURE — 3078F DIAST BP <80 MM HG: CPT | Performed by: NURSE PRACTITIONER

## 2024-04-22 PROCEDURE — 3046F HEMOGLOBIN A1C LEVEL >9.0%: CPT | Performed by: NURSE PRACTITIONER

## 2024-04-22 PROCEDURE — 99204 OFFICE O/P NEW MOD 45 MIN: CPT | Performed by: NURSE PRACTITIONER

## 2024-04-22 PROCEDURE — 36415 COLL VENOUS BLD VENIPUNCTURE: CPT | Performed by: NURSE PRACTITIONER

## 2024-04-22 RX ORDER — CARVEDILOL 25 MG/1
TABLET ORAL
COMMUNITY
Start: 2024-01-15

## 2024-04-22 RX ORDER — SPIRONOLACTONE 25 MG/1
TABLET ORAL
COMMUNITY
Start: 2024-04-16

## 2024-04-22 RX ORDER — LEVOTHYROXINE SODIUM 0.07 MG/1
TABLET ORAL
COMMUNITY
Start: 2024-04-16

## 2024-04-22 RX ORDER — ACYCLOVIR 400 MG/1
TABLET ORAL
COMMUNITY
Start: 2024-04-12

## 2024-04-22 RX ORDER — DAPAGLIFLOZIN 5 MG/1
TABLET, FILM COATED ORAL
COMMUNITY
Start: 2024-01-15

## 2024-04-22 RX ORDER — AMLODIPINE BESYLATE 10 MG/1
TABLET ORAL
COMMUNITY
Start: 2024-02-24

## 2024-04-22 RX ORDER — DULAGLUTIDE 0.75 MG/.5ML
INJECTION, SOLUTION SUBCUTANEOUS
COMMUNITY
Start: 2024-02-23 | End: 2024-04-23 | Stop reason: ALTCHOICE

## 2024-04-22 SDOH — ECONOMIC STABILITY: FOOD INSECURITY: WITHIN THE PAST 12 MONTHS, THE FOOD YOU BOUGHT JUST DIDN'T LAST AND YOU DIDN'T HAVE MONEY TO GET MORE.: NEVER TRUE

## 2024-04-22 SDOH — ECONOMIC STABILITY: HOUSING INSECURITY
IN THE LAST 12 MONTHS, WAS THERE A TIME WHEN YOU DID NOT HAVE A STEADY PLACE TO SLEEP OR SLEPT IN A SHELTER (INCLUDING NOW)?: NO

## 2024-04-22 SDOH — ECONOMIC STABILITY: INCOME INSECURITY: HOW HARD IS IT FOR YOU TO PAY FOR THE VERY BASICS LIKE FOOD, HOUSING, MEDICAL CARE, AND HEATING?: NOT HARD AT ALL

## 2024-04-22 SDOH — ECONOMIC STABILITY: FOOD INSECURITY: WITHIN THE PAST 12 MONTHS, YOU WORRIED THAT YOUR FOOD WOULD RUN OUT BEFORE YOU GOT MONEY TO BUY MORE.: NEVER TRUE

## 2024-04-22 ASSESSMENT — PATIENT HEALTH QUESTIONNAIRE - PHQ9
SUM OF ALL RESPONSES TO PHQ QUESTIONS 1-9: 0
1. LITTLE INTEREST OR PLEASURE IN DOING THINGS: NOT AT ALL
SUM OF ALL RESPONSES TO PHQ9 QUESTIONS 1 & 2: 0
SUM OF ALL RESPONSES TO PHQ QUESTIONS 1-9: 0
SUM OF ALL RESPONSES TO PHQ QUESTIONS 1-9: 0
2. FEELING DOWN, DEPRESSED OR HOPELESS: NOT AT ALL
SUM OF ALL RESPONSES TO PHQ QUESTIONS 1-9: 0

## 2024-04-22 NOTE — PROGRESS NOTES
Health Maintenance Due   Topic Date Due    Depression Screen  Never done    HIV screen  Never done    Hepatitis C screen  Never done    DTaP/Tdap/Td vaccine (1 - Tdap) Never done    Breast cancer screen  Never done    Diabetic retinal exam  07/08/2021    Diabetic foot exam  10/07/2021    Diabetic Alb to Cr ratio (uACR) test  05/03/2022    Lipids  05/03/2022    A1C test (Diabetic or Prediabetic)  08/27/2022    Hepatitis B vaccine (2 of 2 - CpG 2-dose series) 02/24/2023    Shingles vaccine (2 of 2) 03/24/2023    COVID-19 Vaccine (5 - 2023-24 season) 09/01/2023    GFR test (Diabetes, CKD 3-4, OR last GFR 15-59)  02/27/2024

## 2024-04-22 NOTE — PROGRESS NOTES
[facility]  [address] [address]  Dept: [phone]  Loc: [phone]    Adriane Gunderson (:  1970) is a 53 y.o. female,Established patient, here for evaluation of the following chief complaint(s):  Abdominal Pain (Anything she has been eating the past 3 days she gets very bloated and stomach pains. ) and Establish Care      ASSESSMENT/PLAN:  1. Type 2 diabetes mellitus with diabetic nephropathy, with long-term current use of insulin (HCC)  New patient establishing care today.   Labs being checked today  Glucose in office 121  Patient has been taking all her medications, but has been out of her trulicity for 2 weeks.   Her previous PCP had her taking the below:  Levemir 96 units q hs  Actually takes 18 units TID of humalog  -     POCT Urinalysis No Micro (Auto)  -     Hemoglobin A1C; Future  -     Comprehensive Metabolic Panel; Future  -     CBC with Auto Differential; Future  2. Encounter for medical examination to establish care  3. Other fatigue  -     Vitamin D 25 Hydroxy; Future  4. Acquired hypothyroidism  -     TSH; Future  -     T4, Free; Future  5. Essential hypertension    Controlled. 128/72  Continue current medications.         Return in about 4 weeks (around 2024).    SUBJECTIVE/OBJECTIVE:  Patient presents with:  Abdominal Pain: Anything she has been eating the past 3 days she gets very bloated and stomach pains.   Establish Care    Stomach pain, started last week,   Bloated, sharp at times.     In September went into Heart Failure, a lot of her medications      has a past medical history of Diabetes mellitus (HCC), Hyperlipidemia, Hypertension, Irregular heart rhythm, and Sciatica.    Follow with Cardiology, have an appointment 5/3    Last BM today today  Normally 2-3 times a day    Increased urination  Increased thirst.     More tired than usual.     CGM have been running in the 300's     Levemir 96

## 2024-04-23 ENCOUNTER — TELEPHONE (OUTPATIENT)
Dept: FAMILY MEDICINE CLINIC | Age: 54
End: 2024-04-23

## 2024-04-23 DIAGNOSIS — R14.0 ABDOMINAL BLOATING: ICD-10-CM

## 2024-04-23 DIAGNOSIS — E11.21 TYPE 2 DIABETES MELLITUS WITH DIABETIC NEPHROPATHY, WITH LONG-TERM CURRENT USE OF INSULIN (HCC): ICD-10-CM

## 2024-04-23 DIAGNOSIS — Z86.79 HX OF HEART FAILURE: ICD-10-CM

## 2024-04-23 DIAGNOSIS — E55.9 VITAMIN D DEFICIENCY: ICD-10-CM

## 2024-04-23 DIAGNOSIS — R53.83 OTHER FATIGUE: ICD-10-CM

## 2024-04-23 DIAGNOSIS — D64.9 ANEMIA, UNSPECIFIED TYPE: ICD-10-CM

## 2024-04-23 DIAGNOSIS — D64.9 ANEMIA, UNSPECIFIED TYPE: Primary | ICD-10-CM

## 2024-04-23 DIAGNOSIS — Z79.4 TYPE 2 DIABETES MELLITUS WITH DIABETIC NEPHROPATHY, WITH LONG-TERM CURRENT USE OF INSULIN (HCC): ICD-10-CM

## 2024-04-23 LAB
ALBUMIN SERPL BCG-MCNC: 3.8 G/DL (ref 3.5–5.1)
ALP SERPL-CCNC: 108 U/L (ref 38–126)
ALT SERPL W/O P-5'-P-CCNC: 39 U/L (ref 11–66)
ANION GAP SERPL CALC-SCNC: 16 MEQ/L (ref 8–16)
AST SERPL-CCNC: 21 U/L (ref 5–40)
BILIRUB SERPL-MCNC: 0.2 MG/DL (ref 0.3–1.2)
BUN SERPL-MCNC: 31 MG/DL (ref 7–22)
CALCIUM SERPL-MCNC: 9.5 MG/DL (ref 8.5–10.5)
CHLORIDE SERPL-SCNC: 105 MEQ/L (ref 98–111)
CO2 SERPL-SCNC: 22 MEQ/L (ref 23–33)
CREAT SERPL-MCNC: 1.3 MG/DL (ref 0.4–1.2)
FERRITIN SERPL IA-MCNC: 1619 NG/ML (ref 10–291)
GFR SERPL CREATININE-BSD FRML MDRD: 48 ML/MIN/1.73M2
GLUCOSE SERPL-MCNC: 101 MG/DL (ref 70–108)
IRON SATN MFR SERPL: 24 % (ref 20–50)
IRON SERPL-MCNC: 84 UG/DL (ref 50–170)
NT-PROBNP SERPL IA-MCNC: 78.4 PG/ML (ref 0–124)
POTASSIUM SERPL-SCNC: 5.3 MEQ/L (ref 3.5–5.2)
PROT SERPL-MCNC: 7.5 G/DL (ref 6.1–8)
SODIUM SERPL-SCNC: 143 MEQ/L (ref 135–145)
T4 FREE SERPL-MCNC: 1.42 NG/DL (ref 0.93–1.68)
TIBC SERPL-MCNC: 346 UG/DL (ref 171–450)
TSH SERPL DL<=0.005 MIU/L-ACNC: 1.3 UIU/ML (ref 0.4–4.2)

## 2024-04-23 RX ORDER — ERGOCALCIFEROL 1.25 MG/1
50000 CAPSULE ORAL WEEKLY
Qty: 12 CAPSULE | Refills: 1 | Status: SHIPPED | OUTPATIENT
Start: 2024-04-23

## 2024-04-23 NOTE — TELEPHONE ENCOUNTER
----- Message from Pilar Winter APRN - CNP sent at 4/23/2024 11:54 AM EDT -----  Please let Adriane know that her A1C was 9.6% an average glucose of 228.   I am sending in a new Rx for her once a week Trulicity, I increased it to 1.5 mg from 0.75 mg she was taking. This should help bring the A1C down.   Ask her if she send me her CGM readings in 4 weeks, so we can see if further adjustments can be made to her Insulin doses.     Her vit D is awfully low at 14, normal is , women should be closer to 45.   I will be sending in a once a week high dose Vit D3 for her to start.  Also her labs are consistent with anemia.   Has she been taking her oral Iron?   I am adding Iron studies on to yesterdays lab draw. Please let the lab know.   I am also adding on a BNP    Once iron levels and BNP are resulted I will reach back out to her.   She does need a follow up appointment in 1 month as well.     Thyroid levels look good, no change in dose of levothyroxine.

## 2024-04-23 NOTE — RESULT ENCOUNTER NOTE
Please let Adriane know that her A1C was 9.6% an average glucose of 228.   I am sending in a new Rx for her once a week Trulicity, I increased it to 1.5 mg from 0.75 mg she was taking. This should help bring the A1C down.   Ask her if she send me her CGM readings in 4 weeks, so we can see if further adjustments can be made to her Insulin doses.     Her vit D is awfully low at 14, normal is , women should be closer to 45.   I will be sending in a once a week high dose Vit D3 for her to start.  Also her labs are consistent with anemia.   Has she been taking her oral Iron?   I am adding Iron studies on to yesterdays lab draw. Please let the lab know.   I am also adding on a BNP    Once iron levels and BNP are resulted I will reach back out to her.   She does need a follow up appointment in 1 month as well.     Thyroid levels look good, no change in dose of levothyroxine.

## 2024-04-24 NOTE — RESULT ENCOUNTER NOTE
Her Iron levels are actually fine.   However Im a bit concerned about her hemoglobin and hematocrit being on the low side.   How is her abdomen? Is she still having pain? Has she been able to eat?

## 2024-04-29 ASSESSMENT — ENCOUNTER SYMPTOMS
SINUS PRESSURE: 0
VOMITING: 0
CHEST TIGHTNESS: 0
ABDOMINAL DISTENTION: 0
SHORTNESS OF BREATH: 0
CONSTIPATION: 0
EYE DISCHARGE: 0
EYE PAIN: 0
WHEEZING: 0
ABDOMINAL PAIN: 1
COLOR CHANGE: 0
EYE ITCHING: 0
CHOKING: 0
COUGH: 0
NAUSEA: 1
BACK PAIN: 0
VOICE CHANGE: 0

## 2024-05-04 ENCOUNTER — HOSPITAL ENCOUNTER (EMERGENCY)
Age: 54
Discharge: HOME | End: 2024-05-05
Attending: STUDENT IN AN ORGANIZED HEALTH CARE EDUCATION/TRAINING PROGRAM
Payer: MEDICAID

## 2024-05-04 ENCOUNTER — APPOINTMENT (OUTPATIENT)
Dept: GENERAL RADIOLOGY | Age: 54
End: 2024-05-04
Payer: MEDICAID

## 2024-05-04 DIAGNOSIS — R07.89 ATYPICAL CHEST PAIN: Primary | ICD-10-CM

## 2024-05-04 LAB
ANION GAP SERPL CALCULATED.3IONS-SCNC: 14 MMOL/L (ref 9–17)
BASOPHILS # BLD: 0.08 K/UL (ref 0–0.2)
BASOPHILS NFR BLD: 1 % (ref 0–2)
BUN SERPL-MCNC: 37 MG/DL (ref 6–20)
BUN/CREAT SERPL: 21 (ref 9–20)
CALCIUM SERPL-MCNC: 9.5 MG/DL (ref 8.6–10.4)
CHLORIDE SERPL-SCNC: 100 MMOL/L (ref 98–107)
CO2 SERPL-SCNC: 23 MMOL/L (ref 20–31)
CREAT SERPL-MCNC: 1.8 MG/DL (ref 0.5–0.9)
EOSINOPHIL # BLD: 0.4 K/UL (ref 0–0.44)
EOSINOPHILS RELATIVE PERCENT: 4 % (ref 1–4)
ERYTHROCYTE [DISTWIDTH] IN BLOOD BY AUTOMATED COUNT: 14.2 % (ref 11.8–14.4)
GFR, ESTIMATED: 33 ML/MIN/1.73M2
GLUCOSE SERPL-MCNC: 139 MG/DL (ref 70–99)
HCT VFR BLD AUTO: 35.1 % (ref 36.3–47.1)
HGB BLD-MCNC: 11 G/DL (ref 11.9–15.1)
IMM GRANULOCYTES # BLD AUTO: 0.04 K/UL (ref 0–0.3)
IMM GRANULOCYTES NFR BLD: 0 %
LYMPHOCYTES NFR BLD: 4.25 K/UL (ref 1.1–3.7)
LYMPHOCYTES RELATIVE PERCENT: 38 % (ref 24–43)
MAGNESIUM SERPL-MCNC: 2.1 MG/DL (ref 1.6–2.6)
MCH RBC QN AUTO: 23.5 PG (ref 25.2–33.5)
MCHC RBC AUTO-ENTMCNC: 31.3 G/DL (ref 28.4–34.8)
MCV RBC AUTO: 75 FL (ref 82.6–102.9)
MONOCYTES NFR BLD: 0.81 K/UL (ref 0.1–1.2)
MONOCYTES NFR BLD: 7 % (ref 3–12)
NEUTROPHILS NFR BLD: 50 % (ref 36–65)
NEUTS SEG NFR BLD: 5.69 K/UL (ref 1.5–8.1)
NRBC BLD-RTO: 0 PER 100 WBC
PLATELET # BLD AUTO: 347 K/UL (ref 138–453)
PMV BLD AUTO: 9.4 FL (ref 8.1–13.5)
POTASSIUM SERPL-SCNC: 4.1 MMOL/L (ref 3.7–5.3)
RBC # BLD AUTO: 4.68 M/UL (ref 3.95–5.11)
SODIUM SERPL-SCNC: 137 MMOL/L (ref 135–144)
TROPONIN I SERPL HS-MCNC: 16 NG/L (ref 0–14)
TROPONIN I SERPL HS-MCNC: 18 NG/L (ref 0–14)
WBC OTHER # BLD: 11.3 K/UL (ref 3.5–11.3)

## 2024-05-04 PROCEDURE — 85025 COMPLETE CBC W/AUTO DIFF WBC: CPT

## 2024-05-04 PROCEDURE — 99285 EMERGENCY DEPT VISIT HI MDM: CPT

## 2024-05-04 PROCEDURE — 6360000002 HC RX W HCPCS: Performed by: STUDENT IN AN ORGANIZED HEALTH CARE EDUCATION/TRAINING PROGRAM

## 2024-05-04 PROCEDURE — 2580000003 HC RX 258: Performed by: STUDENT IN AN ORGANIZED HEALTH CARE EDUCATION/TRAINING PROGRAM

## 2024-05-04 PROCEDURE — 96375 TX/PRO/DX INJ NEW DRUG ADDON: CPT

## 2024-05-04 PROCEDURE — 36415 COLL VENOUS BLD VENIPUNCTURE: CPT

## 2024-05-04 PROCEDURE — 93005 ELECTROCARDIOGRAM TRACING: CPT | Performed by: STUDENT IN AN ORGANIZED HEALTH CARE EDUCATION/TRAINING PROGRAM

## 2024-05-04 PROCEDURE — 83735 ASSAY OF MAGNESIUM: CPT

## 2024-05-04 PROCEDURE — 71045 X-RAY EXAM CHEST 1 VIEW: CPT

## 2024-05-04 PROCEDURE — 80048 BASIC METABOLIC PNL TOTAL CA: CPT

## 2024-05-04 PROCEDURE — 96374 THER/PROPH/DIAG INJ IV PUSH: CPT

## 2024-05-04 PROCEDURE — 84484 ASSAY OF TROPONIN QUANT: CPT

## 2024-05-04 RX ORDER — ORPHENADRINE CITRATE 30 MG/ML
60 INJECTION INTRAMUSCULAR; INTRAVENOUS ONCE
Status: COMPLETED | OUTPATIENT
Start: 2024-05-04 | End: 2024-05-04

## 2024-05-04 RX ORDER — KETOROLAC TROMETHAMINE 30 MG/ML
30 INJECTION, SOLUTION INTRAMUSCULAR; INTRAVENOUS ONCE
Status: COMPLETED | OUTPATIENT
Start: 2024-05-04 | End: 2024-05-04

## 2024-05-04 RX ORDER — MORPHINE SULFATE 4 MG/ML
4 INJECTION, SOLUTION INTRAMUSCULAR; INTRAVENOUS ONCE
Status: COMPLETED | OUTPATIENT
Start: 2024-05-04 | End: 2024-05-04

## 2024-05-04 RX ORDER — 0.9 % SODIUM CHLORIDE 0.9 %
1000 INTRAVENOUS SOLUTION INTRAVENOUS ONCE
Status: COMPLETED | OUTPATIENT
Start: 2024-05-04 | End: 2024-05-05

## 2024-05-04 RX ADMIN — KETOROLAC TROMETHAMINE 30 MG: 30 INJECTION, SOLUTION INTRAMUSCULAR at 21:47

## 2024-05-04 RX ADMIN — SODIUM CHLORIDE 1000 ML: 9 INJECTION, SOLUTION INTRAVENOUS at 22:41

## 2024-05-04 RX ADMIN — MORPHINE SULFATE 4 MG: 4 INJECTION, SOLUTION INTRAMUSCULAR; INTRAVENOUS at 23:24

## 2024-05-04 RX ADMIN — ORPHENADRINE CITRATE 60 MG: 30 INJECTION, SOLUTION INTRAMUSCULAR; INTRAVENOUS at 23:27

## 2024-05-04 ASSESSMENT — PAIN SCALES - GENERAL
PAINLEVEL_OUTOF10: 9
PAINLEVEL_OUTOF10: 9

## 2024-05-04 ASSESSMENT — PAIN DESCRIPTION - LOCATION
LOCATION: CHEST
LOCATION: BACK;CHEST

## 2024-05-04 ASSESSMENT — PAIN - FUNCTIONAL ASSESSMENT: PAIN_FUNCTIONAL_ASSESSMENT: 0-10

## 2024-05-04 ASSESSMENT — PAIN DESCRIPTION - DESCRIPTORS
DESCRIPTORS: THROBBING
DESCRIPTORS: ACHING;SORE

## 2024-05-04 ASSESSMENT — PAIN DESCRIPTION - PAIN TYPE: TYPE: ACUTE PAIN

## 2024-05-04 ASSESSMENT — LIFESTYLE VARIABLES
HOW OFTEN DO YOU HAVE A DRINK CONTAINING ALCOHOL: MONTHLY OR LESS
HOW MANY STANDARD DRINKS CONTAINING ALCOHOL DO YOU HAVE ON A TYPICAL DAY: 5 OR 6

## 2024-05-04 ASSESSMENT — PAIN DESCRIPTION - FREQUENCY: FREQUENCY: CONTINUOUS

## 2024-05-05 VITALS
HEART RATE: 82 BPM | RESPIRATION RATE: 13 BRPM | TEMPERATURE: 97.6 F | OXYGEN SATURATION: 99 % | SYSTOLIC BLOOD PRESSURE: 163 MMHG | DIASTOLIC BLOOD PRESSURE: 69 MMHG | WEIGHT: 218 LBS | BODY MASS INDEX: 37.22 KG/M2 | HEIGHT: 64 IN

## 2024-05-05 RX ORDER — CYCLOBENZAPRINE HCL 10 MG
10 TABLET ORAL 3 TIMES DAILY PRN
Qty: 21 TABLET | Refills: 0 | Status: SHIPPED | OUTPATIENT
Start: 2024-05-05 | End: 2024-05-15

## 2024-05-05 ASSESSMENT — PAIN - FUNCTIONAL ASSESSMENT: PAIN_FUNCTIONAL_ASSESSMENT: 0-10

## 2024-05-05 ASSESSMENT — PAIN DESCRIPTION - LOCATION
LOCATION: BACK;CHEST
LOCATION: BACK;CHEST

## 2024-05-05 ASSESSMENT — PAIN DESCRIPTION - PAIN TYPE: TYPE: ACUTE PAIN

## 2024-05-05 ASSESSMENT — PAIN SCALES - GENERAL
PAINLEVEL_OUTOF10: 5
PAINLEVEL_OUTOF10: 5

## 2024-05-05 ASSESSMENT — HEART SCORE: ECG: NON-SPECIFC REPOLARIZATION DISTURBANCE/LBTB/PM

## 2024-05-05 NOTE — ED PROVIDER NOTES
DISPOSITION Decision To Discharge 05/05/2024 12:19:10 AM      PATIENT REFERRED TO:  Pilar Winter, APRN - CNP  [address] [address]  [phone]    Schedule an appointment as soon as possible for a visit       [facility] ED  [address] [address]  [phone]  Go to   As needed      DISCHARGE MEDICATIONS:  New Prescriptions    CYCLOBENZAPRINE (FLEXERIL) 10 MG TABLET    Take 1 tablet by mouth 3 times daily as needed for Muscle spasms       Jose Hudson MD  Emergency Medicine Physician     (Please note that portions of thisnote were completed with a voice recognition program.  Efforts were made to edit the dictations but occasionally words are mis-transcribed.)        Jose Hudson MD  05/05/24 0021

## 2024-05-06 LAB
EKG ATRIAL RATE: 89 BPM
EKG P AXIS: 43 DEGREES
EKG P-R INTERVAL: 142 MS
EKG Q-T INTERVAL: 384 MS
EKG QRS DURATION: 84 MS
EKG QTC CALCULATION (BAZETT): 467 MS
EKG R AXIS: -9 DEGREES
EKG T AXIS: 48 DEGREES
EKG VENTRICULAR RATE: 89 BPM

## 2024-05-06 PROCEDURE — 93010 ELECTROCARDIOGRAM REPORT: CPT | Performed by: FAMILY MEDICINE

## 2024-05-22 ENCOUNTER — TELEPHONE (OUTPATIENT)
Dept: FAMILY MEDICINE CLINIC | Age: 54
End: 2024-05-22

## 2024-05-22 NOTE — TELEPHONE ENCOUNTER
Spoke with patient who declined Same Day visit today and due to transportation issues, is scheduled for 6/4.    Reason for visit: Request an Appointment      Comments:   Legs and feet are staying swollen

## 2024-06-04 ENCOUNTER — OFFICE VISIT (OUTPATIENT)
Dept: FAMILY MEDICINE CLINIC | Age: 54
End: 2024-06-04
Payer: MEDICAID

## 2024-06-04 ENCOUNTER — TELEPHONE (OUTPATIENT)
Dept: FAMILY MEDICINE CLINIC | Age: 54
End: 2024-06-04

## 2024-06-04 VITALS
BODY MASS INDEX: 37.87 KG/M2 | OXYGEN SATURATION: 99 % | HEART RATE: 95 BPM | RESPIRATION RATE: 18 BRPM | SYSTOLIC BLOOD PRESSURE: 136 MMHG | DIASTOLIC BLOOD PRESSURE: 74 MMHG | HEIGHT: 64 IN | WEIGHT: 221.8 LBS

## 2024-06-04 DIAGNOSIS — R10.9 ABDOMINAL PAIN, UNSPECIFIED ABDOMINAL LOCATION: ICD-10-CM

## 2024-06-04 DIAGNOSIS — E11.21 TYPE 2 DIABETES MELLITUS WITH DIABETIC NEPHROPATHY, WITH LONG-TERM CURRENT USE OF INSULIN (HCC): Primary | ICD-10-CM

## 2024-06-04 DIAGNOSIS — E78.5 DYSLIPIDEMIA: ICD-10-CM

## 2024-06-04 DIAGNOSIS — Z79.4 TYPE 2 DIABETES MELLITUS WITH DIABETIC NEPHROPATHY, WITH LONG-TERM CURRENT USE OF INSULIN (HCC): Primary | ICD-10-CM

## 2024-06-04 DIAGNOSIS — E03.9 ACQUIRED HYPOTHYROIDISM: ICD-10-CM

## 2024-06-04 DIAGNOSIS — R25.2 MUSCLE CRAMPING: ICD-10-CM

## 2024-06-04 DIAGNOSIS — I10 ESSENTIAL HYPERTENSION: ICD-10-CM

## 2024-06-04 DIAGNOSIS — M79.89 LEG SWELLING: ICD-10-CM

## 2024-06-04 DIAGNOSIS — E83.19 IRON OVERLOAD: ICD-10-CM

## 2024-06-04 DIAGNOSIS — R79.89 LOW SERUM VITAMIN D: ICD-10-CM

## 2024-06-04 LAB
ALBUMIN SERPL BCG-MCNC: 4 G/DL (ref 3.5–5.1)
ALP SERPL-CCNC: 109 U/L (ref 38–126)
ALT SERPL W/O P-5'-P-CCNC: 28 U/L (ref 11–66)
ANION GAP SERPL CALC-SCNC: 10 MEQ/L (ref 8–16)
AST SERPL-CCNC: 29 U/L (ref 5–40)
BASOPHILS ABSOLUTE: 0.1 THOU/MM3 (ref 0–0.1)
BASOPHILS NFR BLD AUTO: 0.8 %
BILIRUB SERPL-MCNC: 0.2 MG/DL (ref 0.3–1.2)
BILIRUBIN, POC: NEGATIVE
BLOOD URINE, POC: ABNORMAL
BUN SERPL-MCNC: 25 MG/DL (ref 7–22)
CALCIUM SERPL-MCNC: 9.6 MG/DL (ref 8.5–10.5)
CHLORIDE SERPL-SCNC: 104 MEQ/L (ref 98–111)
CLARITY, POC: ABNORMAL
CO2 SERPL-SCNC: 24 MEQ/L (ref 23–33)
COLOR, POC: YELLOW
CREAT SERPL-MCNC: 1.2 MG/DL (ref 0.4–1.2)
DEPRECATED RDW RBC AUTO: 43.8 FL (ref 35–45)
EOSINOPHIL NFR BLD AUTO: 4.3 %
EOSINOPHILS ABSOLUTE: 0.4 THOU/MM3 (ref 0–0.4)
ERYTHROCYTE [DISTWIDTH] IN BLOOD BY AUTOMATED COUNT: 15.5 % (ref 11.5–14.5)
GFR SERPL CREATININE-BSD FRML MDRD: 53 ML/MIN/1.73M2
GLUCOSE SERPL-MCNC: 92 MG/DL (ref 70–108)
GLUCOSE URINE, POC: 500
HCT VFR BLD AUTO: 36.9 % (ref 37–47)
HGB BLD-MCNC: 11 GM/DL (ref 12–16)
IMM GRANULOCYTES # BLD AUTO: 0.05 THOU/MM3 (ref 0–0.07)
IMM GRANULOCYTES NFR BLD AUTO: 0.5 %
KETONES, POC: NEGATIVE
LEUKOCYTE EST, POC: NEGATIVE
LYMPHOCYTES ABSOLUTE: 3.6 THOU/MM3 (ref 1–4.8)
LYMPHOCYTES NFR BLD AUTO: 37 %
MAGNESIUM SERPL-MCNC: 1.9 MG/DL (ref 1.6–2.4)
MCH RBC QN AUTO: 23.5 PG (ref 26–33)
MCHC RBC AUTO-ENTMCNC: 29.8 GM/DL (ref 32.2–35.5)
MCV RBC AUTO: 78.8 FL (ref 81–99)
MONOCYTES ABSOLUTE: 0.8 THOU/MM3 (ref 0.4–1.3)
MONOCYTES NFR BLD AUTO: 8.4 %
NEUTROPHILS ABSOLUTE: 4.8 THOU/MM3 (ref 1.8–7.7)
NEUTROPHILS NFR BLD AUTO: 49 %
NITRITE, POC: NEGATIVE
NRBC BLD AUTO-RTO: 0 /100 WBC
PH, POC: 5.5
PLATELET # BLD AUTO: 386 THOU/MM3 (ref 130–400)
PMV BLD AUTO: 10.1 FL (ref 9.4–12.4)
POTASSIUM SERPL-SCNC: 4.2 MEQ/L (ref 3.5–5.2)
PROT SERPL-MCNC: 7.5 G/DL (ref 6.1–8)
PROTEIN, POC: >300
RBC # BLD AUTO: 4.68 MILL/MM3 (ref 4.2–5.4)
SODIUM SERPL-SCNC: 138 MEQ/L (ref 135–145)
SPECIFIC GRAVITY, POC: 1.02
UROBILINOGEN, POC: 0.2
WBC # BLD AUTO: 9.7 THOU/MM3 (ref 4.8–10.8)

## 2024-06-04 PROCEDURE — 81003 URINALYSIS AUTO W/O SCOPE: CPT | Performed by: NURSE PRACTITIONER

## 2024-06-04 PROCEDURE — 99215 OFFICE O/P EST HI 40 MIN: CPT | Performed by: NURSE PRACTITIONER

## 2024-06-04 PROCEDURE — 36415 COLL VENOUS BLD VENIPUNCTURE: CPT | Performed by: NURSE PRACTITIONER

## 2024-06-04 PROCEDURE — 3078F DIAST BP <80 MM HG: CPT | Performed by: NURSE PRACTITIONER

## 2024-06-04 PROCEDURE — 3046F HEMOGLOBIN A1C LEVEL >9.0%: CPT | Performed by: NURSE PRACTITIONER

## 2024-06-04 PROCEDURE — 3075F SYST BP GE 130 - 139MM HG: CPT | Performed by: NURSE PRACTITIONER

## 2024-06-04 RX ORDER — LOSARTAN POTASSIUM 100 MG/1
100 TABLET ORAL DAILY
COMMUNITY
Start: 2024-05-14

## 2024-06-04 NOTE — PATIENT INSTRUCTIONS
Checking labs to see if ok to change water pill, but need to check on kidneys    Check cupboard... read label on sodium

## 2024-06-04 NOTE — PROGRESS NOTES
Health Maintenance Due   Topic Date Due    HIV screen  Never done    Hepatitis C screen  Never done    DTaP/Tdap/Td vaccine (1 - Tdap) Never done    Diabetic retinal exam  [facility] got exam March    Diabetic foot exam  10/07/2021    COVID-19 Vaccine (5 - 2023-24 season) 09/01/2023    Breast cancer screen  Needs one scheduled    Diabetic Alb to Cr ratio (uACR) test  Done on 3/29/24       
Venipuncture obtained from right arm. Patient tolerated the procedure without complications or complaints.     
     Skin integrity: Skin integrity normal.      Toenail Condition: Left toenails are normal.   Lymphadenopathy:      Cervical: No cervical adenopathy.   Skin:     General: Skin is warm and dry.      Capillary Refill: Capillary refill takes less than 2 seconds.   Neurological:      Mental Status: She is alert and oriented to person, place, and time.      Deep Tendon Reflexes: Reflexes are normal and symmetric.   Psychiatric:         Mood and Affect: Mood normal.         Behavior: Behavior normal.             An electronic signature was used to authenticate this note.    --BUZZ England - CNP

## 2024-06-05 LAB
25(OH)D3 SERPL-MCNC: 23 NG/ML (ref 30–100)
FERRITIN SERPL IA-MCNC: 1530 NG/ML (ref 10–291)
NT-PROBNP SERPL IA-MCNC: 61.7 PG/ML (ref 0–124)
T4 FREE SERPL-MCNC: 1.5 NG/DL (ref 0.93–1.68)
TSH SERPL DL<=0.005 MIU/L-ACNC: 2.63 UIU/ML (ref 0.4–4.2)

## 2024-06-06 ENCOUNTER — PATIENT MESSAGE (OUTPATIENT)
Dept: FAMILY MEDICINE CLINIC | Age: 54
End: 2024-06-06

## 2024-06-06 DIAGNOSIS — E55.9 VITAMIN D DEFICIENCY: ICD-10-CM

## 2024-06-06 DIAGNOSIS — M79.89 LEG SWELLING: Primary | ICD-10-CM

## 2024-06-06 RX ORDER — BUMETANIDE 1 MG/1
1 TABLET ORAL DAILY
Qty: 30 TABLET | Refills: 3 | Status: SHIPPED | OUTPATIENT
Start: 2024-06-06

## 2024-06-06 RX ORDER — ERGOCALCIFEROL 1.25 MG/1
50000 CAPSULE ORAL
Qty: 30 CAPSULE | Refills: 3 | Status: SHIPPED | OUTPATIENT
Start: 2024-06-06 | End: 2025-06-06

## 2024-06-06 NOTE — RESULT ENCOUNTER NOTE
Vitamin D a bit better, but still low.   I would like her to take her D supplement 2 times a week instead of once a week. I will send in a new Rx.    Her Ferritin levels are still awfully high, ask her if she is still taking the ferrous sulfate and how many times a day.     Thyroid levels look good.     We can change her Lasix to bumex to see if that helps with her leg swelling. She will need to be sure to take her potassium supplement daily .   So she is to stop the Lasix (furosemide) and start the new med, Bumex.     And we will want to check kidney labs in 2-3 weeks.

## 2024-06-07 ENCOUNTER — PATIENT MESSAGE (OUTPATIENT)
Dept: FAMILY MEDICINE CLINIC | Age: 54
End: 2024-06-07

## 2024-06-07 NOTE — TELEPHONE ENCOUNTER
Ok, please ask her to bring all of her medications including any vitamins in with her for her appointment on Monday

## 2024-06-10 NOTE — TELEPHONE ENCOUNTER
Patient has an appointment on 6/13/24 to discuss- advised to bring all medications and vitamins with her

## 2024-06-10 NOTE — TELEPHONE ENCOUNTER
From: Adriane Gunderson  Sent: 6/7/2024 8:08 AM EDT  To: [facility] Clinical Staff  Subject: Medication     No and I've looked them all up none say anything about iron

## 2024-06-13 ENCOUNTER — OFFICE VISIT (OUTPATIENT)
Dept: FAMILY MEDICINE CLINIC | Age: 54
End: 2024-06-13
Payer: MEDICAID

## 2024-06-13 VITALS
HEART RATE: 100 BPM | HEIGHT: 64 IN | DIASTOLIC BLOOD PRESSURE: 78 MMHG | OXYGEN SATURATION: 98 % | BODY MASS INDEX: 38.28 KG/M2 | WEIGHT: 224.2 LBS | RESPIRATION RATE: 18 BRPM | SYSTOLIC BLOOD PRESSURE: 138 MMHG

## 2024-06-13 DIAGNOSIS — D50.9 IRON DEFICIENCY ANEMIA, UNSPECIFIED IRON DEFICIENCY ANEMIA TYPE: ICD-10-CM

## 2024-06-13 DIAGNOSIS — Z79.4 TYPE 2 DIABETES MELLITUS WITH DIABETIC NEPHROPATHY, WITH LONG-TERM CURRENT USE OF INSULIN (HCC): Primary | ICD-10-CM

## 2024-06-13 DIAGNOSIS — E55.9 VITAMIN D DEFICIENCY: ICD-10-CM

## 2024-06-13 DIAGNOSIS — E11.21 TYPE 2 DIABETES MELLITUS WITH DIABETIC NEPHROPATHY, WITH LONG-TERM CURRENT USE OF INSULIN (HCC): Primary | ICD-10-CM

## 2024-06-13 DIAGNOSIS — E78.5 DYSLIPIDEMIA: ICD-10-CM

## 2024-06-13 DIAGNOSIS — I10 ESSENTIAL HYPERTENSION: ICD-10-CM

## 2024-06-13 DIAGNOSIS — Z86.79 HX OF HEART FAILURE: ICD-10-CM

## 2024-06-13 PROCEDURE — 3078F DIAST BP <80 MM HG: CPT | Performed by: NURSE PRACTITIONER

## 2024-06-13 PROCEDURE — 99215 OFFICE O/P EST HI 40 MIN: CPT | Performed by: NURSE PRACTITIONER

## 2024-06-13 PROCEDURE — 3075F SYST BP GE 130 - 139MM HG: CPT | Performed by: NURSE PRACTITIONER

## 2024-06-13 PROCEDURE — 3046F HEMOGLOBIN A1C LEVEL >9.0%: CPT | Performed by: NURSE PRACTITIONER

## 2024-06-13 RX ORDER — ERGOCALCIFEROL 1.25 MG/1
50000 CAPSULE ORAL
Qty: 30 CAPSULE | Refills: 3 | Status: CANCELLED | OUTPATIENT
Start: 2024-06-13 | End: 2025-06-13

## 2024-06-13 RX ORDER — LUBIPROSTONE 24 UG/1
24 CAPSULE ORAL 2 TIMES DAILY WITH MEALS
COMMUNITY

## 2024-06-13 RX ORDER — POTASSIUM CHLORIDE 750 MG/1
10 TABLET, EXTENDED RELEASE ORAL DAILY
Qty: 90 TABLET | Refills: 3 | Status: SHIPPED | OUTPATIENT
Start: 2024-06-13 | End: 2025-06-13

## 2024-06-13 ASSESSMENT — ENCOUNTER SYMPTOMS
EYE DISCHARGE: 0
BACK PAIN: 0
VOMITING: 0
EYE ITCHING: 0
ABDOMINAL PAIN: 0
CHEST TIGHTNESS: 0
WHEEZING: 0
COLOR CHANGE: 0
CONSTIPATION: 0
EYE PAIN: 0
SHORTNESS OF BREATH: 0
CHOKING: 0
VOICE CHANGE: 0
ABDOMINAL DISTENTION: 1
SINUS PRESSURE: 0
NAUSEA: 0
COUGH: 0

## 2024-06-13 NOTE — PROGRESS NOTES
Health Maintenance Due   Topic Date Due    HIV screen  Never done    Hepatitis C screen  Never done    DTaP/Tdap/Td vaccine (1 - Tdap) Never done    COVID-19 Vaccine (5 - 2023-24 season) 09/01/2023    Breast cancer screen  Scheduled for 6/17/24

## 2024-06-13 NOTE — PROGRESS NOTES
[facility] PROFESSIONAL SERVS  [facility]  [address] [address]  Dept: [phone]  Loc: [phone]    Adriane Gunderson (:  1970) is a 53 y.o. female,Established patient, here for evaluation of the following chief complaint(s):  Follow-up (No concerns today /Need physical for work filled out /Had iron infusion done 5 months ago /No swelling ) and Medication Refill (Needs Vit D Refilled)      ASSESSMENT/PLAN:  1. Type 2 diabetes mellitus with diabetic nephropathy, with long-term current use of insulin (HCC)  Doing well, taking Trulicity 1.5 mg weekly. Farxiga 5 mg  Levemir 42 units BID  Humalog 18 units TID   2. Vitamin D deficiency  Lab Results   Component Value Date/Time    VITD25 2024 03:54 PM    Continue 50, 000 IU every 3 days.     3. Essential hypertension  Controlled. 138/78. Continue losartan 100 mg, Coreg 25 mg BID, amlodipine 10 mg daily.   Patient reminded and encouraged to make the necessary lifestyle modifications if appropriate: Weight loss, DASH diet, Reduce sodium, Increase physical activity, Moderate Alcohol consumption.     4. Dyslipidemia  Continue atorvastatin 40 mg daily.   5. Iron deficiency anemia, unspecified iron deficiency anemia type  Dr. Cabrera ordered infusion, last one was 5 months ago.   6. Hx of heart failure      GI plan hx of acute gastric ulcer with hemorrhage   Fe , saw GI then in [place]     Dr. Faustino Rice in [place]   IN the next Month                   Greater than 45 minutes spent with patient.     Return in about 7 weeks (around 2024) for f/u 6-7 weels DM f/u .    SUBJECTIVE/OBJECTIVE:  Patient presents with:  Follow-up: No concerns today   Need physical for work filled out   Had iron infusion done 5 months ago   No swelling              has a past medical history of Diabetes mellitus (HCC), Hyperlipidemia, Hypertension, Irregular heart rhythm, and Sciatica.    Review of Systems   Constitutional:

## 2024-06-17 ENCOUNTER — HOSPITAL ENCOUNTER (OUTPATIENT)
Dept: WOMENS IMAGING | Age: 54
Discharge: HOME | End: 2024-06-19
Payer: MEDICAID

## 2024-06-17 VITALS — HEIGHT: 64 IN | BODY MASS INDEX: 38.24 KG/M2 | WEIGHT: 224 LBS

## 2024-06-17 DIAGNOSIS — Z12.31 VISIT FOR SCREENING MAMMOGRAM: ICD-10-CM

## 2024-06-17 PROCEDURE — 77063 BREAST TOMOSYNTHESIS BI: CPT

## 2024-06-20 ASSESSMENT — ENCOUNTER SYMPTOMS
EYE PAIN: 0
COLOR CHANGE: 0
COUGH: 0
VOMITING: 0
WHEEZING: 0
SINUS PRESSURE: 0
CHEST TIGHTNESS: 0
ABDOMINAL DISTENTION: 0
NAUSEA: 0
CONSTIPATION: 0
SHORTNESS OF BREATH: 0
VOICE CHANGE: 0
EYE ITCHING: 0
EYE DISCHARGE: 0
CHOKING: 0
BACK PAIN: 0
ABDOMINAL PAIN: 0

## 2024-06-27 ENCOUNTER — TELEPHONE (OUTPATIENT)
Dept: FAMILY MEDICINE CLINIC | Age: 54
End: 2024-06-27

## 2024-06-27 DIAGNOSIS — R25.2 MUSCLE CRAMPING: Primary | ICD-10-CM

## 2024-06-27 DIAGNOSIS — G47.9 SLEEP DISTURBANCES: ICD-10-CM

## 2024-06-27 NOTE — TELEPHONE ENCOUNTER
Patient called today due to having trouble sleeping. Stated that she has muscle spasms that would cause chest pain. Stated she was prescribed Flexeril but is out and wanting to know if Pat can fill it, previous prescribed from Jose Hudson MD.    With sleeping, she has not tried anything OTC. States it makes her feel funny. States that the sleeping problem has been occurring. The Flexeril did not help her sleep but helped the pain/muscle spasms.     Pt aware provider is out and states not urgent and can wait till Monday.

## 2024-07-01 RX ORDER — CYCLOBENZAPRINE HCL 5 MG
5 TABLET ORAL 3 TIMES DAILY PRN
Qty: 30 TABLET | Refills: 0 | Status: SHIPPED | OUTPATIENT
Start: 2024-07-01 | End: 2024-07-11

## 2024-07-01 RX ORDER — TRAZODONE HYDROCHLORIDE 50 MG/1
50 TABLET ORAL NIGHTLY
Qty: 90 TABLET | Refills: 0 | Status: SHIPPED | OUTPATIENT
Start: 2024-07-01

## 2024-07-01 NOTE — TELEPHONE ENCOUNTER
Flexeril refilled.   Trazodone 50 mg tablets sent in, start with a half tablet first.   Be sure to take an hour before bed, will need to plan for 8 hours of sleep when taking this, to avoid daytime grogginess.

## 2024-07-16 DIAGNOSIS — E11.65 UNCONTROLLED TYPE 2 DIABETES MELLITUS WITH HYPERGLYCEMIA (HCC): ICD-10-CM

## 2024-07-16 NOTE — TELEPHONE ENCOUNTER
Patient's last appointment was : 6/13/2024  Patient's next appointment is : 8/1/2024  Last refilled: 10/13/2021 by BUZZ Sigala.   Patient reports with the Insulin Lispro (Humalog) she takes 18 units three times daily.   She also needs her pen needles filled.

## 2024-07-17 RX ORDER — INSULIN LISPRO 100 [IU]/ML
18 INJECTION, SOLUTION INTRAVENOUS; SUBCUTANEOUS
Qty: 16.2 ML | Refills: 11 | Status: SHIPPED | OUTPATIENT
Start: 2024-07-17 | End: 2025-07-17

## 2024-07-17 RX ORDER — PEN NEEDLE, DIABETIC 32GX 5/32"
1 NEEDLE, DISPOSABLE MISCELLANEOUS 3 TIMES DAILY
Qty: 100 EACH | Refills: 11 | Status: SHIPPED | OUTPATIENT
Start: 2024-07-17 | End: 2025-07-17

## 2024-08-01 ENCOUNTER — OFFICE VISIT (OUTPATIENT)
Dept: FAMILY MEDICINE CLINIC | Age: 54
End: 2024-08-01
Payer: MEDICAID

## 2024-08-01 VITALS
RESPIRATION RATE: 16 BRPM | HEART RATE: 90 BPM | WEIGHT: 222.4 LBS | BODY MASS INDEX: 38.17 KG/M2 | OXYGEN SATURATION: 95 % | DIASTOLIC BLOOD PRESSURE: 76 MMHG | SYSTOLIC BLOOD PRESSURE: 134 MMHG

## 2024-08-01 DIAGNOSIS — K57.30 DIVERTICULOSIS OF COLON: ICD-10-CM

## 2024-08-01 DIAGNOSIS — I10 ESSENTIAL HYPERTENSION: ICD-10-CM

## 2024-08-01 DIAGNOSIS — Z79.4 TYPE 2 DIABETES MELLITUS WITH DIABETIC NEPHROPATHY, WITH LONG-TERM CURRENT USE OF INSULIN (HCC): Primary | ICD-10-CM

## 2024-08-01 DIAGNOSIS — E83.19 IRON OVERLOAD: ICD-10-CM

## 2024-08-01 DIAGNOSIS — R39.9 UTI SYMPTOMS: ICD-10-CM

## 2024-08-01 DIAGNOSIS — E11.21 TYPE 2 DIABETES MELLITUS WITH DIABETIC NEPHROPATHY, WITH LONG-TERM CURRENT USE OF INSULIN (HCC): Primary | ICD-10-CM

## 2024-08-01 DIAGNOSIS — K21.9 GASTROESOPHAGEAL REFLUX DISEASE, UNSPECIFIED WHETHER ESOPHAGITIS PRESENT: ICD-10-CM

## 2024-08-01 DIAGNOSIS — M25.511 ACUTE PAIN OF RIGHT SHOULDER: ICD-10-CM

## 2024-08-01 LAB
BILIRUBIN, POC: NORMAL
BLOOD URINE, POC: NORMAL
CLARITY, POC: NORMAL
COLOR, POC: YELLOW
GLUCOSE URINE, POC: >=1000
HBA1C MFR BLD: 8.6 %
KETONES, POC: NORMAL
LEUKOCYTE EST, POC: NORMAL
NITRITE, POC: NORMAL
PH, POC: 6.5
PROTEIN, POC: >=300
SPECIFIC GRAVITY, POC: 1.01
UROBILINOGEN, POC: 0.2

## 2024-08-01 PROCEDURE — 99215 OFFICE O/P EST HI 40 MIN: CPT | Performed by: NURSE PRACTITIONER

## 2024-08-01 PROCEDURE — 3075F SYST BP GE 130 - 139MM HG: CPT | Performed by: NURSE PRACTITIONER

## 2024-08-01 PROCEDURE — 3078F DIAST BP <80 MM HG: CPT | Performed by: NURSE PRACTITIONER

## 2024-08-01 PROCEDURE — 83036 HEMOGLOBIN GLYCOSYLATED A1C: CPT | Performed by: NURSE PRACTITIONER

## 2024-08-01 PROCEDURE — 81003 URINALYSIS AUTO W/O SCOPE: CPT | Performed by: NURSE PRACTITIONER

## 2024-08-01 PROCEDURE — 3052F HG A1C>EQUAL 8.0%<EQUAL 9.0%: CPT | Performed by: NURSE PRACTITIONER

## 2024-08-01 PROCEDURE — 36415 COLL VENOUS BLD VENIPUNCTURE: CPT | Performed by: NURSE PRACTITIONER

## 2024-08-01 RX ORDER — DULAGLUTIDE 3 MG/.5ML
3 INJECTION, SOLUTION SUBCUTANEOUS WEEKLY
Qty: 2 ML | Refills: 11 | Status: SHIPPED | OUTPATIENT
Start: 2024-08-01 | End: 2025-08-01

## 2024-08-01 NOTE — PROGRESS NOTES
[facility] PROFESSIONAL SERVS  [facility]  [address] [address]  Dept: [phone]  Loc: [phone]    Adriane Gunderson (:  1970) is a 53 y.o. female,Established patient, here for evaluation of the following chief complaint(s):  Follow-up (DM follow up, no concerns today ) and Shoulder Pain (Right shoulder pain x 1 week. Unsure of what happened. 6/10 now, yesterday pain was achy and hurt, almost went to ED but didn't. )      ASSESSMENT/PLAN:  1. Type 2 diabetes mellitus with diabetic nephropathy, with long-term current use of insulin ([facility])  Hemoglobin A1C   Date Value Ref Range Status   2024 8.6 % Final   Previous A1c 9.6%  Continue the below treatment and no increase in Trulicity.  Levemir 42 units BID  Humalog 18 units TID   Increase Trulicity to 3 mg q week.       -     POCT glycosylated hemoglobin (Hb A1C)  -     Dulaglutide (TRULICITY) 3 MG/0.5ML SOPN; Inject 3 mg into the skin once a week, Disp-2 mL, R-11Normal  2. Essential hypertension  Controlled 134/76.  Continue losartan 100 mg daily, Coreg 50 mg twice daily, amlodipine 10 mg daily  Patient reminded and encouraged to make the necessary lifestyle modifications if appropriate: Weight loss, DASH diet, Reduce sodium, Increase physical activity, Moderate Alcohol consumption.       -     Basic Metabolic Panel; Future  3. Iron overload  Patient with unknown cause of iron overload.  Repeat ferritin has come down some but still elevated at 1226  Referral placed to GI.  Reports she is not taking any Iron supplement and reports her last iron infusion was in .  -     Michele Brock MD, Gastroenterology, [place]  -     Ferritin; Future  4. Diverticulosis of colon  -     Michele Brock MD, Gastroenterology, [place]  5. Gastroesophageal reflux disease, unspecified whether esophagitis present  -     Michele Brock MD, Gastroenterology, [place]  6. Acute pain of right

## 2024-08-01 NOTE — PROGRESS NOTES
Health Maintenance Due   Topic Date Due    HIV screen  Never done    Hepatitis C screen  Never done    DTaP/Tdap/Td vaccine (1 - Tdap) Never done    COVID-19 Vaccine (5 - 2023-24 season) 09/01/2023    Flu vaccine (1) 08/01/2024    A1C test (Diabetic or Prediabetic)  07/22/2024    Lipids  08/03/2024

## 2024-08-02 LAB
ANION GAP SERPL CALC-SCNC: 13 MEQ/L (ref 8–16)
BUN SERPL-MCNC: 21 MG/DL (ref 7–22)
CALCIUM SERPL-MCNC: 9.3 MG/DL (ref 8.5–10.5)
CHLORIDE SERPL-SCNC: 102 MEQ/L (ref 98–111)
CO2 SERPL-SCNC: 26 MEQ/L (ref 23–33)
CREAT SERPL-MCNC: 1.5 MG/DL (ref 0.4–1.2)
FERRITIN SERPL IA-MCNC: 1226 NG/ML (ref 10–291)
GFR SERPL CREATININE-BSD FRML MDRD: 41 ML/MIN/1.73M2
GLUCOSE SERPL-MCNC: 145 MG/DL (ref 70–108)
POTASSIUM SERPL-SCNC: 4 MEQ/L (ref 3.5–5.2)
SODIUM SERPL-SCNC: 141 MEQ/L (ref 135–145)

## 2024-08-05 NOTE — RESULT ENCOUNTER NOTE
Notify patient that her xray showed degenerative changes (wear and tear) arthritis.   No fracture noted. If worsening, a referral to Orthopedics would be reccommended.

## 2024-08-06 ENCOUNTER — PATIENT MESSAGE (OUTPATIENT)
Dept: FAMILY MEDICINE CLINIC | Age: 54
End: 2024-08-06

## 2024-08-06 NOTE — RESULT ENCOUNTER NOTE
Ferritin levels still remain high, a referral was placed to GI on 8/1/24  Other labs reviewed and stable.

## 2024-08-06 NOTE — TELEPHONE ENCOUNTER
From: Adriane Gunderson  To: Pilar Winter  Sent: 8/6/2024 11:49 AM EDT  Subject: Urinary trac    Do I have a urinary trac infection

## 2024-08-08 RX ORDER — LEVOTHYROXINE SODIUM 0.07 MG/1
75 TABLET ORAL DAILY
Qty: 90 TABLET | Refills: 3 | Status: SHIPPED | OUTPATIENT
Start: 2024-08-08 | End: 2025-08-08

## 2024-08-12 ASSESSMENT — ENCOUNTER SYMPTOMS
COLOR CHANGE: 0
ABDOMINAL PAIN: 0
BACK PAIN: 0
CHOKING: 0
CHEST TIGHTNESS: 0
NAUSEA: 0
ABDOMINAL DISTENTION: 0
VOICE CHANGE: 0
EYE PAIN: 0
SINUS PRESSURE: 0
CONSTIPATION: 0
WHEEZING: 0
EYE DISCHARGE: 0
COUGH: 0
VOMITING: 0
SHORTNESS OF BREATH: 0

## 2024-10-07 LAB — DIABETIC RETINOPATHY: POSITIVE

## 2024-10-09 ENCOUNTER — TELEPHONE (OUTPATIENT)
Dept: FAMILY MEDICINE CLINIC | Age: 54
End: 2024-10-09

## 2024-10-09 ENCOUNTER — OFFICE VISIT (OUTPATIENT)
Dept: FAMILY MEDICINE CLINIC | Age: 54
End: 2024-10-09
Payer: MEDICAID

## 2024-10-09 VITALS
DIASTOLIC BLOOD PRESSURE: 86 MMHG | HEART RATE: 108 BPM | RESPIRATION RATE: 16 BRPM | SYSTOLIC BLOOD PRESSURE: 162 MMHG | TEMPERATURE: 97.6 F | WEIGHT: 212.4 LBS | HEIGHT: 64 IN | OXYGEN SATURATION: 98 % | BODY MASS INDEX: 36.26 KG/M2

## 2024-10-09 DIAGNOSIS — E55.9 VITAMIN D DEFICIENCY: ICD-10-CM

## 2024-10-09 DIAGNOSIS — M79.605 PAIN OF LEFT LOWER EXTREMITY: ICD-10-CM

## 2024-10-09 DIAGNOSIS — E11.65 UNCONTROLLED TYPE 2 DIABETES MELLITUS WITH HYPERGLYCEMIA (HCC): ICD-10-CM

## 2024-10-09 DIAGNOSIS — R30.0 DYSURIA: ICD-10-CM

## 2024-10-09 DIAGNOSIS — R25.2 MUSCLE CRAMPS: Primary | ICD-10-CM

## 2024-10-09 LAB
ALBUMIN SERPL BCG-MCNC: 3.7 G/DL (ref 3.5–5.1)
ALP SERPL-CCNC: 116 U/L (ref 38–126)
ALT SERPL W/O P-5'-P-CCNC: 22 U/L (ref 11–66)
ANION GAP SERPL CALC-SCNC: 16 MEQ/L (ref 8–16)
AST SERPL-CCNC: 20 U/L (ref 5–40)
BASOPHILS ABSOLUTE: 0 THOU/MM3 (ref 0–0.1)
BASOPHILS NFR BLD AUTO: 0.6 %
BILIRUB SERPL-MCNC: 0.2 MG/DL (ref 0.3–1.2)
BUN SERPL-MCNC: 18 MG/DL (ref 7–22)
CALCIUM SERPL-MCNC: 9.4 MG/DL (ref 8.5–10.5)
CHLORIDE SERPL-SCNC: 103 MEQ/L (ref 98–111)
CO2 SERPL-SCNC: 21 MEQ/L (ref 23–33)
CREAT SERPL-MCNC: 1.1 MG/DL (ref 0.4–1.2)
DEPRECATED RDW RBC AUTO: 43.4 FL (ref 35–45)
EOSINOPHIL NFR BLD AUTO: 2.9 %
EOSINOPHILS ABSOLUTE: 0.2 THOU/MM3 (ref 0–0.4)
ERYTHROCYTE [DISTWIDTH] IN BLOOD BY AUTOMATED COUNT: 16 % (ref 11.5–14.5)
GFR SERPL CREATININE-BSD FRML MDRD: 59 ML/MIN/1.73M2
GLUCOSE SERPL-MCNC: 231 MG/DL (ref 70–108)
HCT VFR BLD AUTO: 36.7 % (ref 37–47)
HGB BLD-MCNC: 11.2 GM/DL (ref 12–16)
IMM GRANULOCYTES # BLD AUTO: 0.02 THOU/MM3 (ref 0–0.07)
IMM GRANULOCYTES NFR BLD AUTO: 0.2 %
LYMPHOCYTES ABSOLUTE: 2.8 THOU/MM3 (ref 1–4.8)
LYMPHOCYTES NFR BLD AUTO: 33.6 %
MAGNESIUM SERPL-MCNC: 2 MG/DL (ref 1.6–2.4)
MCH RBC QN AUTO: 23.2 PG (ref 26–33)
MCHC RBC AUTO-ENTMCNC: 30.5 GM/DL (ref 32.2–35.5)
MCV RBC AUTO: 76 FL (ref 81–99)
MONOCYTES ABSOLUTE: 0.4 THOU/MM3 (ref 0.4–1.3)
MONOCYTES NFR BLD AUTO: 5.3 %
NEUTROPHILS ABSOLUTE: 4.8 THOU/MM3 (ref 1.8–7.7)
NEUTROPHILS NFR BLD AUTO: 57.4 %
NRBC BLD AUTO-RTO: 0 /100 WBC
PLATELET # BLD AUTO: 359 THOU/MM3 (ref 130–400)
PMV BLD AUTO: 9.4 FL (ref 9.4–12.4)
POTASSIUM SERPL-SCNC: 4.2 MEQ/L (ref 3.5–5.2)
PROT SERPL-MCNC: 7.2 G/DL (ref 6.1–8)
RBC # BLD AUTO: 4.83 MILL/MM3 (ref 4.2–5.4)
SODIUM SERPL-SCNC: 140 MEQ/L (ref 135–145)
WBC # BLD AUTO: 8.3 THOU/MM3 (ref 4.8–10.8)

## 2024-10-09 PROCEDURE — 3077F SYST BP >= 140 MM HG: CPT | Performed by: NURSE PRACTITIONER

## 2024-10-09 PROCEDURE — 3052F HG A1C>EQUAL 8.0%<EQUAL 9.0%: CPT | Performed by: NURSE PRACTITIONER

## 2024-10-09 PROCEDURE — 36415 COLL VENOUS BLD VENIPUNCTURE: CPT | Performed by: NURSE PRACTITIONER

## 2024-10-09 PROCEDURE — 3079F DIAST BP 80-89 MM HG: CPT | Performed by: NURSE PRACTITIONER

## 2024-10-09 PROCEDURE — 99215 OFFICE O/P EST HI 40 MIN: CPT | Performed by: NURSE PRACTITIONER

## 2024-10-09 RX ORDER — CYCLOBENZAPRINE HCL 5 MG
5 TABLET ORAL 3 TIMES DAILY PRN
Qty: 30 TABLET | Refills: 0 | Status: SHIPPED | OUTPATIENT
Start: 2024-10-09 | End: 2024-10-19

## 2024-10-09 RX ORDER — LIDOCAINE 50 MG/G
1 PATCH TOPICAL EVERY 24 HOURS
COMMUNITY
Start: 2024-10-07

## 2024-10-09 RX ORDER — CEPHALEXIN 500 MG/1
500 CAPSULE ORAL 4 TIMES DAILY
COMMUNITY
Start: 2024-10-07 | End: 2024-10-17

## 2024-10-09 RX ORDER — HYDROCODONE BITARTRATE AND ACETAMINOPHEN 5; 325 MG/1; MG/1
TABLET ORAL
COMMUNITY
Start: 2024-10-07

## 2024-10-09 RX ORDER — INSULIN LISPRO 100 [IU]/ML
18 INJECTION, SOLUTION INTRAVENOUS; SUBCUTANEOUS
Qty: 16.2 ML | Refills: 5 | Status: SHIPPED | OUTPATIENT
Start: 2024-10-09 | End: 2025-10-09

## 2024-10-09 RX ORDER — DAPAGLIFLOZIN 5 MG/1
5 TABLET, FILM COATED ORAL EVERY MORNING
Qty: 90 TABLET | Refills: 3 | Status: SHIPPED | OUTPATIENT
Start: 2024-10-09 | End: 2025-10-09

## 2024-10-09 RX ORDER — ERGOCALCIFEROL 1.25 MG/1
50000 CAPSULE, LIQUID FILLED ORAL
Qty: 30 CAPSULE | Refills: 3 | Status: SHIPPED | OUTPATIENT
Start: 2024-10-09 | End: 2025-10-09

## 2024-10-09 NOTE — PROGRESS NOTES
[facility]  [facility]ESSIONAL [facility]  [address] [address]  Dept: [phone]  Loc: [phone]    Adriane Gunderson (:  1970) is a 53 y.o. female,Established patient, here for evaluation of the following chief complaint(s):  Abdominal Pain (Patient presents with abdominal pain x 2 days. She had a bm 2 days ago.//Patient reports left leg pain x 3 weeks. Denies any known injury. She reports that she went to [facility] ED 10/07/2024 in [facility] Every[facility]. Gave medication for UTI and pain medication for the left leg pain. /She feels that the Keflex abx are upsetting her stomach and giving her a headache. )      ASSESSMENT/PLAN:  1. Muscle cramps  -     Comprehensive Metabolic Panel; Future  -     Magnesium; Future  -     cyclobenzaprine (FLEXERIL) 5 MG tablet; Take 1 tablet by mouth 3 times daily as needed for Muscle spasms, Disp-30 tablet, R-0Normal  2. Vitamin D deficiency  -     vitamin D (ERGOCALCIFEROL) 1.25 MG (99075 UT) CAPS capsule; Take 1 capsule by mouth every 72 hours, Disp-30 capsule, R-3Normal  -     CBC with Auto Differential; Future  3. Uncontrolled type 2 diabetes mellitus with hyperglycemia (HCC)  Hemoglobin A1C   Date Value Ref Range Status   2024 8.6 % Final   Continue current treatment.   Trulicity 3 mg q week.   Levemir 56 units q hs.   -     insulin lispro, 1 Unit Dial, (HUMALOG KWIKPEN) 100 UNIT/ML SOPN; Inject 18 Units into the skin 3 times daily (before meals), Disp-16.2 mL, R-5Normal  4. Dysuria  Culture being sent. She is currently taking Keflex, prescribed by the ER. She reports it is upsetting her stomach.   Pt non toxic appearing and in no acute distress.  -     Culture, Urine  5. Pain of left lower extremity  Patient reports Left upper leg pain.   Left buttock,hip thigh and knee   Patient reports tenderness on exam and with palpation to left thigh.  No deformity, bruising or swelling note.   No red flags

## 2024-10-09 NOTE — RESULT ENCOUNTER NOTE
Lumbar Xray results:   She has quite a bit of arthritis in her spine and some muscle spasm, like we thought.   Update PCP on how she feels after starting the muscle relaxer. We may need to refer to Ortho-Spine    1. Mild spondylosis scattered in the lumbar spine.  2. Straightening of the normal lumbar lordosis. Cannot exclude muscle spasm.  3. No fracture seen. Slight disc base narrowing L4-5..

## 2024-10-09 NOTE — TELEPHONE ENCOUNTER
----- Message from BUZZ Marcum CNP sent at 10/9/2024  4:23 PM EDT -----  Lumbar Xray results:   She has quite a bit of arthritis in her spine and some muscle spasm, like we thought.   Update PCP on how she feels after starting the muscle relaxer. We may need to refer to Ortho-Spine    1. Mild spondylosis scattered in the lumbar spine.  2. Straightening of the normal lumbar lordosis. Cannot exclude muscle spasm.  3. No fracture seen. Slight disc base narrowing L4-5..

## 2024-10-09 NOTE — PROGRESS NOTES
Health Maintenance Due   Topic Date Due    HIV screen  Never done    Hepatitis C screen  Never done    DTaP/Tdap/Td vaccine (1 - Tdap) Never done    Flu vaccine (1) 08/01/2024    Lipids  08/03/2024    COVID-19 Vaccine (5 - 2023-24 season) 09/01/2024

## 2024-10-10 LAB
BACTERIA UR CULT: ABNORMAL
ORGANISM: ABNORMAL

## 2024-10-15 DIAGNOSIS — G47.9 SLEEP DISTURBANCES: ICD-10-CM

## 2024-10-15 DIAGNOSIS — Z79.4 TYPE 2 DIABETES MELLITUS WITH DIABETIC NEPHROPATHY, WITH LONG-TERM CURRENT USE OF INSULIN (HCC): ICD-10-CM

## 2024-10-15 DIAGNOSIS — E11.21 TYPE 2 DIABETES MELLITUS WITH DIABETIC NEPHROPATHY, WITH LONG-TERM CURRENT USE OF INSULIN (HCC): ICD-10-CM

## 2024-10-15 DIAGNOSIS — E11.65 UNCONTROLLED TYPE 2 DIABETES MELLITUS WITH HYPERGLYCEMIA (HCC): ICD-10-CM

## 2024-10-15 RX ORDER — DULAGLUTIDE 3 MG/.5ML
3 INJECTION, SOLUTION SUBCUTANEOUS WEEKLY
Qty: 2 ML | Refills: 11 | Status: CANCELLED | OUTPATIENT
Start: 2024-10-15 | End: 2025-10-15

## 2024-10-15 RX ORDER — PEN NEEDLE, DIABETIC 32GX 5/32"
1 NEEDLE, DISPOSABLE MISCELLANEOUS 3 TIMES DAILY
Qty: 100 EACH | Refills: 11 | Status: CANCELLED | OUTPATIENT
Start: 2024-10-15 | End: 2025-10-15

## 2024-10-15 RX ORDER — TRAZODONE HYDROCHLORIDE 50 MG/1
50 TABLET, FILM COATED ORAL NIGHTLY
Qty: 90 TABLET | Refills: 3 | Status: SHIPPED | OUTPATIENT
Start: 2024-10-15

## 2024-10-15 ASSESSMENT — ENCOUNTER SYMPTOMS
WHEEZING: 0
VOICE CHANGE: 0
COUGH: 0
SHORTNESS OF BREATH: 0
EYE DISCHARGE: 0
EYE PAIN: 0
VOMITING: 0
CONSTIPATION: 0
ABDOMINAL DISTENTION: 0
CHEST TIGHTNESS: 0
NAUSEA: 0
EYE ITCHING: 0
CHOKING: 0
COLOR CHANGE: 0
SINUS PRESSURE: 0
ABDOMINAL PAIN: 1
BACK PAIN: 1

## 2024-10-15 NOTE — TELEPHONE ENCOUNTER
Patient's last appointment was : 10/9/2024  Patient's next appointment is : Visit date not found  Last sent in: Trazodone  7/1/24 90 tabs with 0 refills

## 2024-12-04 ENCOUNTER — OFFICE VISIT (OUTPATIENT)
Dept: FAMILY MEDICINE CLINIC | Age: 54
End: 2024-12-04
Payer: MEDICAID

## 2024-12-04 VITALS
RESPIRATION RATE: 20 BRPM | SYSTOLIC BLOOD PRESSURE: 162 MMHG | HEART RATE: 92 BPM | DIASTOLIC BLOOD PRESSURE: 74 MMHG | BODY MASS INDEX: 36.06 KG/M2 | TEMPERATURE: 98.2 F | OXYGEN SATURATION: 98 % | WEIGHT: 211.2 LBS | HEIGHT: 64 IN

## 2024-12-04 DIAGNOSIS — J40 BRONCHITIS: ICD-10-CM

## 2024-12-04 DIAGNOSIS — E11.21 TYPE 2 DIABETES MELLITUS WITH DIABETIC NEPHROPATHY, WITH LONG-TERM CURRENT USE OF INSULIN (HCC): Primary | ICD-10-CM

## 2024-12-04 DIAGNOSIS — E78.2 MIXED HYPERLIPIDEMIA: ICD-10-CM

## 2024-12-04 DIAGNOSIS — I10 ESSENTIAL HYPERTENSION: ICD-10-CM

## 2024-12-04 DIAGNOSIS — R30.0 DYSURIA: ICD-10-CM

## 2024-12-04 DIAGNOSIS — E83.19 IRON OVERLOAD: ICD-10-CM

## 2024-12-04 DIAGNOSIS — Z79.4 TYPE 2 DIABETES MELLITUS WITH DIABETIC NEPHROPATHY, WITH LONG-TERM CURRENT USE OF INSULIN (HCC): Primary | ICD-10-CM

## 2024-12-04 LAB
BILIRUBIN, POC: NEGATIVE
BLOOD URINE, POC: ABNORMAL
CLARITY, POC: ABNORMAL
COLOR, POC: YELLOW
GLUCOSE URINE, POC: 250 MG/DL
HBA1C MFR BLD: 8.1 %
KETONES, POC: NEGATIVE MG/DL
LEUKOCYTE EST, POC: ABNORMAL
NITRITE, POC: NEGATIVE
PH, POC: 6.5
PROTEIN, POC: >=300 MG/DL
SPECIFIC GRAVITY, POC: 1.02
UROBILINOGEN, POC: ABNORMAL MG/DL

## 2024-12-04 PROCEDURE — 83036 HEMOGLOBIN GLYCOSYLATED A1C: CPT | Performed by: NURSE PRACTITIONER

## 2024-12-04 PROCEDURE — 81003 URINALYSIS AUTO W/O SCOPE: CPT | Performed by: NURSE PRACTITIONER

## 2024-12-04 PROCEDURE — 3078F DIAST BP <80 MM HG: CPT | Performed by: NURSE PRACTITIONER

## 2024-12-04 PROCEDURE — 3052F HG A1C>EQUAL 8.0%<EQUAL 9.0%: CPT | Performed by: NURSE PRACTITIONER

## 2024-12-04 PROCEDURE — 99215 OFFICE O/P EST HI 40 MIN: CPT | Performed by: NURSE PRACTITIONER

## 2024-12-04 PROCEDURE — 3077F SYST BP >= 140 MM HG: CPT | Performed by: NURSE PRACTITIONER

## 2024-12-04 RX ORDER — DOXYCYCLINE HYCLATE 100 MG
100 TABLET ORAL 2 TIMES DAILY
Qty: 20 TABLET | Refills: 0 | Status: SHIPPED | OUTPATIENT
Start: 2024-12-04 | End: 2024-12-14

## 2024-12-04 RX ORDER — LIDOCAINE 50 MG/G
1 PATCH TOPICAL EVERY 24 HOURS
Qty: 30 PATCH | Refills: 2 | Status: SHIPPED | OUTPATIENT
Start: 2024-12-04

## 2024-12-04 RX ORDER — ATORVASTATIN CALCIUM 40 MG/1
40 TABLET, FILM COATED ORAL DAILY
Qty: 90 TABLET | Refills: 3 | Status: SHIPPED | OUTPATIENT
Start: 2024-12-04

## 2024-12-04 RX ORDER — BLOOD-GLUCOSE METER
1 KIT MISCELLANEOUS DAILY
Qty: 1 KIT | Refills: 0 | Status: SHIPPED | OUTPATIENT
Start: 2024-12-04 | End: 2024-12-12

## 2024-12-04 RX ORDER — BENZONATATE 100 MG/1
100 CAPSULE ORAL 3 TIMES DAILY PRN
Qty: 30 CAPSULE | Refills: 0 | Status: SHIPPED | OUTPATIENT
Start: 2024-12-04 | End: 2024-12-14

## 2024-12-04 RX ORDER — CARVEDILOL 25 MG/1
TABLET ORAL
Qty: 90 TABLET | Refills: 11 | Status: SHIPPED | OUTPATIENT
Start: 2024-12-04

## 2024-12-04 NOTE — PROGRESS NOTES
Health Maintenance Due   Topic Date Due    HIV screen  Never done    Hepatitis C screen  Never done    DTaP/Tdap/Td vaccine (1 - Tdap) Never done    Flu vaccine (1) 08/01/2024    Lipids  08/03/2024    COVID-19 Vaccine (5 - 2023-24 season) 09/01/2024      Patient plans to do flu vaccine in future.

## 2024-12-04 NOTE — PROGRESS NOTES
[facility] PROFESSIONAL SERVS  [facility] [place]  [address] [address]  Dept: [phone]  Loc: [phone]    Adriane Gunderson (:  1970) is a 53 y.o. female,Established patient, here for evaluation of the following chief complaint(s):  Follow-up (Patient presents for follow up for HTN and DM. /Last A1C was 8.6% on 2024. /Follows [facility], Dr. John Ranker. /Patient sees Pulmonology and Cardiology at [facility]. /) and Illness (Patient reports runny nose, productive cough and headache for at least 7 days. Patient denies fevers. She has taken OTC medications such as Tylenol, Robitussin, Nyquil, and Alkaseltzer Plus. /Patient is continuing to have urinary burning last week. This week it is appearing foamy and has an odor. )      ASSESSMENT/PLAN:  1. Type 2 diabetes mellitus with diabetic nephropathy, with long-term current use of insulin ([facility])  Hemoglobin A1C   Date Value Ref Range Status   2024 8.1 % Final   Improving, previously 8.6%, 9.6%     Continue current medications.   Patient had decreased her Levemir to 25 units nightly d/t having some lower glucose readings. She was previously taking 56 and before that at one point 85.   I advised her to go to 30 units. Along with increase in Trulicity to 4.5 mg   [facility].   -     POCT glycosylated hemoglobin (Hb A1C)  -     Dulaglutide 4.5 MG/0.5ML SOAJ; Inject 4.5 mg into the skin Once a week at 5 PM, Disp-2 mL, R-11Normal  -     glucose monitoring kit; DAILY Starting Wed 2024, Disp-1 kit, R-0, Normal  2. Essential hypertension  A bit elevated today, she is not feeling well with chest congestion and dysuria.   162/74  Continue current medications.   -     Lipid, Fasting; Future  -     carvedilol (COREG) 25 MG tablet; 25 mg in am and 50 mg evening, Disp-90 tablet, R-11Normal  3. Bronchitis  -     doxycycline hyclate (VIBRA-TABS) 100 MG

## 2024-12-06 ENCOUNTER — TELEPHONE (OUTPATIENT)
Dept: FAMILY MEDICINE CLINIC | Age: 54
End: 2024-12-06

## 2024-12-06 DIAGNOSIS — N30.00 ACUTE CYSTITIS WITHOUT HEMATURIA: Primary | ICD-10-CM

## 2024-12-06 LAB
BACTERIA UR CULT: ABNORMAL
ORGANISM: ABNORMAL

## 2024-12-06 RX ORDER — NITROFURANTOIN 25; 75 MG/1; MG/1
100 CAPSULE ORAL 2 TIMES DAILY
Qty: 14 CAPSULE | Refills: 0 | Status: SHIPPED | OUTPATIENT
Start: 2024-12-06 | End: 2024-12-13

## 2024-12-06 NOTE — TELEPHONE ENCOUNTER
----- Message from BUZZ Marcum CNP sent at 12/6/2024 12:24 PM EST -----  Urine culture partially resulted. Showing gram negative bacteria.  I am sending in Macrobid for her to take.

## 2024-12-09 DIAGNOSIS — N30.00 ACUTE CYSTITIS WITHOUT HEMATURIA: Primary | ICD-10-CM

## 2024-12-09 NOTE — RESULT ENCOUNTER NOTE
Urine culture unfortunately showed that macrobid would not cover the bacteria. She can stop taking this and I am sending in a different antibiotic, Augmentin, she will need to take all 10 days of this.

## 2024-12-10 NOTE — TELEPHONE ENCOUNTER
Pilar Winter, BUZZ - CNP  [facility] Clinical Staff  Urine culture unfortunately showed that macrobid would not cover the bacteria. She can stop taking this and I am sending in a different antibiotic, Augmentin, she will need to take all 10 days of this.

## 2024-12-11 ENCOUNTER — TELEPHONE (OUTPATIENT)
Dept: FAMILY MEDICINE CLINIC | Age: 54
End: 2024-12-11

## 2024-12-11 DIAGNOSIS — E11.21 TYPE 2 DIABETES MELLITUS WITH DIABETIC NEPHROPATHY, WITH LONG-TERM CURRENT USE OF INSULIN (HCC): Primary | ICD-10-CM

## 2024-12-11 DIAGNOSIS — Z79.4 TYPE 2 DIABETES MELLITUS WITH DIABETIC NEPHROPATHY, WITH LONG-TERM CURRENT USE OF INSULIN (HCC): Primary | ICD-10-CM

## 2024-12-11 NOTE — TELEPHONE ENCOUNTER
Received PA for BIBA Apparels Kit.   Spoke with Pietro at [facility].     He said to send a new order that states Generic Glucometer, lancets and test strips, as covered by insurance. Then they can figure out what the insurance will cover.     Thank you.

## 2024-12-12 RX ORDER — GLUCOSAMINE HCL/CHONDROITIN SU 500-400 MG
CAPSULE ORAL
Qty: 100 STRIP | Refills: 11 | Status: SHIPPED | OUTPATIENT
Start: 2024-12-12

## 2024-12-12 RX ORDER — BLOOD-GLUCOSE METER
1 KIT MISCELLANEOUS DAILY
Qty: 1 KIT | Refills: 0 | Status: SHIPPED | OUTPATIENT
Start: 2024-12-12

## 2024-12-12 RX ORDER — LANCETS 30 GAUGE
1 EACH MISCELLANEOUS 3 TIMES DAILY
Qty: 100 EACH | Refills: 11 | Status: SHIPPED | OUTPATIENT
Start: 2024-12-12 | End: 2025-12-12

## 2024-12-12 ASSESSMENT — ENCOUNTER SYMPTOMS
WHEEZING: 0
COUGH: 1
ABDOMINAL DISTENTION: 0
VOICE CHANGE: 0
CONSTIPATION: 0
SHORTNESS OF BREATH: 0
ABDOMINAL PAIN: 0
BACK PAIN: 0
CHEST TIGHTNESS: 0
COLOR CHANGE: 0
EYE PAIN: 0
VOMITING: 0
EYE DISCHARGE: 0
SINUS PRESSURE: 0
RHINORRHEA: 1
NAUSEA: 0
CHOKING: 0
EYE ITCHING: 0

## 2024-12-16 DIAGNOSIS — E55.9 VITAMIN D DEFICIENCY: ICD-10-CM

## 2024-12-16 DIAGNOSIS — N30.00 ACUTE CYSTITIS WITHOUT HEMATURIA: ICD-10-CM

## 2024-12-16 RX ORDER — ERGOCALCIFEROL 1.25 MG/1
50000 CAPSULE, LIQUID FILLED ORAL WEEKLY
Qty: 12 CAPSULE | OUTPATIENT
Start: 2024-12-16

## 2024-12-16 RX ORDER — NITROFURANTOIN 25; 75 MG/1; MG/1
CAPSULE ORAL
Qty: 14 CAPSULE | Refills: 0 | OUTPATIENT
Start: 2024-12-16

## 2024-12-16 NOTE — TELEPHONE ENCOUNTER
Patient's last appointment was : 12/4/2024  Patient's next appointment is : Visit date not found  Last refilled:    Vitamin D - 10/09/24 30 cap, 3 refills     ATB was d/c and switched to Augmentin

## 2024-12-17 ENCOUNTER — PATIENT MESSAGE (OUTPATIENT)
Dept: FAMILY MEDICINE CLINIC | Age: 54
End: 2024-12-17

## 2024-12-17 DIAGNOSIS — I10 ESSENTIAL HYPERTENSION: Primary | ICD-10-CM

## 2024-12-19 LAB
B-TYPE NATRIURETIC PEPTIDE: 19 PG/ML
BASOPHILS ABSOLUTE: 0.1 /ΜL
BASOPHILS RELATIVE PERCENT: 1.1 %
BILIRUBIN, URINE: NEGATIVE
BLOOD, URINE: POSITIVE
BUN BLDV-MCNC: 18 MG/DL
CALCIUM SERPL-MCNC: 8.9 MG/DL
CHLORIDE BLD-SCNC: 106 MMOL/L
CLARITY, UA: ABNORMAL
CO2: 24 MMOL/L
COLOR, UA: ABNORMAL
CREAT SERPL-MCNC: 1.39 MG/DL
EGFR: 45
EOSINOPHILS ABSOLUTE: 0.4 /ΜL
EOSINOPHILS RELATIVE PERCENT: 4.2 %
GLUCOSE BLD-MCNC: 212 MG/DL
GLUCOSE URINE: POSITIVE
HCT VFR BLD CALC: 30.9 % (ref 36–46)
HEMOGLOBIN: 9.9 G/DL (ref 12–16)
INR BLD: 1
KETONES, URINE: NEGATIVE
LEUKOCYTE ESTERASE, URINE: NEGATIVE
LIPASE: 68 UNITS/L
LYMPHOCYTES ABSOLUTE: 3.8 /ΜL
LYMPHOCYTES RELATIVE PERCENT: 40.6 %
MAGNESIUM: 1.5 MG/DL
MCH RBC QN AUTO: 23.2 PG
MCHC RBC AUTO-ENTMCNC: 32.1 G/DL
MCV RBC AUTO: 72 FL
MONOCYTES ABSOLUTE: 0.7 /ΜL
MONOCYTES RELATIVE PERCENT: 7.4 %
NEUTROPHILS ABSOLUTE: 4.4 /ΜL
NEUTROPHILS RELATIVE PERCENT: 46.7 %
NITRITE, URINE: NEGATIVE
PH UA: 6.5 (ref 4.5–8)
PLATELET # BLD: 396 K/ΜL
PMV BLD AUTO: 7.4 FL
POTASSIUM SERPL-SCNC: 3.6 MMOL/L
PROTEIN UA: POSITIVE
PROTHROMBIN TIME: 11.2
RBC # BLD: 4.28 10^6/ΜL
SODIUM BLD-SCNC: 139 MMOL/L
SPECIFIC GRAVITY UA: 1.02 (ref 1–1.03)
UROBILINOGEN, URINE: ABNORMAL
WBC # BLD: 9.4 10^3/ML

## 2024-12-19 RX ORDER — AMLODIPINE BESYLATE 10 MG/1
10 TABLET ORAL DAILY
Qty: 90 TABLET | Refills: 3 | Status: SHIPPED | OUTPATIENT
Start: 2024-12-19 | End: 2025-12-19

## 2024-12-19 NOTE — TELEPHONE ENCOUNTER
Patient's last appointment was : 12/4/2024  Patient's next appointment is : Visit date not found  Last sent in: not found

## 2024-12-19 NOTE — TELEPHONE ENCOUNTER
See result message from 12/9, can we verify that she picked up and took the new Rx.   Pilar Winter, APRN - CNP  12/9/2024  4:55 PM EST       Urine culture unfortunately showed that macrobid would not cover the bacteria. She can stop taking this and I am sending in a different antibiotic, Augmentin, she will need to take all 10 days of this.       Amlodipine refill sent in.

## 2024-12-20 NOTE — TELEPHONE ENCOUNTER
Please call Adriane, if she is having diarrhea, shakiness and and diaphoretic, she should go to the ER for further evaluation. I know this is not convenient, but further evaluation and testing should be done.

## 2024-12-23 NOTE — TELEPHONE ENCOUNTER
LM on VM for patient to call back if she wants to schedule a f/u after [facility] ED visit.

## 2024-12-24 ENCOUNTER — TELEPHONE (OUTPATIENT)
Dept: FAMILY MEDICINE CLINIC | Age: 54
End: 2024-12-24

## 2024-12-24 NOTE — TELEPHONE ENCOUNTER
VM left for pt to contact office regarding scheduling or schedule through [facility], additional TE also open

## 2024-12-24 NOTE — TELEPHONE ENCOUNTER
----- Message from BUZZ Marcum CNP sent at 12/23/2024  5:57 PM EST -----  Please be sure to schedule for an ov ER f/u especially for repeat labs.

## 2024-12-28 DIAGNOSIS — M79.89 LEG SWELLING: ICD-10-CM

## 2024-12-30 ENCOUNTER — TELEPHONE (OUTPATIENT)
Dept: FAMILY MEDICINE CLINIC | Age: 54
End: 2024-12-30

## 2024-12-30 RX ORDER — BUMETANIDE 1 MG/1
1 TABLET ORAL DAILY
Qty: 30 TABLET | Refills: 11 | Status: SHIPPED | OUTPATIENT
Start: 2024-12-30

## 2024-12-30 NOTE — TELEPHONE ENCOUNTER
Spoke with José Miguel at [facility].     Medication filled. Patient had tried to fill too soon.

## 2024-12-30 NOTE — TELEPHONE ENCOUNTER
Patient's last appointment was : 12/4/2024  Patient's next appointment is : 12/30/2024  Last sent in: 6/6/24 30 tabs with 3 refills

## 2024-12-31 DIAGNOSIS — M79.89 LEG SWELLING: ICD-10-CM

## 2024-12-31 RX ORDER — BUMETANIDE 1 MG/1
1 TABLET ORAL DAILY
Qty: 30 TABLET | Refills: 11 | Status: CANCELLED | OUTPATIENT
Start: 2024-12-31

## 2024-12-31 RX ORDER — DAPAGLIFLOZIN 5 MG/1
5 TABLET, FILM COATED ORAL EVERY MORNING
Qty: 90 TABLET | Refills: 3 | Status: CANCELLED | OUTPATIENT
Start: 2024-12-31 | End: 2025-12-31

## 2024-12-31 NOTE — TELEPHONE ENCOUNTER
Patient's last appointment was : 12/4/2024  Patient's next appointment is : 1/2/2025  Last refilled:  Farxiga - 10/9/24 90 tabs, 3 refills - [facility]    Bumex 12/30/24 30 tabs, 11 refills - [facility]

## 2025-01-02 ENCOUNTER — OFFICE VISIT (OUTPATIENT)
Dept: FAMILY MEDICINE CLINIC | Age: 55
End: 2025-01-02
Payer: MEDICAID

## 2025-01-02 ENCOUNTER — TELEPHONE (OUTPATIENT)
Dept: FAMILY MEDICINE CLINIC | Age: 55
End: 2025-01-02

## 2025-01-02 VITALS
OXYGEN SATURATION: 98 % | SYSTOLIC BLOOD PRESSURE: 152 MMHG | HEIGHT: 64 IN | BODY MASS INDEX: 36.91 KG/M2 | RESPIRATION RATE: 16 BRPM | WEIGHT: 216.2 LBS | HEART RATE: 92 BPM | DIASTOLIC BLOOD PRESSURE: 78 MMHG

## 2025-01-02 DIAGNOSIS — Z86.79 HX OF HEART FAILURE: ICD-10-CM

## 2025-01-02 DIAGNOSIS — M79.89 LEG SWELLING: ICD-10-CM

## 2025-01-02 DIAGNOSIS — E03.9 ACQUIRED HYPOTHYROIDISM: ICD-10-CM

## 2025-01-02 DIAGNOSIS — I10 ESSENTIAL HYPERTENSION: Primary | ICD-10-CM

## 2025-01-02 DIAGNOSIS — R82.90 ABNORMAL URINE ODOR: ICD-10-CM

## 2025-01-02 DIAGNOSIS — D64.9 ANEMIA, UNSPECIFIED TYPE: ICD-10-CM

## 2025-01-02 LAB
BASOPHILS ABSOLUTE: 0.1 THOU/MM3 (ref 0–0.1)
BASOPHILS NFR BLD AUTO: 0.9 %
BILIRUBIN, POC: NEGATIVE
BLOOD URINE, POC: NORMAL
CHOLESTEROL, FASTING: 164 MG/DL (ref 100–199)
CLARITY, POC: NORMAL
COLOR, POC: YELLOW
DEPRECATED RDW RBC AUTO: 43.5 FL (ref 35–45)
EOSINOPHIL NFR BLD AUTO: 4.2 %
EOSINOPHILS ABSOLUTE: 0.4 THOU/MM3 (ref 0–0.4)
ERYTHROCYTE [DISTWIDTH] IN BLOOD BY AUTOMATED COUNT: 16 % (ref 11.5–14.5)
GLUCOSE URINE, POC: NEGATIVE MG/DL
HCT VFR BLD AUTO: 34.6 % (ref 37–47)
HDLC SERPL-MCNC: 53 MG/DL
HGB BLD-MCNC: 10.5 GM/DL (ref 12–16)
IMM GRANULOCYTES # BLD AUTO: 0.03 THOU/MM3 (ref 0–0.07)
IMM GRANULOCYTES NFR BLD AUTO: 0.3 %
KETONES, POC: NEGATIVE MG/DL
LDLC SERPL CALC-MCNC: 74 MG/DL
LEUKOCYTE EST, POC: NEGATIVE
LYMPHOCYTES ABSOLUTE: 3.2 THOU/MM3 (ref 1–4.8)
LYMPHOCYTES NFR BLD AUTO: 32.3 %
MCH RBC QN AUTO: 22.8 PG (ref 26–33)
MCHC RBC AUTO-ENTMCNC: 30.3 GM/DL (ref 32.2–35.5)
MCV RBC AUTO: 75.1 FL (ref 81–99)
MONOCYTES ABSOLUTE: 0.7 THOU/MM3 (ref 0.4–1.3)
MONOCYTES NFR BLD AUTO: 7 %
NEUTROPHILS ABSOLUTE: 5.4 THOU/MM3 (ref 1.8–7.7)
NEUTROPHILS NFR BLD AUTO: 55.3 %
NITRITE, POC: NEGATIVE
NRBC BLD AUTO-RTO: 0 /100 WBC
PH, POC: 5.5
PLATELET # BLD AUTO: 338 THOU/MM3 (ref 130–400)
PMV BLD AUTO: 10.3 FL (ref 9.4–12.4)
PROTEIN, POC: >=300 MG/DL
RBC # BLD AUTO: 4.61 MILL/MM3 (ref 4.2–5.4)
SPECIFIC GRAVITY, POC: 1.02
TRIGLYCERIDE, FASTING: 185 MG/DL (ref 0–199)
TSH SERPL DL<=0.005 MIU/L-ACNC: 1.04 UIU/ML (ref 0.4–4.2)
UROBILINOGEN, POC: NORMAL MG/DL
WBC # BLD AUTO: 9.8 THOU/MM3 (ref 4.8–10.8)

## 2025-01-02 PROCEDURE — 3078F DIAST BP <80 MM HG: CPT | Performed by: NURSE PRACTITIONER

## 2025-01-02 PROCEDURE — 3077F SYST BP >= 140 MM HG: CPT | Performed by: NURSE PRACTITIONER

## 2025-01-02 PROCEDURE — 99215 OFFICE O/P EST HI 40 MIN: CPT | Performed by: NURSE PRACTITIONER

## 2025-01-02 PROCEDURE — 81003 URINALYSIS AUTO W/O SCOPE: CPT | Performed by: NURSE PRACTITIONER

## 2025-01-02 RX ORDER — LOSARTAN POTASSIUM 50 MG/1
50 TABLET ORAL DAILY
Qty: 90 TABLET | Refills: 3 | Status: SHIPPED | OUTPATIENT
Start: 2025-01-02 | End: 2026-01-02

## 2025-01-02 ASSESSMENT — PATIENT HEALTH QUESTIONNAIRE - PHQ9
SUM OF ALL RESPONSES TO PHQ QUESTIONS 1-9: 0
SUM OF ALL RESPONSES TO PHQ9 QUESTIONS 1 & 2: 0
1. LITTLE INTEREST OR PLEASURE IN DOING THINGS: NOT AT ALL
2. FEELING DOWN, DEPRESSED OR HOPELESS: NOT AT ALL
SUM OF ALL RESPONSES TO PHQ QUESTIONS 1-9: 0

## 2025-01-02 NOTE — TELEPHONE ENCOUNTER
[facility].     Patient believes she has been off Losartan for a year.     Spoke with [facility]. Last two visits were Oct 2023 and 12/2023. Will send notes.     Phone number given to patient to call Cardiology to schedule a f/u appt.

## 2025-01-02 NOTE — PROGRESS NOTES
Health Maintenance Due   Topic Date Due    HIV screen  Never done    Hepatitis C screen  Never done    Lipids  08/03/2024

## 2025-01-02 NOTE — PROGRESS NOTES
2025     Adriane Gunderson (:  1970) is a 54 y.o. female, here for evaluation of the following medical concerns:    Patient presents with:  Follow-up: Patient presents for follow up. Patient was seen by [facility] ED in [place] on 2024 and reports she had low magnesium.    Last A1C = 8.1 on 2024.   Patient reports she has been off of her Losartan for over one year. She is unsure when she sees Dr. Ivan Bahena, Cardiology through [facility] again. She does report that she feels like it is hard to breath sometimes. She said she has had sweating episodes. Most recent episode was yesterday.   She reports her urine is still cloudy and foul odor      Losartan? She stopped it over a year ago, she thinks she was told by someone to do that.     Farxiga ok for CKD.     Seeing Nephrology in the next month or so            Review of Systems   Constitutional:  Negative for appetite change, chills, fatigue and fever.   HENT:  Negative for congestion, ear discharge, sinus pressure, tinnitus and voice change.    Eyes:  Negative for pain, discharge, itching and visual disturbance.   Respiratory:  Negative for cough, choking, chest tightness, shortness of breath and wheezing.    Cardiovascular:  Positive for leg swelling. Negative for chest pain and palpitations.   Gastrointestinal:  Negative for abdominal distention, abdominal pain, constipation, nausea and vomiting.   Endocrine: Negative for cold intolerance and heat intolerance.   Genitourinary:  Negative for dysuria, hematuria, vaginal discharge and vaginal pain.        Cloudy urine with odor.     Musculoskeletal:  Negative for arthralgias, back pain, gait problem, neck pain and neck stiffness.   Skin:  Negative for color change and rash.   Neurological:  Negative for dizziness, syncope, speech difficulty, light-headedness, numbness and headaches.   Psychiatric/Behavioral:  Negative for behavioral problems, confusion, self-injury and suicidal ideas.

## 2025-01-03 ENCOUNTER — PATIENT MESSAGE (OUTPATIENT)
Dept: FAMILY MEDICINE CLINIC | Age: 55
End: 2025-01-03

## 2025-01-03 NOTE — RESULT ENCOUNTER NOTE
Good news, hemoglobin and hematocrit are rising and not lower than on 12/19.   Thyroid levels are in normal range.   Lipid panel looks amazing.   Continue with our plan to restart the losartan 50 mg.

## 2025-01-06 NOTE — TELEPHONE ENCOUNTER
Encourage patient to continue the medications, even though she has not noticed a huge improvement yet.   Encourage bilateral knee high compression stocks.   She can get them at most drug stores, [facility] or [employer].

## 2025-01-06 NOTE — TELEPHONE ENCOUNTER
Spoke with patient and she voiced understanding.   She plans to get the compression socks at [facility] and will elevated legs when able.   She will continue to monitor and then call us if not better.

## 2025-01-06 NOTE — TELEPHONE ENCOUNTER
Spoke with patient and she is continuing to have LLE , she has not seen any improvement. She is taking Losartan and her Bumex daily. She is trying to avoid salt. She said she always drinks a lot of water, she is unsure of the ounces.     Denies trouble breathing with rest. She does get tired if she walks to far.

## 2025-01-08 ASSESSMENT — ENCOUNTER SYMPTOMS
VOICE CHANGE: 0
COLOR CHANGE: 0
ABDOMINAL PAIN: 0
BACK PAIN: 0
EYE DISCHARGE: 0
CHEST TIGHTNESS: 0
VOMITING: 0
EYE PAIN: 0
COUGH: 0
CHOKING: 0
ABDOMINAL DISTENTION: 0
NAUSEA: 0
SINUS PRESSURE: 0
EYE ITCHING: 0
WHEEZING: 0
CONSTIPATION: 0
SHORTNESS OF BREATH: 0

## 2025-01-30 ENCOUNTER — OFFICE VISIT (OUTPATIENT)
Dept: OBGYN | Age: 55
End: 2025-01-30
Payer: MEDICAID

## 2025-01-30 ENCOUNTER — HOSPITAL ENCOUNTER (OUTPATIENT)
Age: 55
Setting detail: SPECIMEN
Discharge: HOME | End: 2025-01-30
Payer: MEDICAID

## 2025-01-30 VITALS
SYSTOLIC BLOOD PRESSURE: 118 MMHG | HEIGHT: 64 IN | DIASTOLIC BLOOD PRESSURE: 62 MMHG | BODY MASS INDEX: 35.68 KG/M2 | WEIGHT: 209 LBS

## 2025-01-30 DIAGNOSIS — Z01.419 WOMEN'S ANNUAL ROUTINE GYNECOLOGICAL EXAMINATION: Primary | ICD-10-CM

## 2025-01-30 DIAGNOSIS — Z01.419 WOMEN'S ANNUAL ROUTINE GYNECOLOGICAL EXAMINATION: ICD-10-CM

## 2025-01-30 PROCEDURE — G0145 SCR C/V CYTO,THINLAYER,RESCR: HCPCS

## 2025-01-30 PROCEDURE — 3078F DIAST BP <80 MM HG: CPT | Performed by: OBSTETRICS & GYNECOLOGY

## 2025-01-30 PROCEDURE — 3074F SYST BP LT 130 MM HG: CPT | Performed by: OBSTETRICS & GYNECOLOGY

## 2025-01-30 PROCEDURE — 99202 OFFICE O/P NEW SF 15 MIN: CPT | Performed by: OBSTETRICS & GYNECOLOGY

## 2025-01-30 NOTE — PROGRESS NOTES
PROBLEM VISIT     Date of service: 2025    Adriane Gunderson  Is a 54 y.o. single female    PT's PCP is: Pilar Winter, APRN - CNP     : 1970                                             Subjective:       Patient's last menstrual period was 2021.     OB History    Para Term  AB Living   2 2 2     1   SAB IAB Ectopic Molar Multiple Live Births             3      # Outcome Date GA Lbr Donald/2nd Weight Sex Type Anes PTL Lv   2 Term 2002    M CS-Unspec   DEC   1 Term 84    M CS-Unspec EPI N BUNNY        Social History     Tobacco Use   Smoking Status Never    Passive exposure: Past   Smokeless Tobacco Never        Social History     Substance and Sexual Activity   Alcohol Use Not Currently    Alcohol/week: 3.0 standard drinks of alcohol    Types: 3 Cans of beer per week       Social History     Substance and Sexual Activity   Sexual Activity Yes    Partners: Male    Comment: none       Allergies: Patient has no known allergies.    Chief Complaint   Patient presents with    Pelvic Pain     Pt is here today to discuss in house gyn usn results. Pt states previous provider seen cyst on ovaries. Previous pap was 20(-)       Last Yearly:  20    Last pap: 20(-)    Last HPV: 20(-)      NURSE: Courtney [facility]    PE:  Vital Signs  Blood pressure 118/62, height 1.626 m (5' 4.02\"), weight 94.8 kg (209 lb), last menstrual period 2021, not currently breastfeeding.     Labs:    No results found for this visit on 25.    NURSE: Lulu    HPI: The patient is here today for an ultrasound.  Her last GYN provider told she had an ovarian cyst and she wishes to follow-up.  She is not having any pain and did not had any postmenopausal bleeding.    Yes  PT denies fever, chills, nausea and vomiting       Objective: The vulva vagina and cervix all have a normal appearance.  Uterus is normal size shape and contour and the adnexa are nontender and nonenlarged    Pelvic

## 2025-01-31 DIAGNOSIS — N30.00 ACUTE CYSTITIS WITHOUT HEMATURIA: ICD-10-CM

## 2025-02-17 ENCOUNTER — HOSPITAL ENCOUNTER (OUTPATIENT)
Dept: INFUSION THERAPY | Age: 55
Discharge: HOME | End: 2025-02-17
Payer: MEDICAID

## 2025-02-17 ENCOUNTER — OFFICE VISIT (OUTPATIENT)
Dept: ONCOLOGY | Age: 55
End: 2025-02-17
Payer: MEDICAID

## 2025-02-17 VITALS
DIASTOLIC BLOOD PRESSURE: 70 MMHG | TEMPERATURE: 97.5 F | BODY MASS INDEX: 35.2 KG/M2 | WEIGHT: 206.2 LBS | HEART RATE: 91 BPM | RESPIRATION RATE: 18 BRPM | OXYGEN SATURATION: 99 % | SYSTOLIC BLOOD PRESSURE: 130 MMHG | HEIGHT: 64 IN

## 2025-02-17 VITALS
TEMPERATURE: 97.5 F | RESPIRATION RATE: 18 BRPM | OXYGEN SATURATION: 99 % | DIASTOLIC BLOOD PRESSURE: 70 MMHG | HEART RATE: 91 BPM | SYSTOLIC BLOOD PRESSURE: 130 MMHG

## 2025-02-17 DIAGNOSIS — D50.9 MICROCYTIC ANEMIA: ICD-10-CM

## 2025-02-17 DIAGNOSIS — R79.89 ELEVATED FERRITIN: ICD-10-CM

## 2025-02-17 DIAGNOSIS — D50.9 MICROCYTIC ANEMIA: Primary | ICD-10-CM

## 2025-02-17 LAB
ALBUMIN SERPL BCG-MCNC: 4 G/DL (ref 3.5–5.1)
ALP SERPL-CCNC: 114 U/L (ref 38–126)
ALT SERPL W/O P-5'-P-CCNC: 27 U/L (ref 11–66)
ANION GAP SERPL CALC-SCNC: 20 MEQ/L (ref 8–16)
ANTI-COMPLEMENT: NORMAL
AST SERPL-CCNC: 17 U/L (ref 5–40)
BASOPHILS ABSOLUTE: 0 THOU/MM3 (ref 0–0.1)
BASOPHILS NFR BLD AUTO: 0 % (ref 0–3)
BILIRUB SERPL-MCNC: 0.2 MG/DL (ref 0.3–1.2)
BUN SERPL-MCNC: 24 MG/DL (ref 7–22)
CALCIUM SERPL-MCNC: 9.6 MG/DL (ref 8.5–10.5)
CHLORIDE SERPL-SCNC: 98 MEQ/L (ref 98–111)
CO2 SERPL-SCNC: 25 MEQ/L (ref 23–33)
CREAT SERPL-MCNC: 1.4 MG/DL (ref 0.4–1.2)
CYTOLOGY REPORT: NORMAL
DAT IGG: NORMAL
DAT POLY-SP REAG RBC QL: NORMAL
EOSINOPHIL NFR BLD AUTO: 4 % (ref 0–4)
EOSINOPHILS ABSOLUTE: 0.4 THOU/MM3 (ref 0–0.4)
ERYTHROCYTE [DISTWIDTH] IN BLOOD BY AUTOMATED COUNT: 14.9 % (ref 11.5–14.5)
FERRITIN SERPL IA-MCNC: 1343 NG/ML (ref 10–291)
GFR SERPL CREATININE-BSD FRML MDRD: 44 ML/MIN/1.73M2
GLUCOSE SERPL-MCNC: 167 MG/DL (ref 70–108)
HCT VFR BLD AUTO: 35.2 % (ref 37–47)
HGB BLD-MCNC: 10.8 GM/DL (ref 12–16)
HGB RETIC QN AUTO: 26.4 PG (ref 28.2–35.7)
IMM RETICS NFR: 18.2 % (ref 3–15.9)
IMMATURE GRANULOCYTES %: 0 %
IMMATURE GRANULOCYTES ABSOLUTE: 0.01 THOU/MM3 (ref 0–0.07)
INR PPP: 0.97 (ref 0.85–1.13)
IRON SATN MFR SERPL: 25 % (ref 20–50)
IRON SERPL-MCNC: 68 UG/DL (ref 50–170)
LYMPHOCYTES ABSOLUTE: 3.1 THOU/MM3 (ref 1–4.8)
LYMPHOCYTES NFR BLD AUTO: 33 % (ref 15–47)
MCH RBC QN AUTO: 23 PG (ref 26–33)
MCHC RBC AUTO-ENTMCNC: 30.7 GM/DL (ref 32.2–35.5)
MCV RBC AUTO: 75 FL (ref 81–99)
MONOCYTES ABSOLUTE: 0.6 THOU/MM3 (ref 0.4–1.3)
MONOCYTES NFR BLD AUTO: 7 % (ref 0–12)
NEUTROPHILS ABSOLUTE: 5.1 THOU/MM3 (ref 1.8–7.7)
NEUTROPHILS NFR BLD AUTO: 55 % (ref 43–75)
PLATELET # BLD AUTO: 385 THOU/MM3 (ref 130–400)
PMV BLD AUTO: 9.7 FL (ref 9.4–12.4)
POTASSIUM SERPL-SCNC: 4.1 MEQ/L (ref 3.5–5.2)
PROT SERPL-MCNC: 7.6 G/DL (ref 6.1–8)
RBC # BLD AUTO: 4.69 MILL/MM3 (ref 4.2–5.4)
RETICS # AUTO: 80 THOU/MM3 (ref 20–115)
RETICS/RBC NFR AUTO: 1.7 % (ref 0.5–2)
SODIUM SERPL-SCNC: 143 MEQ/L (ref 135–145)
TIBC SERPL-MCNC: 275 UG/DL (ref 171–450)
WBC # BLD AUTO: 9.3 THOU/MM3 (ref 4.8–10.8)

## 2025-02-17 PROCEDURE — 99211 OFF/OP EST MAY X REQ PHY/QHP: CPT

## 2025-02-17 PROCEDURE — 83540 ASSAY OF IRON: CPT

## 2025-02-17 PROCEDURE — 3078F DIAST BP <80 MM HG: CPT | Performed by: NURSE PRACTITIONER

## 2025-02-17 PROCEDURE — 84466 ASSAY OF TRANSFERRIN: CPT

## 2025-02-17 PROCEDURE — 83010 ASSAY OF HAPTOGLOBIN QUANT: CPT

## 2025-02-17 PROCEDURE — 99204 OFFICE O/P NEW MOD 45 MIN: CPT | Performed by: NURSE PRACTITIONER

## 2025-02-17 PROCEDURE — 85046 RETICYTE/HGB CONCENTRATE: CPT

## 2025-02-17 PROCEDURE — 80053 COMPREHEN METABOLIC PANEL: CPT

## 2025-02-17 PROCEDURE — 82668 ASSAY OF ERYTHROPOIETIN: CPT

## 2025-02-17 PROCEDURE — 82525 ASSAY OF COPPER: CPT

## 2025-02-17 PROCEDURE — 85610 PROTHROMBIN TIME: CPT

## 2025-02-17 PROCEDURE — 86880 COOMBS TEST DIRECT: CPT

## 2025-02-17 PROCEDURE — 85025 COMPLETE CBC W/AUTO DIFF WBC: CPT

## 2025-02-17 PROCEDURE — 36415 COLL VENOUS BLD VENIPUNCTURE: CPT

## 2025-02-17 PROCEDURE — 83021 HEMOGLOBIN CHROMOTOGRAPHY: CPT

## 2025-02-17 PROCEDURE — 3075F SYST BP GE 130 - 139MM HG: CPT | Performed by: NURSE PRACTITIONER

## 2025-02-17 PROCEDURE — 82728 ASSAY OF FERRITIN: CPT

## 2025-02-17 PROCEDURE — 84238 ASSAY NONENDOCRINE RECEPTOR: CPT

## 2025-02-17 PROCEDURE — 81256 HFE GENE: CPT

## 2025-02-17 NOTE — PATIENT INSTRUCTIONS
We will draw labs today.  You will be called with the results.    Do not take any iron tablets or get any iron infusions.     Please call with any questions or concerns.

## 2025-02-18 LAB
HAPTOGLOB SERPL-MCNC: 244 MG/DL (ref 30–200)
REVIEWED BY: NORMAL
SMEAR REVIEW: NORMAL
TRANSFERRIN SERPL-MCNC: 225 MG/DL (ref 200–360)

## 2025-02-19 LAB — EPO SERPL-ACNC: 10 MU/ML (ref 4–27)

## 2025-02-20 LAB
COPPER SERPL-MCNC: 126.1 UG/DL (ref 80–155)
HGB A1 MFR BLD: 97.1 % (ref 95–97.9)
HGB A2 MFR BLD: 2.5 % (ref 2–3.5)
HGB C MFR BLD: 0 % (ref 0–0)
HGB E MFR BLD: 0 % (ref 0–0)
HGB F MFR BLD: 0.4 % (ref 0–2.1)
HGB FRACT BLD ELPH-IMP: NORMAL
HGB OTHER MFR BLD: 0 % (ref 0–0)
HGB S BLD QL SOLY: NORMAL
HGB S MFR BLD: 0 % (ref 0–0)
PATH INTERP BLD-IMP: NORMAL
STFR SERPL-MCNC: 3.7 MG/L (ref 1.9–4.4)

## 2025-02-22 LAB
HFE GENE MUT ANL BLD/T: NORMAL
HFE P.C282Y BLD/T QL: NEGATIVE
HFE P.H63D BLD/T QL: NEGATIVE
HFE P.S65C BLD/T QL: NEGATIVE
SPECIMEN SOURCE: NORMAL

## 2025-02-27 ENCOUNTER — TELEPHONE (OUTPATIENT)
Dept: ONCOLOGY | Age: 55
End: 2025-02-27

## 2025-02-27 DIAGNOSIS — R79.89 ELEVATED FERRITIN: Primary | ICD-10-CM

## 2025-02-27 NOTE — TELEPHONE ENCOUNTER
Discussed results of her workup for elevated ferritin with Dr. Bañuelos and the plan is to monitor in 4 months and if ferritin levels increase will consider phlebotomy,      I reviewed her labs and the plan and she was agreeable.  I answered all her questions.

## 2025-03-05 ENCOUNTER — HOSPITAL ENCOUNTER (OUTPATIENT)
Age: 55
Discharge: HOME | End: 2025-03-05
Payer: MEDICAID

## 2025-03-05 LAB
25(OH)D3 SERPL-MCNC: 36.1 NG/ML (ref 30–100)
ALBUMIN SERPL-MCNC: 3.8 G/DL (ref 3.5–5.2)
ANION GAP SERPL CALCULATED.3IONS-SCNC: 10 MMOL/L (ref 9–16)
BACTERIA URNS QL MICRO: ABNORMAL
BILIRUB UR QL STRIP: NEGATIVE
BUN SERPL-MCNC: 21 MG/DL (ref 6–20)
BUN/CREAT SERPL: 16 (ref 9–20)
CALCIUM SERPL-MCNC: 9.2 MG/DL (ref 8.6–10.4)
CHLORIDE SERPL-SCNC: 105 MMOL/L (ref 98–107)
CLARITY UR: ABNORMAL
CO2 SERPL-SCNC: 26 MMOL/L (ref 20–31)
COLOR UR: YELLOW
CREAT SERPL-MCNC: 1.3 MG/DL (ref 0.5–0.9)
CREAT UR-MCNC: 161 MG/DL (ref 28–217)
EPI CELLS #/AREA URNS HPF: ABNORMAL /HPF (ref 0–25)
ERYTHROCYTE [DISTWIDTH] IN BLOOD BY AUTOMATED COUNT: 15.7 % (ref 11.8–14.4)
GFR, ESTIMATED: 48 ML/MIN/1.73M2
GLUCOSE SERPL-MCNC: 131 MG/DL (ref 74–99)
GLUCOSE UR STRIP-MCNC: ABNORMAL MG/DL
HCT VFR BLD AUTO: 34 % (ref 36.3–47.1)
HGB BLD-MCNC: 10.5 G/DL (ref 11.9–15.1)
HGB UR QL STRIP.AUTO: ABNORMAL
KETONES UR STRIP-MCNC: NEGATIVE MG/DL
LEUKOCYTE ESTERASE UR QL STRIP: ABNORMAL
MAGNESIUM SERPL-MCNC: 1.5 MG/DL (ref 1.6–2.6)
MCH RBC QN AUTO: 23.1 PG (ref 25.2–33.5)
MCHC RBC AUTO-ENTMCNC: 30.9 G/DL (ref 28.4–34.8)
MCV RBC AUTO: 74.7 FL (ref 82.6–102.9)
MICROALBUMIN UR-MCNC: 4720 MG/L (ref 0–20)
MICROALBUMIN/CREAT UR-RTO: 2932 MCG/MG CREAT (ref 0–25)
NITRITE UR QL STRIP: NEGATIVE
NRBC BLD-RTO: 0 PER 100 WBC
PH UR STRIP: 6 [PH] (ref 5–9)
PHOSPHATE SERPL-MCNC: 3.7 MG/DL (ref 2.5–4.5)
PLATELET # BLD AUTO: 368 K/UL (ref 138–453)
PMV BLD AUTO: 9 FL (ref 8.1–13.5)
POTASSIUM SERPL-SCNC: 3.4 MMOL/L (ref 3.7–5.3)
PROT UR STRIP-MCNC: ABNORMAL MG/DL
PTH-INTACT SERPL-MCNC: 46 PG/ML (ref 15–65)
RBC # BLD AUTO: 4.55 M/UL (ref 3.95–5.11)
RBC #/AREA URNS HPF: ABNORMAL /HPF (ref 0–2)
SODIUM SERPL-SCNC: 141 MMOL/L (ref 136–145)
SP GR UR STRIP: 1.02 (ref 1.01–1.02)
URATE SERPL-MCNC: 8.2 MG/DL (ref 2.4–5.7)
UROBILINOGEN UR STRIP-ACNC: NORMAL EU/DL (ref 0–1)
WBC #/AREA URNS HPF: ABNORMAL /HPF (ref 0–5)
WBC OTHER # BLD: 9.7 K/UL (ref 3.5–11.3)

## 2025-03-05 PROCEDURE — 84100 ASSAY OF PHOSPHORUS: CPT

## 2025-03-05 PROCEDURE — 36415 COLL VENOUS BLD VENIPUNCTURE: CPT

## 2025-03-05 PROCEDURE — 83970 ASSAY OF PARATHORMONE: CPT

## 2025-03-05 PROCEDURE — 81001 URINALYSIS AUTO W/SCOPE: CPT

## 2025-03-05 PROCEDURE — 82570 ASSAY OF URINE CREATININE: CPT

## 2025-03-05 PROCEDURE — 80048 BASIC METABOLIC PNL TOTAL CA: CPT

## 2025-03-05 PROCEDURE — 83735 ASSAY OF MAGNESIUM: CPT

## 2025-03-05 PROCEDURE — 82306 VITAMIN D 25 HYDROXY: CPT

## 2025-03-05 PROCEDURE — 82043 UR ALBUMIN QUANTITATIVE: CPT

## 2025-03-05 PROCEDURE — 84550 ASSAY OF BLOOD/URIC ACID: CPT

## 2025-03-05 PROCEDURE — 85027 COMPLETE CBC AUTOMATED: CPT

## 2025-03-05 PROCEDURE — 82040 ASSAY OF SERUM ALBUMIN: CPT

## 2025-03-06 NOTE — RESULT ENCOUNTER NOTE
Please ask Adriane when does she see Nephrology next?/ She had several labs done yesterday with many abnormal values noted.

## 2025-03-06 NOTE — RESULT ENCOUNTER NOTE
Ok, thank you . I do not think Nephrologist saw these yet, so maybe they will ask to see her sooner.

## 2025-03-13 ENCOUNTER — OFFICE VISIT (OUTPATIENT)
Dept: FAMILY MEDICINE CLINIC | Age: 55
End: 2025-03-13
Payer: MEDICAID

## 2025-03-13 VITALS
DIASTOLIC BLOOD PRESSURE: 92 MMHG | WEIGHT: 205.4 LBS | SYSTOLIC BLOOD PRESSURE: 134 MMHG | BODY MASS INDEX: 34.22 KG/M2 | OXYGEN SATURATION: 99 % | TEMPERATURE: 98 F | HEIGHT: 65 IN | HEART RATE: 88 BPM | RESPIRATION RATE: 16 BRPM

## 2025-03-13 DIAGNOSIS — E11.21 TYPE 2 DIABETES MELLITUS WITH DIABETIC NEPHROPATHY, WITH LONG-TERM CURRENT USE OF INSULIN (HCC): Primary | ICD-10-CM

## 2025-03-13 DIAGNOSIS — I10 ESSENTIAL HYPERTENSION: ICD-10-CM

## 2025-03-13 DIAGNOSIS — J06.9 UPPER RESPIRATORY TRACT INFECTION, UNSPECIFIED TYPE: ICD-10-CM

## 2025-03-13 DIAGNOSIS — E11.65 UNCONTROLLED TYPE 2 DIABETES MELLITUS WITH HYPERGLYCEMIA (HCC): ICD-10-CM

## 2025-03-13 DIAGNOSIS — Z79.4 TYPE 2 DIABETES MELLITUS WITH DIABETIC NEPHROPATHY, WITH LONG-TERM CURRENT USE OF INSULIN (HCC): Primary | ICD-10-CM

## 2025-03-13 LAB — HBA1C MFR BLD: 7.6 %

## 2025-03-13 PROCEDURE — 3074F SYST BP LT 130 MM HG: CPT | Performed by: NURSE PRACTITIONER

## 2025-03-13 PROCEDURE — 99214 OFFICE O/P EST MOD 30 MIN: CPT | Performed by: NURSE PRACTITIONER

## 2025-03-13 PROCEDURE — 83036 HEMOGLOBIN GLYCOSYLATED A1C: CPT | Performed by: NURSE PRACTITIONER

## 2025-03-13 PROCEDURE — 3080F DIAST BP >= 90 MM HG: CPT | Performed by: NURSE PRACTITIONER

## 2025-03-13 PROCEDURE — 3051F HG A1C>EQUAL 7.0%<8.0%: CPT | Performed by: NURSE PRACTITIONER

## 2025-03-13 RX ORDER — INSULIN LISPRO 100 [IU]/ML
14 INJECTION, SOLUTION INTRAVENOUS; SUBCUTANEOUS
Qty: 16.2 ML | Refills: 5
Start: 2025-03-13 | End: 2026-03-13

## 2025-03-13 RX ORDER — AZITHROMYCIN 250 MG/1
TABLET, FILM COATED ORAL
Qty: 6 TABLET | Refills: 0 | Status: SHIPPED | OUTPATIENT
Start: 2025-03-13 | End: 2025-03-23

## 2025-03-13 RX ORDER — LANOLIN ALCOHOL/MO/W.PET/CERES
400 CREAM (GRAM) TOPICAL DAILY
COMMUNITY
Start: 2025-03-10

## 2025-03-13 RX ORDER — GUAIFENESIN 600 MG/1
600 TABLET, EXTENDED RELEASE ORAL 2 TIMES DAILY
Qty: 30 TABLET | Refills: 0 | Status: SHIPPED | OUTPATIENT
Start: 2025-03-13 | End: 2025-03-28

## 2025-03-13 RX ORDER — ACYCLOVIR 400 MG/1
1 TABLET ORAL
Qty: 6 EACH | Refills: 3 | Status: SHIPPED | OUTPATIENT
Start: 2025-03-13 | End: 2026-03-13

## 2025-03-13 SDOH — ECONOMIC STABILITY: FOOD INSECURITY: WITHIN THE PAST 12 MONTHS, THE FOOD YOU BOUGHT JUST DIDN'T LAST AND YOU DIDN'T HAVE MONEY TO GET MORE.: NEVER TRUE

## 2025-03-13 SDOH — ECONOMIC STABILITY: INCOME INSECURITY: IN THE LAST 12 MONTHS, WAS THERE A TIME WHEN YOU WERE NOT ABLE TO PAY THE MORTGAGE OR RENT ON TIME?: NO

## 2025-03-13 SDOH — ECONOMIC STABILITY: FOOD INSECURITY: WITHIN THE PAST 12 MONTHS, YOU WORRIED THAT YOUR FOOD WOULD RUN OUT BEFORE YOU GOT MONEY TO BUY MORE.: NEVER TRUE

## 2025-03-13 NOTE — PROGRESS NOTES
[facility] PROFESSIONAL SERVS  [facility]  [address] [address]  Dept: [phone]  Loc: [phone]    Adriane Gunderson (:  1970) is a 54 y.o. female,Established patient, here for evaluation of the following chief complaint(s):  Diabetes (Patient presents for a 3 month f/u for Diabetes. /Last A1C = 8.1% on 2024. /Patient reports a chronic cough x 3 weeks, she is using a humidifier, cough drops and sinus medication.)      ASSESSMENT/PLAN:  1. Type 2 diabetes mellitus with diabetic nephropathy, with long-term current use of insulin (HCC)  Improving.  7.6%, previously 8.1%   Trulicity 4.5 mg, Farxiga 5 mg, Levemir  25 units q hs   Taking 18 units of fast acting three times a day before each meal.   Will decrease to 14 units as she is having episodes of hypoglycemia an hour after taking and eating.     -     POCT glycosylated hemoglobin (Hb A1C)  2. Uncontrolled type 2 diabetes mellitus with hyperglycemia (HCC)  See above.  -     Continuous Glucose Sensor (DEXCOM G7 SENSOR) MISC; 1 Device by [employer]. (Med.Supl.;Non-Drugs) route every 14 days, Disp-6 each, R-3Normal  -     insulin lispro, 1 Unit Dial, (HUMALOG KWIKPEN) 100 UNIT/ML SOPN; Inject 14 Units into the skin 3 times daily (before meals), Disp-16.2 mL, R-5Adjust Sig  3. Essential hypertension  134/92.  Continue taking amlodipine 10 mg daily, Coreg 25 mg daily losartan 50 mg daily.  Patient reminded to not take over-the-counter cold and cough medications as this can elevate her blood pressure and if needed during those times to use the brand Coricidin  4. Upper respiratory tract infection, unspecified type  Patient nontoxic-appearing and in no acute distress  She does report a chronic cough that started about 3 weeks ago previously had had bodyaches and nasal congestion but that has improved and she is just now left with this cough at times does feel a little short of breath with exertion.  No

## 2025-03-13 NOTE — PROGRESS NOTES
Health Maintenance Due   Topic Date Due    HIV screen  Never done    Hepatitis C screen  Never done    Diabetic retinal exam  02/19/2025      Patient has an eye exam on 04/07/2025 at [facility], [place]

## 2025-04-11 DIAGNOSIS — N30.00 ACUTE CYSTITIS WITHOUT HEMATURIA: ICD-10-CM

## 2025-04-15 RX ORDER — NITROFURANTOIN 25; 75 MG/1; MG/1
CAPSULE ORAL
Qty: 14 CAPSULE | Refills: 0 | OUTPATIENT
Start: 2025-04-15

## 2025-04-15 NOTE — TELEPHONE ENCOUNTER
Called and spoke with patient- she reports that she is not needing this refills and has no concerns at this time

## 2025-05-05 LAB — DIABETIC RETINOPATHY: POSITIVE

## 2025-05-09 ENCOUNTER — OFFICE VISIT (OUTPATIENT)
Dept: FAMILY MEDICINE CLINIC | Age: 55
End: 2025-05-09
Payer: MEDICAID

## 2025-05-09 ENCOUNTER — RESULTS FOLLOW-UP (OUTPATIENT)
Dept: FAMILY MEDICINE CLINIC | Age: 55
End: 2025-05-09

## 2025-05-09 VITALS
DIASTOLIC BLOOD PRESSURE: 78 MMHG | HEART RATE: 96 BPM | SYSTOLIC BLOOD PRESSURE: 136 MMHG | HEIGHT: 65 IN | BODY MASS INDEX: 34.22 KG/M2 | WEIGHT: 205.4 LBS | RESPIRATION RATE: 16 BRPM | TEMPERATURE: 97.8 F | OXYGEN SATURATION: 97 %

## 2025-05-09 DIAGNOSIS — E11.21 TYPE 2 DIABETES MELLITUS WITH DIABETIC NEPHROPATHY, WITH LONG-TERM CURRENT USE OF INSULIN (HCC): Primary | ICD-10-CM

## 2025-05-09 DIAGNOSIS — R30.0 DYSURIA: ICD-10-CM

## 2025-05-09 DIAGNOSIS — Z79.4 TYPE 2 DIABETES MELLITUS WITH DIABETIC NEPHROPATHY, WITH LONG-TERM CURRENT USE OF INSULIN (HCC): Primary | ICD-10-CM

## 2025-05-09 DIAGNOSIS — E87.6 HYPOKALEMIA: ICD-10-CM

## 2025-05-09 DIAGNOSIS — N18.2 STAGE 2 CHRONIC KIDNEY DISEASE: ICD-10-CM

## 2025-05-09 DIAGNOSIS — R39.9 UTI SYMPTOMS: ICD-10-CM

## 2025-05-09 LAB
ANION GAP SERPL CALC-SCNC: 12 MEQ/L (ref 8–16)
BASOPHILS ABSOLUTE: 0.1 THOU/MM3 (ref 0–0.1)
BASOPHILS NFR BLD AUTO: 0.7 %
BILIRUBIN, POC: NEGATIVE
BLOOD URINE, POC: NORMAL
BUN SERPL-MCNC: 38 MG/DL (ref 8–23)
CALCIUM SERPL-MCNC: 9.9 MG/DL (ref 8.6–10)
CHLORIDE SERPL-SCNC: 103 MEQ/L (ref 98–111)
CLARITY, POC: NORMAL
CO2 SERPL-SCNC: 25 MEQ/L (ref 22–29)
COLOR, POC: YELLOW
CREAT SERPL-MCNC: 1.6 MG/DL (ref 0.5–0.9)
DEPRECATED RDW RBC AUTO: 41.6 FL (ref 35–45)
EOSINOPHIL NFR BLD AUTO: 4.6 %
EOSINOPHILS ABSOLUTE: 0.4 THOU/MM3 (ref 0–0.4)
ERYTHROCYTE [DISTWIDTH] IN BLOOD BY AUTOMATED COUNT: 15.1 % (ref 11.5–14.5)
GFR SERPL CREATININE-BSD FRML MDRD: 38 ML/MIN/1.73M2
GLUCOSE SERPL-MCNC: 174 MG/DL (ref 74–109)
GLUCOSE URINE, POC: 250 MG/DL
HCT VFR BLD AUTO: 34.6 % (ref 37–47)
HGB BLD-MCNC: 10.3 GM/DL (ref 12–16)
IMM GRANULOCYTES # BLD AUTO: 0.03 THOU/MM3 (ref 0–0.07)
IMM GRANULOCYTES NFR BLD AUTO: 0.3 %
KETONES, POC: NEGATIVE MG/DL
LEUKOCYTE EST, POC: NORMAL
LYMPHOCYTES ABSOLUTE: 2.6 THOU/MM3 (ref 1–4.8)
LYMPHOCYTES NFR BLD AUTO: 28.3 %
MAGNESIUM SERPL-MCNC: 2.1 MG/DL (ref 1.6–2.6)
MCH RBC QN AUTO: 23.1 PG (ref 26–33)
MCHC RBC AUTO-ENTMCNC: 29.8 GM/DL (ref 32.2–35.5)
MCV RBC AUTO: 77.6 FL (ref 81–99)
MONOCYTES ABSOLUTE: 0.7 THOU/MM3 (ref 0.4–1.3)
MONOCYTES NFR BLD AUTO: 7.3 %
NEUTROPHILS ABSOLUTE: 5.4 THOU/MM3 (ref 1.8–7.7)
NEUTROPHILS NFR BLD AUTO: 58.8 %
NITRITE, POC: NEGATIVE
NRBC BLD AUTO-RTO: 0 /100 WBC
PH, POC: 5.5
PLATELET # BLD AUTO: 326 THOU/MM3 (ref 130–400)
PMV BLD AUTO: 9.7 FL (ref 9.4–12.4)
POTASSIUM SERPL-SCNC: 4.9 MEQ/L (ref 3.5–5.2)
PROTEIN, POC: POSITIVE MG/DL
RBC # BLD AUTO: 4.46 MILL/MM3 (ref 4.2–5.4)
SODIUM SERPL-SCNC: 140 MEQ/L (ref 135–145)
SPECIFIC GRAVITY, POC: 1.02
UROBILINOGEN, POC: 0.2 MG/DL
WBC # BLD AUTO: 9.2 THOU/MM3 (ref 4.8–10.8)

## 2025-05-09 PROCEDURE — 36415 COLL VENOUS BLD VENIPUNCTURE: CPT | Performed by: NURSE PRACTITIONER

## 2025-05-09 PROCEDURE — 3051F HG A1C>EQUAL 7.0%<8.0%: CPT | Performed by: NURSE PRACTITIONER

## 2025-05-09 PROCEDURE — 81003 URINALYSIS AUTO W/O SCOPE: CPT | Performed by: NURSE PRACTITIONER

## 2025-05-09 PROCEDURE — 3078F DIAST BP <80 MM HG: CPT | Performed by: NURSE PRACTITIONER

## 2025-05-09 PROCEDURE — 99215 OFFICE O/P EST HI 40 MIN: CPT | Performed by: NURSE PRACTITIONER

## 2025-05-09 PROCEDURE — 3075F SYST BP GE 130 - 139MM HG: CPT | Performed by: NURSE PRACTITIONER

## 2025-05-09 RX ORDER — POTASSIUM CHLORIDE 750 MG/1
10 TABLET, EXTENDED RELEASE ORAL DAILY
Qty: 90 TABLET | Refills: 3 | Status: SHIPPED | OUTPATIENT
Start: 2025-05-09 | End: 2026-05-09

## 2025-05-09 RX ORDER — PEN NEEDLE, DIABETIC 31 G X1/4"
1 NEEDLE, DISPOSABLE MISCELLANEOUS
Qty: 400 EACH | Refills: 3 | Status: SHIPPED | OUTPATIENT
Start: 2025-05-09

## 2025-05-09 NOTE — PROGRESS NOTES
[facility] PROFESSIONAL SERVS  [facility]  [address] [address]  Dept: [phone]  Loc: [phone]    Adriane Gunderson (:  1970) is a 54 y.o. female,Established patient, here for evaluation of the following chief complaint(s):  Urinary Tract Infection (Patient c/o urine odor, cloudy urine, and urinary frequency x 7 days. She said she felt feverish about first 5 days, not for the last 2 days. She reports feeling very tired and off balanced, but she is noticing improvement. She did have low back but has subsided. She said it was in the middle of the back. /She reports that her blood sugars have been 150, 160 to 202 in the morning./Patient needs pen needles 32 G x 6 mm instead of the 4mm length. ///) and Sinus Problem (Patient also c/o sinus congestion x a few months. She has been using a Dex's nasal inhaler. )      ASSESSMENT/PLAN:  1. Type 2 diabetes mellitus with diabetic nephropathy, with long-term current use of insulin ([facility])  Hemoglobin A1C   Date Value Ref Range Status   2025 7.6 % Final   Continue current treatment.   7.6%, previously 8.1%   Trulicity 4.5 mg, Farxiga 5 mg, Levemir  25 units q hs   Taking 18 units of fast acting three times a day before each meal.   Will decrease to 14 units as she is having episodes of hypoglycemia an hour after taking and eating.   Depending on next A1C mid , if still mid 7's or higher, will try for switching to mounjaro for better control and hopes to reduce insulin   -     Insulin Pen Needle ([employer] PEN NEEDLES) 31G X 6 MM MISC; 4 TIMES DAILY BEFORE MEALS & NIGHTLY Starting Fri 2025, Disp-400 each, R-3, Normal  2. Stage 2 chronic kidney disease  Nephrologist was   Referral to new Nephrologist,   3. Hypokalemia  -     potassium chloride (KLOR-CON M) 10 MEQ extended release tablet; Take 1 tablet by mouth daily, Disp-90 tablet, R-3Normal  -     Magnesium; Future  -     Basic Metabolic Panel;

## 2025-05-09 NOTE — PROGRESS NOTES
Health Maintenance Due   Topic Date Due    HIV screen  Never done    Hepatitis C screen  Never done    Diabetic foot exam  06/04/2025

## 2025-05-09 NOTE — RESULT ENCOUNTER NOTE
Adriane, your labs do not show any evidence of bacterial infection, so we will wait for the urine culture to be resulted.     Regarding your kidneys, if you are taking the Bumetanide 1 mg daily, can you decrease that to either every other day or a half tablet daily. It is having some negative impact on your kidneys.

## 2025-05-11 LAB
BACTERIA UR CULT: ABNORMAL
ORGANISM: ABNORMAL

## 2025-05-13 NOTE — RESULT ENCOUNTER NOTE
Urine culture showed a bacterial called Klebsiella. I am sending in Augmentin, she needs to start this today.   If worsening in symptoms have occurred, such as   fever, vomiting, then she should go to the ER.

## 2025-05-21 ASSESSMENT — ENCOUNTER SYMPTOMS
CHEST TIGHTNESS: 0
SINUS PRESSURE: 0
COUGH: 0
EYE PAIN: 0
VOMITING: 0
CHOKING: 0
BACK PAIN: 0
ABDOMINAL PAIN: 0
ABDOMINAL DISTENTION: 0
WHEEZING: 0
EYE ITCHING: 0
EYE DISCHARGE: 0
COLOR CHANGE: 0
VOICE CHANGE: 0
NAUSEA: 0
CONSTIPATION: 0
SHORTNESS OF BREATH: 0

## 2025-06-04 ENCOUNTER — PATIENT MESSAGE (OUTPATIENT)
Dept: FAMILY MEDICINE CLINIC | Age: 55
End: 2025-06-04

## 2025-06-04 DIAGNOSIS — J30.2 SEASONAL ALLERGIC RHINITIS, UNSPECIFIED TRIGGER: Primary | ICD-10-CM

## 2025-06-05 RX ORDER — FLUTICASONE PROPIONATE 50 MCG
2 SPRAY, SUSPENSION (ML) NASAL DAILY
Qty: 16 G | Refills: 3 | Status: SHIPPED | OUTPATIENT
Start: 2025-06-05

## 2025-06-05 NOTE — TELEPHONE ENCOUNTER
Spoke with pt, states she has a headache, stuffed nose, cough, for a couple months not new symptoms wondering what she can take. States she forgot to ask when in last month. Denies an appointment at this time.

## 2025-07-02 ENCOUNTER — OFFICE VISIT (OUTPATIENT)
Dept: FAMILY MEDICINE CLINIC | Age: 55
End: 2025-07-02
Payer: MEDICAID

## 2025-07-02 VITALS
WEIGHT: 208.8 LBS | BODY MASS INDEX: 34.79 KG/M2 | DIASTOLIC BLOOD PRESSURE: 74 MMHG | RESPIRATION RATE: 16 BRPM | HEIGHT: 65 IN | HEART RATE: 88 BPM | SYSTOLIC BLOOD PRESSURE: 138 MMHG | OXYGEN SATURATION: 98 %

## 2025-07-02 DIAGNOSIS — I10 ESSENTIAL HYPERTENSION: ICD-10-CM

## 2025-07-02 DIAGNOSIS — Z12.31 ENCOUNTER FOR SCREENING MAMMOGRAM FOR MALIGNANT NEOPLASM OF BREAST: ICD-10-CM

## 2025-07-02 DIAGNOSIS — E11.21 TYPE 2 DIABETES MELLITUS WITH DIABETIC NEPHROPATHY, WITH LONG-TERM CURRENT USE OF INSULIN (HCC): Primary | ICD-10-CM

## 2025-07-02 DIAGNOSIS — N18.30 STAGE 3 CHRONIC KIDNEY DISEASE, UNSPECIFIED WHETHER STAGE 3A OR 3B CKD (HCC): ICD-10-CM

## 2025-07-02 DIAGNOSIS — Z79.4 TYPE 2 DIABETES MELLITUS WITH DIABETIC NEPHROPATHY, WITH LONG-TERM CURRENT USE OF INSULIN (HCC): Primary | ICD-10-CM

## 2025-07-02 LAB — HBA1C MFR BLD: 7.1 %

## 2025-07-02 PROCEDURE — 3075F SYST BP GE 130 - 139MM HG: CPT | Performed by: NURSE PRACTITIONER

## 2025-07-02 PROCEDURE — 99214 OFFICE O/P EST MOD 30 MIN: CPT | Performed by: NURSE PRACTITIONER

## 2025-07-02 PROCEDURE — 83036 HEMOGLOBIN GLYCOSYLATED A1C: CPT | Performed by: NURSE PRACTITIONER

## 2025-07-02 PROCEDURE — 3078F DIAST BP <80 MM HG: CPT | Performed by: NURSE PRACTITIONER

## 2025-07-02 PROCEDURE — 3051F HG A1C>EQUAL 7.0%<8.0%: CPT | Performed by: NURSE PRACTITIONER

## 2025-07-02 RX ORDER — CIPROFLOXACIN 250 MG/1
250 TABLET, FILM COATED ORAL 2 TIMES DAILY
COMMUNITY
Start: 2025-06-30

## 2025-07-02 ASSESSMENT — ENCOUNTER SYMPTOMS
EYE PAIN: 0
VOMITING: 0
VOICE CHANGE: 0
EYE DISCHARGE: 0
WHEEZING: 0
SHORTNESS OF BREATH: 0
ABDOMINAL PAIN: 0
NAUSEA: 0
CHOKING: 0
ABDOMINAL DISTENTION: 0
BACK PAIN: 0
COUGH: 0
CONSTIPATION: 0
CHEST TIGHTNESS: 0
COLOR CHANGE: 0
SINUS PRESSURE: 0
EYE ITCHING: 0

## 2025-07-02 NOTE — PROGRESS NOTES
Health Maintenance Due   Topic Date Due    HIV screen  Never done    Hepatitis C screen  Never done    Diabetic foot exam  06/04/2025    Breast cancer screen  06/17/2025      Patient will do mammogram at [facility]. Ordered.   Patient willing to do diabetic foot exam.   
really tired, but she is following with Dr. Mejía (nephrology). Ordered a CT of Abdomen, due to bowel issues on 07/11/2025 at [place], [place].   07/09/2025 Patient has a stress test at [facility]              has a past medical history of Anemia, Diabetes mellitus (HCC), History of blood transfusion, Hyperlipidemia, Hypertension, Irregular heart rhythm, Macular edema of right eye, Mild nonproliferative diabetic retinopathy of right eye (HCC), Moderate nonproliferative diabetic retinopathy of both eyes (HCC), and Sciatica.    Review of Systems   Constitutional:  Negative for appetite change, chills, fatigue and fever.   HENT:  Negative for congestion, ear discharge, sinus pressure, tinnitus and voice change.    Eyes:  Negative for pain, discharge, itching and visual disturbance.   Respiratory:  Negative for cough, choking, chest tightness, shortness of breath and wheezing.    Cardiovascular:  Negative for chest pain, palpitations and leg swelling.   Gastrointestinal:  Negative for abdominal distention, abdominal pain, constipation, nausea and vomiting.   Endocrine: Negative for cold intolerance and heat intolerance.   Genitourinary:  Negative for dysuria, hematuria, vaginal discharge and vaginal pain.   Musculoskeletal:  Negative for arthralgias, back pain, gait problem, neck pain and neck stiffness.   Skin:  Negative for color change and rash.   Neurological:  Negative for dizziness, syncope, speech difficulty, light-headedness, numbness and headaches.   Psychiatric/Behavioral:  Negative for behavioral problems, confusion, self-injury and suicidal ideas. The patient is not nervous/anxious.        Physical Exam  Vitals and nursing note reviewed.   Constitutional:       General: She is not in acute distress.     Appearance: Normal appearance. She is well-developed. She is not ill-appearing or diaphoretic.   HENT:      Head: Normocephalic and atraumatic.      Right Ear: Tympanic membrane and external

## 2025-07-19 DIAGNOSIS — E11.65 UNCONTROLLED TYPE 2 DIABETES MELLITUS WITH HYPERGLYCEMIA (HCC): ICD-10-CM

## 2025-07-21 NOTE — TELEPHONE ENCOUNTER
Patient's last appointment was : 7/2/2025  Patient's next appointment is : 10/2/2025  Last refilled:03/13/25 16.2 ml, 5 refills

## 2025-07-22 RX ORDER — INSULIN LISPRO 100 [IU]/ML
14 INJECTION, SOLUTION INTRAVENOUS; SUBCUTANEOUS
Qty: 37.8 ML | Refills: 3 | Status: SHIPPED | OUTPATIENT
Start: 2025-07-22 | End: 2026-06-22

## 2025-07-30 ENCOUNTER — PATIENT MESSAGE (OUTPATIENT)
Dept: FAMILY MEDICINE CLINIC | Age: 55
End: 2025-07-30

## 2025-07-30 DIAGNOSIS — E11.65 UNCONTROLLED TYPE 2 DIABETES MELLITUS WITH HYPERGLYCEMIA (HCC): Primary | ICD-10-CM

## 2025-08-19 ENCOUNTER — PATIENT MESSAGE (OUTPATIENT)
Dept: FAMILY MEDICINE CLINIC | Age: 55
End: 2025-08-19

## 2025-08-19 RX ORDER — LANOLIN ALCOHOL/MO/W.PET/CERES
400 CREAM (GRAM) TOPICAL DAILY
Qty: 30 TABLET | Refills: 0 | Status: SHIPPED | OUTPATIENT
Start: 2025-08-19

## (undated) DEVICE — CANNULA ORAL NSL AD CO2 N INTUB O2 DEL DISP TRU LNK

## (undated) DEVICE — MEDI-VAC NON-CONDUCTIVE TUBING7MM X 30.5 (100FT): Brand: CARDINAL HEALTH

## (undated) DEVICE — SOLUTION IV IRRIG POUR BRL 0.9% SODIUM CHL 2F7124

## (undated) DEVICE — FORCEP SPEC RETRV BX AD 2 MMX155 CM 5 MM GI OVL CUP W/ NDL

## (undated) DEVICE — FORCEPS BX L240CM JAW DIA2.4MM ORNG L CAP W/ NDL DISP RAD